# Patient Record
Sex: FEMALE | Race: WHITE | Employment: OTHER | URBAN - NONMETROPOLITAN AREA
[De-identification: names, ages, dates, MRNs, and addresses within clinical notes are randomized per-mention and may not be internally consistent; named-entity substitution may affect disease eponyms.]

---

## 2017-07-25 ENCOUNTER — HOSPITAL ENCOUNTER (OUTPATIENT)
Age: 82
Discharge: HOME | End: 2017-07-25
Payer: MEDICARE

## 2017-07-25 LAB
ALBUMIN SERPL-MCNC: 4.2 G/DL (ref 3.5–5.1)
ALP BLD-CCNC: 66 U/L (ref 38–126)
ALT SERPL-CCNC: 25 U/L (ref 11–66)
ANION GAP SERPL CALCULATED.3IONS-SCNC: 13 MEQ/L (ref 8–16)
AST SERPL-CCNC: 39 U/L (ref 5–40)
BILIRUB SERPL-MCNC: 0.6 MG/DL (ref 0.3–1.2)
BILIRUBIN DIRECT: < 0.2 MG/DL (ref 0–0.3)
BUN BLDV-MCNC: 13 MG/DL (ref 7–22)
CALCIUM SERPL-MCNC: 9.9 MG/DL (ref 8.5–10.5)
CHLORIDE BLD-SCNC: 96 MEQ/L (ref 98–111)
CHOLESTEROL, TOTAL: 143 MG/DL (ref 100–199)
CO2: 33 MEQ/L (ref 23–33)
CREAT SERPL-MCNC: 0.8 MG/DL (ref 0.4–1.2)
GFR SERPL CREATININE-BSD FRML MDRD: 68 ML/MIN/1.73M2
GLUCOSE BLD-MCNC: 116 MG/DL (ref 70–108)
HDLC SERPL-MCNC: 40 MG/DL
LDL CHOLESTEROL CALCULATED: 66 MG/DL
POTASSIUM SERPL-SCNC: 4.4 MEQ/L (ref 3.5–5.2)
SODIUM BLD-SCNC: 142 MEQ/L (ref 135–145)
TOTAL PROTEIN: 7.3 G/DL (ref 6.1–8)
TRIGL SERPL-MCNC: 185 MG/DL (ref 0–199)

## 2017-07-25 PROCEDURE — 80061 LIPID PANEL: CPT

## 2017-07-25 PROCEDURE — 36415 COLL VENOUS BLD VENIPUNCTURE: CPT

## 2017-07-25 PROCEDURE — 80053 COMPREHEN METABOLIC PANEL: CPT

## 2017-07-25 PROCEDURE — 82248 BILIRUBIN DIRECT: CPT

## 2017-07-29 ENCOUNTER — APPOINTMENT (OUTPATIENT)
Dept: GENERAL RADIOLOGY | Age: 82
DRG: 310 | End: 2017-07-29
Payer: MEDICARE

## 2017-07-29 ENCOUNTER — HOSPITAL ENCOUNTER (INPATIENT)
Age: 82
LOS: 2 days | Discharge: HOME | DRG: 310 | End: 2017-07-31
Attending: FAMILY MEDICINE | Admitting: FAMILY MEDICINE
Payer: MEDICARE

## 2017-07-29 ENCOUNTER — APPOINTMENT (OUTPATIENT)
Dept: CT IMAGING | Age: 82
DRG: 310 | End: 2017-07-29
Payer: MEDICARE

## 2017-07-29 DIAGNOSIS — I48.21 PERMANENT ATRIAL FIBRILLATION (HCC): ICD-10-CM

## 2017-07-29 DIAGNOSIS — R23.2 FLUSHING: Primary | ICD-10-CM

## 2017-07-29 DIAGNOSIS — N28.89 RENAL MASS, RIGHT: ICD-10-CM

## 2017-07-29 DIAGNOSIS — N28.89 RENAL MASS: ICD-10-CM

## 2017-07-29 DIAGNOSIS — K76.9 HEPATIC LESION: ICD-10-CM

## 2017-07-29 PROBLEM — I48.91 ATRIAL FIBRILLATION (HCC): Status: ACTIVE | Noted: 2017-07-29

## 2017-07-29 PROBLEM — I10 ESSENTIAL HYPERTENSION: Status: ACTIVE | Noted: 2017-07-29

## 2017-07-29 LAB
ALBUMIN SERPL-MCNC: 4.3 G/DL (ref 3.5–5.1)
ALP BLD-CCNC: 65 U/L (ref 38–126)
ALT SERPL-CCNC: 31 U/L (ref 11–66)
ANION GAP SERPL CALCULATED.3IONS-SCNC: 13 MEQ/L (ref 8–16)
AST SERPL-CCNC: 39 U/L (ref 5–40)
BACTERIA: ABNORMAL /HPF
BASOPHILS # BLD: 0.5 %
BASOPHILS ABSOLUTE: 0 THOU/MM3 (ref 0–0.1)
BILIRUB SERPL-MCNC: 0.5 MG/DL (ref 0.3–1.2)
BILIRUBIN DIRECT: < 0.2 MG/DL (ref 0–0.3)
BILIRUBIN URINE: NEGATIVE
BLOOD, URINE: NEGATIVE
BUN BLDV-MCNC: 19 MG/DL (ref 7–22)
CALCIUM SERPL-MCNC: 10.1 MG/DL (ref 8.5–10.5)
CASTS 2: ABNORMAL /LPF
CASTS UA: ABNORMAL /LPF
CEA: 2 NG/ML (ref 0–5)
CHARACTER, URINE: CLEAR
CHLORIDE BLD-SCNC: 94 MEQ/L (ref 98–111)
CO2: 31 MEQ/L (ref 23–33)
COLOR: YELLOW
CREAT SERPL-MCNC: 0.8 MG/DL (ref 0.4–1.2)
CRYSTALS, UA: ABNORMAL
EOSINOPHIL # BLD: 1 %
EOSINOPHILS ABSOLUTE: 0.1 THOU/MM3 (ref 0–0.4)
EPITHELIAL CELLS, UA: ABNORMAL /HPF
GFR SERPL CREATININE-BSD FRML MDRD: 68 ML/MIN/1.73M2
GLUCOSE BLD-MCNC: 107 MG/DL (ref 70–108)
GLUCOSE URINE: NEGATIVE MG/DL
HCT VFR BLD CALC: 43 % (ref 37–47)
HEMOGLOBIN: 14.5 GM/DL (ref 12–16)
INR BLD: 1.11 (ref 0.85–1.13)
KETONES, URINE: NEGATIVE
LEUKOCYTE ESTERASE, URINE: ABNORMAL
LYMPHOCYTES # BLD: 26.4 %
LYMPHOCYTES ABSOLUTE: 1.6 THOU/MM3 (ref 1–4.8)
MCH RBC QN AUTO: 31.3 PG (ref 27–31)
MCHC RBC AUTO-ENTMCNC: 33.8 GM/DL (ref 33–37)
MCV RBC AUTO: 92.4 FL (ref 81–99)
MISCELLANEOUS 2: ABNORMAL
MONOCYTES # BLD: 6.5 %
MONOCYTES ABSOLUTE: 0.4 THOU/MM3 (ref 0.4–1.3)
NITRITE, URINE: NEGATIVE
NUCLEATED RED BLOOD CELLS: 0 /100 WBC
OSMOLALITY CALCULATION: 278.4 MOSMOL/KG (ref 275–300)
PDW BLD-RTO: 14.5 % (ref 11.5–14.5)
PH UA: 7
PLATELET # BLD: 161 THOU/MM3 (ref 130–400)
PMV BLD AUTO: 8.1 MCM (ref 7.4–10.4)
POTASSIUM SERPL-SCNC: 4.5 MEQ/L (ref 3.5–5.2)
PRO-BNP: 936.8 PG/ML (ref 0–1800)
PROCALCITONIN: 0.04 NG/ML (ref 0.01–0.09)
PROTEIN UA: NEGATIVE
RBC # BLD: 4.65 MILL/MM3 (ref 4.2–5.4)
RBC # BLD: NORMAL 10*6/UL
RBC URINE: ABNORMAL /HPF
RENAL EPITHELIAL, UA: ABNORMAL
SEG NEUTROPHILS: 65.6 %
SEGMENTED NEUTROPHILS ABSOLUTE COUNT: 4 THOU/MM3 (ref 1.8–7.7)
SODIUM BLD-SCNC: 138 MEQ/L (ref 135–145)
SPECIFIC GRAVITY, URINE: 1.01 (ref 1–1.03)
TOTAL PROTEIN: 7.5 G/DL (ref 6.1–8)
TROPONIN T: < 0.01 NG/ML
TSH SERPL DL<=0.05 MIU/L-ACNC: 3.14 UIU/ML (ref 0.4–4.2)
UROBILINOGEN, URINE: 0.2 EU/DL
WBC # BLD: 6.1 THOU/MM3 (ref 4.8–10.8)
WBC UA: ABNORMAL /HPF
YEAST: ABNORMAL

## 2017-07-29 PROCEDURE — 74177 CT ABD & PELVIS W/CONTRAST: CPT

## 2017-07-29 PROCEDURE — 99222 1ST HOSP IP/OBS MODERATE 55: CPT | Performed by: FAMILY MEDICINE

## 2017-07-29 PROCEDURE — 99285 EMERGENCY DEPT VISIT HI MDM: CPT

## 2017-07-29 PROCEDURE — G0378 HOSPITAL OBSERVATION PER HR: HCPCS

## 2017-07-29 PROCEDURE — 36415 COLL VENOUS BLD VENIPUNCTURE: CPT

## 2017-07-29 PROCEDURE — 86301 IMMUNOASSAY TUMOR CA 19-9: CPT

## 2017-07-29 PROCEDURE — 85025 COMPLETE CBC W/AUTO DIFF WBC: CPT

## 2017-07-29 PROCEDURE — 1200000003 HC TELEMETRY R&B

## 2017-07-29 PROCEDURE — 84443 ASSAY THYROID STIM HORMONE: CPT

## 2017-07-29 PROCEDURE — 71260 CT THORAX DX C+: CPT

## 2017-07-29 PROCEDURE — 84145 PROCALCITONIN (PCT): CPT

## 2017-07-29 PROCEDURE — 82378 CARCINOEMBRYONIC ANTIGEN: CPT

## 2017-07-29 PROCEDURE — 93005 ELECTROCARDIOGRAM TRACING: CPT

## 2017-07-29 PROCEDURE — 82248 BILIRUBIN DIRECT: CPT

## 2017-07-29 PROCEDURE — 80053 COMPREHEN METABOLIC PANEL: CPT

## 2017-07-29 PROCEDURE — 6360000004 HC RX CONTRAST MEDICATION: Performed by: FAMILY MEDICINE

## 2017-07-29 PROCEDURE — 83880 ASSAY OF NATRIURETIC PEPTIDE: CPT

## 2017-07-29 PROCEDURE — 85610 PROTHROMBIN TIME: CPT

## 2017-07-29 PROCEDURE — 84484 ASSAY OF TROPONIN QUANT: CPT

## 2017-07-29 PROCEDURE — 87086 URINE CULTURE/COLONY COUNT: CPT

## 2017-07-29 PROCEDURE — 6370000000 HC RX 637 (ALT 250 FOR IP): Performed by: FAMILY MEDICINE

## 2017-07-29 PROCEDURE — 81001 URINALYSIS AUTO W/SCOPE: CPT

## 2017-07-29 PROCEDURE — 71010 XR CHEST PORTABLE: CPT

## 2017-07-29 RX ORDER — AMLODIPINE BESYLATE 5 MG/1
5 TABLET ORAL ONCE
Status: COMPLETED | OUTPATIENT
Start: 2017-07-29 | End: 2017-07-29

## 2017-07-29 RX ADMIN — IOPAMIDOL 80 ML: 755 INJECTION, SOLUTION INTRAVENOUS at 21:46

## 2017-07-29 RX ADMIN — AMLODIPINE BESYLATE 5 MG: 5 TABLET ORAL at 22:54

## 2017-07-29 ASSESSMENT — ENCOUNTER SYMPTOMS
RHINORRHEA: 0
VOMITING: 0
COUGH: 0
SHORTNESS OF BREATH: 0
DIARRHEA: 0
ABDOMINAL PAIN: 0
SORE THROAT: 0
EYE PAIN: 0
EYE DISCHARGE: 0
WHEEZING: 0
BACK PAIN: 0
NAUSEA: 0
COLOR CHANGE: 0

## 2017-07-29 NOTE — ED PROVIDER NOTES
[facility]  eMERGENCY dEPARTMENT eNCOUnter          CHIEF COMPLAINT       Chief Complaint   Patient presents with    Other     feeling flushed       Nurses Notes reviewed and I agree except as noted in the HPI. HISTORY OF PRESENT ILLNESS    Ava Bernstein is a 80 y.o. female who presents to the Emergency Department with her son for the evaluation of feeling flushed. The patient states her first episode was last Monday which lasted for 20 seconds and then resolved on its own. The patient then had a second episode this Thursday which lasted for several hours. Just prior to arrival, the patient was waiting to order food where she started to become flush and then asked her son to take her to the ED. The patient reports these episodes can come on when she is resting, walking or when she is active. She states these episodes make her \"not feel right\" and \"everything stops\". She cannot further elaborate. She has history of similar episodes years ago which she has an extensive cardiac workup. She was found to have a fib and started on Xarelto. At that time, she was told she did not require a pace-maker. She follows up with Dr. Baljinder Bruce annually. She denies all other complains. She also denies any new or changing back pain and leg swelling. The patient also denies nausea, vomiting, urinary problems, numbness and tingling. She has no history or stents, strokes, DVT, and PE. She denies any recent travel. The HPI was provided by the patient. REVIEW OF SYSTEMS     Review of Systems   Constitutional: Negative for appetite change, chills, fatigue and fever. Positive for malaise and feeling flushed. HENT: Negative for congestion, ear pain, rhinorrhea and sore throat. Eyes: Negative for pain, discharge and visual disturbance. Respiratory: Negative for cough, shortness of breath and wheezing. Cardiovascular: Negative for chest pain, palpitations and leg swelling. pulmonary edema. Small pericardial effusion. No displaced fracture is seen. Motion artifact limits exam. Reticular lower to mid lung density suggestive of scarring and/or atelectasis. No pleural effusion or pneumothorax No lymphadenopathy is seen. Heterogeneity at right thyroid lobe suggestive of nodules which appear to be chronic although follow-up ultrasound for further evaluation may be helpful. Moderate atherosclerotic calcifications. Mild to moderate scoliosis and moderate spinal degenerative changes IMPRESSION:  1. No acute intrathoracic abnormality is identified. 2. Further details and incidental findings are described above. Final report electronically signed by Dr. Alex Carrillo on 7/29/2017 10:24 PM    Ct Abdomen Pelvis W Iv Contrast Additional Contrast? None    Result Date: 7/29/2017  PROCEDURE: CT ABDOMEN PELVIS W IV CONTRAST CLINICAL INFORMATION: patient with flusing, history of renal mass . COMPARISON: 2-15, 9-15, 2-16, 5-16 TECHNIQUE: CT of abdomen and pelvis with IV Isovue. All CT scans at this facility use dose modulation, iterative reconstruction, and/or weight-based dosing when appropriate to reduce radiation dose to as low as reasonably achievable. FINDINGS: Posterior right hepatic lobe has indistinct hypodensity approximated at 3.5 x 2.8 cm Hysterectomy. No bowel obstruction. Moderate atherosclerotic disease Moderate spinal degenerative changes. Subcentimeter low-attenuation lesion left hepatic lobe too small to characterize on image 24. Mass posteriorly of the right kidney however has soft tissue around the periphery and central low attenuation, size up to about 2.6 cm Mild pancreatic atrophy. Motion limits the exam     1. No acute abnormality is identified in the abdomen and pelvis.  2.  Mass at the posterior right kidney is about 2.6 cm having peripheral soft tissue attenuation and central cystic, not significantly changed but a renal cell carcinoma is a consideration including prior exams

## 2017-07-29 NOTE — IP AVS SNAPSHOT
8/1/17  AM                            pravastatin 20 MG tablet   Commonly known as:  PRAVACHOL   Take 20 mg by mouth daily. 20 mg on 7/30/2017  9:14 PM     Tonight                          rivaroxaban 20 MG Tabs tablet   Commonly known as:  XARELTO   Take 20 mg by mouth daily                20 mg on 7/30/2017  6:06 PM     Today at 6pm                          therapeutic multivitamin-minerals tablet   Take 1 tablet by mouth daily. 1 tablet on 7/31/2017  9:18 AM     Tomorrow  8/1/17  AM                            OCUVITE PO   Take 1 tablet by mouth daily                1 tablet on 7/31/2017  9:18 AM     Tomorrow  8/1/17  AM                            VITAMIN D-3 PO   Take by mouth daily                  Tomorrow  8/1/17  AM                              These are the medications you have told us you were taking at home, STOP taking them after you leave the hospital     VITAMIN E PO               Allergies as of 7/31/2017        Reactions    Bactrim [Sulfamethoxazole-trimethoprim]     Ciprofloxacin Diarrhea    Sulfa Antibiotics       Immunizations as of 7/31/2017     No immunizations on file. Last Vitals          Most Recent Value    Temp  98.2 °F (36.8 °C)    Pulse  84    Resp  16    BP  (!)  135/90         After Visit Summary    This summary was created for you. Thank you for entrusting your care to us. The following information includes details about your hospital/visit stay along with steps you should take to help with your recovery once you leave the hospital.  In this packet, you will find information about the topics listed below:    · Instructions about your medications including a list of your home medications  · A summary of your hospital visit  · Follow-up appointments once you have left the hospital  · Your care plan at home      You may receive a survey regarding the care you received during your stay. [address] [place] [address]       9/20/2017 1:15 PM     Appointment with Elmer Tran MD at [facility] ([phone])   Please arrive 15 minutes prior to appointment, bring photo ID and insurance card. Please arrive 15 minutes prior to appointment, bring photo ID and insurance card. [address] [address]       10/2/2017     Imaging:  MRI ABDOMEN W WO CONTRAST          Preventive Care        Date Due    Tetanus Combination Vaccine (1 - Tdap) 4/22/1948    Zoster Vaccine 4/22/1989    Pneumococcal Vaccines (two) for all adults aged 72 and over (1 of 2 - PCV13) 4/22/1994    Yearly Flu Vaccine (1) 8/1/2017                 Care Plan Once You Return Home    This section includes instructions you will need to follow once you leave the hospital.  Your care team will discuss these with you, so you and those caring for you know how to best care for your health needs at home. This section may also include educational information about certain health topics that may be of help to you. Diet Instructions     ? Good nutrition is important when healing from an illness, injury, or surgery. Follow any nutrition recommendations given to you during your hospital stay. ? If you were given an oral nutrition supplement while in the hospital, continue to take this supplement at home. You can take it with meals, in-between meals, and/or before bedtime. These supplements can be purchased at most local grocery stores, pharmacies, and chain super-stores.    ? If you have any questions about your diet or nutrition, call the hospital and ask for the dietitian.  general           Activity Instructions     As tolerated           Procedures and Other Instructions     MRI ABDOMEN W WO CONTRAST       Reason for exam:  right renal mass, liver lesion               MyChart Signup     MyChart allows you to send messages to your doctor, view your test

## 2017-07-30 LAB
CA 19-9: < 1 U/ML (ref 0–35)
EKG ATRIAL RATE: 416 BPM
EKG Q-T INTERVAL: 404 MS
EKG QRS DURATION: 140 MS
EKG QTC CALCULATION (BAZETT): 480 MS
EKG R AXIS: -56 DEGREES
EKG T AXIS: -26 DEGREES
EKG VENTRICULAR RATE: 85 BPM
ORGANISM: ABNORMAL
URINE CULTURE REFLEX: ABNORMAL

## 2017-07-30 PROCEDURE — 6370000000 HC RX 637 (ALT 250 FOR IP): Performed by: FAMILY MEDICINE

## 2017-07-30 PROCEDURE — 99221 1ST HOSP IP/OBS SF/LOW 40: CPT | Performed by: NURSE PRACTITIONER

## 2017-07-30 PROCEDURE — 2580000003 HC RX 258: Performed by: FAMILY MEDICINE

## 2017-07-30 PROCEDURE — 1200000000 HC SEMI PRIVATE

## 2017-07-30 PROCEDURE — G0378 HOSPITAL OBSERVATION PER HR: HCPCS

## 2017-07-30 RX ORDER — CALCIUM CARBONATE 200(500)MG
500 TABLET,CHEWABLE ORAL DAILY
Status: DISCONTINUED | OUTPATIENT
Start: 2017-07-30 | End: 2017-07-31 | Stop reason: HOSPADM

## 2017-07-30 RX ORDER — HYDROCHLOROTHIAZIDE 25 MG/1
12.5 TABLET ORAL DAILY
Status: DISCONTINUED | OUTPATIENT
Start: 2017-07-30 | End: 2017-07-30

## 2017-07-30 RX ORDER — LISINOPRIL AND HYDROCHLOROTHIAZIDE 20; 12.5 MG/1; MG/1
1 TABLET ORAL DAILY
Status: DISCONTINUED | OUTPATIENT
Start: 2017-07-30 | End: 2017-07-30 | Stop reason: SDUPTHER

## 2017-07-30 RX ORDER — AMLODIPINE BESYLATE 5 MG/1
5 TABLET ORAL DAILY
Status: DISCONTINUED | OUTPATIENT
Start: 2017-07-30 | End: 2017-07-31 | Stop reason: HOSPADM

## 2017-07-30 RX ORDER — ACETAMINOPHEN 325 MG/1
650 TABLET ORAL EVERY 4 HOURS PRN
Status: DISCONTINUED | OUTPATIENT
Start: 2017-07-30 | End: 2017-07-31 | Stop reason: HOSPADM

## 2017-07-30 RX ORDER — ONDANSETRON 2 MG/ML
4 INJECTION INTRAMUSCULAR; INTRAVENOUS EVERY 6 HOURS PRN
Status: DISCONTINUED | OUTPATIENT
Start: 2017-07-30 | End: 2017-07-31 | Stop reason: HOSPADM

## 2017-07-30 RX ORDER — LISINOPRIL AND HYDROCHLOROTHIAZIDE 20; 12.5 MG/1; MG/1
1 TABLET ORAL DAILY
Status: DISCONTINUED | OUTPATIENT
Start: 2017-07-31 | End: 2017-07-31 | Stop reason: HOSPADM

## 2017-07-30 RX ORDER — POTASSIUM CHLORIDE 750 MG/1
10 TABLET, FILM COATED, EXTENDED RELEASE ORAL DAILY
Status: DISCONTINUED | OUTPATIENT
Start: 2017-07-30 | End: 2017-07-31 | Stop reason: HOSPADM

## 2017-07-30 RX ORDER — PRAVASTATIN SODIUM 20 MG
20 TABLET ORAL NIGHTLY
Status: DISCONTINUED | OUTPATIENT
Start: 2017-07-30 | End: 2017-07-31 | Stop reason: HOSPADM

## 2017-07-30 RX ORDER — ATENOLOL 25 MG/1
25 TABLET ORAL DAILY
Status: DISCONTINUED | OUTPATIENT
Start: 2017-07-30 | End: 2017-07-31 | Stop reason: HOSPADM

## 2017-07-30 RX ORDER — LISINOPRIL 20 MG/1
20 TABLET ORAL DAILY
Status: DISCONTINUED | OUTPATIENT
Start: 2017-07-30 | End: 2017-07-30

## 2017-07-30 RX ORDER — SODIUM CHLORIDE 0.9 % (FLUSH) 0.9 %
10 SYRINGE (ML) INJECTION PRN
Status: DISCONTINUED | OUTPATIENT
Start: 2017-07-30 | End: 2017-07-31 | Stop reason: HOSPADM

## 2017-07-30 RX ORDER — M-VIT,TX,IRON,MINS/CALC/FOLIC 27MG-0.4MG
1 TABLET ORAL DAILY
Status: DISCONTINUED | OUTPATIENT
Start: 2017-07-30 | End: 2017-07-31 | Stop reason: HOSPADM

## 2017-07-30 RX ORDER — PHENOL 1.4 %
1 AEROSOL, SPRAY (ML) MUCOUS MEMBRANE DAILY
Status: DISCONTINUED | OUTPATIENT
Start: 2017-07-30 | End: 2017-07-30 | Stop reason: CLARIF

## 2017-07-30 RX ORDER — SODIUM CHLORIDE 0.9 % (FLUSH) 0.9 %
10 SYRINGE (ML) INJECTION EVERY 12 HOURS SCHEDULED
Status: DISCONTINUED | OUTPATIENT
Start: 2017-07-30 | End: 2017-07-31 | Stop reason: HOSPADM

## 2017-07-30 RX ORDER — FAMOTIDINE 20 MG/1
20 TABLET, FILM COATED ORAL 2 TIMES DAILY
Status: DISCONTINUED | OUTPATIENT
Start: 2017-07-30 | End: 2017-07-31 | Stop reason: HOSPADM

## 2017-07-30 RX ADMIN — Medication 10 ML: at 12:03

## 2017-07-30 RX ADMIN — AMLODIPINE BESYLATE 5 MG: 5 TABLET ORAL at 12:01

## 2017-07-30 RX ADMIN — Medication 20 MG: at 21:14

## 2017-07-30 RX ADMIN — LISINOPRIL 20 MG: 20 TABLET ORAL at 12:02

## 2017-07-30 RX ADMIN — FAMOTIDINE 20 MG: 20 TABLET, FILM COATED ORAL at 21:11

## 2017-07-30 RX ADMIN — HYDROCHLOROTHIAZIDE 12.5 MG: 25 TABLET ORAL at 12:02

## 2017-07-30 RX ADMIN — ATENOLOL 25 MG: 25 TABLET ORAL at 12:02

## 2017-07-30 RX ADMIN — FAMOTIDINE 20 MG: 20 TABLET, FILM COATED ORAL at 12:00

## 2017-07-30 RX ADMIN — Medication 10 ML: at 21:12

## 2017-07-30 ASSESSMENT — PAIN SCALES - GENERAL: PAINLEVEL_OUTOF10: 0

## 2017-07-30 NOTE — ONCOLOGY
[facility] PROFESSIONAL SERVICES  [facility]  [address] [facility] [address] [facility] [address]  [address] [address]  Dept: [phone]  Dept Fax: [phone]: [phone]    Brief note, full consult to follow    Patient:  Shantell Saunders    Chief Complaint: Renal mass, hepatic mass    Patient will require a tissue diagnosis for further oncology recommendations. Urology has been consulted for kidney mass. If tissue diagnosis is not obtained from kidney, then biopsy of liver lesion may be necessary. Vance Figueroa M.D.   [facility]

## 2017-07-30 NOTE — CONSULTS
[facility] Urology Consult Note  [facility]  [facility], [address]    Reason for Consult:  Right renal mass  Requesting Physician:  Dr. Gila Lombardi    History Obtained From:  patient, electronic medical record    HISTORY OF PRESENT ILLNESS:                The patient is a 80 y.o. female with significant past medical history of atrial fibrillation, HTN who presents with feeling of being flushed. She is a patient of Dr. Santos Rodriguez with a known right renal mass. We have been following this as she does not want to undergo surgical procedure to remove it. She denies renal colic, weight loss, night sweats, nausea, vomiting, gross hematuria.     Past Medical History:        Diagnosis Date    Atrial fibrillation ([address]     Gross hematuria 2014    Dr Adi Perrin HTN (hypertension)      Past Surgical History:        Procedure Laterality Date    FOOT SURGERY      x2    HYSTERECTOMY  1982    KNEE SURGERY  2010     Current Medications:   Current Facility-Administered Medications: amLODIPine (NORVASC) tablet 5 mg, 5 mg, Oral, Daily  atenolol (TENORMIN) tablet 25 mg, 25 mg, Oral, Daily  therapeutic multivitamin-minerals 1 tablet, 1 tablet, Oral, Daily  potassium chloride (KLOR-CON) extended release tablet 10 mEq, 10 mEq, Oral, Daily  pravastatin (PRAVACHOL) tablet 20 mg, 20 mg, Oral, Nightly  rivaroxaban (XARELTO) tablet 20 mg, 20 mg, Oral, Daily  sodium chloride flush 0.9 % injection 10 mL, 10 mL, Intravenous, 2 times per day  sodium chloride flush 0.9 % injection 10 mL, 10 mL, Intravenous, PRN  acetaminophen (TYLENOL) tablet 650 mg, 650 mg, Oral, Q4H PRN  magnesium hydroxide (MILK OF MAGNESIA) 400 MG/5ML suspension 30 mL, 30 mL, Oral, Daily PRN  ondansetron (ZOFRAN) injection 4 mg, 4 mg, Intravenous, Q6H PRN  famotidine (PEPCID) tablet 20 mg, 20 mg, Oral, BID  calcium carbonate (TUMS) chewable tablet 500 mg, 500 mg, Oral, Daily  lisinopril (PRINIVIL;ZESTRIL) tablet 20 mg, 20 mg, Oral, Daily **AND** hydrochlorothiazide active. Extremities:    No cyanosis, clubbing, or edema present. No tenderness of lower extremities. Neurological:    Alert and oriented. No cranial nerve deficit. There are no focalizing motor or   Psychiatric:    Normal mood and affect. DATA:  CBC:   Lab Results   Component Value Date    WBC 6.1 07/29/2017    RBC 4.65 07/29/2017    RBC 4.27 07/18/2011    HGB 14.5 07/29/2017    HCT 43.0 07/29/2017    MCV 92.4 07/29/2017    MCH 31.3 07/29/2017    MCHC 33.8 07/29/2017    RDW 14.5 07/29/2017     07/29/2017    MPV 8.1 07/29/2017     BMP:    Lab Results   Component Value Date     07/29/2017    K 4.5 07/29/2017    CL 94 07/29/2017    CO2 31 07/29/2017    BUN 19 07/29/2017    CREATININE 0.8 07/29/2017    CALCIUM 10.1 07/29/2017    LABGLOM 68 07/29/2017    GLUCOSE 107 07/29/2017    GLUCOSE 111 05/15/2012     BUN/Creatinine:    Lab Results   Component Value Date    BUN 19 07/29/2017    CREATININE 0.8 07/29/2017     Magnesium:    Lab Results   Component Value Date    MG 2.0 05/26/2016     Phosphorus:  No results found for: PHOS  PT/INR:    Lab Results   Component Value Date    PROTIME 2.09 12/20/2011    INR 1.11 07/29/2017     U/A:    Lab Results   Component Value Date    NITRITE neg 09/20/2016    COLORU YELLOW 07/29/2017    PHUR 7.0 07/29/2017    WBCUA 2-4 07/29/2017    WBCUA 25-50 07/18/2011    RBCUA 0-2 07/29/2017    MUCUS THREADS 07/18/2011    YEAST NONE SEEN 07/29/2017    BACTERIA NONE 07/29/2017    SPECGRAV 1.015 07/12/2014    LEUKOCYTESUR MODERATE 07/29/2017    UROBILINOGEN 0.2 07/29/2017    BILIRUBINUR NEGATIVE 07/29/2017    BILIRUBINUR NEGATIVE 07/18/2011    BLOODU NEGATIVE 07/29/2017    GLUCOSEU NEGATIVE 07/29/2017     Urine Culture:  No components found for: CURINE  Blood Culture:  No components found for: CBLOOD, CFUNGUSBL    IMAGING: The patient has had a CT With Contrast which I have reviewed along with its accompanying report.   The study demonstrates posterior right renal mass 2.6 cm

## 2017-07-30 NOTE — ED NOTES
Pt resting in bed with side rails up 2x2 and call light in reach.         Eddi Foley RN  07/30/17 2524

## 2017-07-30 NOTE — H&P
noted above are negative. Vitals:   Vitals:    07/29/17 2255   BP: (!) 176/94   Pulse: 77   Resp:    Temp:    SpO2: 96%      BMI: Body mass index is 29.18 kg/(m^2).                 Exam:  Physical Examination: General appearance - alert, well appearing, and in no distress  Mental status - alert, oriented to person, place, and time  Neck - supple, no significant adenopathy, no JVD, or carotid bruits  Chest - clear to auscultation, no wheezes, rales or rhonchi, symmetric air entry  Heart - normal rate, regular rhythm, normal S1, S2, no murmurs, rubs, clicks or gallops  Abdomen - soft, nontender, nondistended, no masses or organomegaly  Neurological - alert, oriented, normal speech, no focal findings or movement disorder noted  Musculoskeletal - no joint tenderness, deformity or swelling  Extremities - peripheral pulses normal, no pedal edema, no clubbing or cyanosis  Skin - normal coloration and turgor, no rashes, no suspicious skin lesions noted      Review of Labs and Diagnostic Testing:    Recent Results (from the past 24 hour(s))   EKG 12 Lead    Collection Time: 07/29/17  7:04 PM   Result Value Ref Range    Ventricular Rate 85 BPM    Atrial Rate 416 BPM    QRS Duration 140 ms    Q-T Interval 404 ms    QTc Calculation (Bazett) 480 ms    R Axis -56 degrees    T Axis -26 degrees   CBC auto differential    Collection Time: 07/29/17  7:55 PM   Result Value Ref Range    WBC 6.1 4.8 - 10.8 thou/mm3    RBC 4.65 4.20 - 5.40 mill/mm3    Hemoglobin 14.5 12.0 - 16.0 gm/dl    Hematocrit 43.0 37.0 - 47.0 %    MCV 92.4 81.0 - 99.0 fL    MCH 31.3 (H) 27.0 - 31.0 pg    MCHC 33.8 33.0 - 37.0 gm/dl    RDW 14.5 11.5 - 14.5 %    Platelets 028 091 - 359 thou/mm3    MPV 8.1 7.4 - 10.4 mcm    RBC Morphology NORMAL     Seg Neutrophils 65.6 %    Lymphocytes 26.4 %    Monocytes 6.5 %    Eosinophils 1.0 %    Basophils 0.5 %    nRBC 0 /100 wbc    Segs Absolute 4.0 1.8 - 7.7 thou/mm3    Lymphocytes # 1.6 1.0 - 4.8 thou/mm3    Monocytes # 0.4 0.4 - 1.3 thou/mm3    Eosinophils # 0.1 0.0 - 0.4 thou/mm3    Basophils # 0.0 0.0 - 0.1 thou/mm3   Basic Metabolic Panel    Collection Time: 07/29/17  7:55 PM   Result Value Ref Range    Sodium 138 135 - 145 meq/L    Potassium 4.5 3.5 - 5.2 meq/L    Chloride 94 (L) 98 - 111 meq/L    CO2 31 23 - 33 meq/L    Glucose 107 70 - 108 mg/dL    BUN 19 7 - 22 mg/dL    CREATININE 0.8 0.4 - 1.2 mg/dL    Calcium 10.1 8.5 - 10.5 mg/dL   Hepatic function panel    Collection Time: 07/29/17  7:55 PM   Result Value Ref Range    Alb 4.3 3.5 - 5.1 g/dL    Total Bilirubin 0.5 0.3 - 1.2 mg/dL    Bilirubin, Direct <0.2 0.0 - 0.3 mg/dL    Alkaline Phosphatase 65 38 - 126 U/L    AST 39 5 - 40 U/L    ALT 31 11 - 66 U/L    Total Protein 7.5 6.1 - 8.0 g/dL   Protime-INR    Collection Time: 07/29/17  7:55 PM   Result Value Ref Range    INR 1.11 0.85 - 1.13   Procalcitonin    Collection Time: 07/29/17  7:55 PM   Result Value Ref Range    Procalcitonin 0.04 0.01 - 0.09 ng/mL   Troponin    Collection Time: 07/29/17  7:55 PM   Result Value Ref Range    Troponin T < 0.010 ng/ml   Brain Natriuretic Peptide    Collection Time: 07/29/17  7:55 PM   Result Value Ref Range    Pro-.8 0.0 - 1800.0 pg/mL   TSH with Reflex    Collection Time: 07/29/17  7:55 PM   Result Value Ref Range    TSH 3.140 0.400 - 4.20 uIU/mL   Anion Gap    Collection Time: 07/29/17  7:55 PM   Result Value Ref Range    Anion Gap 13.0 8.0 - 16.0 meq/L   Glomerular Filtration Rate, Estimated    Collection Time: 07/29/17  7:55 PM   Result Value Ref Range    Est, Glom Filt Rate 68 (A) ml/min/1.73m2   Osmolality    Collection Time: 07/29/17  7:55 PM   Result Value Ref Range    Osmolality Calc 278.4 275.0 - 300 mOsmol/kg   CEA    Collection Time: 07/29/17 10:29 PM   Result Value Ref Range    CEA 2.0 0.0 - 5.0 ng/ml       Radiology:     Ct Chest W Contrast    Result Date: 7/29/2017  PROCEDURE: CT CHEST W CONTRAST CLINICAL INFORMATION: Trauma .  COMPARISON: 7-10 TECHNIQUE: CT chest

## 2017-07-31 VITALS
TEMPERATURE: 98.2 F | BODY MASS INDEX: 29.02 KG/M2 | RESPIRATION RATE: 16 BRPM | SYSTOLIC BLOOD PRESSURE: 135 MMHG | WEIGHT: 170 LBS | HEART RATE: 84 BPM | OXYGEN SATURATION: 94 % | HEIGHT: 64 IN | DIASTOLIC BLOOD PRESSURE: 90 MMHG

## 2017-07-31 LAB
ANION GAP SERPL CALCULATED.3IONS-SCNC: 11 MEQ/L (ref 8–16)
BUN BLDV-MCNC: 19 MG/DL (ref 7–22)
CALCIUM SERPL-MCNC: 9.3 MG/DL (ref 8.5–10.5)
CHLORIDE BLD-SCNC: 97 MEQ/L (ref 98–111)
CO2: 31 MEQ/L (ref 23–33)
CREAT SERPL-MCNC: 0.8 MG/DL (ref 0.4–1.2)
GFR SERPL CREATININE-BSD FRML MDRD: 68 ML/MIN/1.73M2
GLUCOSE BLD-MCNC: 114 MG/DL (ref 70–108)
HCT VFR BLD CALC: 44.2 % (ref 37–47)
HEMOGLOBIN: 14.9 GM/DL (ref 12–16)
MCH RBC QN AUTO: 31.4 PG (ref 27–31)
MCHC RBC AUTO-ENTMCNC: 33.7 GM/DL (ref 33–37)
MCV RBC AUTO: 93.3 FL (ref 81–99)
PDW BLD-RTO: 14 % (ref 11.5–14.5)
PLATELET # BLD: 154 THOU/MM3 (ref 130–400)
PMV BLD AUTO: 8.1 MCM (ref 7.4–10.4)
POTASSIUM SERPL-SCNC: 3.9 MEQ/L (ref 3.5–5.2)
RBC # BLD: 4.74 MILL/MM3 (ref 4.2–5.4)
SODIUM BLD-SCNC: 139 MEQ/L (ref 135–145)
WBC # BLD: 5.4 THOU/MM3 (ref 4.8–10.8)

## 2017-07-31 PROCEDURE — 99232 SBSQ HOSP IP/OBS MODERATE 35: CPT | Performed by: NURSE PRACTITIONER

## 2017-07-31 PROCEDURE — 6370000000 HC RX 637 (ALT 250 FOR IP): Performed by: FAMILY MEDICINE

## 2017-07-31 PROCEDURE — 99238 HOSP IP/OBS DSCHRG MGMT 30/<: CPT | Performed by: INTERNAL MEDICINE

## 2017-07-31 PROCEDURE — 85027 COMPLETE CBC AUTOMATED: CPT

## 2017-07-31 PROCEDURE — 36415 COLL VENOUS BLD VENIPUNCTURE: CPT

## 2017-07-31 PROCEDURE — G0378 HOSPITAL OBSERVATION PER HR: HCPCS

## 2017-07-31 PROCEDURE — 80048 BASIC METABOLIC PNL TOTAL CA: CPT

## 2017-07-31 PROCEDURE — 83835 ASSAY OF METANEPHRINES: CPT

## 2017-07-31 RX ORDER — POTASSIUM CHLORIDE 20 MEQ/1
20 TABLET, EXTENDED RELEASE ORAL DAILY PRN
Status: ON HOLD | COMMUNITY
End: 2018-12-24 | Stop reason: HOSPADM

## 2017-07-31 RX ORDER — FUROSEMIDE 20 MG/1
20 TABLET ORAL DAILY PRN
Status: ON HOLD | COMMUNITY
End: 2018-12-24 | Stop reason: HOSPADM

## 2017-07-31 RX ADMIN — ANTACID TABLETS 500 MG: 500 TABLET, CHEWABLE ORAL at 09:18

## 2017-07-31 RX ADMIN — FAMOTIDINE 20 MG: 20 TABLET, FILM COATED ORAL at 09:18

## 2017-07-31 RX ADMIN — AMLODIPINE BESYLATE 5 MG: 5 TABLET ORAL at 09:20

## 2017-07-31 RX ADMIN — ATENOLOL 25 MG: 25 TABLET ORAL at 09:20

## 2017-07-31 RX ADMIN — MULTIPLE VITAMINS W/ MINERALS TAB 1 TABLET: TAB at 09:18

## 2017-07-31 RX ADMIN — POTASSIUM CHLORIDE 10 MEQ: 750 TABLET, FILM COATED, EXTENDED RELEASE ORAL at 09:18

## 2017-07-31 RX ADMIN — LISINOPRIL AND HYDROCHLOROTHIAZIDE 1 TABLET: 20; 12.5 TABLET ORAL at 09:19

## 2017-07-31 ASSESSMENT — PAIN SCALES - GENERAL: PAINLEVEL_OUTOF10: 0

## 2017-07-31 NOTE — CARE COORDINATION
17, 12:33 PM      Ava Bernstein       Admitted from: ER 2017/ Stiven Jacksonzane [place] 429 day: 2   [facility] Reason for admit: Renal mass [N28.89] Status: Inpt  Admit order signed?: yes  PMH:  has a past medical history of Atrial fibrillation ([address] Gross hematuria; and HTN (hypertension). Procedure: No  Pertinent abnormal Imagin17 CT Abd/Pelvis  1.  No acute abnormality is identified in the abdomen and pelvis. 2.  Mass at the posterior right kidney is about 2.6 cm having peripheral soft tissue attenuation and central cystic, not significantly changed but a renal cell carcinoma is a consideration including prior exams demonstrating enhancement of the peripheral    soft tissue. 3.  Vague low-attenuation area in the posterior right hepatic lobe about 3.5 cm size is unable to be characterized and a neoplasm is a consideration. Medications:  Scheduled Meds:   amLODIPine  5 mg Oral Daily    atenolol  25 mg Oral Daily    therapeutic multivitamin-minerals  1 tablet Oral Daily    potassium chloride  10 mEq Oral Daily    pravastatin  20 mg Oral Nightly    rivaroxaban  20 mg Oral Daily    sodium chloride flush  10 mL Intravenous 2 times per day    famotidine  20 mg Oral BID    calcium carbonate  500 mg Oral Daily    lisinopril-hydrochlorothiazide  1 tablet Oral Daily     Continuous Infusions:    Pertinent Info/Orders/Treatment Plan:  VS. I&O. Telemetry. Urology and Oncology following. Diet: DIET GENERAL;   DVT Prophylaxis: Xarelto  Smoking status:  reports that she has never smoked.  She has never used smokeless tobacco.   Influenza Vaccination Screening Completed: n/a  Pneumonia Vaccination Screening Completed: yes  Core measures: VTE  PCP: Marylee Bernheim, DO  Readmission:   No  Risk Score: 8.25     Discharge Planning  Current Residence:  Private Residence  Living Arrangements:  Alone   Support Systems:  Family Members, Children  Current Services PTA:     Potential Assistance

## 2017-07-31 NOTE — DISCHARGE SUMMARY
low-attenuation area in the posterior right hepatic lobe about 3.5 cm size is unable to be characterized and a neoplasm is a consideration. Final report electronically signed by Dr. David Schmidt on 7/29/2017 10:26 PM      CT Chest W Contrast   Final Result   Mild to moderate scoliosis and moderate spinal degenerative changes IMPRESSION:          1. No acute intrathoracic abnormality is identified. 2. Further details and incidental findings are described above. Final report electronically signed by Dr. David Schmidt on 7/29/2017 10:24 PM      XR Chest Portable   Final Result       No acute cardiopulmonary process is identified by portable evaluation. *This report may contain minor errors which are inherent in voice recognition technology. Final report electronically signed by Dr. David Schmidt on 7/29/2017 8:14 PM      MRI ABDOMEN W WO CONTRAST    (Results Pending)          Consults:     IP CONSULT TO ONCOLOGY  IP CONSULT TO UROLOGY    Disposition:    [] Home       [] TCU       [] Rehab       [] Psych       [] SNF       [] [place]       [] Other-    Condition at Discharge: Stable    Code Status:  Full Code     Patient Instructions:    Discharge lab work: Activity: activity as tolerated  Diet: DIET GENERAL;      Follow-up visits:   Meryl Angelucci, DO  [address] [place] [address]  [phone]               Discharge Medications: Radha Stairs   Home Medication Instructions [id number]    Printed on:07/31/17 7682   Medication Information                      amLODIPine (NORVASC) 5 MG tablet  Take 5 mg by mouth daily. atenolol (TENORMIN) 25 MG tablet  Take 25 mg by mouth daily. calcium carbonate 600 MG TABS tablet  Take 1 tablet by mouth daily.              Cholecalciferol (VITAMIN D-3 PO)  Take by mouth daily              furosemide (LASIX) 20 MG tablet  Take 20 mg by mouth daily as needed (swelling) Glucosamine-Chondroitin (GLUCOSAMINE CHONDR COMPLEX PO)  Take 2 tablets by mouth daily Move Free             lisinopril-hydrochlorothiazide (PRINZIDE;ZESTORETIC) 20-12.5 MG per tablet  Take 1 tablet by mouth daily. Multiple Vitamins-Minerals (OCUVITE PO)  Take 1 tablet by mouth daily              Multiple Vitamins-Minerals (THERAPEUTIC MULTIVITAMIN-MINERALS) tablet  Take 1 tablet by mouth daily. potassium chloride (KLOR-CON M) 20 MEQ extended release tablet  Take 20 mEq by mouth daily as needed (when furosemide is taken)             POTASSIUM GLUCONATE PO  Take 500 mg by mouth daily              pravastatin (PRAVACHOL) 20 MG tablet  Take 20 mg by mouth daily. rivaroxaban (XARELTO) 20 MG TABS tablet  Take 20 mg by mouth daily                  Time Spent on discharge is more than 20 minutes in the examination, evaluation, counseling and review of medications and discharge plan. Signed: Thank you Meryl Angelucci, DO for the opportunity to be involved in this patient's care.     Electronically signed by Edyta Stewart MD on 7/31/2017 at 1:55 PM

## 2017-07-31 NOTE — CARE COORDINATION
7/31/17, 12:46 PM    Discharge plan discussed by  and . Discharge plan reviewed with patient/ family. Patient/ family verbalize understanding of discharge plan and are in agreement with plan. Understanding was demonstrated using the teach back method. IMM Letter  IMM Letter given to Patient/Family/Significant other/Guardian/POA/by[de-identified]   IMM Letter date given[de-identified] 07/31/17  IMM Letter time given[de-identified] 0945       Upon presentation to pt room, it appears pt has already been discharged. She returned to home but per nursing staff states a son lives with her.

## 2017-07-31 NOTE — DISCHARGE INSTR - DIET

## 2017-07-31 NOTE — PROGRESS NOTES
LABURIN  Blood Culture:  No results found for: [facility]    Impression & Plan:   Right renal mass  Liver lesion-new-possible neoplasm? Discussed with Tosha Valles about follow-up MRI in 3 months to monitor right renal mass and liver lesion. Patient is in agreement with this plan. Our office will call to schedule MRI and follow-up appointment.     Biopsy is a consideration if changes are noted on MRI    Sommer Delgado CNP  7/31/2017 9:20 AM

## 2017-08-03 ENCOUNTER — TELEPHONE (OUTPATIENT)
Dept: INTERNAL MEDICINE CLINIC | Age: 82
End: 2017-08-03

## 2017-08-03 LAB — METANEPHRINES PLASMA: NORMAL

## 2017-08-04 ENCOUNTER — HOSPITAL ENCOUNTER (EMERGENCY)
Age: 82
Discharge: HOME | End: 2017-08-05
Attending: EMERGENCY MEDICINE
Payer: MEDICARE

## 2017-08-04 DIAGNOSIS — R16.0 LIVER MASS: ICD-10-CM

## 2017-08-04 DIAGNOSIS — I16.0 HYPERTENSIVE URGENCY: Primary | ICD-10-CM

## 2017-08-04 DIAGNOSIS — R23.2 FLUSHING: ICD-10-CM

## 2017-08-04 DIAGNOSIS — N28.89 RENAL MASS: ICD-10-CM

## 2017-08-04 PROCEDURE — 99283 EMERGENCY DEPT VISIT LOW MDM: CPT

## 2017-08-04 PROCEDURE — 85025 COMPLETE CBC W/AUTO DIFF WBC: CPT

## 2017-08-04 PROCEDURE — 83690 ASSAY OF LIPASE: CPT

## 2017-08-04 PROCEDURE — 84484 ASSAY OF TROPONIN QUANT: CPT

## 2017-08-04 PROCEDURE — 83880 ASSAY OF NATRIURETIC PEPTIDE: CPT

## 2017-08-04 PROCEDURE — 82248 BILIRUBIN DIRECT: CPT

## 2017-08-04 PROCEDURE — 80053 COMPREHEN METABOLIC PANEL: CPT

## 2017-08-04 PROCEDURE — 36415 COLL VENOUS BLD VENIPUNCTURE: CPT

## 2017-08-04 PROCEDURE — 93005 ELECTROCARDIOGRAM TRACING: CPT

## 2017-08-04 ASSESSMENT — PAIN DESCRIPTION - PAIN TYPE: TYPE: ACUTE PAIN

## 2017-08-04 ASSESSMENT — PAIN SCALES - GENERAL: PAINLEVEL_OUTOF10: 1

## 2017-08-04 ASSESSMENT — PAIN DESCRIPTION - DESCRIPTORS: DESCRIPTORS: ACHING

## 2017-08-04 ASSESSMENT — PAIN DESCRIPTION - LOCATION: LOCATION: BACK

## 2017-08-05 VITALS
HEART RATE: 75 BPM | TEMPERATURE: 98 F | OXYGEN SATURATION: 93 % | DIASTOLIC BLOOD PRESSURE: 87 MMHG | SYSTOLIC BLOOD PRESSURE: 133 MMHG | RESPIRATION RATE: 17 BRPM

## 2017-08-05 LAB
ALBUMIN SERPL-MCNC: 4.4 G/DL (ref 3.5–5.1)
ALP BLD-CCNC: 75 U/L (ref 38–126)
ALT SERPL-CCNC: 43 U/L (ref 11–66)
ANION GAP SERPL CALCULATED.3IONS-SCNC: 10 MEQ/L (ref 8–16)
AST SERPL-CCNC: 39 U/L (ref 5–40)
BACTERIA: ABNORMAL /HPF
BASOPHILS # BLD: 0.8 %
BASOPHILS ABSOLUTE: 0.1 THOU/MM3 (ref 0–0.1)
BILIRUB SERPL-MCNC: 0.4 MG/DL (ref 0.3–1.2)
BILIRUBIN DIRECT: < 0.2 MG/DL (ref 0–0.3)
BILIRUBIN URINE: NEGATIVE
BLOOD, URINE: NEGATIVE
BUN BLDV-MCNC: 23 MG/DL (ref 7–22)
CALCIUM SERPL-MCNC: 10 MG/DL (ref 8.5–10.5)
CASTS 2: ABNORMAL /LPF
CASTS UA: ABNORMAL /LPF
CHARACTER, URINE: CLEAR
CHLORIDE BLD-SCNC: 96 MEQ/L (ref 98–111)
CO2: 31 MEQ/L (ref 23–33)
COLOR: YELLOW
CREAT SERPL-MCNC: 0.8 MG/DL (ref 0.4–1.2)
CRYSTALS, UA: ABNORMAL
EOSINOPHIL # BLD: 1.4 %
EOSINOPHILS ABSOLUTE: 0.1 THOU/MM3 (ref 0–0.4)
EPITHELIAL CELLS, UA: ABNORMAL /HPF
GFR SERPL CREATININE-BSD FRML MDRD: 68 ML/MIN/1.73M2
GLUCOSE BLD-MCNC: 122 MG/DL (ref 70–108)
GLUCOSE URINE: NEGATIVE MG/DL
HCT VFR BLD CALC: 45 % (ref 37–47)
HEMOGLOBIN: 15.3 GM/DL (ref 12–16)
KETONES, URINE: NEGATIVE
LEUKOCYTE ESTERASE, URINE: ABNORMAL
LIPASE: 34.3 U/L (ref 5.6–51.3)
LYMPHOCYTES # BLD: 33.6 %
LYMPHOCYTES ABSOLUTE: 2.5 THOU/MM3 (ref 1–4.8)
MCH RBC QN AUTO: 31.6 PG (ref 27–31)
MCHC RBC AUTO-ENTMCNC: 34 GM/DL (ref 33–37)
MCV RBC AUTO: 92.9 FL (ref 81–99)
MISCELLANEOUS 2: ABNORMAL
MONOCYTES # BLD: 8.5 %
MONOCYTES ABSOLUTE: 0.6 THOU/MM3 (ref 0.4–1.3)
NITRITE, URINE: NEGATIVE
NUCLEATED RED BLOOD CELLS: 0 /100 WBC
OSMOLALITY CALCULATION: 278.8 MOSMOL/KG (ref 275–300)
PDW BLD-RTO: 14 % (ref 11.5–14.5)
PH UA: 7
PLATELET # BLD: 163 THOU/MM3 (ref 130–400)
PMV BLD AUTO: 8.3 MCM (ref 7.4–10.4)
POTASSIUM SERPL-SCNC: 4.3 MEQ/L (ref 3.5–5.2)
PRO-BNP: 855.7 PG/ML (ref 0–1800)
PROTEIN UA: NEGATIVE
RBC # BLD: 4.84 MILL/MM3 (ref 4.2–5.4)
RBC # BLD: NORMAL 10*6/UL
RBC URINE: ABNORMAL /HPF
RENAL EPITHELIAL, UA: ABNORMAL
SEG NEUTROPHILS: 55.7 %
SEGMENTED NEUTROPHILS ABSOLUTE COUNT: 4.1 THOU/MM3 (ref 1.8–7.7)
SODIUM BLD-SCNC: 137 MEQ/L (ref 135–145)
SPECIFIC GRAVITY, URINE: 1.01 (ref 1–1.03)
TOTAL PROTEIN: 7.5 G/DL (ref 6.1–8)
TROPONIN T: < 0.01 NG/ML
UROBILINOGEN, URINE: 0.2 EU/DL
WBC # BLD: 7.3 THOU/MM3 (ref 4.8–10.8)
WBC UA: ABNORMAL /HPF
YEAST: ABNORMAL

## 2017-08-05 PROCEDURE — 2500000003 HC RX 250 WO HCPCS: Performed by: EMERGENCY MEDICINE

## 2017-08-05 PROCEDURE — 96374 THER/PROPH/DIAG INJ IV PUSH: CPT

## 2017-08-05 PROCEDURE — 96375 TX/PRO/DX INJ NEW DRUG ADDON: CPT

## 2017-08-05 PROCEDURE — 6360000002 HC RX W HCPCS: Performed by: EMERGENCY MEDICINE

## 2017-08-05 PROCEDURE — 81001 URINALYSIS AUTO W/SCOPE: CPT

## 2017-08-05 PROCEDURE — 2580000003 HC RX 258: Performed by: EMERGENCY MEDICINE

## 2017-08-05 PROCEDURE — 96361 HYDRATE IV INFUSION ADD-ON: CPT

## 2017-08-05 PROCEDURE — 87086 URINE CULTURE/COLONY COUNT: CPT

## 2017-08-05 RX ORDER — SODIUM CHLORIDE 9 MG/ML
INJECTION, SOLUTION INTRAVENOUS CONTINUOUS
Status: DISCONTINUED | OUTPATIENT
Start: 2017-08-05 | End: 2017-08-05 | Stop reason: HOSPADM

## 2017-08-05 RX ORDER — LABETALOL HYDROCHLORIDE 5 MG/ML
20 INJECTION, SOLUTION INTRAVENOUS ONCE
Status: COMPLETED | OUTPATIENT
Start: 2017-08-05 | End: 2017-08-05

## 2017-08-05 RX ORDER — LORAZEPAM 2 MG/ML
0.5 INJECTION INTRAMUSCULAR ONCE
Status: COMPLETED | OUTPATIENT
Start: 2017-08-05 | End: 2017-08-05

## 2017-08-05 RX ADMIN — LORAZEPAM 0.5 MG: 2 INJECTION INTRAMUSCULAR; INTRAVENOUS at 00:21

## 2017-08-05 RX ADMIN — SODIUM CHLORIDE: 9 INJECTION, SOLUTION INTRAVENOUS at 00:20

## 2017-08-05 RX ADMIN — LABETALOL HYDROCHLORIDE 20 MG: 5 INJECTION, SOLUTION INTRAVENOUS at 00:20

## 2017-08-05 ASSESSMENT — ENCOUNTER SYMPTOMS
DIARRHEA: 0
BACK PAIN: 1
FACIAL SWELLING: 1
BLOOD IN STOOL: 0
SORE THROAT: 0
COUGH: 0
RHINORRHEA: 0
NAUSEA: 0
VOMITING: 0
ABDOMINAL PAIN: 0
COLOR CHANGE: 1
WHEEZING: 0
SHORTNESS OF BREATH: 0

## 2017-08-06 LAB
EKG ATRIAL RATE: 100 BPM
EKG Q-T INTERVAL: 384 MS
EKG QRS DURATION: 136 MS
EKG QTC CALCULATION (BAZETT): 482 MS
EKG R AXIS: -60 DEGREES
EKG T AXIS: -32 DEGREES
EKG VENTRICULAR RATE: 95 BPM
ORGANISM: ABNORMAL
URINE CULTURE REFLEX: ABNORMAL

## 2017-08-07 ENCOUNTER — TELEPHONE (OUTPATIENT)
Dept: UROLOGY | Age: 82
End: 2017-08-07

## 2017-08-08 ENCOUNTER — TELEPHONE (OUTPATIENT)
Dept: UROLOGY | Age: 82
End: 2017-08-08

## 2017-08-08 DIAGNOSIS — K76.9 HEPATIC LESION: ICD-10-CM

## 2017-08-08 DIAGNOSIS — N28.89 RENAL MASS, RIGHT: Primary | ICD-10-CM

## 2017-08-10 ENCOUNTER — HOSPITAL ENCOUNTER (OUTPATIENT)
Dept: INFUSION THERAPY | Age: 82
Discharge: HOME | End: 2017-08-10
Payer: MEDICARE

## 2017-08-10 ENCOUNTER — OFFICE VISIT (OUTPATIENT)
Dept: ONCOLOGY | Age: 82
End: 2017-08-10
Payer: MEDICARE

## 2017-08-10 VITALS
DIASTOLIC BLOOD PRESSURE: 86 MMHG | RESPIRATION RATE: 18 BRPM | HEART RATE: 90 BPM | BODY MASS INDEX: 29.26 KG/M2 | WEIGHT: 171.4 LBS | TEMPERATURE: 97.5 F | OXYGEN SATURATION: 97 % | SYSTOLIC BLOOD PRESSURE: 167 MMHG | HEIGHT: 64 IN

## 2017-08-10 DIAGNOSIS — N28.89 RENAL MASS, RIGHT: Primary | ICD-10-CM

## 2017-08-10 PROCEDURE — 99205 OFFICE O/P NEW HI 60 MIN: CPT | Performed by: INTERNAL MEDICINE

## 2017-08-10 PROCEDURE — 99211 OFF/OP EST MAY X REQ PHY/QHP: CPT

## 2017-08-13 ENCOUNTER — HOSPITAL ENCOUNTER (OUTPATIENT)
Age: 82
Setting detail: SPECIMEN
Discharge: HOME | End: 2017-08-13
Attending: OBSTETRICS & GYNECOLOGY | Admitting: OBSTETRICS & GYNECOLOGY
Payer: MEDICARE

## 2017-08-13 PROCEDURE — 83497 ASSAY OF 5-HIAA: CPT

## 2017-08-14 ENCOUNTER — TELEPHONE (OUTPATIENT)
Dept: INTERVENTIONAL RADIOLOGY/VASCULAR | Age: 82
End: 2017-08-14

## 2017-08-14 PROCEDURE — 81050 URINALYSIS VOLUME MEASURE: CPT

## 2017-08-16 ENCOUNTER — HOSPITAL ENCOUNTER (OUTPATIENT)
Dept: INTERVENTIONAL RADIOLOGY/VASCULAR | Age: 82
Discharge: HOME | End: 2017-08-16
Payer: MEDICARE

## 2017-08-16 ENCOUNTER — HOSPITAL ENCOUNTER (OUTPATIENT)
Dept: NON INVASIVE DIAGNOSTICS | Age: 82
Discharge: HOME | End: 2017-08-16
Payer: MEDICARE

## 2017-08-16 DIAGNOSIS — R55 NEAR SYNCOPE: ICD-10-CM

## 2017-08-16 PROCEDURE — 93880 EXTRACRANIAL BILAT STUDY: CPT

## 2017-08-16 PROCEDURE — 93225 XTRNL ECG REC<48 HRS REC: CPT

## 2017-08-16 PROCEDURE — 93226 XTRNL ECG REC<48 HR SCAN A/R: CPT

## 2017-08-23 ENCOUNTER — HOSPITAL ENCOUNTER (OUTPATIENT)
Dept: CT IMAGING | Age: 82
Discharge: HOME | End: 2017-08-23
Payer: MEDICARE

## 2017-08-23 VITALS
DIASTOLIC BLOOD PRESSURE: 71 MMHG | HEART RATE: 70 BPM | SYSTOLIC BLOOD PRESSURE: 131 MMHG | TEMPERATURE: 97.1 F | WEIGHT: 174 LBS | OXYGEN SATURATION: 99 % | RESPIRATION RATE: 16 BRPM | BODY MASS INDEX: 29.71 KG/M2

## 2017-08-23 DIAGNOSIS — K76.9 LIVER LESION: ICD-10-CM

## 2017-08-23 PROCEDURE — 88333 PATH CONSLTJ SURG CYTO XM 1: CPT

## 2017-08-23 PROCEDURE — 77012 CT SCAN FOR NEEDLE BIOPSY: CPT

## 2017-08-23 PROCEDURE — 88307 TISSUE EXAM BY PATHOLOGIST: CPT

## 2017-08-23 PROCEDURE — 2580000003 HC RX 258

## 2017-08-23 PROCEDURE — 47000 NEEDLE BIOPSY OF LIVER PERQ: CPT

## 2017-08-23 PROCEDURE — 6370000000 HC RX 637 (ALT 250 FOR IP)

## 2017-08-23 PROCEDURE — 6360000002 HC RX W HCPCS

## 2017-08-23 PROCEDURE — 6360000004 HC RX CONTRAST MEDICATION

## 2017-08-23 PROCEDURE — 88334 PATH CONSLTJ SURG CYTO XM EA: CPT

## 2017-08-23 RX ORDER — FENTANYL CITRATE 50 UG/ML
50 INJECTION, SOLUTION INTRAMUSCULAR; INTRAVENOUS ONCE
Status: COMPLETED | OUTPATIENT
Start: 2017-08-23 | End: 2017-08-23

## 2017-08-23 RX ORDER — SODIUM CHLORIDE 450 MG/100ML
INJECTION, SOLUTION INTRAVENOUS CONTINUOUS
Status: DISCONTINUED | OUTPATIENT
Start: 2017-08-23 | End: 2017-08-24 | Stop reason: HOSPADM

## 2017-08-23 RX ORDER — DIAPER,BRIEF,INFANT-TODD,DISP
EACH MISCELLANEOUS ONCE
Status: COMPLETED | OUTPATIENT
Start: 2017-08-23 | End: 2017-08-23

## 2017-08-23 RX ORDER — MIDAZOLAM HYDROCHLORIDE 1 MG/ML
1 INJECTION INTRAMUSCULAR; INTRAVENOUS ONCE
Status: COMPLETED | OUTPATIENT
Start: 2017-08-23 | End: 2017-08-23

## 2017-08-23 RX ADMIN — IOPAMIDOL 90 ML: 755 INJECTION, SOLUTION INTRAVENOUS at 12:45

## 2017-08-23 RX ADMIN — FENTANYL CITRATE 50 MCG: 50 INJECTION, SOLUTION INTRAMUSCULAR; INTRAVENOUS at 12:36

## 2017-08-23 RX ADMIN — MIDAZOLAM HYDROCHLORIDE 1 MG: 1 INJECTION INTRAMUSCULAR; INTRAVENOUS at 12:35

## 2017-08-23 RX ADMIN — SODIUM CHLORIDE: 4.5 INJECTION, SOLUTION INTRAVENOUS at 09:56

## 2017-08-23 RX ADMIN — Medication 0.9 G: at 13:15

## 2017-08-23 ASSESSMENT — PAIN SCALES - GENERAL
PAINLEVEL_OUTOF10: 1
PAINLEVEL_OUTOF10: 2
PAINLEVEL_OUTOF10: 2
PAINLEVEL_OUTOF10: 0
PAINLEVEL_OUTOF10: 0
PAINLEVEL_OUTOF10: 5
PAINLEVEL_OUTOF10: 2
PAINLEVEL_OUTOF10: 5

## 2017-08-23 ASSESSMENT — PAIN - FUNCTIONAL ASSESSMENT: PAIN_FUNCTIONAL_ASSESSMENT: 0-10

## 2017-08-28 ENCOUNTER — APPOINTMENT (OUTPATIENT)
Dept: CT IMAGING | Age: 82
End: 2017-08-28
Payer: MEDICARE

## 2017-08-28 ENCOUNTER — HOSPITAL ENCOUNTER (EMERGENCY)
Age: 82
Discharge: HOME | End: 2017-08-28
Attending: FAMILY MEDICINE
Payer: MEDICARE

## 2017-08-28 ENCOUNTER — NURSE TRIAGE (OUTPATIENT)
Dept: ADMINISTRATIVE | Age: 82
End: 2017-08-28

## 2017-08-28 VITALS
BODY MASS INDEX: 29.02 KG/M2 | RESPIRATION RATE: 18 BRPM | HEART RATE: 88 BPM | OXYGEN SATURATION: 94 % | TEMPERATURE: 99.7 F | WEIGHT: 170 LBS | SYSTOLIC BLOOD PRESSURE: 139 MMHG | DIASTOLIC BLOOD PRESSURE: 74 MMHG

## 2017-08-28 DIAGNOSIS — R10.11 ABDOMINAL PAIN, RIGHT UPPER QUADRANT: Primary | ICD-10-CM

## 2017-08-28 LAB
ALBUMIN SERPL-MCNC: 4.4 G/DL (ref 3.5–5.1)
ALP BLD-CCNC: 86 U/L (ref 38–126)
ALT SERPL-CCNC: 42 U/L (ref 11–66)
ANION GAP SERPL CALCULATED.3IONS-SCNC: 12 MEQ/L (ref 8–16)
AST SERPL-CCNC: 40 U/L (ref 5–40)
BACTERIA: ABNORMAL /HPF
BASOPHILS # BLD: 0.6 %
BASOPHILS ABSOLUTE: 0.1 THOU/MM3 (ref 0–0.1)
BILIRUB SERPL-MCNC: 0.5 MG/DL (ref 0.3–1.2)
BILIRUBIN DIRECT: < 0.2 MG/DL (ref 0–0.3)
BILIRUBIN URINE: NEGATIVE
BLOOD, URINE: NEGATIVE
BUN BLDV-MCNC: 19 MG/DL (ref 7–22)
CALCIUM SERPL-MCNC: 10.1 MG/DL (ref 8.5–10.5)
CASTS 2: ABNORMAL /LPF
CASTS UA: ABNORMAL /LPF
CHARACTER, URINE: CLEAR
CHLORIDE BLD-SCNC: 94 MEQ/L (ref 98–111)
CO2: 31 MEQ/L (ref 23–33)
COLOR: YELLOW
CREAT SERPL-MCNC: 0.8 MG/DL (ref 0.4–1.2)
CRYSTALS, UA: ABNORMAL
EOSINOPHIL # BLD: 1.2 %
EOSINOPHILS ABSOLUTE: 0.1 THOU/MM3 (ref 0–0.4)
EPITHELIAL CELLS, UA: ABNORMAL /HPF
GFR SERPL CREATININE-BSD FRML MDRD: 68 ML/MIN/1.73M2
GLUCOSE BLD-MCNC: 134 MG/DL (ref 70–108)
GLUCOSE URINE: NEGATIVE MG/DL
HCT VFR BLD CALC: 42.4 % (ref 37–47)
HEMOGLOBIN: 14.5 GM/DL (ref 12–16)
INR BLD: 1.22 (ref 0.85–1.13)
KETONES, URINE: 15
LACTIC ACID: 0.8 MMOL/L (ref 0.5–2.2)
LEUKOCYTE ESTERASE, URINE: ABNORMAL
LIPASE: 15.4 U/L (ref 5.6–51.3)
LYMPHOCYTES # BLD: 6.5 %
LYMPHOCYTES ABSOLUTE: 0.6 THOU/MM3 (ref 1–4.8)
MCH RBC QN AUTO: 31.8 PG (ref 27–31)
MCHC RBC AUTO-ENTMCNC: 34.2 GM/DL (ref 33–37)
MCV RBC AUTO: 93 FL (ref 81–99)
MISCELLANEOUS 2: ABNORMAL
MONOCYTES # BLD: 7.5 %
MONOCYTES ABSOLUTE: 0.7 THOU/MM3 (ref 0.4–1.3)
NITRITE, URINE: NEGATIVE
NUCLEATED RED BLOOD CELLS: 0 /100 WBC
OSMOLALITY CALCULATION: 278.1 MOSMOL/KG (ref 275–300)
PDW BLD-RTO: 14.2 % (ref 11.5–14.5)
PH UA: 7.5
PLATELET # BLD: 150 THOU/MM3 (ref 130–400)
PMV BLD AUTO: 7.9 MCM (ref 7.4–10.4)
POTASSIUM SERPL-SCNC: 3.8 MEQ/L (ref 3.5–5.2)
PRO-BNP: 1134 PG/ML (ref 0–1800)
PROCALCITONIN: 0.07 NG/ML (ref 0.01–0.09)
PROTEIN UA: NEGATIVE
RBC # BLD: 4.55 MILL/MM3 (ref 4.2–5.4)
RBC # BLD: NORMAL 10*6/UL
RBC URINE: ABNORMAL /HPF
RENAL EPITHELIAL, UA: ABNORMAL
SEG NEUTROPHILS: 84.2 %
SEGMENTED NEUTROPHILS ABSOLUTE COUNT: 7.6 THOU/MM3 (ref 1.8–7.7)
SODIUM BLD-SCNC: 137 MEQ/L (ref 135–145)
SPECIFIC GRAVITY, URINE: 1.02 (ref 1–1.03)
TOTAL PROTEIN: 7.2 G/DL (ref 6.1–8)
TROPONIN T: < 0.01 NG/ML
UROBILINOGEN, URINE: 0.2 EU/DL
WBC # BLD: 9 THOU/MM3 (ref 4.8–10.8)
WBC UA: ABNORMAL /HPF
YEAST: ABNORMAL

## 2017-08-28 PROCEDURE — 36415 COLL VENOUS BLD VENIPUNCTURE: CPT

## 2017-08-28 PROCEDURE — 84145 PROCALCITONIN (PCT): CPT

## 2017-08-28 PROCEDURE — 83605 ASSAY OF LACTIC ACID: CPT

## 2017-08-28 PROCEDURE — 83690 ASSAY OF LIPASE: CPT

## 2017-08-28 PROCEDURE — 84484 ASSAY OF TROPONIN QUANT: CPT

## 2017-08-28 PROCEDURE — 82248 BILIRUBIN DIRECT: CPT

## 2017-08-28 PROCEDURE — 71260 CT THORAX DX C+: CPT

## 2017-08-28 PROCEDURE — 87040 BLOOD CULTURE FOR BACTERIA: CPT

## 2017-08-28 PROCEDURE — 81001 URINALYSIS AUTO W/SCOPE: CPT

## 2017-08-28 PROCEDURE — 74177 CT ABD & PELVIS W/CONTRAST: CPT

## 2017-08-28 PROCEDURE — 99285 EMERGENCY DEPT VISIT HI MDM: CPT

## 2017-08-28 PROCEDURE — 2580000003 HC RX 258: Performed by: FAMILY MEDICINE

## 2017-08-28 PROCEDURE — 80053 COMPREHEN METABOLIC PANEL: CPT

## 2017-08-28 PROCEDURE — 85610 PROTHROMBIN TIME: CPT

## 2017-08-28 PROCEDURE — 93005 ELECTROCARDIOGRAM TRACING: CPT

## 2017-08-28 PROCEDURE — 6360000004 HC RX CONTRAST MEDICATION: Performed by: FAMILY MEDICINE

## 2017-08-28 PROCEDURE — 87086 URINE CULTURE/COLONY COUNT: CPT

## 2017-08-28 PROCEDURE — 85025 COMPLETE CBC W/AUTO DIFF WBC: CPT

## 2017-08-28 PROCEDURE — 83880 ASSAY OF NATRIURETIC PEPTIDE: CPT

## 2017-08-28 RX ORDER — SODIUM CHLORIDE 9 MG/ML
INJECTION, SOLUTION INTRAVENOUS CONTINUOUS
Status: DISCONTINUED | OUTPATIENT
Start: 2017-08-28 | End: 2017-08-28 | Stop reason: HOSPADM

## 2017-08-28 RX ADMIN — SODIUM CHLORIDE: 9 INJECTION, SOLUTION INTRAVENOUS at 16:38

## 2017-08-28 RX ADMIN — IOPAMIDOL 80 ML: 755 INJECTION, SOLUTION INTRAVENOUS at 17:28

## 2017-08-28 ASSESSMENT — PAIN SCALES - GENERAL
PAINLEVEL_OUTOF10: 8
PAINLEVEL_OUTOF10: 6

## 2017-08-28 ASSESSMENT — PAIN DESCRIPTION - DESCRIPTORS: DESCRIPTORS: PRESSURE

## 2017-08-28 ASSESSMENT — ENCOUNTER SYMPTOMS
COUGH: 0
ABDOMINAL PAIN: 1
EYE PAIN: 0
SHORTNESS OF BREATH: 0
VOMITING: 0
SORE THROAT: 0
BACK PAIN: 0
NAUSEA: 0
EYE DISCHARGE: 0
DIARRHEA: 0
RHINORRHEA: 0
WHEEZING: 0

## 2017-08-28 ASSESSMENT — PAIN DESCRIPTION - LOCATION: LOCATION: ABDOMEN

## 2017-08-28 ASSESSMENT — PAIN DESCRIPTION - ORIENTATION: ORIENTATION: RIGHT;UPPER

## 2017-08-28 ASSESSMENT — PAIN DESCRIPTION - FREQUENCY: FREQUENCY: CONTINUOUS

## 2017-08-28 ASSESSMENT — PAIN DESCRIPTION - PAIN TYPE: TYPE: ACUTE PAIN

## 2017-08-29 LAB
EKG ATRIAL RATE: 102 BPM
EKG Q-T INTERVAL: 402 MS
EKG QRS DURATION: 144 MS
EKG QTC CALCULATION (BAZETT): 491 MS
EKG R AXIS: -62 DEGREES
EKG T AXIS: -65 DEGREES
EKG VENTRICULAR RATE: 90 BPM

## 2017-08-31 ENCOUNTER — HOSPITAL ENCOUNTER (OUTPATIENT)
Dept: INFUSION THERAPY | Age: 82
Discharge: HOME | End: 2017-08-31
Payer: MEDICARE

## 2017-08-31 ENCOUNTER — OFFICE VISIT (OUTPATIENT)
Dept: ONCOLOGY | Age: 82
End: 2017-08-31
Payer: MEDICARE

## 2017-08-31 VITALS
HEART RATE: 89 BPM | RESPIRATION RATE: 18 BRPM | HEIGHT: 64 IN | SYSTOLIC BLOOD PRESSURE: 173 MMHG | BODY MASS INDEX: 29.09 KG/M2 | OXYGEN SATURATION: 96 % | TEMPERATURE: 97.2 F | WEIGHT: 170.4 LBS | DIASTOLIC BLOOD PRESSURE: 91 MMHG

## 2017-08-31 DIAGNOSIS — N28.89 RENAL MASS, RIGHT: Primary | ICD-10-CM

## 2017-08-31 LAB — URINE CULTURE REFLEX: NORMAL

## 2017-08-31 PROCEDURE — 99213 OFFICE O/P EST LOW 20 MIN: CPT | Performed by: INTERNAL MEDICINE

## 2017-08-31 PROCEDURE — 99211 OFF/OP EST MAY X REQ PHY/QHP: CPT

## 2017-09-03 LAB
BLOOD CULTURE, ROUTINE: NORMAL
BLOOD CULTURE, ROUTINE: NORMAL

## 2017-09-11 ENCOUNTER — HOSPITAL ENCOUNTER (OUTPATIENT)
Dept: ULTRASOUND IMAGING | Age: 82
Discharge: HOME | End: 2017-09-11
Payer: MEDICARE

## 2017-09-11 DIAGNOSIS — N28.89 RENAL MASS: ICD-10-CM

## 2017-09-11 DIAGNOSIS — N28.89 OTHER SPECIFIED DISORDERS OF KIDNEY AND URETER: ICD-10-CM

## 2017-09-11 PROCEDURE — 76775 US EXAM ABDO BACK WALL LIM: CPT

## 2017-09-15 ENCOUNTER — HOSPITAL ENCOUNTER (OUTPATIENT)
Age: 82
Discharge: HOME | End: 2017-09-15
Payer: MEDICARE

## 2017-09-15 LAB
AVERAGE GLUCOSE: 132 MG/DL (ref 70–126)
CREATININE, URINE: 131.6 MG/DL
HBA1C MFR BLD: 6.4 % (ref 4.4–6.4)
MICROALBUMIN UR-MCNC: 3.73 MG/DL
MICROALBUMIN/CREAT UR-RTO: 28 MG/G (ref 0–30)
TSH SERPL DL<=0.05 MIU/L-ACNC: 1.33 UIU/ML (ref 0.4–4.2)

## 2017-09-15 PROCEDURE — 36415 COLL VENOUS BLD VENIPUNCTURE: CPT

## 2017-09-15 PROCEDURE — 84443 ASSAY THYROID STIM HORMONE: CPT

## 2017-09-15 PROCEDURE — 83036 HEMOGLOBIN GLYCOSYLATED A1C: CPT

## 2017-09-15 PROCEDURE — 82043 UR ALBUMIN QUANTITATIVE: CPT

## 2017-10-18 ENCOUNTER — HOSPITAL ENCOUNTER (OUTPATIENT)
Dept: MRI IMAGING | Age: 82
Discharge: HOME | End: 2017-10-18
Payer: MEDICARE

## 2017-10-18 DIAGNOSIS — N28.89 RENAL MASS: ICD-10-CM

## 2017-10-18 DIAGNOSIS — K76.9 HEPATIC LESION: ICD-10-CM

## 2017-10-18 LAB — POC CREATININE WHOLE BLOOD: 0.9 MG/DL (ref 0.5–1.2)

## 2017-10-18 PROCEDURE — A9581 GADOXETATE DISODIUM INJ: HCPCS | Performed by: NURSE PRACTITIONER

## 2017-10-18 PROCEDURE — 6360000004 HC RX CONTRAST MEDICATION: Performed by: NURSE PRACTITIONER

## 2017-10-18 PROCEDURE — 74183 MRI ABD W/O CNTR FLWD CNTR: CPT

## 2017-10-18 PROCEDURE — 82565 ASSAY OF CREATININE: CPT

## 2017-10-18 RX ADMIN — GADOXETATE DISODIUM 10 ML: 181.43 INJECTION, SOLUTION INTRAVENOUS at 16:10

## 2017-10-24 ENCOUNTER — OFFICE VISIT (OUTPATIENT)
Dept: UROLOGY | Age: 82
End: 2017-10-24
Payer: MEDICARE

## 2017-10-24 VITALS
SYSTOLIC BLOOD PRESSURE: 138 MMHG | WEIGHT: 168.7 LBS | BODY MASS INDEX: 31.85 KG/M2 | HEIGHT: 61 IN | DIASTOLIC BLOOD PRESSURE: 78 MMHG

## 2017-10-24 DIAGNOSIS — N28.1 RENAL CYST: Primary | ICD-10-CM

## 2017-10-24 LAB
BILIRUBIN URINE: NEGATIVE
BLOOD URINE, POC: NEGATIVE
CHARACTER, URINE: CLEAR
COLOR, URINE: YELLOW
GLUCOSE URINE: NEGATIVE MG/DL
KETONES, URINE: NEGATIVE
LEUKOCYTE CLUMPS, URINE: NEGATIVE
NITRITE, URINE: NEGATIVE
PH, URINE: 7
PROTEIN, URINE: NEGATIVE MG/DL
SPECIFIC GRAVITY, URINE: 1.02 (ref 1–1.03)
UROBILINOGEN, URINE: 0.2 EU/DL

## 2017-10-24 PROCEDURE — 99213 OFFICE O/P EST LOW 20 MIN: CPT | Performed by: UROLOGY

## 2017-10-24 PROCEDURE — 81003 URINALYSIS AUTO W/O SCOPE: CPT | Performed by: UROLOGY

## 2017-10-24 NOTE — PROGRESS NOTES
new since the previous    MRI of the abdomen dated 9/14/2016. The patient has had a previous CT-guided biopsy of these lesions however surgical pathology reports normal hepatic parenchyma which appears to be a false negative. A malignancy cannot be excluded based on the MRI    appearance. A follow-up MRI of the liver in 3 months is recommended to confirm stability. 2. There is a stable 1.9 x 2.3 x 2.0 cm Bosniak 3 cyst along the upper pole of the right kidney which is complicated with a thickened enhancing wall. This lesion is not significantly changed from a CT examination of the abdomen dating back to at least    9/8/2014 and partially imaged on a CT chest examination dated 7/17/2010. The long-term stability is consistent with a nonaggressive lesion. 3. There are several subcentimeter Bosniak 1 cysts scattered within both kidneys. 4. There are 2 subcentimeter Bosniak 2 cysts within the right kidney, complicated with proteinaceous debris and/or blood degradation products.             Imp:  1. Renal cyst        Plan:   Mri findings discussed with patients, options explained to, she is not keen on active management  Patient is advised follow in one year with renal ulrasound      Return in about 1 year (around 10/24/2018), or if symptoms worsen or fail to improve, for Bilateral renal cysts. Medication Ordered:  No orders of the defined types were placed in this encounter.     Orders Placed:  Orders Placed This Encounter   Procedures    US RENAL COMPLETE     Standing Status:   Future     Standing Expiration Date:   10/24/2018     Order Specific Question:   Reason for exam:     Answer:   Bilateral renal cysts    POCT Urinalysis No Micro (Auto)       Electronically signed by Chayo Person MD on 10/24/2017 at 10:15 PM

## 2018-03-12 ENCOUNTER — HOSPITAL ENCOUNTER (OUTPATIENT)
Age: 83
Discharge: HOME | End: 2018-03-12
Payer: MEDICARE

## 2018-03-12 LAB
ALBUMIN SERPL-MCNC: 4.1 G/DL (ref 3.5–5.1)
ANION GAP SERPL CALCULATED.3IONS-SCNC: 13 MEQ/L (ref 8–16)
AVERAGE GLUCOSE: 129 MG/DL (ref 70–126)
BUN BLDV-MCNC: 21 MG/DL (ref 7–22)
CALCIUM SERPL-MCNC: 10 MG/DL (ref 8.5–10.5)
CHLORIDE BLD-SCNC: 99 MEQ/L (ref 98–111)
CO2: 31 MEQ/L (ref 23–33)
CREAT SERPL-MCNC: 0.9 MG/DL (ref 0.4–1.2)
GFR SERPL CREATININE-BSD FRML MDRD: 59 ML/MIN/1.73M2
GLUCOSE BLD-MCNC: 115 MG/DL (ref 70–108)
HBA1C MFR BLD: 6.3 % (ref 4.4–6.4)
PHOSPHORUS: 3.2 MG/DL (ref 2.4–4.7)
POTASSIUM SERPL-SCNC: 4.4 MEQ/L (ref 3.5–5.2)
SODIUM BLD-SCNC: 143 MEQ/L (ref 135–145)
TSH SERPL DL<=0.05 MIU/L-ACNC: 2.26 UIU/ML (ref 0.4–4.2)

## 2018-03-12 PROCEDURE — 80069 RENAL FUNCTION PANEL: CPT

## 2018-03-12 PROCEDURE — 36415 COLL VENOUS BLD VENIPUNCTURE: CPT

## 2018-03-12 PROCEDURE — 84443 ASSAY THYROID STIM HORMONE: CPT

## 2018-03-12 PROCEDURE — 83036 HEMOGLOBIN GLYCOSYLATED A1C: CPT

## 2018-04-29 ENCOUNTER — HOSPITAL ENCOUNTER (OUTPATIENT)
Age: 83
Setting detail: OBSERVATION
Discharge: HOME | End: 2018-04-30
Attending: FAMILY MEDICINE | Admitting: INTERNAL MEDICINE
Payer: MEDICARE

## 2018-04-29 DIAGNOSIS — R07.89 ATYPICAL CHEST PAIN: ICD-10-CM

## 2018-04-29 DIAGNOSIS — I16.0 HYPERTENSIVE URGENCY: Primary | ICD-10-CM

## 2018-04-29 PROBLEM — I48.20 CHRONIC ATRIAL FIBRILLATION (HCC): Status: ACTIVE | Noted: 2017-07-29

## 2018-04-29 PROBLEM — I10 ACCELERATED HYPERTENSION: Status: RESOLVED | Noted: 2018-04-29 | Resolved: 2018-04-29

## 2018-04-29 PROBLEM — R07.9 CHEST PAIN: Status: ACTIVE | Noted: 2018-04-29

## 2018-04-29 PROBLEM — I10 ACCELERATED HYPERTENSION: Status: ACTIVE | Noted: 2018-04-29

## 2018-04-29 LAB
ALBUMIN SERPL-MCNC: 4.4 G/DL (ref 3.5–5.1)
ALP BLD-CCNC: 73 U/L (ref 38–126)
ALT SERPL-CCNC: 18 U/L (ref 11–66)
ANION GAP SERPL CALCULATED.3IONS-SCNC: 12 MEQ/L (ref 8–16)
ANISOCYTOSIS: ABNORMAL
APTT: 34.7 SECONDS (ref 22–38)
AST SERPL-CCNC: 25 U/L (ref 5–40)
BASOPHILS # BLD: 1.4 %
BASOPHILS ABSOLUTE: 0.1 THOU/MM3 (ref 0–0.1)
BILIRUB SERPL-MCNC: 0.6 MG/DL (ref 0.3–1.2)
BUN BLDV-MCNC: 18 MG/DL (ref 7–22)
CALCIUM SERPL-MCNC: 9.6 MG/DL (ref 8.5–10.5)
CHLORIDE BLD-SCNC: 95 MEQ/L (ref 98–111)
CO2: 29 MEQ/L (ref 23–33)
CREAT SERPL-MCNC: 0.7 MG/DL (ref 0.4–1.2)
EOSINOPHIL # BLD: 1.1 %
EOSINOPHILS ABSOLUTE: 0.1 THOU/MM3 (ref 0–0.4)
GFR SERPL CREATININE-BSD FRML MDRD: 79 ML/MIN/1.73M2
GLUCOSE BLD-MCNC: 137 MG/DL (ref 70–108)
HCT VFR BLD CALC: 42.2 % (ref 37–47)
HEMOGLOBIN: 14.5 GM/DL (ref 12–16)
INR BLD: 1.2 (ref 0.85–1.13)
LACTIC ACID: 0.8 MMOL/L (ref 0.5–2.2)
LIPASE: 24.3 U/L (ref 5.6–51.3)
LYMPHOCYTES # BLD: 20 %
LYMPHOCYTES ABSOLUTE: 1.3 THOU/MM3 (ref 1–4.8)
MCH RBC QN AUTO: 31.6 PG (ref 27–31)
MCHC RBC AUTO-ENTMCNC: 34.4 GM/DL (ref 33–37)
MCV RBC AUTO: 91.8 FL (ref 81–99)
MONOCYTES # BLD: 8 %
MONOCYTES ABSOLUTE: 0.5 THOU/MM3 (ref 0.4–1.3)
NUCLEATED RED BLOOD CELLS: 0 /100 WBC
OSMOLALITY CALCULATION: 276 MOSMOL/KG (ref 275–300)
PDW BLD-RTO: 14.6 % (ref 11.5–14.5)
PLATELET # BLD: 168 THOU/MM3 (ref 130–400)
PMV BLD AUTO: 7.7 FL (ref 7.4–10.4)
POTASSIUM REFLEX MAGNESIUM: 4.1 MEQ/L (ref 3.5–5.2)
PRO-BNP: 815.4 PG/ML (ref 0–1800)
RBC # BLD: 4.6 MILL/MM3 (ref 4.2–5.4)
SEG NEUTROPHILS: 69.5 %
SEGMENTED NEUTROPHILS ABSOLUTE COUNT: 4.4 THOU/MM3 (ref 1.8–7.7)
SODIUM BLD-SCNC: 136 MEQ/L (ref 135–145)
TOTAL PROTEIN: 7.3 G/DL (ref 6.1–8)
TROPONIN T: < 0.01 NG/ML
TSH SERPL DL<=0.05 MIU/L-ACNC: 2.59 UIU/ML (ref 0.4–4.2)
WBC # BLD: 6.4 THOU/MM3 (ref 4.8–10.8)

## 2018-04-29 PROCEDURE — 99219 PR INITIAL OBSERVATION CARE/DAY 50 MINUTES: CPT | Performed by: NURSE PRACTITIONER

## 2018-04-29 PROCEDURE — G0378 HOSPITAL OBSERVATION PER HR: HCPCS

## 2018-04-29 PROCEDURE — 99284 EMERGENCY DEPT VISIT MOD MDM: CPT

## 2018-04-29 PROCEDURE — 93005 ELECTROCARDIOGRAM TRACING: CPT | Performed by: FAMILY MEDICINE

## 2018-04-29 PROCEDURE — 2580000003 HC RX 258: Performed by: FAMILY MEDICINE

## 2018-04-29 PROCEDURE — 83880 ASSAY OF NATRIURETIC PEPTIDE: CPT

## 2018-04-29 PROCEDURE — 36415 COLL VENOUS BLD VENIPUNCTURE: CPT

## 2018-04-29 PROCEDURE — 85730 THROMBOPLASTIN TIME PARTIAL: CPT

## 2018-04-29 PROCEDURE — 80053 COMPREHEN METABOLIC PANEL: CPT

## 2018-04-29 PROCEDURE — 83690 ASSAY OF LIPASE: CPT

## 2018-04-29 PROCEDURE — 83605 ASSAY OF LACTIC ACID: CPT

## 2018-04-29 PROCEDURE — 85025 COMPLETE CBC W/AUTO DIFF WBC: CPT

## 2018-04-29 PROCEDURE — 84484 ASSAY OF TROPONIN QUANT: CPT

## 2018-04-29 PROCEDURE — 84443 ASSAY THYROID STIM HORMONE: CPT

## 2018-04-29 PROCEDURE — 85610 PROTHROMBIN TIME: CPT

## 2018-04-29 PROCEDURE — 6370000000 HC RX 637 (ALT 250 FOR IP): Performed by: FAMILY MEDICINE

## 2018-04-29 RX ORDER — ACETAMINOPHEN 325 MG/1
650 TABLET ORAL EVERY 4 HOURS PRN
Status: DISCONTINUED | OUTPATIENT
Start: 2018-04-29 | End: 2018-04-30 | Stop reason: HOSPADM

## 2018-04-29 RX ORDER — PRAVASTATIN SODIUM 20 MG
20 TABLET ORAL NIGHTLY
Status: DISCONTINUED | OUTPATIENT
Start: 2018-04-30 | End: 2018-04-30 | Stop reason: HOSPADM

## 2018-04-29 RX ORDER — SODIUM CHLORIDE 0.9 % (FLUSH) 0.9 %
10 SYRINGE (ML) INJECTION EVERY 12 HOURS SCHEDULED
Status: DISCONTINUED | OUTPATIENT
Start: 2018-04-30 | End: 2018-04-30 | Stop reason: HOSPADM

## 2018-04-29 RX ORDER — 0.9 % SODIUM CHLORIDE 0.9 %
500 INTRAVENOUS SOLUTION INTRAVENOUS ONCE
Status: COMPLETED | OUTPATIENT
Start: 2018-04-29 | End: 2018-04-29

## 2018-04-29 RX ORDER — ONDANSETRON 2 MG/ML
4 INJECTION INTRAMUSCULAR; INTRAVENOUS EVERY 6 HOURS PRN
Status: DISCONTINUED | OUTPATIENT
Start: 2018-04-29 | End: 2018-04-30 | Stop reason: HOSPADM

## 2018-04-29 RX ORDER — DOCUSATE SODIUM 100 MG/1
100 CAPSULE, LIQUID FILLED ORAL 2 TIMES DAILY PRN
Status: DISCONTINUED | OUTPATIENT
Start: 2018-04-29 | End: 2018-04-30 | Stop reason: HOSPADM

## 2018-04-29 RX ORDER — AMLODIPINE BESYLATE 5 MG/1
5 TABLET ORAL DAILY
Status: DISCONTINUED | OUTPATIENT
Start: 2018-04-30 | End: 2018-04-30 | Stop reason: HOSPADM

## 2018-04-29 RX ORDER — ATENOLOL 25 MG/1
25 TABLET ORAL DAILY
Status: DISCONTINUED | OUTPATIENT
Start: 2018-04-30 | End: 2018-04-30 | Stop reason: HOSPADM

## 2018-04-29 RX ORDER — LABETALOL HYDROCHLORIDE 5 MG/ML
10 INJECTION, SOLUTION INTRAVENOUS
Status: DISCONTINUED | OUTPATIENT
Start: 2018-04-29 | End: 2018-04-29

## 2018-04-29 RX ORDER — ACETAMINOPHEN 325 MG/1
650 TABLET ORAL ONCE
Status: COMPLETED | OUTPATIENT
Start: 2018-04-29 | End: 2018-04-29

## 2018-04-29 RX ORDER — SODIUM CHLORIDE 0.9 % (FLUSH) 0.9 %
10 SYRINGE (ML) INJECTION PRN
Status: DISCONTINUED | OUTPATIENT
Start: 2018-04-29 | End: 2018-04-30 | Stop reason: HOSPADM

## 2018-04-29 RX ORDER — LISINOPRIL AND HYDROCHLOROTHIAZIDE 20; 12.5 MG/1; MG/1
1 TABLET ORAL DAILY
Status: DISCONTINUED | OUTPATIENT
Start: 2018-04-30 | End: 2018-04-30 | Stop reason: CLARIF

## 2018-04-29 RX ADMIN — ACETAMINOPHEN 650 MG: 325 TABLET ORAL at 18:27

## 2018-04-29 RX ADMIN — SODIUM CHLORIDE 500 ML: 9 INJECTION, SOLUTION INTRAVENOUS at 19:22

## 2018-04-29 ASSESSMENT — ENCOUNTER SYMPTOMS
VOICE CHANGE: 0
RHINORRHEA: 0
SORE THROAT: 0
VOMITING: 0
FACIAL SWELLING: 0
SHORTNESS OF BREATH: 0
CHOKING: 0
ABDOMINAL DISTENTION: 0
COUGH: 0
WHEEZING: 0
EYE DISCHARGE: 0
TROUBLE SWALLOWING: 0
NAUSEA: 0
SINUS PRESSURE: 0
ABDOMINAL PAIN: 0
BACK PAIN: 0
EYE REDNESS: 0
CONSTIPATION: 0
PHOTOPHOBIA: 0
EYE PAIN: 0
COLOR CHANGE: 0
BLOOD IN STOOL: 0
DIARRHEA: 0

## 2018-04-29 ASSESSMENT — PAIN SCALES - GENERAL
PAINLEVEL_OUTOF10: 5
PAINLEVEL_OUTOF10: 5
PAINLEVEL_OUTOF10: 0

## 2018-04-29 ASSESSMENT — PAIN DESCRIPTION - FREQUENCY: FREQUENCY: CONTINUOUS

## 2018-04-29 ASSESSMENT — PAIN DESCRIPTION - ONSET: ONSET: GRADUAL

## 2018-04-29 ASSESSMENT — PAIN DESCRIPTION - DESCRIPTORS: DESCRIPTORS: ACHING

## 2018-04-29 ASSESSMENT — PAIN DESCRIPTION - ORIENTATION: ORIENTATION: LEFT

## 2018-04-29 ASSESSMENT — PAIN DESCRIPTION - LOCATION: LOCATION: ARM

## 2018-04-30 ENCOUNTER — APPOINTMENT (OUTPATIENT)
Dept: GENERAL RADIOLOGY | Age: 83
End: 2018-04-30
Payer: MEDICARE

## 2018-04-30 VITALS
DIASTOLIC BLOOD PRESSURE: 65 MMHG | HEART RATE: 76 BPM | BODY MASS INDEX: 28.24 KG/M2 | WEIGHT: 165.4 LBS | OXYGEN SATURATION: 97 % | RESPIRATION RATE: 18 BRPM | SYSTOLIC BLOOD PRESSURE: 136 MMHG | TEMPERATURE: 97.1 F | HEIGHT: 64 IN

## 2018-04-30 LAB
EKG ATRIAL RATE: 97 BPM
EKG Q-T INTERVAL: 406 MS
EKG QRS DURATION: 136 MS
EKG QTC CALCULATION (BAZETT): 485 MS
EKG R AXIS: -54 DEGREES
EKG T AXIS: -37 DEGREES
EKG VENTRICULAR RATE: 86 BPM
TROPONIN T: < 0.01 NG/ML

## 2018-04-30 PROCEDURE — 6370000000 HC RX 637 (ALT 250 FOR IP): Performed by: INTERNAL MEDICINE

## 2018-04-30 PROCEDURE — 71046 X-RAY EXAM CHEST 2 VIEWS: CPT

## 2018-04-30 PROCEDURE — 84484 ASSAY OF TROPONIN QUANT: CPT

## 2018-04-30 PROCEDURE — 36415 COLL VENOUS BLD VENIPUNCTURE: CPT

## 2018-04-30 PROCEDURE — 2580000003 HC RX 258: Performed by: NURSE PRACTITIONER

## 2018-04-30 PROCEDURE — 93005 ELECTROCARDIOGRAM TRACING: CPT | Performed by: NURSE PRACTITIONER

## 2018-04-30 PROCEDURE — 99217 PR OBSERVATION CARE DISCHARGE MANAGEMENT: CPT | Performed by: INTERNAL MEDICINE

## 2018-04-30 PROCEDURE — G0378 HOSPITAL OBSERVATION PER HR: HCPCS

## 2018-04-30 PROCEDURE — 6370000000 HC RX 637 (ALT 250 FOR IP): Performed by: NURSE PRACTITIONER

## 2018-04-30 PROCEDURE — 73030 X-RAY EXAM OF SHOULDER: CPT

## 2018-04-30 RX ORDER — ISOSORBIDE MONONITRATE 30 MG/1
30 TABLET, EXTENDED RELEASE ORAL DAILY
Status: DISCONTINUED | OUTPATIENT
Start: 2018-04-30 | End: 2018-04-30 | Stop reason: HOSPADM

## 2018-04-30 RX ORDER — HYDROCHLOROTHIAZIDE 12.5 MG/1
12.5 CAPSULE, GELATIN COATED ORAL DAILY
Status: DISCONTINUED | OUTPATIENT
Start: 2018-04-30 | End: 2018-04-30 | Stop reason: HOSPADM

## 2018-04-30 RX ORDER — LISINOPRIL 20 MG/1
20 TABLET ORAL DAILY
Status: DISCONTINUED | OUTPATIENT
Start: 2018-04-30 | End: 2018-04-30 | Stop reason: HOSPADM

## 2018-04-30 RX ORDER — ISOSORBIDE MONONITRATE 30 MG/1
30 TABLET, EXTENDED RELEASE ORAL DAILY
Qty: 30 TABLET | Refills: 3 | Status: ON HOLD | OUTPATIENT
Start: 2018-05-01 | End: 2018-12-11 | Stop reason: ALTCHOICE

## 2018-04-30 RX ADMIN — HYDROCHLOROTHIAZIDE 12.5 MG: 12.5 CAPSULE ORAL at 12:45

## 2018-04-30 RX ADMIN — ATENOLOL 25 MG: 25 TABLET ORAL at 12:43

## 2018-04-30 RX ADMIN — SODIUM CHLORIDE, PRESERVATIVE FREE 10 ML: 5 INJECTION INTRAVENOUS at 12:43

## 2018-04-30 RX ADMIN — ACETAMINOPHEN 650 MG: 325 TABLET ORAL at 05:44

## 2018-04-30 RX ADMIN — RIVAROXABAN 20 MG: 20 TABLET, FILM COATED ORAL at 03:50

## 2018-04-30 RX ADMIN — PRAVASTATIN SODIUM 20 MG: 20 TABLET ORAL at 03:50

## 2018-04-30 RX ADMIN — ACETAMINOPHEN 650 MG: 325 TABLET ORAL at 12:47

## 2018-04-30 RX ADMIN — LISINOPRIL 20 MG: 20 TABLET ORAL at 12:45

## 2018-04-30 RX ADMIN — ISOSORBIDE MONONITRATE 30 MG: 30 TABLET ORAL at 11:10

## 2018-04-30 RX ADMIN — AMLODIPINE BESYLATE 5 MG: 5 TABLET ORAL at 12:43

## 2018-04-30 ASSESSMENT — PAIN SCALES - GENERAL
PAINLEVEL_OUTOF10: 6
PAINLEVEL_OUTOF10: 0
PAINLEVEL_OUTOF10: 0
PAINLEVEL_OUTOF10: 6
PAINLEVEL_OUTOF10: 5
PAINLEVEL_OUTOF10: 2

## 2018-05-01 LAB
EKG ATRIAL RATE: 70 BPM
EKG Q-T INTERVAL: 434 MS
EKG QRS DURATION: 136 MS
EKG QTC CALCULATION (BAZETT): 478 MS
EKG R AXIS: -64 DEGREES
EKG T AXIS: -46 DEGREES
EKG VENTRICULAR RATE: 73 BPM

## 2018-05-01 PROCEDURE — 93010 ELECTROCARDIOGRAM REPORT: CPT | Performed by: INTERNAL MEDICINE

## 2018-05-15 ENCOUNTER — HOSPITAL ENCOUNTER (OUTPATIENT)
Dept: WOMENS IMAGING | Age: 83
Discharge: HOME | End: 2018-05-15
Payer: MEDICARE

## 2018-05-15 DIAGNOSIS — Z12.31 VISIT FOR SCREENING MAMMOGRAM: ICD-10-CM

## 2018-05-15 PROCEDURE — 77063 BREAST TOMOSYNTHESIS BI: CPT

## 2018-05-25 ENCOUNTER — HOSPITAL ENCOUNTER (OUTPATIENT)
Dept: PHYSICAL THERAPY | Age: 83
Setting detail: THERAPIES SERIES
Discharge: HOME | End: 2018-05-25
Payer: MEDICARE

## 2018-05-25 PROCEDURE — G8979 MOBILITY GOAL STATUS: HCPCS

## 2018-05-25 PROCEDURE — G8978 MOBILITY CURRENT STATUS: HCPCS

## 2018-05-25 PROCEDURE — 97140 MANUAL THERAPY 1/> REGIONS: CPT

## 2018-05-25 PROCEDURE — 97161 PT EVAL LOW COMPLEX 20 MIN: CPT

## 2018-05-25 ASSESSMENT — PAIN SCALES - GENERAL: PAINLEVEL_OUTOF10: 5

## 2018-05-25 ASSESSMENT — PAIN DESCRIPTION - PAIN TYPE: TYPE: ACUTE PAIN

## 2018-05-25 ASSESSMENT — PAIN DESCRIPTION - ORIENTATION: ORIENTATION: LEFT

## 2018-05-25 ASSESSMENT — PAIN DESCRIPTION - LOCATION: LOCATION: ARM;NECK

## 2018-05-30 ENCOUNTER — HOSPITAL ENCOUNTER (OUTPATIENT)
Dept: PHYSICAL THERAPY | Age: 83
Setting detail: THERAPIES SERIES
Discharge: HOME | End: 2018-05-30
Payer: MEDICARE

## 2018-05-30 PROCEDURE — 97012 MECHANICAL TRACTION THERAPY: CPT

## 2018-05-30 PROCEDURE — 97110 THERAPEUTIC EXERCISES: CPT

## 2018-06-05 ENCOUNTER — HOSPITAL ENCOUNTER (OUTPATIENT)
Dept: PHYSICAL THERAPY | Age: 83
Setting detail: THERAPIES SERIES
Discharge: HOME | End: 2018-06-05
Payer: MEDICARE

## 2018-06-05 PROCEDURE — 97110 THERAPEUTIC EXERCISES: CPT

## 2018-06-05 PROCEDURE — 97012 MECHANICAL TRACTION THERAPY: CPT

## 2018-06-08 ENCOUNTER — HOSPITAL ENCOUNTER (OUTPATIENT)
Dept: PHYSICAL THERAPY | Age: 83
Setting detail: THERAPIES SERIES
Discharge: HOME | End: 2018-06-08
Payer: MEDICARE

## 2018-06-08 PROCEDURE — 97110 THERAPEUTIC EXERCISES: CPT

## 2018-06-08 PROCEDURE — 97012 MECHANICAL TRACTION THERAPY: CPT

## 2018-06-12 ENCOUNTER — HOSPITAL ENCOUNTER (OUTPATIENT)
Dept: PHYSICAL THERAPY | Age: 83
Setting detail: THERAPIES SERIES
Discharge: HOME | End: 2018-06-12
Payer: MEDICARE

## 2018-06-12 PROCEDURE — 97110 THERAPEUTIC EXERCISES: CPT

## 2018-06-12 PROCEDURE — 97012 MECHANICAL TRACTION THERAPY: CPT

## 2018-06-15 ENCOUNTER — HOSPITAL ENCOUNTER (OUTPATIENT)
Dept: PHYSICAL THERAPY | Age: 83
Setting detail: THERAPIES SERIES
Discharge: HOME | End: 2018-06-15
Payer: MEDICARE

## 2018-06-15 PROCEDURE — 97110 THERAPEUTIC EXERCISES: CPT

## 2018-06-15 PROCEDURE — 97012 MECHANICAL TRACTION THERAPY: CPT

## 2018-06-18 ENCOUNTER — HOSPITAL ENCOUNTER (OUTPATIENT)
Dept: PHYSICAL THERAPY | Age: 83
Setting detail: THERAPIES SERIES
Discharge: HOME | End: 2018-06-18
Payer: MEDICARE

## 2018-06-18 PROCEDURE — 97110 THERAPEUTIC EXERCISES: CPT

## 2018-06-18 PROCEDURE — G8980 MOBILITY D/C STATUS: HCPCS

## 2018-06-18 PROCEDURE — G8979 MOBILITY GOAL STATUS: HCPCS

## 2018-06-22 ENCOUNTER — APPOINTMENT (OUTPATIENT)
Dept: PHYSICAL THERAPY | Age: 83
End: 2018-06-22
Payer: MEDICARE

## 2018-08-19 ENCOUNTER — HOSPITAL ENCOUNTER (EMERGENCY)
Age: 83
Discharge: HOME | End: 2018-08-19
Payer: MEDICARE

## 2018-08-19 VITALS
RESPIRATION RATE: 16 BRPM | SYSTOLIC BLOOD PRESSURE: 169 MMHG | OXYGEN SATURATION: 96 % | HEART RATE: 97 BPM | TEMPERATURE: 97.8 F | DIASTOLIC BLOOD PRESSURE: 93 MMHG

## 2018-08-19 DIAGNOSIS — M79.641 PAIN OF RIGHT HAND: Primary | ICD-10-CM

## 2018-08-19 PROCEDURE — 99213 OFFICE O/P EST LOW 20 MIN: CPT | Performed by: NURSE PRACTITIONER

## 2018-08-19 PROCEDURE — 99213 OFFICE O/P EST LOW 20 MIN: CPT

## 2018-08-19 RX ORDER — CAPSAICIN 0.025 %
CREAM (GRAM) TOPICAL
Qty: 1 TUBE | Refills: 1 | Status: SHIPPED | OUTPATIENT
Start: 2018-08-19 | End: 2018-09-18

## 2018-08-19 ASSESSMENT — ENCOUNTER SYMPTOMS
VISUAL CHANGE: 0
COLOR CHANGE: 0
CHEST TIGHTNESS: 0
HEMATOCHEZIA: 0
WHEEZING: 0
NAUSEA: 0
CHOKING: 0
COUGH: 0
STRIDOR: 0
VOMITING: 0
DIARRHEA: 0
APNEA: 0
ABDOMINAL PAIN: 0
SHORTNESS OF BREATH: 0

## 2018-08-19 ASSESSMENT — PAIN DESCRIPTION - DESCRIPTORS: DESCRIPTORS: TENDER;SORE

## 2018-08-19 ASSESSMENT — PAIN SCALES - GENERAL: PAINLEVEL_OUTOF10: 4

## 2018-08-19 ASSESSMENT — PAIN DESCRIPTION - ORIENTATION: ORIENTATION: RIGHT

## 2018-08-19 ASSESSMENT — PAIN DESCRIPTION - LOCATION: LOCATION: WRIST

## 2018-08-19 ASSESSMENT — PAIN DESCRIPTION - FREQUENCY: FREQUENCY: CONTINUOUS

## 2018-08-19 ASSESSMENT — PAIN DESCRIPTION - PAIN TYPE: TYPE: ACUTE PAIN

## 2018-08-19 NOTE — ED PROVIDER NOTES
palpitations, leg swelling, syncope and near-syncope. Gastrointestinal: Negative for abdominal pain, anorexia, diarrhea, dysphagia, hematochezia, melena, nausea and vomiting. Genitourinary: Negative for dysuria, frequency and urgency. Musculoskeletal: Positive for joint swelling and myalgias. Negative for arthralgias and falls. Skin: Negative for color change, pallor, rash and wound. Neurological: Negative for dizziness, seizures, loss of consciousness and headaches. PAST MEDICAL HISTORY         Diagnosis Date    Arthritis     Atrial fibrillation ([facility] [address]     Cancer ([facility] [address]     skin    Gross hematuria 2014    Dr Renee Abraham HTN (hypertension)     Hyperlipidemia        SURGICAL HISTORY     Patient  has a past surgical history that includes Hysterectomy (1982); Foot surgery; knee surgery (2010); liver biopsy (08/23/2017); Colonoscopy; eye surgery; and skin biopsy. CURRENT MEDICATIONS       Discharge Medication List as of 8/19/2018 11:01 AM      CONTINUE these medications which have NOT CHANGED    Details   isosorbide mononitrate (IMDUR) 30 MG extended release tablet Take 1 tablet by mouth daily, Disp-30 tablet, R-3Normal      potassium chloride (KLOR-CON M) 20 MEQ extended release tablet Take 20 mEq by mouth daily as needed (when furosemide is taken)Historical Med      furosemide (LASIX) 20 MG tablet Take 20 mg by mouth daily as needed (swelling)Historical Med      Glucosamine-Chondroitin (GLUCOSAMINE CHONDR COMPLEX PO) Take 2 tablets by mouth daily Move FreeHistorical Med      Cholecalciferol (VITAMIN D-3 PO) Take by mouth daily Historical Med      !! Multiple Vitamins-Minerals (OCUVITE PO) Take 1 tablet by mouth daily Historical Med      lisinopril-hydrochlorothiazide (PRINZIDE;ZESTORETIC) 20-12.5 MG per tablet Take 1 tablet by mouth daily. !! Multiple Vitamins-Minerals (THERAPEUTIC MULTIVITAMIN-MINERALS) tablet Take 1 tablet by mouth daily.       pravastatin (PRAVACHOL) 20 MG tablet Take 20 mg

## 2018-08-19 NOTE — ED NOTES
Patient discharge instructions given to pt and daughter and pt and daughter verbalized understanding, medications discussed, no other needs at this time, and pt left in stable condition.      Danielle Jorge RN  08/19/18 [id number]

## 2018-09-05 ENCOUNTER — HOSPITAL ENCOUNTER (OUTPATIENT)
Age: 83
Discharge: HOME | End: 2018-09-05
Payer: MEDICARE

## 2018-09-05 LAB
ALBUMIN SERPL-MCNC: 4.3 G/DL (ref 3.5–5.1)
ANION GAP SERPL CALCULATED.3IONS-SCNC: 9 MEQ/L (ref 8–16)
AVERAGE GLUCOSE: 129 MG/DL (ref 70–126)
BUN BLDV-MCNC: 16 MG/DL (ref 7–22)
CALCIUM SERPL-MCNC: 10.3 MG/DL (ref 8.5–10.5)
CHLORIDE BLD-SCNC: 99 MEQ/L (ref 98–111)
CO2: 34 MEQ/L (ref 23–33)
CREAT SERPL-MCNC: 0.8 MG/DL (ref 0.4–1.2)
GFR SERPL CREATININE-BSD FRML MDRD: 67 ML/MIN/1.73M2
GLUCOSE BLD-MCNC: 113 MG/DL (ref 70–108)
HBA1C MFR BLD: 6.3 % (ref 4.4–6.4)
PHOSPHORUS: 4.2 MG/DL (ref 2.4–4.7)
POTASSIUM SERPL-SCNC: 4.6 MEQ/L (ref 3.5–5.2)
SODIUM BLD-SCNC: 142 MEQ/L (ref 135–145)
TSH SERPL DL<=0.05 MIU/L-ACNC: 1.92 UIU/ML (ref 0.4–4.2)

## 2018-09-05 PROCEDURE — 84443 ASSAY THYROID STIM HORMONE: CPT

## 2018-09-05 PROCEDURE — 80069 RENAL FUNCTION PANEL: CPT

## 2018-09-05 PROCEDURE — 83036 HEMOGLOBIN GLYCOSYLATED A1C: CPT

## 2018-09-05 PROCEDURE — 36415 COLL VENOUS BLD VENIPUNCTURE: CPT

## 2018-10-04 ENCOUNTER — PROCEDURE VISIT (OUTPATIENT)
Dept: NEUROLOGY | Age: 83
End: 2018-10-04
Payer: MEDICARE

## 2018-10-04 DIAGNOSIS — M25.531 BILATERAL WRIST PAIN: ICD-10-CM

## 2018-10-04 DIAGNOSIS — G56.03 BILATERAL CARPAL TUNNEL SYNDROME: Primary | ICD-10-CM

## 2018-10-04 DIAGNOSIS — M25.532 BILATERAL WRIST PAIN: ICD-10-CM

## 2018-10-04 DIAGNOSIS — R20.0 BILATERAL HAND NUMBNESS: ICD-10-CM

## 2018-10-04 PROCEDURE — 95911 NRV CNDJ TEST 9-10 STUDIES: CPT | Performed by: PSYCHIATRY & NEUROLOGY

## 2018-10-04 PROCEDURE — 95886 MUSC TEST DONE W/N TEST COMP: CPT | Performed by: PSYCHIATRY & NEUROLOGY

## 2018-10-17 ENCOUNTER — HOSPITAL ENCOUNTER (OUTPATIENT)
Dept: ULTRASOUND IMAGING | Age: 83
Discharge: HOME | End: 2018-10-17
Payer: MEDICARE

## 2018-10-17 ENCOUNTER — TELEPHONE (OUTPATIENT)
Dept: UROLOGY | Age: 83
End: 2018-10-17

## 2018-10-17 DIAGNOSIS — N28.1 RENAL CYST: ICD-10-CM

## 2018-10-17 DIAGNOSIS — N28.1 RENAL CYST: Primary | ICD-10-CM

## 2018-10-17 PROCEDURE — 76770 US EXAM ABDO BACK WALL COMP: CPT

## 2018-10-24 ENCOUNTER — OFFICE VISIT (OUTPATIENT)
Dept: UROLOGY | Age: 83
End: 2018-10-24
Payer: MEDICARE

## 2018-10-24 VITALS
DIASTOLIC BLOOD PRESSURE: 80 MMHG | SYSTOLIC BLOOD PRESSURE: 128 MMHG | HEIGHT: 63 IN | BODY MASS INDEX: 29.59 KG/M2 | WEIGHT: 167 LBS

## 2018-10-24 DIAGNOSIS — N28.1 RENAL CYST: Primary | ICD-10-CM

## 2018-10-24 DIAGNOSIS — N28.9 KIDNEY LESION, NATIVE, RIGHT: ICD-10-CM

## 2018-10-24 LAB
BILIRUBIN URINE: NEGATIVE
BLOOD URINE, POC: NORMAL
CHARACTER, URINE: CLEAR
COLOR, URINE: YELLOW
GLUCOSE URINE: NEGATIVE MG/DL
KETONES, URINE: NEGATIVE
LEUKOCYTE CLUMPS, URINE: NEGATIVE
NITRITE, URINE: NEGATIVE
PH, URINE: 7
PROTEIN, URINE: NEGATIVE MG/DL
SPECIFIC GRAVITY, URINE: 1.01 (ref 1–1.03)
UROBILINOGEN, URINE: 0.2 EU/DL

## 2018-10-24 PROCEDURE — 99214 OFFICE O/P EST MOD 30 MIN: CPT | Performed by: NURSE PRACTITIONER

## 2018-10-24 PROCEDURE — 81003 URINALYSIS AUTO W/O SCOPE: CPT | Performed by: NURSE PRACTITIONER

## 2018-10-24 RX ORDER — KETOCONAZOLE 20 MG/G
CREAM TOPICAL DAILY
Status: ON HOLD | COMMUNITY
End: 2018-12-11 | Stop reason: ALTCHOICE

## 2018-10-29 ENCOUNTER — HOSPITAL ENCOUNTER (OUTPATIENT)
Age: 83
Discharge: HOME | End: 2018-10-29
Payer: MEDICARE

## 2018-10-29 LAB
ALBUMIN SERPL-MCNC: 4.2 G/DL (ref 3.5–5.1)
ALP BLD-CCNC: 68 U/L (ref 38–126)
ALT SERPL-CCNC: 16 U/L (ref 11–66)
ANION GAP SERPL CALCULATED.3IONS-SCNC: 12 MEQ/L (ref 8–16)
AST SERPL-CCNC: 22 U/L (ref 5–40)
BASOPHILS # BLD: 0.5 %
BASOPHILS ABSOLUTE: 0 THOU/MM3 (ref 0–0.1)
BILIRUB SERPL-MCNC: 0.6 MG/DL (ref 0.3–1.2)
BILIRUBIN DIRECT: < 0.2 MG/DL (ref 0–0.3)
BUN BLDV-MCNC: 22 MG/DL (ref 7–22)
CALCIUM SERPL-MCNC: 9.9 MG/DL (ref 8.5–10.5)
CHLORIDE BLD-SCNC: 99 MEQ/L (ref 98–111)
CHOLESTEROL, TOTAL: 121 MG/DL (ref 100–199)
CO2: 31 MEQ/L (ref 23–33)
CREAT SERPL-MCNC: 0.8 MG/DL (ref 0.4–1.2)
EOSINOPHIL # BLD: 1.1 %
EOSINOPHILS ABSOLUTE: 0.1 THOU/MM3 (ref 0–0.4)
ERYTHROCYTE [DISTWIDTH] IN BLOOD BY AUTOMATED COUNT: 13.8 % (ref 11.5–14.5)
ERYTHROCYTE [DISTWIDTH] IN BLOOD BY AUTOMATED COUNT: 47.8 FL (ref 35–45)
GFR SERPL CREATININE-BSD FRML MDRD: 67 ML/MIN/1.73M2
GLUCOSE BLD-MCNC: 109 MG/DL (ref 70–108)
HCT VFR BLD CALC: 43.8 % (ref 37–47)
HDLC SERPL-MCNC: 45 MG/DL
HEMOGLOBIN: 14.5 GM/DL (ref 12–16)
IMMATURE GRANS (ABS): 0.02 THOU/MM3 (ref 0–0.07)
IMMATURE GRANULOCYTES: 0.3 %
LDL CHOLESTEROL CALCULATED: 54 MG/DL
LYMPHOCYTES # BLD: 24.1 %
LYMPHOCYTES ABSOLUTE: 1.5 THOU/MM3 (ref 1–4.8)
MCH RBC QN AUTO: 31.6 PG (ref 26–33)
MCHC RBC AUTO-ENTMCNC: 33.1 GM/DL (ref 32.2–35.5)
MCV RBC AUTO: 95.4 FL (ref 81–99)
MONOCYTES # BLD: 8.6 %
MONOCYTES ABSOLUTE: 0.5 THOU/MM3 (ref 0.4–1.3)
NUCLEATED RED BLOOD CELLS: 0 /100 WBC
PLATELET # BLD: 202 THOU/MM3 (ref 130–400)
PMV BLD AUTO: 10.1 FL (ref 9.4–12.4)
POTASSIUM SERPL-SCNC: 4.3 MEQ/L (ref 3.5–5.2)
RBC # BLD: 4.59 MILL/MM3 (ref 4.2–5.4)
SEG NEUTROPHILS: 65.4 %
SEGMENTED NEUTROPHILS ABSOLUTE COUNT: 4.1 THOU/MM3 (ref 1.8–7.7)
SODIUM BLD-SCNC: 142 MEQ/L (ref 135–145)
TOTAL PROTEIN: 7.1 G/DL (ref 6.1–8)
TRIGL SERPL-MCNC: 110 MG/DL (ref 0–199)
WBC # BLD: 6.3 THOU/MM3 (ref 4.8–10.8)

## 2018-10-29 PROCEDURE — 80053 COMPREHEN METABOLIC PANEL: CPT

## 2018-10-29 PROCEDURE — 85025 COMPLETE CBC W/AUTO DIFF WBC: CPT

## 2018-10-29 PROCEDURE — 36415 COLL VENOUS BLD VENIPUNCTURE: CPT

## 2018-10-29 PROCEDURE — 80061 LIPID PANEL: CPT

## 2018-10-29 PROCEDURE — 82248 BILIRUBIN DIRECT: CPT

## 2018-11-09 ENCOUNTER — HOSPITAL ENCOUNTER (OUTPATIENT)
Dept: MRI IMAGING | Age: 83
Discharge: HOME | End: 2018-11-09
Payer: MEDICARE

## 2018-11-09 DIAGNOSIS — N28.1 RENAL CYST: ICD-10-CM

## 2018-11-09 DIAGNOSIS — N28.9 KIDNEY LESION, NATIVE, RIGHT: ICD-10-CM

## 2018-11-09 PROCEDURE — A9579 GAD-BASE MR CONTRAST NOS,1ML: HCPCS | Performed by: NURSE PRACTITIONER

## 2018-11-09 PROCEDURE — 6360000004 HC RX CONTRAST MEDICATION: Performed by: NURSE PRACTITIONER

## 2018-11-09 PROCEDURE — 74183 MRI ABD W/O CNTR FLWD CNTR: CPT

## 2018-11-09 RX ADMIN — GADOTERIDOL 15 ML: 279.3 INJECTION, SOLUTION INTRAVENOUS at 18:55

## 2018-11-11 ENCOUNTER — TELEPHONE (OUTPATIENT)
Dept: UROLOGY | Age: 83
End: 2018-11-11

## 2018-12-02 ENCOUNTER — APPOINTMENT (OUTPATIENT)
Dept: GENERAL RADIOLOGY | Age: 83
DRG: 871 | End: 2018-12-02
Payer: MEDICARE

## 2018-12-02 ENCOUNTER — APPOINTMENT (OUTPATIENT)
Dept: CT IMAGING | Age: 83
DRG: 871 | End: 2018-12-02
Payer: MEDICARE

## 2018-12-02 ENCOUNTER — HOSPITAL ENCOUNTER (INPATIENT)
Age: 83
LOS: 10 days | Discharge: NURSING, REHAB OR LONG-TERM CARE | DRG: 871 | End: 2018-12-12
Attending: INTERNAL MEDICINE | Admitting: INTERNAL MEDICINE
Payer: MEDICARE

## 2018-12-02 DIAGNOSIS — M79.602 LEFT ARM PAIN: ICD-10-CM

## 2018-12-02 DIAGNOSIS — R10.13 EPIGASTRIC PAIN: ICD-10-CM

## 2018-12-02 DIAGNOSIS — K52.9 GASTROENTERITIS: ICD-10-CM

## 2018-12-02 DIAGNOSIS — R65.10 SIRS (SYSTEMIC INFLAMMATORY RESPONSE SYNDROME) (HCC): Primary | ICD-10-CM

## 2018-12-02 PROBLEM — J18.9 PNEUMONIA: Status: ACTIVE | Noted: 2018-12-02

## 2018-12-02 PROBLEM — M25.512 ACUTE PAIN OF LEFT SHOULDER: Status: ACTIVE | Noted: 2018-12-02

## 2018-12-02 LAB
ALBUMIN SERPL-MCNC: 4.1 G/DL (ref 3.5–5.1)
ALP BLD-CCNC: 76 U/L (ref 38–126)
ALT SERPL-CCNC: 27 U/L (ref 11–66)
ANION GAP SERPL CALCULATED.3IONS-SCNC: 15 MEQ/L (ref 8–16)
APTT: 30.4 SECONDS (ref 22–38)
AST SERPL-CCNC: 28 U/L (ref 5–40)
BASOPHILS # BLD: 0.2 %
BASOPHILS ABSOLUTE: 0 THOU/MM3 (ref 0–0.1)
BILIRUB SERPL-MCNC: 0.8 MG/DL (ref 0.3–1.2)
BILIRUBIN URINE: NEGATIVE
BLOOD, URINE: NEGATIVE
BUN BLDV-MCNC: 19 MG/DL (ref 7–22)
CALCIUM SERPL-MCNC: 9.8 MG/DL (ref 8.5–10.5)
CHARACTER, URINE: CLEAR
CHLORIDE BLD-SCNC: 94 MEQ/L (ref 98–111)
CO2: 27 MEQ/L (ref 23–33)
COLOR: YELLOW
CREAT SERPL-MCNC: 0.8 MG/DL (ref 0.4–1.2)
EKG ATRIAL RATE: 78 BPM
EKG Q-T INTERVAL: 356 MS
EKG QRS DURATION: 138 MS
EKG QTC CALCULATION (BAZETT): 479 MS
EKG R AXIS: -60 DEGREES
EKG T AXIS: 61 DEGREES
EKG VENTRICULAR RATE: 109 BPM
EOSINOPHIL # BLD: 0 %
EOSINOPHILS ABSOLUTE: 0 THOU/MM3 (ref 0–0.4)
ERYTHROCYTE [DISTWIDTH] IN BLOOD BY AUTOMATED COUNT: 13.8 % (ref 11.5–14.5)
ERYTHROCYTE [DISTWIDTH] IN BLOOD BY AUTOMATED COUNT: 47.5 FL (ref 35–45)
FLU A ANTIGEN: NEGATIVE
FLU B ANTIGEN: NEGATIVE
GFR SERPL CREATININE-BSD FRML MDRD: 67 ML/MIN/1.73M2
GLUCOSE BLD-MCNC: 169 MG/DL (ref 70–108)
GLUCOSE URINE: NEGATIVE MG/DL
HCT VFR BLD CALC: 41.6 % (ref 37–47)
HEMOGLOBIN: 13.9 GM/DL (ref 12–16)
IMMATURE GRANS (ABS): 0.03 THOU/MM3 (ref 0–0.07)
IMMATURE GRANULOCYTES: 0.2 %
INR BLD: 1.45 (ref 0.85–1.13)
KETONES, URINE: ABNORMAL
LACTIC ACID: 1.8 MMOL/L (ref 0.5–2.2)
LEUKOCYTE ESTERASE, URINE: NEGATIVE
LIPASE: 24.3 U/L (ref 5.6–51.3)
LYMPHOCYTES # BLD: 4.7 %
LYMPHOCYTES ABSOLUTE: 0.6 THOU/MM3 (ref 1–4.8)
MCH RBC QN AUTO: 31.4 PG (ref 26–33)
MCHC RBC AUTO-ENTMCNC: 33.4 GM/DL (ref 32.2–35.5)
MCV RBC AUTO: 94.1 FL (ref 81–99)
MONOCYTES # BLD: 4.4 %
MONOCYTES ABSOLUTE: 0.6 THOU/MM3 (ref 0.4–1.3)
NITRITE, URINE: NEGATIVE
NUCLEATED RED BLOOD CELLS: 0 /100 WBC
OSMOLALITY CALCULATION: 278.1 MOSMOL/KG (ref 275–300)
PH UA: 7
PLATELET # BLD: 158 THOU/MM3 (ref 130–400)
PMV BLD AUTO: 9.7 FL (ref 9.4–12.4)
POTASSIUM SERPL-SCNC: 3.7 MEQ/L (ref 3.5–5.2)
PROCALCITONIN: 0.19 NG/ML (ref 0.01–0.09)
PROTEIN UA: NEGATIVE
RBC # BLD: 4.42 MILL/MM3 (ref 4.2–5.4)
SEG NEUTROPHILS: 90.5 %
SEGMENTED NEUTROPHILS ABSOLUTE COUNT: 11.4 THOU/MM3 (ref 1.8–7.7)
SODIUM BLD-SCNC: 136 MEQ/L (ref 135–145)
SPECIFIC GRAVITY, URINE: 1.01 (ref 1–1.03)
TOTAL PROTEIN: 7.2 G/DL (ref 6.1–8)
TROPONIN T: < 0.01 NG/ML
UROBILINOGEN, URINE: 0.2 EU/DL
WBC # BLD: 12.6 THOU/MM3 (ref 4.8–10.8)

## 2018-12-02 PROCEDURE — 87186 SC STD MICRODIL/AGAR DIL: CPT

## 2018-12-02 PROCEDURE — 71046 X-RAY EXAM CHEST 2 VIEWS: CPT

## 2018-12-02 PROCEDURE — 84145 PROCALCITONIN (PCT): CPT

## 2018-12-02 PROCEDURE — 99223 1ST HOSP IP/OBS HIGH 75: CPT | Performed by: INTERNAL MEDICINE

## 2018-12-02 PROCEDURE — 94760 N-INVAS EAR/PLS OXIMETRY 1: CPT

## 2018-12-02 PROCEDURE — 73010 X-RAY EXAM OF SHOULDER BLADE: CPT

## 2018-12-02 PROCEDURE — 93005 ELECTROCARDIOGRAM TRACING: CPT | Performed by: PHYSICIAN ASSISTANT

## 2018-12-02 PROCEDURE — 87804 INFLUENZA ASSAY W/OPTIC: CPT

## 2018-12-02 PROCEDURE — 87040 BLOOD CULTURE FOR BACTERIA: CPT

## 2018-12-02 PROCEDURE — 87077 CULTURE AEROBIC IDENTIFY: CPT

## 2018-12-02 PROCEDURE — 2580000003 HC RX 258: Performed by: PHYSICIAN ASSISTANT

## 2018-12-02 PROCEDURE — 81003 URINALYSIS AUTO W/O SCOPE: CPT

## 2018-12-02 PROCEDURE — 87801 DETECT AGNT MULT DNA AMPLI: CPT

## 2018-12-02 PROCEDURE — 6360000004 HC RX CONTRAST MEDICATION: Performed by: PHYSICIAN ASSISTANT

## 2018-12-02 PROCEDURE — 1200000003 HC TELEMETRY R&B

## 2018-12-02 PROCEDURE — 2700000000 HC OXYGEN THERAPY PER DAY

## 2018-12-02 PROCEDURE — 36415 COLL VENOUS BLD VENIPUNCTURE: CPT

## 2018-12-02 PROCEDURE — 84484 ASSAY OF TROPONIN QUANT: CPT

## 2018-12-02 PROCEDURE — 6370000000 HC RX 637 (ALT 250 FOR IP): Performed by: INTERNAL MEDICINE

## 2018-12-02 PROCEDURE — 85025 COMPLETE CBC W/AUTO DIFF WBC: CPT

## 2018-12-02 PROCEDURE — 6370000000 HC RX 637 (ALT 250 FOR IP): Performed by: PHYSICIAN ASSISTANT

## 2018-12-02 PROCEDURE — 85730 THROMBOPLASTIN TIME PARTIAL: CPT

## 2018-12-02 PROCEDURE — 83690 ASSAY OF LIPASE: CPT

## 2018-12-02 PROCEDURE — 99285 EMERGENCY DEPT VISIT HI MDM: CPT

## 2018-12-02 PROCEDURE — 2580000003 HC RX 258: Performed by: INTERNAL MEDICINE

## 2018-12-02 PROCEDURE — 85610 PROTHROMBIN TIME: CPT

## 2018-12-02 PROCEDURE — 83605 ASSAY OF LACTIC ACID: CPT

## 2018-12-02 PROCEDURE — 72040 X-RAY EXAM NECK SPINE 2-3 VW: CPT

## 2018-12-02 PROCEDURE — 74177 CT ABD & PELVIS W/CONTRAST: CPT

## 2018-12-02 PROCEDURE — 6360000002 HC RX W HCPCS: Performed by: INTERNAL MEDICINE

## 2018-12-02 PROCEDURE — 80053 COMPREHEN METABOLIC PANEL: CPT

## 2018-12-02 RX ORDER — LISINOPRIL 20 MG/1
20 TABLET ORAL DAILY
Status: DISCONTINUED | OUTPATIENT
Start: 2018-12-02 | End: 2018-12-06

## 2018-12-02 RX ORDER — POTASSIUM CHLORIDE 20 MEQ/1
20 TABLET, EXTENDED RELEASE ORAL DAILY PRN
Status: DISCONTINUED | OUTPATIENT
Start: 2018-12-02 | End: 2018-12-12 | Stop reason: HOSPADM

## 2018-12-02 RX ORDER — CALCIUM CARBONATE 500(1250)
500 TABLET ORAL DAILY
Status: DISCONTINUED | OUTPATIENT
Start: 2018-12-02 | End: 2018-12-12 | Stop reason: HOSPADM

## 2018-12-02 RX ORDER — KETOROLAC TROMETHAMINE 30 MG/ML
15 INJECTION, SOLUTION INTRAMUSCULAR; INTRAVENOUS ONCE
Status: DISCONTINUED | OUTPATIENT
Start: 2018-12-02 | End: 2018-12-02

## 2018-12-02 RX ORDER — SODIUM CHLORIDE 0.9 % (FLUSH) 0.9 %
10 SYRINGE (ML) INJECTION EVERY 12 HOURS SCHEDULED
Status: DISCONTINUED | OUTPATIENT
Start: 2018-12-02 | End: 2018-12-12 | Stop reason: HOSPADM

## 2018-12-02 RX ORDER — TRAMADOL HYDROCHLORIDE 50 MG/1
50 TABLET ORAL ONCE
Status: COMPLETED | OUTPATIENT
Start: 2018-12-02 | End: 2018-12-02

## 2018-12-02 RX ORDER — PRAVASTATIN SODIUM 20 MG
20 TABLET ORAL DAILY
Status: DISCONTINUED | OUTPATIENT
Start: 2018-12-02 | End: 2018-12-12 | Stop reason: HOSPADM

## 2018-12-02 RX ORDER — DOCUSATE SODIUM 100 MG/1
100 CAPSULE, LIQUID FILLED ORAL 2 TIMES DAILY
Status: DISCONTINUED | OUTPATIENT
Start: 2018-12-02 | End: 2018-12-12 | Stop reason: HOSPADM

## 2018-12-02 RX ORDER — TRAMADOL HYDROCHLORIDE 50 MG/1
25 TABLET ORAL EVERY 6 HOURS PRN
Status: DISCONTINUED | OUTPATIENT
Start: 2018-12-02 | End: 2018-12-08

## 2018-12-02 RX ORDER — AMLODIPINE BESYLATE 5 MG/1
5 TABLET ORAL DAILY
Status: DISCONTINUED | OUTPATIENT
Start: 2018-12-02 | End: 2018-12-06

## 2018-12-02 RX ORDER — ACETAMINOPHEN 500 MG
1000 TABLET ORAL ONCE
Status: COMPLETED | OUTPATIENT
Start: 2018-12-02 | End: 2018-12-02

## 2018-12-02 RX ORDER — SODIUM CHLORIDE 9 MG/ML
INJECTION, SOLUTION INTRAVENOUS CONTINUOUS
Status: DISCONTINUED | OUTPATIENT
Start: 2018-12-02 | End: 2018-12-02

## 2018-12-02 RX ORDER — ONDANSETRON 2 MG/ML
4 INJECTION INTRAMUSCULAR; INTRAVENOUS EVERY 6 HOURS PRN
Status: DISCONTINUED | OUTPATIENT
Start: 2018-12-02 | End: 2018-12-12 | Stop reason: HOSPADM

## 2018-12-02 RX ORDER — ATENOLOL 25 MG/1
25 TABLET ORAL DAILY
Status: DISCONTINUED | OUTPATIENT
Start: 2018-12-02 | End: 2018-12-12 | Stop reason: HOSPADM

## 2018-12-02 RX ORDER — HYDROCHLOROTHIAZIDE 25 MG/1
12.5 TABLET ORAL DAILY
Status: DISCONTINUED | OUTPATIENT
Start: 2018-12-02 | End: 2018-12-05

## 2018-12-02 RX ORDER — 0.9 % SODIUM CHLORIDE 0.9 %
30 INTRAVENOUS SOLUTION INTRAVENOUS ONCE
Status: COMPLETED | OUTPATIENT
Start: 2018-12-02 | End: 2018-12-02

## 2018-12-02 RX ORDER — SODIUM CHLORIDE 0.9 % (FLUSH) 0.9 %
10 SYRINGE (ML) INJECTION PRN
Status: DISCONTINUED | OUTPATIENT
Start: 2018-12-02 | End: 2018-12-12 | Stop reason: HOSPADM

## 2018-12-02 RX ORDER — LISINOPRIL AND HYDROCHLOROTHIAZIDE 20; 12.5 MG/1; MG/1
1 TABLET ORAL DAILY
Status: DISCONTINUED | OUTPATIENT
Start: 2018-12-02 | End: 2018-12-02 | Stop reason: CLARIF

## 2018-12-02 RX ORDER — FUROSEMIDE 40 MG/1
20 TABLET ORAL DAILY PRN
Status: DISCONTINUED | OUTPATIENT
Start: 2018-12-02 | End: 2018-12-05

## 2018-12-02 RX ORDER — ISOSORBIDE MONONITRATE 30 MG/1
30 TABLET, EXTENDED RELEASE ORAL DAILY
Status: DISCONTINUED | OUTPATIENT
Start: 2018-12-02 | End: 2018-12-07

## 2018-12-02 RX ADMIN — SODIUM CHLORIDE: 9 INJECTION, SOLUTION INTRAVENOUS at 19:03

## 2018-12-02 RX ADMIN — ACETAMINOPHEN 1000 MG: 500 TABLET, FILM COATED ORAL at 11:43

## 2018-12-02 RX ADMIN — FLUOCINONIDE: 0.5 CREAM TOPICAL at 21:06

## 2018-12-02 RX ADMIN — CALCIUM 500 MG: 500 TABLET ORAL at 21:06

## 2018-12-02 RX ADMIN — ISOSORBIDE MONONITRATE 30 MG: 30 TABLET ORAL at 21:06

## 2018-12-02 RX ADMIN — ATENOLOL 25 MG: 25 TABLET ORAL at 21:06

## 2018-12-02 RX ADMIN — SODIUM CHLORIDE 2178 ML: 9 INJECTION, SOLUTION INTRAVENOUS at 10:45

## 2018-12-02 RX ADMIN — TRAMADOL HYDROCHLORIDE 50 MG: 50 TABLET, FILM COATED ORAL at 14:26

## 2018-12-02 RX ADMIN — AMLODIPINE BESYLATE 5 MG: 5 TABLET ORAL at 21:06

## 2018-12-02 RX ADMIN — DOCUSATE SODIUM 100 MG: 100 CAPSULE, LIQUID FILLED ORAL at 21:06

## 2018-12-02 RX ADMIN — LISINOPRIL 20 MG: 20 TABLET ORAL at 21:06

## 2018-12-02 RX ADMIN — AZITHROMYCIN MONOHYDRATE 500 MG: 500 INJECTION, POWDER, LYOPHILIZED, FOR SOLUTION INTRAVENOUS at 22:37

## 2018-12-02 RX ADMIN — HYDROCHLOROTHIAZIDE 12.5 MG: 25 TABLET ORAL at 21:05

## 2018-12-02 RX ADMIN — RIVAROXABAN 20 MG: 20 TABLET, FILM COATED ORAL at 21:06

## 2018-12-02 RX ADMIN — PRAVASTATIN SODIUM 20 MG: 20 TABLET ORAL at 21:06

## 2018-12-02 RX ADMIN — IOPAMIDOL 80 ML: 755 INJECTION, SOLUTION INTRAVENOUS at 12:13

## 2018-12-02 RX ADMIN — TRAMADOL HYDROCHLORIDE 25 MG: 50 TABLET, FILM COATED ORAL at 22:28

## 2018-12-02 RX ADMIN — CEFTRIAXONE SODIUM 1 G: 1 INJECTION, POWDER, FOR SOLUTION INTRAMUSCULAR; INTRAVENOUS at 22:34

## 2018-12-02 ASSESSMENT — PAIN DESCRIPTION - LOCATION
LOCATION: SHOULDER
LOCATION: SHOULDER
LOCATION: BACK;SHOULDER
LOCATION: SHOULDER
LOCATION: SHOULDER

## 2018-12-02 ASSESSMENT — PAIN DESCRIPTION - ORIENTATION
ORIENTATION: LEFT
ORIENTATION: LEFT
ORIENTATION: LEFT;RIGHT;POSTERIOR
ORIENTATION: LEFT

## 2018-12-02 ASSESSMENT — ENCOUNTER SYMPTOMS
COUGH: 0
EYE DISCHARGE: 0
DIARRHEA: 0
WHEEZING: 0
EYE PAIN: 0
VOMITING: 1
SHORTNESS OF BREATH: 0
RHINORRHEA: 0
SORE THROAT: 0
ABDOMINAL PAIN: 1
BACK PAIN: 0
NAUSEA: 1

## 2018-12-02 ASSESSMENT — PAIN DESCRIPTION - PAIN TYPE
TYPE: ACUTE PAIN

## 2018-12-02 ASSESSMENT — PAIN DESCRIPTION - PROGRESSION: CLINICAL_PROGRESSION: NOT CHANGED

## 2018-12-02 ASSESSMENT — PAIN SCALES - GENERAL
PAINLEVEL_OUTOF10: 10
PAINLEVEL_OUTOF10: 6
PAINLEVEL_OUTOF10: 10
PAINLEVEL_OUTOF10: 5
PAINLEVEL_OUTOF10: 8
PAINLEVEL_OUTOF10: 8
PAINLEVEL_OUTOF10: 10

## 2018-12-02 ASSESSMENT — PAIN DESCRIPTION - DESCRIPTORS: DESCRIPTORS: SHARP

## 2018-12-02 ASSESSMENT — PAIN DESCRIPTION - FREQUENCY: FREQUENCY: CONTINUOUS

## 2018-12-02 ASSESSMENT — PAIN DESCRIPTION - ONSET: ONSET: ON-GOING

## 2018-12-02 NOTE — ED PROVIDER NOTES
Neck: No JVD present. No neck rigidity. No tracheal deviation present. Cardiovascular: Regular rhythm and normal heart sounds. Tachycardia present. Pulses:       Radial pulses are 2+ on the right side, and 2+ on the left side. Pulmonary/Chest: Effort normal and breath sounds normal. No stridor. No tachypnea. No respiratory distress. She has no decreased breath sounds. She has no wheezes. Abdominal: Soft. Normal appearance and bowel sounds are normal. She exhibits no distension and no pulsatile midline mass. There is no tenderness. There is no rigidity, no rebound, no guarding, no CVA tenderness, no tenderness at McBurney's point and negative Hanson's sign. No hernia. Musculoskeletal: Normal range of motion. Left shoulder: She exhibits tenderness (mild to palpation). She exhibits normal range of motion, no bony tenderness, no swelling, no effusion, no deformity and no pain. Left elbow: She exhibits normal range of motion and no swelling. No tenderness found. Lymphadenopathy:     She has no cervical adenopathy. Neurological: She is alert and oriented to person, place, and time. She has normal strength. No sensory deficit. Gait normal. GCS eye subscore is 4. GCS verbal subscore is 5. GCS motor subscore is 6. Skin: Skin is warm, dry and intact. No abrasion, no ecchymosis and no rash noted. She is not diaphoretic. No pallor. Psychiatric: She has a normal mood and affect. Her speech is normal and behavior is normal. Thought content normal. Cognition and memory are normal.   Nursing note and vitals reviewed. DIFFERENTIAL DIAGNOSIS:   Including but not limited to: gastroenteritis, musculoskeletal pain, Cervical radiculopathy, ACS, dehydration, and influenza. DIAGNOSTIC RESULTS     EKG: All [facility] who either signs or Co-signs this chart in the absence of a cardiologist.  EKG interpreted by Dr. Christina Tyler.  Rate: 109 bpm  CO Lymphocytes # 0.8 (*)     Immature Grans (Abs) 0.25 (*)     All other components within normal limits   GLOMERULAR FILTRATION RATE, ESTIMATED - Abnormal; Notable for the following:     Est, Glom Filt Rate 59 (*)     All other components within normal limits   SEDIMENTATION RATE - Abnormal; Notable for the following:     Sed Rate 80 (*)     All other components within normal limits   C-REACTIVE PROTEIN - Abnormal; Notable for the following:     CRP 30.14 (*)     All other components within normal limits   BASIC METABOLIC PANEL - Abnormal; Notable for the following:     Sodium 128 (*)     Chloride 90 (*)     Glucose 117 (*)     All other components within normal limits   CBC - Abnormal; Notable for the following:     WBC 12.7 (*)     RBC 3.84 (*)     Hematocrit 33.7 (*)     MCHC 35.9 (*)     Platelets 137 (*)     All other components within normal limits   CK - Abnormal; Notable for the following:      Total CK 28 (*)     All other components within normal limits   CBC - Abnormal; Notable for the following:     WBC 12.0 (*)     RBC 3.84 (*)     Hematocrit 35.8 (*)     RDW-SD 46.8 (*)     All other components within normal limits   BASIC METABOLIC PANEL - Abnormal; Notable for the following:     Sodium 131 (*)     Chloride 89 (*)     Glucose 172 (*)     All other components within normal limits   SEDIMENTATION RATE - Abnormal; Notable for the following:     Sed Rate 87 (*)     All other components within normal limits   C-REACTIVE PROTEIN - Abnormal; Notable for the following:     CRP 17.40 (*)     All other components within normal limits   GLOMERULAR FILTRATION RATE, ESTIMATED - Abnormal; Notable for the following:     Est, Glom Filt Rate 79 (*)     All other components within normal limits   CBC - Abnormal; Notable for the following:     WBC 12.1 (*)     RBC 3.81 (*)     Hemoglobin 11.9 (*)     Hematocrit 35.3 (*)     RDW-SD 46.5 (*)     MPV 9.1 (*)     All other components within normal limits   BASIC METABOLIC PANEL - Abnormal; Notable for the following:     Sodium 126 (*)     Chloride 86 (*)     Glucose 130 (*)     All other components within normal limits   SEDIMENTATION RATE - Abnormal; Notable for the following:     Sed Rate 93 (*)     All other components within normal limits   C-REACTIVE PROTEIN - Abnormal; Notable for the following:     CRP 20.67 (*)     All other components within normal limits   GLOMERULAR FILTRATION RATE, ESTIMATED - Abnormal; Notable for the following:     Est, Glom Filt Rate 79 (*)     All other components within normal limits   RAPID INFLUENZA A/B ANTIGENS   CULTURE BLOOD #1    Narrative:     Source: blood-Adult-suboptimal <5.5oz./set volume       Site: Peripheral Vein            Current Antibiotics: not stated   CULTURE BLOOD #2    Narrative:     Source: blood-Adult-suboptimal <5.5oz./set volume       Site: Peripheral Vein(single bottle)            Current Antibiotics: not stated   MRSA SCREENING CULTURE ONLY    Narrative:     Source: nares/nasal       Site: swab          Current Antibiotics: Ceftriaxone, Ampicillin,   Clindamycin   CULTURE BLOOD #1   CULTURE BLOOD #2   LACTIC ACID, PLASMA   LIPASE   APTT   ANION GAP   OSMOLALITY   TROPONIN   BLOOD ID FILM ARRAY   ANION GAP   MAGNESIUM   ANION GAP   ANION GAP   GLOMERULAR FILTRATION RATE, ESTIMATED   ANION GAP   ANION GAP       EMERGENCY DEPARTMENT COURSE:   Vitals:    Vitals:    12/06/18 1900 12/06/18 2200 12/07/18 0300 12/07/18 0858   BP: 132/60 (!) 117/56 (!) 126/58 (!) 111/51   Pulse: 73 97 68 71   Resp: 18 16 16 18   Temp: 101.1 °F (38.4 °C) 98 °F (36.7 °C) 98.1 °F (36.7 °C) 98.4 °F (36.9 °C)   TempSrc: Temporal Temporal Temporal Oral   SpO2: 97% 99% 98% 93%   Weight:       Height:         Patient was seen and evaluated by me at bedside for left shoulder pain, abdominal pain, and a fever (103). These symptoms have been ongoing for the last three days.  History and physical exam were obtained which the patient did have some left shoulder

## 2018-12-02 NOTE — H&P
History & Physical        Patient:  Alisson Thibodeaux  YOB: 1929    MRN: [id number]     Acct: [de-identified]    PCP: Herlinda Sandoval    Date of Admission: 12/2/2018    Date of Service: Pt seen/examined on 12/02/18  and Admitted to Inpatient with expected LOS greater than two midnights due to medical therapy. Chief Complaint:  Left shoulder pain      History Of Present Illness:   80 y.o. female who presented to [address] [place] [address] with left shoulder pain. Pt notes that symptoms began 3 days ago. Has gradually gotten worse. Rated as 10/10, radiating to the shoulder blade and elbow. Worse with neck extension. Pt developed fever this morning and was brought to ED. Pt denies any cough, no n/v and no change in bowel. CT abd shows Pneumonia. procal mildly elevated    Past Medical History:          Diagnosis Date    Arthritis     Atrial fibrillation ([address]     Cancer ([address]     skin    Gross hematuria 2014    Dr Pasty Lesch    HTN (hypertension)     Hyperlipidemia     Pneumonia 12/2/2018       Past Surgical History:          Procedure Laterality Date    CARPAL TUNNEL RELEASE  10/23/2018    right hand    COLONOSCOPY      EYE SURGERY      FOOT SURGERY      x2    HYSTERECTOMY  1982    KNEE SURGERY  2010    LIVER BIOPSY  08/23/2017    SKIN BIOPSY         Medications Prior to Admission:      Prior to Admission medications    Medication Sig Start Date End Date Taking? Authorizing Provider   ketoconazole (NIZORAL) 2 % cream Apply topically daily Apply topically daily. Historical Provider, MD   hydrocortisone (WESTCORT) 0.2 % cream Apply topically 2 times daily.  8/19/18   SHARON Samuels - TRISHA   isosorbide mononitrate (IMDUR) 30 MG extended release tablet Take 1 tablet by mouth daily 5/1/18   Sujey Zhang MD   potassium chloride (KLOR-CON M) 20 MEQ extended release tablet Take 20 mEq by mouth daily as needed (when furosemide is taken)    Historical Provider, MD   furosemide (LASIX) 20 MG tablet Take 20 mg by mouth daily as needed (swelling)    Historical Provider, MD   Glucosamine-Chondroitin (GLUCOSAMINE CHONDR COMPLEX PO) Take 2 tablets by mouth daily Move Free    Historical Provider, MD   Cholecalciferol (VITAMIN D-3 PO) Take by mouth daily     Historical Provider, MD   Multiple Vitamins-Minerals (OCUVITE PO) Take 1 tablet by mouth daily     Historical Provider, MD   lisinopril-hydrochlorothiazide (PRINZIDE;ZESTORETIC) 20-12.5 MG per tablet Take 1 tablet by mouth daily. Historical Provider, MD   Multiple Vitamins-Minerals (THERAPEUTIC MULTIVITAMIN-MINERALS) tablet Take 1 tablet by mouth daily. Historical Provider, MD   pravastatin (PRAVACHOL) 20 MG tablet Take 20 mg by mouth daily. Historical Provider, MD   atenolol (TENORMIN) 25 MG tablet Take 25 mg by mouth daily. Historical Provider, MD   amLODIPine (NORVASC) 5 MG tablet Take 5 mg by mouth daily. Historical Provider, MD   calcium carbonate 600 MG TABS tablet Take 1 tablet by mouth daily. Historical Provider, MD   rivaroxaban (XARELTO) 20 MG TABS tablet Take 20 mg by mouth daily     Historical Provider, MD       Allergies:  Bactrim [sulfamethoxazole-trimethoprim]; Ciprofloxacin; and Sulfa antibiotics    Social History:      The patient currently lives at home    TOBACCO:   reports that she has never smoked. She has never used smokeless tobacco.  ETOH:   reports that she does not drink alcohol. Family History:          Family History   Problem Relation Age of Onset    Cancer Mother     High Blood Pressure Father     Arthritis Father     Heart Attack Father     Heart Disease Father     Cancer Sister     Diabetes Sister     Cancer Brother     Early Death Brother     Cancer Sister     Diabetes Sister        Diet:  DIET GENERAL; Low Sodium (2 GM)    REVIEW OF SYSTEMS:   Pertinent positives as noted in the HPI. All other systems reviewed and negative.     PHYSICAL EXAM:    BP (!) 101/54   Pulse 87   Temp 98.7 **This report has been created using voice recognition software. It may contain minor errors which are inherent in voice recognition technology. **      Final report electronically signed by Dr. Peter Roth on 12/2/2018 11:01 AM      XR SCAPULA LEFT (COMPLETE)    (Results Pending)   XR CERVICAL SPINE (2-3 VIEWS)    (Results Pending)            DVT prophylaxis: [] Lovenox                                 [] SCDs                                 [] SQ Heparin                                 [] Encourage ambulation           [x] Already on Anticoagulation    Code Status: Full Code      PT/OT Eval Status: y  Disposition:    [] Home       [] TCU       [] Rehab       [] Psych       [] SNF       [] [place]       [] Other-    ASSESSMENT:    C/Nelda Harris 1106 Problems    Diagnosis Date Noted    Pneumonia [J18.9] 12/02/2018    Acute pain of left shoulder [M25.512] 12/02/2018    Chronic atrial fibrillation ([facility] [address] [I48.2] 07/29/2017       PLAN:    1. Admit to acute medical floor  2. Antibiotics for CAP  3. Pain control  4. X-ray: shoulder and neck  5. Ortho consult  6. Resume home meds  7. Monitor        Thank you Krunal Coleman for the opportunity to be involved in this patient's care.     Electronically signed by Kadeem Jimenez MD on 12/2/2018 at 5:27 PM

## 2018-12-03 ENCOUNTER — APPOINTMENT (OUTPATIENT)
Dept: INTERVENTIONAL RADIOLOGY/VASCULAR | Age: 83
DRG: 871 | End: 2018-12-03
Payer: MEDICARE

## 2018-12-03 LAB
ACINETOBACTER BAUMANNII FILM ARRAY: NOT DETECTED
ANION GAP SERPL CALCULATED.3IONS-SCNC: 12 MEQ/L (ref 8–16)
BASOPHILS # BLD: 0.1 %
BASOPHILS ABSOLUTE: 0 THOU/MM3 (ref 0–0.1)
BOTTLE TYPE: NORMAL
BUN BLDV-MCNC: 17 MG/DL (ref 7–22)
CALCIUM SERPL-MCNC: 9 MG/DL (ref 8.5–10.5)
CANDIDA ALBICANS FILM ARRAY: NOT DETECTED
CANDIDA GLABRATA FILM ARRAY: NOT DETECTED
CANDIDA KRUSEI FILM ARRAY: NOT DETECTED
CANDIDA PARAPSILOSIS FILM ARRAY: NOT DETECTED
CANDIDA TROPICALIS FILM ARRAY: NOT DETECTED
CARBAPENEM RESITANT FILM ARRAY: NORMAL
CHLORIDE BLD-SCNC: 92 MEQ/L (ref 98–111)
CO2: 28 MEQ/L (ref 23–33)
CREAT SERPL-MCNC: 1 MG/DL (ref 0.4–1.2)
ENTERBACTER CLOACAE FILM ARRAY: NOT DETECTED
ENTERBACTERIACEAE FILM ARRAY: NOT DETECTED
ENTEROCOCCUS FILM ARRAY: NOT DETECTED
EOSINOPHIL # BLD: 0 %
EOSINOPHILS ABSOLUTE: 0 THOU/MM3 (ref 0–0.4)
ERYTHROCYTE [DISTWIDTH] IN BLOOD BY AUTOMATED COUNT: 13.9 % (ref 11.5–14.5)
ERYTHROCYTE [DISTWIDTH] IN BLOOD BY AUTOMATED COUNT: 47.7 FL (ref 35–45)
ESCHERICHIA COLI FILM ARRAY: NOT DETECTED
GFR SERPL CREATININE-BSD FRML MDRD: 52 ML/MIN/1.73M2
GLUCOSE BLD-MCNC: 210 MG/DL (ref 70–108)
HAEMOPHILUS INFLUENZA FILM ARRAY: NOT DETECTED
HCT VFR BLD CALC: 37.1 % (ref 37–47)
HEMOGLOBIN: 12.4 GM/DL (ref 12–16)
IMMATURE GRANS (ABS): 0.08 THOU/MM3 (ref 0–0.07)
IMMATURE GRANULOCYTES: 0.6 %
KLEBSIELLA OXYTOCA FILM ARRAY: NOT DETECTED
KLEBSIELLA PNEUMONIAE FILM ARRAY: NOT DETECTED
LISTERIA MONOCYTOGENES FILM ARRAY: NOT DETECTED
LYMPHOCYTES # BLD: 3.7 %
LYMPHOCYTES ABSOLUTE: 0.5 THOU/MM3 (ref 1–4.8)
MCH RBC QN AUTO: 31.6 PG (ref 26–33)
MCHC RBC AUTO-ENTMCNC: 33.4 GM/DL (ref 32.2–35.5)
MCV RBC AUTO: 94.6 FL (ref 81–99)
METHICILLIN RESISTANT FILM ARRAY: NORMAL
MONOCYTES # BLD: 6.5 %
MONOCYTES ABSOLUTE: 0.9 THOU/MM3 (ref 0.4–1.3)
NEISSERIA MENIGITIDIS FILM ARRAY: NOT DETECTED
NUCLEATED RED BLOOD CELLS: 0 /100 WBC
OSMOLALITY CALCULATION: 272.3 MOSMOL/KG (ref 275–300)
PLATELET # BLD: 132 THOU/MM3 (ref 130–400)
PMV BLD AUTO: 9.7 FL (ref 9.4–12.4)
POTASSIUM REFLEX MAGNESIUM: 3.9 MEQ/L (ref 3.5–5.2)
PRO-BNP: 6790 PG/ML (ref 0–1800)
PROTEUS FILM ARRAY: NOT DETECTED
PSEUDOMONAS AERUGINOSA FILM ARRAY: NOT DETECTED
RBC # BLD: 3.92 MILL/MM3 (ref 4.2–5.4)
SEG NEUTROPHILS: 89.1 %
SEGMENTED NEUTROPHILS ABSOLUTE COUNT: 12.8 THOU/MM3 (ref 1.8–7.7)
SERRATIA MARCESCENS FILM ARRAY: NOT DETECTED
SODIUM BLD-SCNC: 132 MEQ/L (ref 135–145)
SOURCE OF BLOOD CULTURE: NORMAL
STAPH AUREUS FILM ARRAY: NOT DETECTED
STAPHYLOCOCCUS FILM ARRAY: NOT DETECTED
STREP AGALACTIAE FILM ARRAY: NOT DETECTED
STREP PNEUMONIAE FILM ARRAY: NOT DETECTED
STREP PYOCGENES FILM ARRAY: NOT DETECTED
STREPTOCOCCUS FILM ARRAY: NOT DETECTED
VANCOMYCIN RESISTANT FILM ARRAY: NORMAL
WBC # BLD: 14.4 THOU/MM3 (ref 4.8–10.8)

## 2018-12-03 PROCEDURE — G8988 SELF CARE GOAL STATUS: HCPCS

## 2018-12-03 PROCEDURE — G8987 SELF CARE CURRENT STATUS: HCPCS

## 2018-12-03 PROCEDURE — 93971 EXTREMITY STUDY: CPT

## 2018-12-03 PROCEDURE — 2700000000 HC OXYGEN THERAPY PER DAY

## 2018-12-03 PROCEDURE — 1200000003 HC TELEMETRY R&B

## 2018-12-03 PROCEDURE — 2580000003 HC RX 258: Performed by: FAMILY MEDICINE

## 2018-12-03 PROCEDURE — 97166 OT EVAL MOD COMPLEX 45 MIN: CPT

## 2018-12-03 PROCEDURE — 83880 ASSAY OF NATRIURETIC PEPTIDE: CPT

## 2018-12-03 PROCEDURE — 6370000000 HC RX 637 (ALT 250 FOR IP): Performed by: INTERNAL MEDICINE

## 2018-12-03 PROCEDURE — 6360000002 HC RX W HCPCS: Performed by: INTERNAL MEDICINE

## 2018-12-03 PROCEDURE — 85025 COMPLETE CBC W/AUTO DIFF WBC: CPT

## 2018-12-03 PROCEDURE — 99233 SBSQ HOSP IP/OBS HIGH 50: CPT | Performed by: INTERNAL MEDICINE

## 2018-12-03 PROCEDURE — 97535 SELF CARE MNGMENT TRAINING: CPT

## 2018-12-03 PROCEDURE — 6360000002 HC RX W HCPCS: Performed by: FAMILY MEDICINE

## 2018-12-03 PROCEDURE — 36415 COLL VENOUS BLD VENIPUNCTURE: CPT

## 2018-12-03 PROCEDURE — 80048 BASIC METABOLIC PNL TOTAL CA: CPT

## 2018-12-03 PROCEDURE — 2580000003 HC RX 258: Performed by: INTERNAL MEDICINE

## 2018-12-03 PROCEDURE — 93010 ELECTROCARDIOGRAM REPORT: CPT | Performed by: INTERNAL MEDICINE

## 2018-12-03 RX ADMIN — FLUOCINONIDE: 0.5 CREAM TOPICAL at 08:10

## 2018-12-03 RX ADMIN — ATENOLOL 25 MG: 25 TABLET ORAL at 08:17

## 2018-12-03 RX ADMIN — ISOSORBIDE MONONITRATE 30 MG: 30 TABLET ORAL at 08:16

## 2018-12-03 RX ADMIN — CALCIUM 500 MG: 500 TABLET ORAL at 08:16

## 2018-12-03 RX ADMIN — TRAMADOL HYDROCHLORIDE 25 MG: 50 TABLET, FILM COATED ORAL at 20:05

## 2018-12-03 RX ADMIN — CEFTRIAXONE SODIUM 2 G: 2 INJECTION, POWDER, FOR SOLUTION INTRAMUSCULAR; INTRAVENOUS at 13:30

## 2018-12-03 RX ADMIN — Medication 10 ML: at 20:06

## 2018-12-03 RX ADMIN — Medication 10 ML: at 08:10

## 2018-12-03 RX ADMIN — AMLODIPINE BESYLATE 5 MG: 5 TABLET ORAL at 08:16

## 2018-12-03 RX ADMIN — HYDROCHLOROTHIAZIDE 12.5 MG: 25 TABLET ORAL at 08:11

## 2018-12-03 RX ADMIN — LISINOPRIL 20 MG: 20 TABLET ORAL at 08:16

## 2018-12-03 RX ADMIN — AZITHROMYCIN MONOHYDRATE 500 MG: 500 INJECTION, POWDER, LYOPHILIZED, FOR SOLUTION INTRAVENOUS at 18:37

## 2018-12-03 RX ADMIN — RIVAROXABAN 20 MG: 20 TABLET, FILM COATED ORAL at 08:10

## 2018-12-03 RX ADMIN — DOCUSATE SODIUM 100 MG: 100 CAPSULE, LIQUID FILLED ORAL at 20:05

## 2018-12-03 RX ADMIN — DOCUSATE SODIUM 100 MG: 100 CAPSULE, LIQUID FILLED ORAL at 08:10

## 2018-12-03 RX ADMIN — PRAVASTATIN SODIUM 20 MG: 20 TABLET ORAL at 08:10

## 2018-12-03 ASSESSMENT — PAIN DESCRIPTION - PAIN TYPE
TYPE: ACUTE PAIN

## 2018-12-03 ASSESSMENT — PAIN DESCRIPTION - LOCATION
LOCATION: LEG;SHOULDER
LOCATION: LEG
LOCATION: SHOULDER

## 2018-12-03 ASSESSMENT — PAIN DESCRIPTION - PROGRESSION
CLINICAL_PROGRESSION: GRADUALLY IMPROVING
CLINICAL_PROGRESSION: GRADUALLY IMPROVING
CLINICAL_PROGRESSION: NOT CHANGED

## 2018-12-03 ASSESSMENT — PAIN SCALES - GENERAL
PAINLEVEL_OUTOF10: 4
PAINLEVEL_OUTOF10: 4
PAINLEVEL_OUTOF10: 5
PAINLEVEL_OUTOF10: 0

## 2018-12-03 ASSESSMENT — PAIN DESCRIPTION - FREQUENCY
FREQUENCY: CONTINUOUS
FREQUENCY: CONTINUOUS

## 2018-12-03 ASSESSMENT — PAIN DESCRIPTION - ONSET
ONSET: ON-GOING
ONSET: ON-GOING

## 2018-12-03 ASSESSMENT — PAIN DESCRIPTION - DESCRIPTORS
DESCRIPTORS: SHARP

## 2018-12-03 ASSESSMENT — PAIN DESCRIPTION - ORIENTATION
ORIENTATION: LEFT
ORIENTATION: RIGHT;LEFT
ORIENTATION: RIGHT

## 2018-12-03 NOTE — CONSULTS
Orthopedic Consult    Requesting Physician: Benji Amaya MD    CHIEF COMPLAINT:  Left shoulder pain/Left scapula pain    HISTORY OF PRESENT ILLNESS:      The patient is a 80 y.o. female  who presents with left shoulder and scapula pain ongoing for the last several days. She does report some cervical spine issues from the past or from raking leaves about 5 days ago. Since about 5 days ago she reports moderate to severe in the superolateral, anterior and posterior aspect of her left shoulder into her scapular area. She also reports some locking catching popping and grinding sensations in her left shoulder. In addition she reports decreased motion and strength in her left shoulder as compared to her right. Her left shoulder/scapular pain and mechanical symptoms are debilitating in nature and altering her ADLs. She responding to pain medications and conservative methods of treatment at this time.     Past Medical History:    Past Medical History:   Diagnosis Date    Arthritis     [facility] fibrillation ([facility] [address]     Cancer ([facility] [address]     skin    Gross hematuria 2014    Dr Stephen Shoemaker HTN (hypertension)     Hyperlipidemia     Pneumonia 12/2/2018       Past Surgical History:    Past Surgical History:   Procedure Laterality Date    CARPAL TUNNEL RELEASE  10/23/2018    right hand    COLONOSCOPY      EYE SURGERY      FOOT SURGERY      x2    HYSTERECTOMY  1982    KNEE SURGERY  2010    LIVER BIOPSY  08/23/2017    SKIN BIOPSY         Medications Prior to Admission:   Current Facility-Administered Medications   Medication Dose Route Frequency Provider Last Rate Last Dose    cefTRIAXone (ROCEPHIN) 2 g IVPB in D5W 50ml minibag  2 g Intravenous Q24H Mary Jane Troy,         sodium chloride flush 0.9 % injection 10 mL  10 mL Intravenous 2 times per day Arslan Olsen MD   10 mL at 12/03/18 0810    sodium chloride flush 0.9 % injection 10 mL  10 mL Intravenous PRN Arslan Olsen MD        docusate sodium Value Date    PROTIME 2.09 12/20/2011    INR 1.45 12/02/2018         Radiology: See electronic record to view reports. Reports reviewed. Xray 2 views left scapula  No acute fracture or dislocation is identified. Degenerative changes are present of the acromioclavicular joint and bony spurring is present at the greater tuberosity of the humerus which may correspond to underlying rotator cuff arthropathy. As per read by: Dr. Pisano Ear cervical spine 3 views: Worsened degenerative changes are present within the cervical spine and which are most notable at C6-C7. As per read by: Dr. Nicolás Lee    ASSESSMENT:Principal Problem:    Pneumonia  Active Problems:    Chronic atrial fibrillation (HCC)    Acute pain of left shoulder  Resolved Problems:    * No resolved hospital problems. *     Left Shoulder sprain  Left Scapula pain  Neck DJD  Left RC Impingement   Left AC joint OA  Possible Left RCT      PLAN:  1)  Sling to the left upper extremity for comfort. No heavy activities with left upper extremity  2)  Continue current hospital course and pain control per admitting  3)  Ice the left shoulder and scapula QID x30 min  4)  Follow with Dr. Jama Schulz office 12/17/18. Please call for appt. Anticipate xrays at that time. 5)  Ok to d/c from ortho standpoint.       Electronically signed by Brent Harding PA-C on 12/3/2018 at 11:27 AM

## 2018-12-03 NOTE — PROGRESS NOTES
for PLB)  LE Bathing: Moderate assistance, Verbal cueing, Increased time to complete (Assist distal to bilat knees)  UE Dressing: Minimal assistance, Increased time to complete (changing gowns)  LE Dressing: Maximum assistance (with donning and doffing slippers)     Bed mobility  Supine to Sit: Minimal assistance (inc time, cues for technique)  Sit to Supine: Unable to assess  Scooting: Contact guard assistance (to EOB)    Transfers  Sit to stand: Contact guard assistance (cues for hand placement)  Stand to sit: Contact guard assistance (cues for safety)    Balance  Sitting Balance: Stand by assistance  Standing Balance: Contact guard assistance     Time: 30 seconds, 30 seconds, 3 mins  Activity: prep for mobility, prep for mobility, ADLs sinkside     Functional Mobility  Functional - Mobility Device: Rolling Walker  Activity: Other (around room from EOB to sink in room to chair)  Assist Level: Contact guard assistance  Functional Mobility Comments: pt initially stood at EOB without device, unable to maintain d/t pain in RLE, unsteady however no LOB with use of RW                                                                    Activity Tolerance:  Activity Tolerance: Patient Tolerated treatment well, Patient limited by pain  Activity Tolerance: Treatmnet initiated: OT eval completed, see above. Pt c/o R LE pain with WB, RN notified and aware.         Assessment:  Assessment: Pt would continue to benefit from skilled OT services for maximizing pt safety and independnece with self care tasks and functional mobility to ensure safe return to PLOF and home  Performance deficits / Impairments: Decreased functional mobility , Decreased ADL status, Decreased endurance, Decreased balance, Decreased safe awareness  Prognosis: Good    Clinical Decision Making: Clinical Decision making was of Moderate Complexity as the result of analysis of data from a detailed assessment, a consideration of several treatment options, the

## 2018-12-03 NOTE — PROGRESS NOTES
scarring along the posterior margin of the lower pole the left kidney. There are stable 0.2 cm hypodense lesion along the anterior margin of the upper pole of the left kidney, 0.6 cm along the medial aspect of the midpole    of the left kidney and 0.4 cm along the anterior aspect of lower pole the left kidney. 5. There is a stable 2.3 cm mass projecting off the posterolateral margin of the midpole the right kidney which is high attenuation peripherally and low attenuation centrally. There is also a stable 1.4 cm cyst projecting off the lateral margin of the    mid to lower pole of the right kidney. 6. There are stable hypodense lesions within the lateral segment of the left lobe of the liver measuring 0.4 cm and 0.3 cm. There is a stable 3.6 cm irregular hypoechoic lesion within the lateral aspect of the right lobe of the liver. 7. The gallbladder is unremarkable. 8. There is stable mild prominence of the common duct measuring 1.0 cm.   9. There are numerous small mesenteric and retroperitoneal lymph nodes which appear slightly larger and more numerous compared to the previous examination. These lymph nodes are nonspecific with the differential including reactive lymphadenopathy and a    neoplastic process. 10. Additional findings as described in the body the report. **This report has been created using voice recognition software. It may contain minor errors which are inherent in voice recognition technology. **      Final report electronically signed by Dr. Soila Parish on 12/2/2018 12:36 PM      XR CHEST STANDARD (2 VW)   Final Result   1. There is no acute cardiopulmonary process. **This report has been created using voice recognition software. It may contain minor errors which are inherent in voice recognition technology. **      Final report electronically signed by Dr. Soila Parish on 12/2/2018 11:01 AM          Diet: DIET GENERAL; Low Sodium (2 GM)    DVT prophylaxis: [] Lovenox                                 [] SCDs                                 [] SQ Heparin                                 [] Encourage ambulation           [x] Already on Anticoagulation     Disposition:    [x] Home       [] TCU       [] Rehab       [] Psych       [x] SNF       [] [place]       [] Other-    Code Status: Limited    PT/OT Eval Status: pending     Assessment/Plan:    Anticipated Discharge in : 1-2 days     Active Hospital Problems    Diagnosis Date Noted    Pneumonia [J18.9] 12/02/2018    Acute pain of left shoulder [M25.512] 12/02/2018    Chronic atrial fibrillation ([facility] [address] [I48.2] 07/29/2017       Assessment:  1. Sepsis   2. CAP   3. Left shoulder pain   4. Chronic A. Fib   5. Liver and Renal Mass       Plan:  1. Presented with 3/4 SIRS (tachycardia, fevers and elevated WBC count). Source likely lungs (CAP) given her cough. ProCal elevated. CT A/P showing bilateral effusions. Will start treatment with IV Rocephin and Azithromycin. Cont IV fluids. F/U Bx. If continues to decompensate will need to broaden Abx. 2. Plan per #1  3. Left shoulder pain likely 2/2 sprain with possible left RCT. Orthopedic surgery recommending LUE sling and ice with no heavy activities and outpatient follow-up with Dr. Teodoro Caal. No acute intervention at this time. 4. Known Atrial Fibrillation on Xarelto and Atenolol. Currently rate controlled. Monitor on telemetry. 5. Known liver and renal mass. Follows up with Dr. Benjamen Phalen (oncology) and urology. Liver biopsy showing benign. Pt does not want anymore invasive biopsies. Will just monitor. Dispo: will need treatment for CAP. Consider transitioning to oral ABx therapy. PT/OT.          Electronically signed by Brant Kaur MD on 12/3/2018 at 12:47 PM

## 2018-12-03 NOTE — CONSULTS
Patient was seen and examined by Alvaro Menendez PA-C. I agree with the assessment and plan as outlined by Alvaro Menendez PA-C; as described in her note.     Electronically signed by Zena Mcgill MD on 12/3/2018 at 11:54 AM

## 2018-12-04 LAB
ANION GAP SERPL CALCULATED.3IONS-SCNC: 12 MEQ/L (ref 8–16)
BASOPHILS # BLD: 0.2 %
BASOPHILS ABSOLUTE: 0 THOU/MM3 (ref 0–0.1)
BLOOD CULTURE, ROUTINE: ABNORMAL
BUN BLDV-MCNC: 20 MG/DL (ref 7–22)
C-REACTIVE PROTEIN: 30.14 MG/DL (ref 0–1)
CALCIUM SERPL-MCNC: 9.3 MG/DL (ref 8.5–10.5)
CHLORIDE BLD-SCNC: 89 MEQ/L (ref 98–111)
CO2: 28 MEQ/L (ref 23–33)
CREAT SERPL-MCNC: 0.9 MG/DL (ref 0.4–1.2)
EOSINOPHIL # BLD: 0 %
EOSINOPHILS ABSOLUTE: 0 THOU/MM3 (ref 0–0.4)
ERYTHROCYTE [DISTWIDTH] IN BLOOD BY AUTOMATED COUNT: 13.9 % (ref 11.5–14.5)
ERYTHROCYTE [DISTWIDTH] IN BLOOD BY AUTOMATED COUNT: 47.7 FL (ref 35–45)
GFR SERPL CREATININE-BSD FRML MDRD: 59 ML/MIN/1.73M2
GLUCOSE BLD-MCNC: 122 MG/DL (ref 70–108)
HCT VFR BLD CALC: 35.2 % (ref 37–47)
HEMOGLOBIN: 11.7 GM/DL (ref 12–16)
IMMATURE GRANS (ABS): 0.25 THOU/MM3 (ref 0–0.07)
IMMATURE GRANULOCYTES: 1.7 %
LV EF: 55 %
LVEF MODALITY: NORMAL
LYMPHOCYTES # BLD: 5.6 %
LYMPHOCYTES ABSOLUTE: 0.8 THOU/MM3 (ref 1–4.8)
MAGNESIUM: 1.9 MG/DL (ref 1.6–2.4)
MCH RBC QN AUTO: 31.1 PG (ref 26–33)
MCHC RBC AUTO-ENTMCNC: 33.2 GM/DL (ref 32.2–35.5)
MCV RBC AUTO: 93.6 FL (ref 81–99)
MONOCYTES # BLD: 5.6 %
MONOCYTES ABSOLUTE: 0.8 THOU/MM3 (ref 0.4–1.3)
NUCLEATED RED BLOOD CELLS: 0 /100 WBC
ORGANISM: ABNORMAL
ORGANISM: ABNORMAL
PLATELET # BLD: 135 THOU/MM3 (ref 130–400)
PMV BLD AUTO: 9.8 FL (ref 9.4–12.4)
POTASSIUM SERPL-SCNC: 3.8 MEQ/L (ref 3.5–5.2)
RBC # BLD: 3.76 MILL/MM3 (ref 4.2–5.4)
SEDIMENTATION RATE, ERYTHROCYTE: 80 MM/HR (ref 0–20)
SEG NEUTROPHILS: 86.9 %
SEGMENTED NEUTROPHILS ABSOLUTE COUNT: 12.5 THOU/MM3 (ref 1.8–7.7)
SODIUM BLD-SCNC: 129 MEQ/L (ref 135–145)
TOTAL CK: 28 U/L (ref 30–135)
WBC # BLD: 14.4 THOU/MM3 (ref 4.8–10.8)

## 2018-12-04 PROCEDURE — 6360000002 HC RX W HCPCS: Performed by: INTERNAL MEDICINE

## 2018-12-04 PROCEDURE — 2580000003 HC RX 258: Performed by: FAMILY MEDICINE

## 2018-12-04 PROCEDURE — 93306 TTE W/DOPPLER COMPLETE: CPT

## 2018-12-04 PROCEDURE — 80048 BASIC METABOLIC PNL TOTAL CA: CPT

## 2018-12-04 PROCEDURE — 85651 RBC SED RATE NONAUTOMATED: CPT

## 2018-12-04 PROCEDURE — 85025 COMPLETE CBC W/AUTO DIFF WBC: CPT

## 2018-12-04 PROCEDURE — 86140 C-REACTIVE PROTEIN: CPT

## 2018-12-04 PROCEDURE — 87040 BLOOD CULTURE FOR BACTERIA: CPT

## 2018-12-04 PROCEDURE — 6370000000 HC RX 637 (ALT 250 FOR IP): Performed by: INTERNAL MEDICINE

## 2018-12-04 PROCEDURE — 2580000003 HC RX 258: Performed by: INTERNAL MEDICINE

## 2018-12-04 PROCEDURE — 83735 ASSAY OF MAGNESIUM: CPT

## 2018-12-04 PROCEDURE — 36415 COLL VENOUS BLD VENIPUNCTURE: CPT

## 2018-12-04 PROCEDURE — 97161 PT EVAL LOW COMPLEX 20 MIN: CPT

## 2018-12-04 PROCEDURE — 6360000002 HC RX W HCPCS: Performed by: FAMILY MEDICINE

## 2018-12-04 PROCEDURE — 82550 ASSAY OF CK (CPK): CPT

## 2018-12-04 PROCEDURE — 97530 THERAPEUTIC ACTIVITIES: CPT

## 2018-12-04 PROCEDURE — G8979 MOBILITY GOAL STATUS: HCPCS

## 2018-12-04 PROCEDURE — G8978 MOBILITY CURRENT STATUS: HCPCS

## 2018-12-04 PROCEDURE — 97535 SELF CARE MNGMENT TRAINING: CPT

## 2018-12-04 PROCEDURE — 1200000003 HC TELEMETRY R&B

## 2018-12-04 PROCEDURE — 97110 THERAPEUTIC EXERCISES: CPT

## 2018-12-04 PROCEDURE — 99233 SBSQ HOSP IP/OBS HIGH 50: CPT | Performed by: INTERNAL MEDICINE

## 2018-12-04 RX ADMIN — ISOSORBIDE MONONITRATE 30 MG: 30 TABLET ORAL at 08:49

## 2018-12-04 RX ADMIN — AMPICILLIN SODIUM 1 G: 1 INJECTION, POWDER, FOR SOLUTION INTRAMUSCULAR; INTRAVENOUS at 20:13

## 2018-12-04 RX ADMIN — TRAMADOL HYDROCHLORIDE 25 MG: 50 TABLET, FILM COATED ORAL at 05:01

## 2018-12-04 RX ADMIN — TRAMADOL HYDROCHLORIDE 25 MG: 50 TABLET, FILM COATED ORAL at 20:13

## 2018-12-04 RX ADMIN — Medication 10 ML: at 20:13

## 2018-12-04 RX ADMIN — AZITHROMYCIN MONOHYDRATE 500 MG: 500 INJECTION, POWDER, LYOPHILIZED, FOR SOLUTION INTRAVENOUS at 17:14

## 2018-12-04 RX ADMIN — CEFTRIAXONE SODIUM 2 G: 2 INJECTION, POWDER, FOR SOLUTION INTRAMUSCULAR; INTRAVENOUS at 13:01

## 2018-12-04 RX ADMIN — FLUOCINONIDE: 0.5 CREAM TOPICAL at 08:49

## 2018-12-04 RX ADMIN — DOCUSATE SODIUM 100 MG: 100 CAPSULE, LIQUID FILLED ORAL at 08:49

## 2018-12-04 RX ADMIN — CALCIUM 500 MG: 500 TABLET ORAL at 08:49

## 2018-12-04 RX ADMIN — RIVAROXABAN 15 MG: 15 TABLET, FILM COATED ORAL at 08:49

## 2018-12-04 RX ADMIN — Medication 10 ML: at 08:47

## 2018-12-04 RX ADMIN — DOCUSATE SODIUM 100 MG: 100 CAPSULE, LIQUID FILLED ORAL at 20:13

## 2018-12-04 RX ADMIN — PRAVASTATIN SODIUM 20 MG: 20 TABLET ORAL at 08:49

## 2018-12-04 ASSESSMENT — PAIN SCALES - GENERAL
PAINLEVEL_OUTOF10: 0
PAINLEVEL_OUTOF10: 0
PAINLEVEL_OUTOF10: 5
PAINLEVEL_OUTOF10: 6
PAINLEVEL_OUTOF10: 6

## 2018-12-04 ASSESSMENT — PAIN DESCRIPTION - PAIN TYPE
TYPE: ACUTE PAIN
TYPE: ACUTE PAIN

## 2018-12-04 ASSESSMENT — PAIN DESCRIPTION - FREQUENCY: FREQUENCY: CONTINUOUS

## 2018-12-04 ASSESSMENT — PAIN DESCRIPTION - ONSET: ONSET: ON-GOING

## 2018-12-04 ASSESSMENT — PAIN DESCRIPTION - ORIENTATION
ORIENTATION: RIGHT
ORIENTATION: RIGHT

## 2018-12-04 ASSESSMENT — PAIN DESCRIPTION - LOCATION
LOCATION: LEG
LOCATION: LEG

## 2018-12-04 ASSESSMENT — PAIN DESCRIPTION - DESCRIPTORS: DESCRIPTORS: ACHING

## 2018-12-04 NOTE — PLAN OF CARE
Problem: DISCHARGE BARRIERS  Goal: Patient's continuum of care needs are met  Outcome: Ongoing  SW assessment completed, see note for details.   Home alone with [facility]

## 2018-12-04 NOTE — CONSULTS
distress. HEENT: pink conjunctiva, unicteric sclera, moist oral mucosa. Chest:  clear  Cardiovascular:  RRR ,S1S2, no murmur or gallop. Abdomen:  Soft, non tender to palpation. Extremities: the right lower leg is swollen red and warm to touch. It is very tender on the calf, no crepitus. dilated veins on the lower extremites. There is no swelling or effusion on the left shoulder  Skin:  Warm and dry. CNS: oriented        LABS:     CBC:   Recent Labs      12/02/18   1024  12/03/18   0547  12/04/18   0633   WBC  12.6*  14.4*  14.4*   HGB  13.9  12.4  11.7*   PLT  158  132  135     BMP:    Recent Labs      12/02/18   1024  12/03/18   0547  12/04/18   0633   NA  136  132*  129*   K  3.7  3.9  3.8   CL  94*  92*  89*   CO2  27  28  28   BUN  19  17  20   CREATININE  0.8  1.0  0.9   GLUCOSE  169*  210*  122*     Calcium:  Recent Labs      12/04/18   0633   CALCIUM  9.3     Ionized Calcium:No results for input(s): IONCA in the last 72 hours. Magnesium:  Recent Labs      12/04/18   0633   MG  1.9     Phosphorus:No results for input(s): PHOS in the last 72 hours. BNP:No results for input(s): BNP in the last 72 hours. Glucose:No results for input(s): POCGLU in the last 72 hours. HgbA1C: No results for input(s): LABA1C in the last 72 hours.   INR:   Recent Labs      12/02/18   1024   INR  1.45*     Hepatic:   Recent Labs      12/02/18   1024   ALKPHOS  76   ALT  27   AST  28   PROT  7.2   BILITOT  0.8   LABALBU  4.1     Amylase and Lipase:  Recent Labs      12/02/18   1024   LACTA  1.8     Lactic Acid:   Recent Labs      12/02/18   1024   LACTA  1.8      UA:   Recent Labs      12/02/18   1024   PHUR  7.0   COLORU  YELLOW   PROTEINU  NEGATIVE   BLOODU  NEGATIVE   NITRU  NEGATIVE   GLUCOSEU  NEGATIVE   BILIRUBINUR  NEGATIVE   UROBILINOGEN  0.2   KETUA  TRACE*         IMAGING:    Micro:   Lab Results   Component Value Date    [facility]  12/02/2018     Isolates of beta hemolytic Streptococcus are sensitive

## 2018-12-04 NOTE — PROGRESS NOTES
[address]ie [address] 60  INPATIENT OCCUPATIONAL THERAPY  [facility] RENAL TELEMETRY 6K  DAILY NOTE    Time:  Time In: 9064  Time Out: 1520  Timed Code Treatment Minutes: 52 Minutes  Minutes: 47          Date: 2018  Patient Name: Igor Guillory,   Gender: female      Room: [facility]27/027-A  MRN: [id number]  : 1929  (80 y.o.)  Referring Practitioner: Timur Gill MD  Diagnosis: Pneumonia  Additional Pertinent Hx: 80 y.o. female who presented to [address] [place] [address] with left shoulder pain. Pt notes that symptoms began 3 days ago. Has gradually gotten worse. Rated as 10/10, radiating to the shoulder blade and elbow. Worse with neck extension. Pt developed fever this morning and was brought to ED. Pt denies any cough, no n/v and no change in bowel. CT abd shows Pneumonia. procal mildly elevated    Past Medical History:   Diagnosis Date    Arthritis     Atrial fibrillation ([address]     Cancer ([address]     skin    Gross hematuria     Dr Archie Muniz HTN (hypertension)     Hyperlipidemia     Pneumonia 2018     Past Surgical History:   Procedure Laterality Date    CARPAL TUNNEL RELEASE  10/23/2018    right hand    COLONOSCOPY      EYE SURGERY      FOOT SURGERY      x2    HYSTERECTOMY  1982    KNEE SURGERY  2010    LIVER BIOPSY  2017    SKIN BIOPSY         Restrictions/Precautions:  General Precautions, Fall Risk                    Other position/activity restrictions: No heavy lifting L UE  Left Upper Extremity Brace/Splint: Sling  Left Upper Extremity Brace/Splint: for comfort    Prior Level of Function:  ADL Assistance: Independent  Homemaking Assistance: Independent  Ambulation Assistance: Independent  Transfer Assistance: Independent  Additional Comments: Pt very indp at OF, ambulates without device and manages all ADLs and IADLs independently.   pt uses walk in shower in basement, laundry is in basement along with treadmill and pulleys for home therex    Subjective  Patient assessed of devices: All fall risk precautions in place, Left in chair, Call light within reach, Chair alarm in place, Patient at risk for falls, Nurse notified, Gait belt    Plan:  Times per week: 5x  Current Treatment Recommendations: Strengthening, Balance Training, Endurance Training, Functional Mobility Training, Self-Care / ADL, Home Management Training, Safety Education & Training, Equipment Evaluation, Education, & procurement, Patient/Caregiver Education & Training    Goals:  Patient goals : To return to independent lifestyle    Short term goals  Time Frame for Short term goals:  Two weeks  Short term goal 1: Pt to complete functional mobility with no greater than SUP for inc indep with accessing enviornment  Short term goal 2: Pt to tolerate standing greater than 6 mins with 1-2 UE release at SUP in prep for simple IADL tasks  Short term goal 3: Pt to complete BADL routine with no greater than SBA using AE prn for increased indep with self cares  Long term goals  Time Frame for Long term goals : NA d/t [place]

## 2018-12-04 NOTE — PROGRESS NOTES
132*  129*   K  3.7  3.9  3.8   CL  94*  92*  89*   CO2  27  28  28   BUN  19  17  20   CREATININE  0.8  1.0  0.9   CALCIUM  9.8  9.0  9.3     Recent Labs      12/02/18   1024   AST  28   ALT  27   BILITOT  0.8   ALKPHOS  76     Recent Labs      12/02/18   1024   INR  1.45*     No results for input(s): Nevarchana Hosteller in the last 72 hours. Urinalysis:      Lab Results   Component Value Date    NITRU NEGATIVE 12/02/2018    WBCUA 0-2 08/28/2017    WBCUA 25-50 07/18/2011    BACTERIA NONE 08/28/2017    RBCUA 0-2 08/28/2017    BLOODU NEGATIVE 12/02/2018    SPECGRAV 1.015 07/12/2014    GLUCOSEU NEGATIVE 12/02/2018       Radiology:  VL DUP LOWER EXTREMITY VENOUS RIGHT   Final Result      No evidence of deep venous thrombosis in the right lower extremity. Final report electronically signed by Dr. Mallory Urrutia on 12/3/2018 5:20 PM      XR SCAPULA LEFT (COMPLETE)   Final Result      No acute fracture or dislocation is identified. Degenerative changes are present of the acromioclavicular joint and bony spurring is present at the greater tuberosity of the humerus which may correspond to underlying rotator cuff arthropathy. **This report has been created using voice recognition software. It may contain minor errors which are inherent in voice recognition technology. **      Final report electronically signed by Dr. Kennyth Severance on 12/2/2018 5:59 PM      XR CERVICAL SPINE (2-3 VIEWS)   Final Result       Worsened degenerative changes are present within the cervical spine and which are most notable at C6-C7. **This report has been created using voice recognition software. It may contain minor errors which are inherent in voice recognition technology. **      Final report electronically signed by Dr. Kennyth Severance on 12/2/2018 5:55 PM      CT ABDOMEN PELVIS W IV CONTRAST Additional Contrast? None   Final Result   1.  There are small bilateral pleural effusions, right greater than

## 2018-12-05 LAB
ANION GAP SERPL CALCULATED.3IONS-SCNC: 11 MEQ/L (ref 8–16)
BUN BLDV-MCNC: 16 MG/DL (ref 7–22)
CALCIUM SERPL-MCNC: 8.9 MG/DL (ref 8.5–10.5)
CHLORIDE BLD-SCNC: 90 MEQ/L (ref 98–111)
CO2: 27 MEQ/L (ref 23–33)
CREAT SERPL-MCNC: 0.6 MG/DL (ref 0.4–1.2)
ERYTHROCYTE [DISTWIDTH] IN BLOOD BY AUTOMATED COUNT: 13.8 % (ref 11.5–14.5)
ERYTHROCYTE [DISTWIDTH] IN BLOOD BY AUTOMATED COUNT: 44 FL (ref 35–45)
GFR SERPL CREATININE-BSD FRML MDRD: > 90 ML/MIN/1.73M2
GLUCOSE BLD-MCNC: 117 MG/DL (ref 70–108)
HCT VFR BLD CALC: 33.7 % (ref 37–47)
HEMOGLOBIN: 12.1 GM/DL (ref 12–16)
MCH RBC QN AUTO: 31.5 PG (ref 26–33)
MCHC RBC AUTO-ENTMCNC: 35.9 GM/DL (ref 32.2–35.5)
MCV RBC AUTO: 87.8 FL (ref 81–99)
PLATELET # BLD: 126 THOU/MM3 (ref 130–400)
PMV BLD AUTO: 10.4 FL (ref 9.4–12.4)
POTASSIUM SERPL-SCNC: 4.1 MEQ/L (ref 3.5–5.2)
RBC # BLD: 3.84 MILL/MM3 (ref 4.2–5.4)
SODIUM BLD-SCNC: 128 MEQ/L (ref 135–145)
WBC # BLD: 12.7 THOU/MM3 (ref 4.8–10.8)

## 2018-12-05 PROCEDURE — 97535 SELF CARE MNGMENT TRAINING: CPT

## 2018-12-05 PROCEDURE — 97530 THERAPEUTIC ACTIVITIES: CPT

## 2018-12-05 PROCEDURE — 2580000003 HC RX 258: Performed by: INTERNAL MEDICINE

## 2018-12-05 PROCEDURE — 6370000000 HC RX 637 (ALT 250 FOR IP): Performed by: INTERNAL MEDICINE

## 2018-12-05 PROCEDURE — 80048 BASIC METABOLIC PNL TOTAL CA: CPT

## 2018-12-05 PROCEDURE — 6360000002 HC RX W HCPCS: Performed by: INTERNAL MEDICINE

## 2018-12-05 PROCEDURE — 99232 SBSQ HOSP IP/OBS MODERATE 35: CPT | Performed by: INTERNAL MEDICINE

## 2018-12-05 PROCEDURE — 97116 GAIT TRAINING THERAPY: CPT

## 2018-12-05 PROCEDURE — 97110 THERAPEUTIC EXERCISES: CPT

## 2018-12-05 PROCEDURE — 1200000003 HC TELEMETRY R&B

## 2018-12-05 PROCEDURE — 36415 COLL VENOUS BLD VENIPUNCTURE: CPT

## 2018-12-05 PROCEDURE — 94760 N-INVAS EAR/PLS OXIMETRY 1: CPT

## 2018-12-05 PROCEDURE — 85027 COMPLETE CBC AUTOMATED: CPT

## 2018-12-05 RX ORDER — FUROSEMIDE 20 MG/1
20 TABLET ORAL DAILY
Status: DISCONTINUED | OUTPATIENT
Start: 2018-12-05 | End: 2018-12-07

## 2018-12-05 RX ADMIN — ISOSORBIDE MONONITRATE 30 MG: 30 TABLET ORAL at 09:15

## 2018-12-05 RX ADMIN — DOCUSATE SODIUM 100 MG: 100 CAPSULE, LIQUID FILLED ORAL at 21:33

## 2018-12-05 RX ADMIN — DOCUSATE SODIUM 100 MG: 100 CAPSULE, LIQUID FILLED ORAL at 09:15

## 2018-12-05 RX ADMIN — PRAVASTATIN SODIUM 20 MG: 20 TABLET ORAL at 09:15

## 2018-12-05 RX ADMIN — Medication 10 ML: at 09:15

## 2018-12-05 RX ADMIN — Medication 10 ML: at 21:46

## 2018-12-05 RX ADMIN — FUROSEMIDE 20 MG: 20 TABLET ORAL at 13:32

## 2018-12-05 RX ADMIN — AMPICILLIN SODIUM 1 G: 1 INJECTION, POWDER, FOR SOLUTION INTRAMUSCULAR; INTRAVENOUS at 03:07

## 2018-12-05 RX ADMIN — AMPICILLIN SODIUM 1 G: 1 INJECTION, POWDER, FOR SOLUTION INTRAMUSCULAR; INTRAVENOUS at 13:32

## 2018-12-05 RX ADMIN — CALCIUM 500 MG: 500 TABLET ORAL at 09:15

## 2018-12-05 RX ADMIN — AMPICILLIN SODIUM 2 G: 2 INJECTION, POWDER, FOR SOLUTION INTRAMUSCULAR; INTRAVENOUS at 21:34

## 2018-12-05 RX ADMIN — FLUOCINONIDE: 0.5 CREAM TOPICAL at 09:14

## 2018-12-05 RX ADMIN — Medication 10 ML: at 21:37

## 2018-12-05 RX ADMIN — TRAMADOL HYDROCHLORIDE 25 MG: 50 TABLET, FILM COATED ORAL at 03:17

## 2018-12-05 RX ADMIN — RIVAROXABAN 15 MG: 15 TABLET, FILM COATED ORAL at 09:15

## 2018-12-05 RX ADMIN — AMPICILLIN SODIUM 1 G: 1 INJECTION, POWDER, FOR SOLUTION INTRAMUSCULAR; INTRAVENOUS at 09:15

## 2018-12-05 ASSESSMENT — PAIN DESCRIPTION - ORIENTATION
ORIENTATION: RIGHT

## 2018-12-05 ASSESSMENT — PAIN DESCRIPTION - ONSET: ONSET: ON-GOING

## 2018-12-05 ASSESSMENT — PAIN SCALES - GENERAL
PAINLEVEL_OUTOF10: 2
PAINLEVEL_OUTOF10: 2
PAINLEVEL_OUTOF10: 5

## 2018-12-05 ASSESSMENT — PAIN DESCRIPTION - LOCATION
LOCATION: LEG

## 2018-12-05 ASSESSMENT — PAIN DESCRIPTION - PAIN TYPE
TYPE: ACUTE PAIN
TYPE: ACUTE PAIN

## 2018-12-05 ASSESSMENT — PAIN DESCRIPTION - FREQUENCY: FREQUENCY: CONTINUOUS

## 2018-12-05 ASSESSMENT — PAIN DESCRIPTION - PROGRESSION: CLINICAL_PROGRESSION: NOT CHANGED

## 2018-12-05 ASSESSMENT — PAIN SCALES - WONG BAKER: WONGBAKER_NUMERICALRESPONSE: 2

## 2018-12-05 ASSESSMENT — PAIN DESCRIPTION - DESCRIPTORS: DESCRIPTORS: ACHING

## 2018-12-05 NOTE — CONSULTS
Patient was seen and examined by Elpidio Sumner PA-C. I agree with the assessment plan as outlined by Elpidio uSmner PA-C; as described in her note.     Electronically signed by Les Bello MD on 12/5/2018 at 1:07 PM

## 2018-12-05 NOTE — CONSULTS
Patient was seen and examined by Joan Vital PA-C. I agree with the assessment plan as outlined by Joan Vital PA-C; as described in her note.     Electronically signed by Sophia Callahan MD on 12/4/2018 at 7:34 PM

## 2018-12-05 NOTE — CONSULTS
Orthopedic Consult    Requesting Physician: Boy Crowley MD    CHIEF COMPLAINT:  Left shoulder and scapula pain    HISTORY OF PRESENT ILLNESS:      Omari Loja is a 80 y.o. female  who presented with left shoulder and scapula pain ongoing for the several days at home before presenting to the ER. Mayra Aviles does report some cervical spine issues in the past and raking leaves about several days ago. After raking leaves she reports moderate pain in the superolateral, anterior and posterior aspect of her left shoulder into her scapular area.  She also reports some locking, catching, popping and grinding sensations in her left shoulder.  In addition she reports decreased motion and strength in her left shoulder as compared to her right.  Her left shoulder/scapular pain and mechanical symptoms are debilitating in nature and altering her ADLs.  She responding to pain medications and conservative methods of treatment at this time. She does report improvement in her pain and mechanical symptoms since she has been admitted.     Past Medical History:    Past Medical History:   Diagnosis Date    Arthritis     Atrial fibrillation ([address]     Cancer ([facility] [address]     skin    Gross hematuria 2014    Dr Cha Patiño HTN (hypertension)     Hyperlipidemia     Pneumonia 12/2/2018       Past Surgical History:    Past Surgical History:   Procedure Laterality Date    CARPAL TUNNEL RELEASE  10/23/2018    right hand    COLONOSCOPY      EYE SURGERY      FOOT SURGERY      x2    HYSTERECTOMY  1982    KNEE SURGERY  2010    LIVER BIOPSY  08/23/2017    SKIN BIOPSY         Medications Prior to Admission:   Current Facility-Administered Medications   Medication Dose Route Frequency Provider Last Rate Last Dose    ampicillin 1 g ivpb mini bag  1 g Intravenous Q6H Boy Crowley MD   Stopped at 12/05/18 0631    rivaroxaban (XARELTO) tablet 15 mg  15 mg Oral Daily with breakfast Edgard Adan MD   15 mg at 12/04/18 0849    sodium chloride flush

## 2018-12-05 NOTE — PLAN OF CARE
Problem: Pain:  Goal: Pain level will decrease  Pain level will decrease   Outcome: Ongoing  Patient free from pain this shift. Pain rated on 0-10 pain rating scale. Will continue to reassess. Problem: Falls - Risk of:  Goal: Will remain free from falls  Will remain free from falls   Outcome: Ongoing  Call light within reach. Side rails up x2. Bed alarm on. Non skid slippers available. Patient is a 1 assist with walker/gait belt      Problem: Discharge Planning:  Goal: Participates in care planning  Participates in care planning   Outcome: Ongoing  Patient and family have been kept up to date on POC and discharge planning. Will continue to update     Problem: Airway Clearance - Ineffective:  Goal: Ability to maintain a clear airway will improve  Ability to maintain a clear airway will improve   Outcome: Ongoing  Lung sounds are clear    Problem: Bowel Function - Altered:  Goal: Bowel elimination is within specified parameters  Bowel elimination is within specified parameters   Outcome: Ongoing  Patient has had a bowel movement this shift. Colace taken daily    Problem: Cardiac Output - Decreased:  Goal: Hemodynamic stability will improve  Hemodynamic stability will improve   Outcome: Ongoing  I/O last 3 completed shifts: In: 7010 [P.O.:1010; I.V.:95]  Out: 450 [Urine:450]  No intake/output data recorded. Problem: Fluid Volume - Imbalance:  Goal: Absence of imbalanced fluid volume signs and symptoms  Absence of imbalanced fluid volume signs and symptoms   Outcome: Ongoing  Lung sounds are clear    Problem: Gas Exchange - Impaired:  Goal: Levels of oxygenation will improve  Levels of oxygenation will improve   Outcome: Ongoing  Patient oxygen level greater than 90 on room air. Will continue to reassess respiratory status.       Problem: Skin Integrity - Impaired:  Goal: Will show no infection signs and symptoms  Will show no infection signs and symptoms   Outcome: Ongoing  Patient free from skin

## 2018-12-05 NOTE — PROGRESS NOTES
Progress note: Infectious diseases    Patient - Jessica Gaines,  Age - 80 y.o.    - 1929      Room Number - 9K-15/776-L   MRN -  [id number]   Acct # - [de-identified]  Date of Admission -  2018  9:59 AM    SUBJECTIVE:   She feels better  Repeat blood culture  OBJECTIVE   VITALS    height is 5' 4\" (1.626 m) and weight is 170 lb 6.4 oz (77.3 kg). Her oral temperature is 98.1 °F (36.7 °C). Her blood pressure is 124/65 and her pulse is 95. Her respiration is 18 and oxygen saturation is 96%.        Wt Readings from Last 3 Encounters:   18 170 lb 6.4 oz (77.3 kg)   10/24/18 167 lb (75.8 kg)   18 165 lb 6.4 oz (75 kg)       I/O (24 Hours)    Intake/Output Summary (Last 24 hours) at 18 1453  Last data filed at 18 0310   Gross per 24 hour   Intake              745 ml   Output              450 ml   Net              295 ml       General Appearance  Awake, alert, oriented,  not  In acute distress  HEENT - normocephalic, atraumatic, pink conjunctiva,  anicteric sclera  Neck - Supple, no mass  Lungs -  Bilateral good air entry, no rhonchi, no wheeze  Cardiovascular - Heart sounds are normal.    Abdomen - soft, not distended, nontender,   Neurologic -oriented  Skin - No bruising or bleeding  Extremities - the right lower leg is swollen and red, less tender than yesterday    MEDICATIONS:      furosemide  20 mg Oral Daily    ampicillin IV  1 g Intravenous Q6H    rivaroxaban  15 mg Oral Daily with breakfast    sodium chloride flush  10 mL Intravenous 2 times per day    docusate sodium  100 mg Oral BID    amLODIPine  5 mg Oral Daily    atenolol  25 mg Oral Daily    calcium elemental  500 mg Oral Daily    isosorbide mononitrate  30 mg Oral Daily    pravastatin  20 mg Oral Daily    lisinopril  20 mg Oral Daily    fluocinonide   Topical Daily       sodium chloride flush, ondansetron, potassium chloride,

## 2018-12-06 ENCOUNTER — APPOINTMENT (OUTPATIENT)
Dept: MRI IMAGING | Age: 83
DRG: 871 | End: 2018-12-06
Payer: MEDICARE

## 2018-12-06 LAB
ANION GAP SERPL CALCULATED.3IONS-SCNC: 12 MEQ/L (ref 8–16)
BUN BLDV-MCNC: 11 MG/DL (ref 7–22)
C-REACTIVE PROTEIN: 17.4 MG/DL (ref 0–1)
CALCIUM SERPL-MCNC: 8.7 MG/DL (ref 8.5–10.5)
CHLORIDE BLD-SCNC: 89 MEQ/L (ref 98–111)
CO2: 30 MEQ/L (ref 23–33)
CREAT SERPL-MCNC: 0.7 MG/DL (ref 0.4–1.2)
ERYTHROCYTE [DISTWIDTH] IN BLOOD BY AUTOMATED COUNT: 13.8 % (ref 11.5–14.5)
ERYTHROCYTE [DISTWIDTH] IN BLOOD BY AUTOMATED COUNT: 46.8 FL (ref 35–45)
GFR SERPL CREATININE-BSD FRML MDRD: 79 ML/MIN/1.73M2
GLUCOSE BLD-MCNC: 172 MG/DL (ref 70–108)
HCT VFR BLD CALC: 35.8 % (ref 37–47)
HEMOGLOBIN: 12.1 GM/DL (ref 12–16)
MCH RBC QN AUTO: 31.5 PG (ref 26–33)
MCHC RBC AUTO-ENTMCNC: 33.8 GM/DL (ref 32.2–35.5)
MCV RBC AUTO: 93.2 FL (ref 81–99)
PLATELET # BLD: 169 THOU/MM3 (ref 130–400)
PMV BLD AUTO: 9.6 FL (ref 9.4–12.4)
POTASSIUM SERPL-SCNC: 4.1 MEQ/L (ref 3.5–5.2)
RBC # BLD: 3.84 MILL/MM3 (ref 4.2–5.4)
SEDIMENTATION RATE, ERYTHROCYTE: 87 MM/HR (ref 0–20)
SODIUM BLD-SCNC: 131 MEQ/L (ref 135–145)
WBC # BLD: 12 THOU/MM3 (ref 4.8–10.8)

## 2018-12-06 PROCEDURE — 2580000003 HC RX 258: Performed by: INTERNAL MEDICINE

## 2018-12-06 PROCEDURE — 73720 MRI LWR EXTREMITY W/O&W/DYE: CPT

## 2018-12-06 PROCEDURE — 1200000003 HC TELEMETRY R&B

## 2018-12-06 PROCEDURE — 6370000000 HC RX 637 (ALT 250 FOR IP): Performed by: INTERNAL MEDICINE

## 2018-12-06 PROCEDURE — 6360000002 HC RX W HCPCS: Performed by: INTERNAL MEDICINE

## 2018-12-06 PROCEDURE — 80048 BASIC METABOLIC PNL TOTAL CA: CPT

## 2018-12-06 PROCEDURE — 2500000003 HC RX 250 WO HCPCS: Performed by: INTERNAL MEDICINE

## 2018-12-06 PROCEDURE — 85027 COMPLETE CBC AUTOMATED: CPT

## 2018-12-06 PROCEDURE — 36415 COLL VENOUS BLD VENIPUNCTURE: CPT

## 2018-12-06 PROCEDURE — 97116 GAIT TRAINING THERAPY: CPT

## 2018-12-06 PROCEDURE — 99233 SBSQ HOSP IP/OBS HIGH 50: CPT | Performed by: INTERNAL MEDICINE

## 2018-12-06 PROCEDURE — 97110 THERAPEUTIC EXERCISES: CPT

## 2018-12-06 PROCEDURE — 87081 CULTURE SCREEN ONLY: CPT

## 2018-12-06 PROCEDURE — A9579 GAD-BASE MR CONTRAST NOS,1ML: HCPCS | Performed by: ORTHOPAEDIC SURGERY

## 2018-12-06 PROCEDURE — 6360000004 HC RX CONTRAST MEDICATION: Performed by: ORTHOPAEDIC SURGERY

## 2018-12-06 PROCEDURE — 86140 C-REACTIVE PROTEIN: CPT

## 2018-12-06 PROCEDURE — 85651 RBC SED RATE NONAUTOMATED: CPT

## 2018-12-06 RX ORDER — OXYCODONE HYDROCHLORIDE 5 MG/1
2.5 TABLET ORAL ONCE
Status: COMPLETED | OUTPATIENT
Start: 2018-12-06 | End: 2018-12-06

## 2018-12-06 RX ORDER — ACETAMINOPHEN 325 MG/1
650 TABLET ORAL EVERY 4 HOURS PRN
Status: DISCONTINUED | OUTPATIENT
Start: 2018-12-06 | End: 2018-12-12 | Stop reason: HOSPADM

## 2018-12-06 RX ORDER — LISINOPRIL 5 MG/1
5 TABLET ORAL DAILY
Status: DISCONTINUED | OUTPATIENT
Start: 2018-12-07 | End: 2018-12-07

## 2018-12-06 RX ORDER — LACTOBACILLUS RHAMNOSUS GG 10B CELL
1 CAPSULE ORAL
Status: DISCONTINUED | OUTPATIENT
Start: 2018-12-07 | End: 2018-12-12 | Stop reason: HOSPADM

## 2018-12-06 RX ORDER — CLINDAMYCIN PHOSPHATE 600 MG/50ML
600 INJECTION INTRAVENOUS EVERY 8 HOURS
Status: DISCONTINUED | OUTPATIENT
Start: 2018-12-06 | End: 2018-12-08

## 2018-12-06 RX ADMIN — TRAMADOL HYDROCHLORIDE 25 MG: 50 TABLET, FILM COATED ORAL at 20:01

## 2018-12-06 RX ADMIN — PRAVASTATIN SODIUM 20 MG: 20 TABLET ORAL at 08:41

## 2018-12-06 RX ADMIN — CLINDAMYCIN IN 5 PERCENT DEXTROSE 600 MG: 12 INJECTION, SOLUTION INTRAVENOUS at 23:00

## 2018-12-06 RX ADMIN — FUROSEMIDE 20 MG: 20 TABLET ORAL at 08:41

## 2018-12-06 RX ADMIN — ATENOLOL 25 MG: 25 TABLET ORAL at 08:41

## 2018-12-06 RX ADMIN — GADOTERIDOL 15 ML: 279.3 INJECTION, SOLUTION INTRAVENOUS at 19:04

## 2018-12-06 RX ADMIN — RIVAROXABAN 15 MG: 15 TABLET, FILM COATED ORAL at 08:41

## 2018-12-06 RX ADMIN — AMPICILLIN SODIUM 2 G: 2 INJECTION, POWDER, FOR SOLUTION INTRAMUSCULAR; INTRAVENOUS at 02:28

## 2018-12-06 RX ADMIN — CALCIUM 500 MG: 500 TABLET ORAL at 08:41

## 2018-12-06 RX ADMIN — AMPICILLIN SODIUM 2 G: 2 INJECTION, POWDER, FOR SOLUTION INTRAMUSCULAR; INTRAVENOUS at 10:07

## 2018-12-06 RX ADMIN — Medication 10 ML: at 20:04

## 2018-12-06 RX ADMIN — CEFTRIAXONE SODIUM 2 G: 2 INJECTION, POWDER, FOR SOLUTION INTRAMUSCULAR; INTRAVENOUS at 16:16

## 2018-12-06 RX ADMIN — FLUOCINONIDE: 0.5 CREAM TOPICAL at 08:45

## 2018-12-06 RX ADMIN — AMLODIPINE BESYLATE 5 MG: 5 TABLET ORAL at 08:42

## 2018-12-06 RX ADMIN — LISINOPRIL 20 MG: 20 TABLET ORAL at 08:41

## 2018-12-06 RX ADMIN — Medication 10 ML: at 08:45

## 2018-12-06 RX ADMIN — ACETAMINOPHEN 650 MG: 325 TABLET ORAL at 20:55

## 2018-12-06 RX ADMIN — TRAMADOL HYDROCHLORIDE 25 MG: 50 TABLET, FILM COATED ORAL at 02:14

## 2018-12-06 RX ADMIN — TRAMADOL HYDROCHLORIDE 25 MG: 50 TABLET, FILM COATED ORAL at 08:49

## 2018-12-06 RX ADMIN — ISOSORBIDE MONONITRATE 30 MG: 30 TABLET ORAL at 08:41

## 2018-12-06 RX ADMIN — OXYCODONE HYDROCHLORIDE 2.5 MG: 5 TABLET ORAL at 13:28

## 2018-12-06 RX ADMIN — CLINDAMYCIN IN 5 PERCENT DEXTROSE 600 MG: 12 INJECTION, SOLUTION INTRAVENOUS at 16:06

## 2018-12-06 ASSESSMENT — PAIN SCALES - GENERAL
PAINLEVEL_OUTOF10: 2
PAINLEVEL_OUTOF10: 10
PAINLEVEL_OUTOF10: 10
PAINLEVEL_OUTOF10: 8
PAINLEVEL_OUTOF10: 10
PAINLEVEL_OUTOF10: 7
PAINLEVEL_OUTOF10: 5
PAINLEVEL_OUTOF10: 5
PAINLEVEL_OUTOF10: 6
PAINLEVEL_OUTOF10: 10
PAINLEVEL_OUTOF10: 5
PAINLEVEL_OUTOF10: 6
PAINLEVEL_OUTOF10: 0
PAINLEVEL_OUTOF10: 10
PAINLEVEL_OUTOF10: 10

## 2018-12-06 ASSESSMENT — PAIN DESCRIPTION - ORIENTATION
ORIENTATION: RIGHT;LOWER
ORIENTATION: RIGHT

## 2018-12-06 ASSESSMENT — PAIN DESCRIPTION - PAIN TYPE
TYPE: ACUTE PAIN

## 2018-12-06 ASSESSMENT — PAIN DESCRIPTION - LOCATION
LOCATION: LEG

## 2018-12-06 NOTE — FLOWSHEET NOTE
Pt conplained of leg problem. She talked about some deaths in her family. She said that she was ready to go if it pleases the [address] Prayer was appreciated. 12/06/18 1405   Encounter Summary   Services provided to: Patient   Referral/Consult From: [address] [place] [address] [place] [address] Visiting Yes  (12/6 )   Complexity of Encounter Low   Length of Encounter 15 minutes   Spiritual/Cheondoism   Type Spiritual support   Assessment Approachable;Calm   Intervention Prayer;Nurtured hope; Active listening   Outcome Connection/belonging;Expressed gratitude; Hopeful;Encouraged   Advance Directives (For Healthcare)   Healthcare Directive Yes, patient has an advance directive for healthcare treatment

## 2018-12-06 NOTE — PROGRESS NOTES
Pineda [address] 60  OCCUPATIONAL THERAPY MISSED TREATMENT NOTE  [facility] RENAL TELEMETRY 6K  6K-027-A      Date: 2018  Patient Name: Karla Avalos        CSN: [id number]   : 1929  (80 y.o.)  Gender: female   Referring Practitioner: Anat Lake MD  Diagnosis: Pneumonia         REASON FOR MISSED TREATMENT:  Missed Treat. Attempted to see pt however pt declined due to fatigue and was in too much pain, as well as getting an MRI soon of RLE.   Will attempt to see again tomorrow as time allows

## 2018-12-06 NOTE — PLAN OF CARE
Problem: Pain:  Goal: Pain level will decrease  Pain level will decrease   Outcome: Ongoing  Pain is decreasing, in pain only when touching the affected extremity. Problem: Falls - Risk of:  Goal: Will remain free from falls  Will remain free from falls   Outcome: Ongoing  No falls noted this shift. Continue falling star program. Bed alarm on, bed in low position. Call light and personal belongings in reach. Patient uses call light appropriately. Problem: Bowel Function - Altered:  Goal: Bowel elimination is within specified parameters  Bowel elimination is within specified parameters   Outcome: Ongoing  Patient is passing gas.      Problem: Cardiac Output - Decreased:  Goal: Hemodynamic stability will improve  Hemodynamic stability will improve   Outcome: Ongoing  VSS

## 2018-12-07 LAB
ANION GAP SERPL CALCULATED.3IONS-SCNC: 9 MEQ/L (ref 8–16)
BUN BLDV-MCNC: 11 MG/DL (ref 7–22)
C-REACTIVE PROTEIN: 20.67 MG/DL (ref 0–1)
CALCIUM SERPL-MCNC: 8.8 MG/DL (ref 8.5–10.5)
CHLORIDE BLD-SCNC: 86 MEQ/L (ref 98–111)
CO2: 31 MEQ/L (ref 23–33)
CREAT SERPL-MCNC: 0.7 MG/DL (ref 0.4–1.2)
CREATININE URINE: 33.2 MG/DL
ERYTHROCYTE [DISTWIDTH] IN BLOOD BY AUTOMATED COUNT: 13.7 % (ref 11.5–14.5)
ERYTHROCYTE [DISTWIDTH] IN BLOOD BY AUTOMATED COUNT: 46.5 FL (ref 35–45)
GFR SERPL CREATININE-BSD FRML MDRD: 79 ML/MIN/1.73M2
GLUCOSE BLD-MCNC: 130 MG/DL (ref 70–108)
HCT VFR BLD CALC: 35.3 % (ref 37–47)
HEMOGLOBIN: 11.9 GM/DL (ref 12–16)
MCH RBC QN AUTO: 31.2 PG (ref 26–33)
MCHC RBC AUTO-ENTMCNC: 33.7 GM/DL (ref 32.2–35.5)
MCV RBC AUTO: 92.7 FL (ref 81–99)
OSMOLALITY URINE: 142 MOSMOL/KG (ref 250–750)
OSMOLALITY: 268 MOSMOL/KG (ref 275–295)
PLATELET # BLD: 189 THOU/MM3 (ref 130–400)
PMV BLD AUTO: 9.1 FL (ref 9.4–12.4)
POTASSIUM SERPL-SCNC: 3.8 MEQ/L (ref 3.5–5.2)
PROTEIN, URINE: 16.5 MG/DL
RBC # BLD: 3.81 MILL/MM3 (ref 4.2–5.4)
SEDIMENTATION RATE, ERYTHROCYTE: 93 MM/HR (ref 0–20)
SODIUM BLD-SCNC: 126 MEQ/L (ref 135–145)
SODIUM URINE: < 20 MEQ/L
TOTAL CK: 36 U/L (ref 30–135)
WBC # BLD: 12.1 THOU/MM3 (ref 4.8–10.8)

## 2018-12-07 PROCEDURE — 0SJC3ZZ INSPECTION OF RIGHT KNEE JOINT, PERCUTANEOUS APPROACH: ICD-10-PCS | Performed by: ORTHOPAEDIC SURGERY

## 2018-12-07 PROCEDURE — 86140 C-REACTIVE PROTEIN: CPT

## 2018-12-07 PROCEDURE — 6370000000 HC RX 637 (ALT 250 FOR IP): Performed by: INTERNAL MEDICINE

## 2018-12-07 PROCEDURE — 80048 BASIC METABOLIC PNL TOTAL CA: CPT

## 2018-12-07 PROCEDURE — 1200000003 HC TELEMETRY R&B

## 2018-12-07 PROCEDURE — 84156 ASSAY OF PROTEIN URINE: CPT

## 2018-12-07 PROCEDURE — 99233 SBSQ HOSP IP/OBS HIGH 50: CPT | Performed by: INTERNAL MEDICINE

## 2018-12-07 PROCEDURE — 97535 SELF CARE MNGMENT TRAINING: CPT

## 2018-12-07 PROCEDURE — 2580000003 HC RX 258: Performed by: INTERNAL MEDICINE

## 2018-12-07 PROCEDURE — 82550 ASSAY OF CK (CPK): CPT

## 2018-12-07 PROCEDURE — 83935 ASSAY OF URINE OSMOLALITY: CPT

## 2018-12-07 PROCEDURE — 85027 COMPLETE CBC AUTOMATED: CPT

## 2018-12-07 PROCEDURE — 6360000002 HC RX W HCPCS: Performed by: INTERNAL MEDICINE

## 2018-12-07 PROCEDURE — 97530 THERAPEUTIC ACTIVITIES: CPT

## 2018-12-07 PROCEDURE — 83930 ASSAY OF BLOOD OSMOLALITY: CPT

## 2018-12-07 PROCEDURE — 84300 ASSAY OF URINE SODIUM: CPT

## 2018-12-07 PROCEDURE — 36415 COLL VENOUS BLD VENIPUNCTURE: CPT

## 2018-12-07 PROCEDURE — 2500000003 HC RX 250 WO HCPCS: Performed by: INTERNAL MEDICINE

## 2018-12-07 PROCEDURE — 87040 BLOOD CULTURE FOR BACTERIA: CPT

## 2018-12-07 PROCEDURE — 82570 ASSAY OF URINE CREATININE: CPT

## 2018-12-07 PROCEDURE — 85651 RBC SED RATE NONAUTOMATED: CPT

## 2018-12-07 RX ORDER — SODIUM CHLORIDE 9 MG/ML
INJECTION, SOLUTION INTRAVENOUS CONTINUOUS
Status: DISCONTINUED | OUTPATIENT
Start: 2018-12-07 | End: 2018-12-08

## 2018-12-07 RX ADMIN — TRAMADOL HYDROCHLORIDE 25 MG: 50 TABLET, FILM COATED ORAL at 20:39

## 2018-12-07 RX ADMIN — TRAMADOL HYDROCHLORIDE 25 MG: 50 TABLET, FILM COATED ORAL at 02:56

## 2018-12-07 RX ADMIN — Medication 10 ML: at 03:41

## 2018-12-07 RX ADMIN — SODIUM CHLORIDE: 9 INJECTION, SOLUTION INTRAVENOUS at 16:36

## 2018-12-07 RX ADMIN — ATENOLOL 25 MG: 25 TABLET ORAL at 10:30

## 2018-12-07 RX ADMIN — RIVAROXABAN 15 MG: 15 TABLET, FILM COATED ORAL at 09:01

## 2018-12-07 RX ADMIN — CLINDAMYCIN IN 5 PERCENT DEXTROSE 600 MG: 12 INJECTION, SOLUTION INTRAVENOUS at 18:23

## 2018-12-07 RX ADMIN — Medication 1 CAPSULE: at 09:02

## 2018-12-07 RX ADMIN — TRAMADOL HYDROCHLORIDE 25 MG: 50 TABLET, FILM COATED ORAL at 09:02

## 2018-12-07 RX ADMIN — CEFTRIAXONE SODIUM 2 G: 2 INJECTION, POWDER, FOR SOLUTION INTRAMUSCULAR; INTRAVENOUS at 03:32

## 2018-12-07 RX ADMIN — PRAVASTATIN SODIUM 20 MG: 20 TABLET ORAL at 09:02

## 2018-12-07 RX ADMIN — CEFTRIAXONE SODIUM 2 G: 2 INJECTION, POWDER, FOR SOLUTION INTRAMUSCULAR; INTRAVENOUS at 16:36

## 2018-12-07 RX ADMIN — CLINDAMYCIN IN 5 PERCENT DEXTROSE 600 MG: 12 INJECTION, SOLUTION INTRAVENOUS at 06:19

## 2018-12-07 RX ADMIN — FLUOCINONIDE: 0.5 CREAM TOPICAL at 09:02

## 2018-12-07 RX ADMIN — CALCIUM 500 MG: 500 TABLET ORAL at 09:02

## 2018-12-07 RX ADMIN — Medication 10 ML: at 09:02

## 2018-12-07 ASSESSMENT — PAIN SCALES - GENERAL
PAINLEVEL_OUTOF10: 7
PAINLEVEL_OUTOF10: 10
PAINLEVEL_OUTOF10: 2
PAINLEVEL_OUTOF10: 7
PAINLEVEL_OUTOF10: 0

## 2018-12-07 NOTE — PROGRESS NOTES
0541  12/06/18   0550  12/07/18   0540   WBC  12.7*  12.0*  12.1*   HGB  12.1  12.1  11.9*   HCT  33.7*  35.8*  35.3*   PLT  126*  169  189     Recent Labs      12/05/18   0541  12/06/18   1215  12/07/18   0540   NA  128*  131*  126*   K  4.1  4.1  3.8   CL  90*  89*  86*   CO2  27  30  31   BUN  16  11  11   CREATININE  0.6  0.7  0.7   CALCIUM  8.9  8.7  8.8     No results for input(s): AST, ALT, BILIDIR, BILITOT, ALKPHOS in the last 72 hours. No results for input(s): INR in the last 72 hours. Recent Labs      12/04/18   1842   CKTOTAL  28*       Urinalysis:      Lab Results   Component Value Date    NITRU NEGATIVE 12/02/2018    WBCUA 0-2 08/28/2017    WBCUA 25-50 07/18/2011    BACTERIA NONE 08/28/2017    RBCUA 0-2 08/28/2017    BLOODU NEGATIVE 12/02/2018    SPECGRAV 1.015 07/12/2014    GLUCOSEU NEGATIVE 12/02/2018       Radiology:  MRI TIBULA FIBULA RIGHT W WO CONTRAST   Final Result   1. Findings consistent with a severe cellulitis of the right lower leg as described in the body the report. There is no associated abscess. 2. There is no evidence of osteomyelitis. 3. There is vague edema throughout the musculature of the anterior, lateral and posterior compartments of the right lower leg which can be associated with a myositis. There is no intramuscular fluid or subcapsular fluid. There is no MRI evidence of    compartment syndrome however correlation should be made with clinical findings. 4. Clinical management is recommended. Follow-up MR imaging is recommended if clinical symptoms worsen or if there is continued clinical concern. **This report has been created using voice recognition software. It may contain minor errors which are inherent in voice recognition technology. **      Final report electronically signed by Dr. Ashleigh Sanches on 12/7/2018 6:41 AM      VL DUP LOWER EXTREMITY VENOUS RIGHT   Final Result      No evidence of deep venous thrombosis in the right lower extremity.       Final the lateral margin of the    mid to lower pole of the right kidney. 6. There are stable hypodense lesions within the lateral segment of the left lobe of the liver measuring 0.4 cm and 0.3 cm. There is a stable 3.6 cm irregular hypoechoic lesion within the lateral aspect of the right lobe of the liver. 7. The gallbladder is unremarkable. 8. There is stable mild prominence of the common duct measuring 1.0 cm.   9. There are numerous small mesenteric and retroperitoneal lymph nodes which appear slightly larger and more numerous compared to the previous examination. These lymph nodes are nonspecific with the differential including reactive lymphadenopathy and a    neoplastic process. 10. Additional findings as described in the body the report. **This report has been created using voice recognition software. It may contain minor errors which are inherent in voice recognition technology. **      Final report electronically signed by Dr. Su Saint on 12/2/2018 12:36 PM      XR CHEST STANDARD (2 VW)   Final Result   1. There is no acute cardiopulmonary process. **This report has been created using voice recognition software. It may contain minor errors which are inherent in voice recognition technology. **      Final report electronically signed by Dr. Su Saint on 12/2/2018 11:01 AM          Diet: DIET GENERAL; Low Sodium (2 GM)    DVT prophylaxis: [] Lovenox                                 [] SCDs                                 [] SQ Heparin                                 [] Encourage ambulation           [x] Already on Anticoagulation     Disposition:    [x] Home       [] TCU       [] Rehab       [] Psych       [x] SNF       [] [facility]       [] Other-    Code Status: Limited    PT/OT Eval Status: likely home with home PT/OT    Assessment/Plan:    Anticipated Discharge in : 2-3 days     Active Hospital Problems    Diagnosis Date Noted    Sepsis ([facility] [address] [A41.9]    

## 2018-12-07 NOTE — PLAN OF CARE
Problem: Pain:  Goal: Pain level will decrease  Pain level will decrease   Outcome: Ongoing  Pain is decreasing with medication intervention & elevation. Will continue to monitor       Problem: Falls - Risk of:  Goal: Will remain free from falls  Will remain free from falls   Outcome: Ongoing  No falls noted this shift. Continue falling star program. Bed alarm on, bed in low position. Call light and personal belongings in reach. Patient uses call light appropriately. Problem: Discharge Planning:  Goal: Participates in care planning  Participates in care planning   Outcome: Ongoing  Care plan reviewed with patient. Patient verbalizes understanding of the plan of care and contribute to goal setting.         Problem: Airway Clearance - Ineffective:  Goal: Ability to maintain a clear airway will improve  Ability to maintain a clear airway will improve   Outcome: Ongoing

## 2018-12-07 NOTE — PROGRESS NOTES
in  bed upon arrival, agreeale to OT treatment  Comments: RN ok'd OT, Per PCT the patient leg has been very painful    Pain:    Patient reports 6/10 pain in bed and increases with movement    Objective        ADL  Grooming: Stand by assistance (Seated on BSC with washing face.)  Toileting: Maximum assistance (with hygiene and clothing management. Patient required B UE on walker for standing balance support)   LB dressing: Max Assistance with LLE sock and R LE slipper. Bed mobility  Supine to Sit: Minimal assistance (with R LE due to pain )  Scooting: Stand by assistance  Comment: extended time  required    Transfers  Sit to stand: Contact guard assistance  Stand to sit: Minimal assistance (with verbal cues for safety with hand placement)  From bed, to recliner    Toilet Transfers  Toilet - Technique: Stand pivot  Equipment Used: Standard bedside commode  Toilet Transfer: Minimal assistance with verbal cues for safety with hand placement  Toilet Transfers Comments: additional time required to stand pivot to [facility]. Patient requires additional time to get R LE into position. Assistance to stand from [address] [place] [address]  Sitting Balance: Stand by assistance (seated edge of bed in preparation for transfer)    Standing Balance: Contact guard assistance   In preparation for ambulation        Functional Mobility  Functional - Mobility Device: Rolling Walker  Assist Level: Contact guard assistance  Functional Mobility Comments: Patient ambulated from bed to BSC, 3 steps, Slow pace, NO LOB, verbal cues for walker placement. Patient completed ambulation of 3 steps from [facility] to Chair. Room chair moved to right side of room so patient could get out of bed and sit in chair. Chair brought to patient due to pain in RLE and limited ambulation at this time. Education on music channel and set up of music channel for patient at end of treatment. Patient reports she cannot see well.        Activity Tolerance:  Activity Tolerance:

## 2018-12-07 NOTE — PROGRESS NOTES
Topical Daily       acetaminophen, sodium chloride flush, ondansetron, potassium chloride, traMADol      LABS:     CBC:   Recent Labs      12/05/18   0541  12/06/18   0550  12/07/18   0540   WBC  12.7*  12.0*  12.1*   HGB  12.1  12.1  11.9*   PLT  126*  169  189     BMP:    Recent Labs      12/05/18   0541  12/06/18   1215  12/07/18   0540   NA  128*  131*  126*   K  4.1  4.1  3.8   CL  90*  89*  86*   CO2  27  30  31   BUN  16  11  11   CREATININE  0.6  0.7  0.7   GLUCOSE  117*  172*  130*     Calcium:  Recent Labs      12/07/18   0540   CALCIUM  8.8           Problem list of patient:     Patient Active Problem List   Diagnosis Code    Renal mass, right N28.89    GISELL (stress urinary incontinence, female) N39.3    Flushing R23.2    Uncontrolled hypertension I10    Chronic atrial fibrillation (HCC) I48.2    Hepatic lesion K76.9    Chest pain R07.9    Hypertensive urgency I16.0    Atypical chest pain R07.89    Pneumonia J18.9    Acute pain of left shoulder M25.512    Sepsis (HCC) A41.9         ASSESSMENT/PLAN   Streptococcus bacteremia  Right lower leg cellulites: MRI shows soft tissue swelling and mysoites  Continue iv antibiotics  Orthopedic consult : she may need exploration of the leg due to worsening symptoms and involvement of the muscle.   Cate Ghosh MD, [address] 12/7/2018 10:03 AM

## 2018-12-07 NOTE — CONSULTS
12/07/2018     BMP:  Lab Results   Component Value Date     12/07/2018    K 3.8 12/07/2018    K 3.9 12/03/2018    CL 86 12/07/2018    CO2 31 12/07/2018    BUN 11 12/07/2018    CREATININE 0.7 12/07/2018    CALCIUM 8.8 12/07/2018    GLUCOSE 130 12/07/2018    GLUCOSE 111 05/15/2012     PT/INR:    Lab Results   Component Value Date    PROTIME 2.09 12/20/2011    INR 1.45 12/02/2018     Troponin:  No results found for: TROPONINI  No results for input(s): LIPASE, AMYLASE in the last 72 hours. No results for input(s): AST, ALT, BILIDIR, BILITOT, ALKPHOS in the last 72 hours. Radiology:   Xr Chest Standard (2 Vw)    Result Date: 12/2/2018  PROCEDURE: XR CHEST (2 VW) CLINICAL INFORMATION: Shoulder pain; back pain; low oxygen saturation. COMPARISON: No prior study. TECHNIQUE: AP upright and lateral views of the chest performed. FINDINGS: POSTSURGICAL CHANGES: None. LINES/TUBES/MECHANICAL DEVICES: None. TRACHEA/HEART/MEDIASTINUM/HILUM: 1. There is mild to moderate enlargement of the cardiac silhouette. 2. There is mild atheromatous calcification at the aortic arch. LUNG BRITO: Normal. OTHER: None. PNEUMOTHORAX: None. OSSEOUS STRUCTURES: 1. No acute osseous abnormality. 2. There is generalized osteopenia. 1. There is no acute cardiopulmonary process. **This report has been created using voice recognition software. It may contain minor errors which are inherent in voice recognition technology. ** Final report electronically signed by Dr. Marimar Whatley on 12/2/2018 11:01 AM    Xr Cervical Spine (2-3 Views)    Result Date: 12/2/2018  PROCEDURE: XR CERVICAL SPINE (2-3 VIEWS) CLINICAL INFORMATION: pain. Additional history obtained from electronic medical record indicates the patient has pain along the left side of the neck and down the left arm. COMPARISON: Radiographs of the cervical spine dated February 3, 2016. TECHNIQUE: 3 views of the cervical spine including AP view, a lateral view and an odontoid view.  FINDINGS: There is stable mild cortical scarring along the posterior margin of the lower pole the left kidney. There are stable 0.2 cm hypodense lesion along the anterior margin of the upper pole of the left kidney, 0.6 cm along the medial aspect of the midpole  of the left kidney and 0.4 cm along the anterior aspect of lower pole the left kidney. 3. There is a stable 2.3 cm mass projecting off the posterolateral margin of the midpole the right kidney which is high attenuation peripherally and low attenuation centrally. There is also a stable 1.4 cm cyst projecting off the lateral margin of the mid to lower pole of the right kidney. BOWEL: 1. The bowel gas pattern is nonobstructive. There is a small amount of stool within the colon. 2. The appendix is not identified. 3. The distal esophagus, stomach and small bowel are unremarkable. FREE AIR/FREE FLUID/INFLAMMATION: None. LYMPHADENOPATHY: 1. There are numerous small mesenteric and retroperitoneal lymph nodes which appear slightly larger and more numerous compared to the previous examination. These lymph nodes are nonspecific with the differential including reactive lymphadenopathy and a neoplastic process. ABDOMINAL AORTA: 1. Atheromatous calcification abdominal aorta and iliac arteries. 2. Atheromatous calcification splenic artery. PELVIS: 1. The unopacified urinary bladder is unremarkable. 2. Hysterectomy. ABDOMINAL WALL: Unremarkable. MUSCULOSKELETAL: 1. There is generalized osteopenia. 2. Scoliosis. 3. Degenerative changes at numerous levels along the spine. OTHER: None. 1. There are small bilateral pleural effusions, right greater than left. 2. There is atelectasis and/or infiltrate at the right lung base. There are atelectatic densities at the left lung base. 3. There is stable mild hypertrophy of the bilateral adrenal glands. 4. There is stable mild cortical scarring along the posterior margin of the lower pole the left kidney.  There are stable 0.2 cm hypodense would be helpful to further evaluate this. The bony alignment is anatomic. There is fatty marrow signal throughout the imaged osseous structures. There is a 1.6 cm focus of increased T2 weighted subchondral signal at the junction of the lateral tibial plateau and the tibial eminence which is either degenerative or related to an old osteochondral injury. There is otherwise no marrow edema. There is no fracture. There is no osseous destruction or periostitis. There is no evidence of osteomyelitis. There is no tendinous derangement. There is vague edema throughout the musculature of the anterior, lateral and posterior compartments of the right lower leg which can be associated with a myositis. There is no intramuscular fluid or subcapsular fluid. There is no MRI evidence of compartment syndrome however correlation should be made with clinical findings. The neurovascular bundles are unremarkable. There is extensive skin thickening with edema and enhancement and extensive subcutaneous edema and enhancement throughout the right lower leg consistent with the clinical history of a cellulitis. There is no loculated or drainable fluid collection. There  is no abscess. 1. Findings consistent with a severe cellulitis of the right lower leg as described in the body the report. There is no associated abscess. 2. There is no evidence of osteomyelitis. 3. There is vague edema throughout the musculature of the anterior, lateral and posterior compartments of the right lower leg which can be associated with a myositis. There is no intramuscular fluid or subcapsular fluid. There is no MRI evidence of compartment syndrome however correlation should be made with clinical findings. 4. Clinical management is recommended. Follow-up MR imaging is recommended if clinical symptoms worsen or if there is continued clinical concern. **This report has been created using voice recognition software.  It may contain minor errors which are inherent in voice recognition technology. ** Final report electronically signed by Dr. Amandeep Scruggs on 12/7/2018 6:41 AM     Dup Lower Extremity Venous Right    Result Date: 12/3/2018  PROCEDURE: VL DUP LOWER EXTREMITY VENOUS RIGHT CLINICAL INFORMATION: Right leg pain and redness TECHNIQUE: Ultrasound of the right lower extremity was performed. The common femoral, superficial femoral, popliteal and calf vein segments were studied to the ankle. COMPARISON: None FINDINGS: There is normal compressibility with no evidence of thrombus. Flow study shows normal color flow, augmentation and phasic response. No evidence of deep venous thrombosis in the right lower extremity. Final report electronically signed by Dr. Bennett Crocker on 12/3/2018 5:20 PM      ASSESSMENT:Principal Problem:    Pneumonia  Active Problems:    Chronic atrial fibrillation ([address]    Acute pain of left shoulder    Sepsis ([address]  Resolved Problems:    * No resolved hospital problems. *     RLE cellulitis    Procedure: Right knee prepped with betadine. Right knee aspiration attempted, no fluid obtained. PLAN as discussed with Dr. Drea Alvarez:  No plan for I and D today. Will continue to Monitor  Continue medical management and pain control  abx per Dr. Carlos Park pending  Dr. Drea Alvarez to follow.          Electronically signed by SHARON Hodges CNP on 12/7/2018 at 12:52 PM

## 2018-12-07 NOTE — PLAN OF CARE
Problem: Airway Clearance - Ineffective:  Goal: Ability to maintain a clear airway will improve  Ability to maintain a clear airway will improve   Outcome: Ongoing  Patient educated on cough and deep breathing. Problem: Bowel Function - Altered:  Goal: Bowel elimination is within specified parameters  Bowel elimination is within specified parameters   Outcome: Ongoing  Patient bowel sounds active, BM x3 this shift, will continue to monitor. Problem: Cardiac Output - Decreased:  Goal: Hemodynamic stability will improve  Hemodynamic stability will improve   Outcome: Ongoing  VSS    Problem: Fluid Volume - Imbalance:  Goal: Absence of imbalanced fluid volume signs and symptoms  Absence of imbalanced fluid volume signs and symptoms   Outcome: Ongoing  Patient I/O monitored, lung sounds clear. Problem: Gas Exchange - Impaired:  Goal: Levels of oxygenation will improve  Levels of oxygenation will improve   Outcome: Ongoing  O2 Sat level >92. Will continue to monitor    Problem: Skin Integrity - Impaired:  Goal: Will show no infection signs and symptoms  Will show no infection signs and symptoms   Outcome: Ongoing  No new signs or symptoms of skin breakdown noted this shift, encouraging patient to turn and reposition self in bed q2h      Problem: DISCHARGE BARRIERS  Goal: Patient's continuum of care needs are met  Outcome: Ongoing  Care plan reviewed with patient. Patient verbalize understanding of the plan of care and contribute to goal setting.

## 2018-12-08 LAB
ANION GAP SERPL CALCULATED.3IONS-SCNC: 9 MEQ/L (ref 8–16)
APTT: 33.6 SECONDS (ref 22–38)
APTT: 38.5 SECONDS (ref 22–38)
BUN BLDV-MCNC: 10 MG/DL (ref 7–22)
C-REACTIVE PROTEIN: 16.64 MG/DL (ref 0–1)
CALCIUM SERPL-MCNC: 8.7 MG/DL (ref 8.5–10.5)
CHLORIDE BLD-SCNC: 93 MEQ/L (ref 98–111)
CO2: 30 MEQ/L (ref 23–33)
CREAT SERPL-MCNC: 0.6 MG/DL (ref 0.4–1.2)
ERYTHROCYTE [DISTWIDTH] IN BLOOD BY AUTOMATED COUNT: 13.6 % (ref 11.5–14.5)
ERYTHROCYTE [DISTWIDTH] IN BLOOD BY AUTOMATED COUNT: 13.8 % (ref 11.5–14.5)
ERYTHROCYTE [DISTWIDTH] IN BLOOD BY AUTOMATED COUNT: 46.7 FL (ref 35–45)
ERYTHROCYTE [DISTWIDTH] IN BLOOD BY AUTOMATED COUNT: 47.2 FL (ref 35–45)
GFR SERPL CREATININE-BSD FRML MDRD: > 90 ML/MIN/1.73M2
GLUCOSE BLD-MCNC: 133 MG/DL (ref 70–108)
HCT VFR BLD CALC: 34.9 % (ref 37–47)
HCT VFR BLD CALC: 37.9 % (ref 37–47)
HEMOGLOBIN: 11.6 GM/DL (ref 12–16)
HEMOGLOBIN: 12.7 GM/DL (ref 12–16)
MCH RBC QN AUTO: 30.9 PG (ref 26–33)
MCH RBC QN AUTO: 31.4 PG (ref 26–33)
MCHC RBC AUTO-ENTMCNC: 33.2 GM/DL (ref 32.2–35.5)
MCHC RBC AUTO-ENTMCNC: 33.5 GM/DL (ref 32.2–35.5)
MCV RBC AUTO: 93.1 FL (ref 81–99)
MCV RBC AUTO: 93.8 FL (ref 81–99)
MRSA SCREEN: NORMAL
PLATELET # BLD: 212 THOU/MM3 (ref 130–400)
PLATELET # BLD: 262 THOU/MM3 (ref 130–400)
PMV BLD AUTO: 9.1 FL (ref 9.4–12.4)
PMV BLD AUTO: 9.4 FL (ref 9.4–12.4)
POTASSIUM SERPL-SCNC: 4 MEQ/L (ref 3.5–5.2)
RBC # BLD: 3.75 MILL/MM3 (ref 4.2–5.4)
RBC # BLD: 4.04 MILL/MM3 (ref 4.2–5.4)
SEDIMENTATION RATE, ERYTHROCYTE: 103 MM/HR (ref 0–20)
SODIUM BLD-SCNC: 132 MEQ/L (ref 135–145)
WBC # BLD: 9.1 THOU/MM3 (ref 4.8–10.8)
WBC # BLD: 9.9 THOU/MM3 (ref 4.8–10.8)

## 2018-12-08 PROCEDURE — 2580000003 HC RX 258: Performed by: INTERNAL MEDICINE

## 2018-12-08 PROCEDURE — 85027 COMPLETE CBC AUTOMATED: CPT

## 2018-12-08 PROCEDURE — 87040 BLOOD CULTURE FOR BACTERIA: CPT

## 2018-12-08 PROCEDURE — 86140 C-REACTIVE PROTEIN: CPT

## 2018-12-08 PROCEDURE — 1200000003 HC TELEMETRY R&B

## 2018-12-08 PROCEDURE — 80048 BASIC METABOLIC PNL TOTAL CA: CPT

## 2018-12-08 PROCEDURE — 6360000002 HC RX W HCPCS: Performed by: INTERNAL MEDICINE

## 2018-12-08 PROCEDURE — 85730 THROMBOPLASTIN TIME PARTIAL: CPT

## 2018-12-08 PROCEDURE — 6370000000 HC RX 637 (ALT 250 FOR IP): Performed by: INTERNAL MEDICINE

## 2018-12-08 PROCEDURE — 2500000003 HC RX 250 WO HCPCS: Performed by: INTERNAL MEDICINE

## 2018-12-08 PROCEDURE — 99232 SBSQ HOSP IP/OBS MODERATE 35: CPT | Performed by: INTERNAL MEDICINE

## 2018-12-08 PROCEDURE — 36415 COLL VENOUS BLD VENIPUNCTURE: CPT

## 2018-12-08 PROCEDURE — 85651 RBC SED RATE NONAUTOMATED: CPT

## 2018-12-08 RX ORDER — HEPARIN SODIUM 1000 [USP'U]/ML
80 INJECTION, SOLUTION INTRAVENOUS; SUBCUTANEOUS PRN
Status: DISCONTINUED | OUTPATIENT
Start: 2018-12-08 | End: 2018-12-11 | Stop reason: ALTCHOICE

## 2018-12-08 RX ORDER — HEPARIN SODIUM 1000 [USP'U]/ML
40 INJECTION, SOLUTION INTRAVENOUS; SUBCUTANEOUS PRN
Status: DISCONTINUED | OUTPATIENT
Start: 2018-12-08 | End: 2018-12-11 | Stop reason: ALTCHOICE

## 2018-12-08 RX ORDER — HEPARIN SODIUM 10000 [USP'U]/100ML
18 INJECTION, SOLUTION INTRAVENOUS CONTINUOUS
Status: DISCONTINUED | OUTPATIENT
Start: 2018-12-08 | End: 2018-12-10

## 2018-12-08 RX ORDER — CLINDAMYCIN PHOSPHATE 900 MG/50ML
900 INJECTION INTRAVENOUS EVERY 8 HOURS
Status: DISCONTINUED | OUTPATIENT
Start: 2018-12-09 | End: 2018-12-12

## 2018-12-08 RX ORDER — OXYCODONE HYDROCHLORIDE 5 MG/1
5 TABLET ORAL EVERY 6 HOURS PRN
Status: DISCONTINUED | OUTPATIENT
Start: 2018-12-08 | End: 2018-12-12 | Stop reason: HOSPADM

## 2018-12-08 RX ORDER — HEPARIN SODIUM 1000 [USP'U]/ML
80 INJECTION, SOLUTION INTRAVENOUS; SUBCUTANEOUS ONCE
Status: COMPLETED | OUTPATIENT
Start: 2018-12-08 | End: 2018-12-08

## 2018-12-08 RX ORDER — TRAMADOL HYDROCHLORIDE 50 MG/1
25 TABLET ORAL EVERY 6 HOURS PRN
Status: DISCONTINUED | OUTPATIENT
Start: 2018-12-08 | End: 2018-12-12 | Stop reason: HOSPADM

## 2018-12-08 RX ADMIN — ATENOLOL 25 MG: 25 TABLET ORAL at 08:52

## 2018-12-08 RX ADMIN — CLINDAMYCIN IN 5 PERCENT DEXTROSE 600 MG: 12 INJECTION, SOLUTION INTRAVENOUS at 03:44

## 2018-12-08 RX ADMIN — CEFTRIAXONE SODIUM 2 G: 2 INJECTION, POWDER, FOR SOLUTION INTRAMUSCULAR; INTRAVENOUS at 04:50

## 2018-12-08 RX ADMIN — HEPARIN SODIUM 6180 UNITS: 1000 INJECTION INTRAVENOUS; SUBCUTANEOUS at 14:00

## 2018-12-08 RX ADMIN — OXYCODONE HYDROCHLORIDE 5 MG: 5 TABLET ORAL at 08:52

## 2018-12-08 RX ADMIN — OXYCODONE HYDROCHLORIDE 5 MG: 5 TABLET ORAL at 17:28

## 2018-12-08 RX ADMIN — CLINDAMYCIN IN 5 PERCENT DEXTROSE 600 MG: 12 INJECTION, SOLUTION INTRAVENOUS at 12:04

## 2018-12-08 RX ADMIN — HEPARIN SODIUM 18 UNITS/KG/HR: 10000 INJECTION, SOLUTION INTRAVENOUS at 14:00

## 2018-12-08 RX ADMIN — ACETAMINOPHEN 650 MG: 325 TABLET ORAL at 17:28

## 2018-12-08 RX ADMIN — CEFTRIAXONE SODIUM 2 G: 2 INJECTION, POWDER, FOR SOLUTION INTRAMUSCULAR; INTRAVENOUS at 17:28

## 2018-12-08 RX ADMIN — HEPARIN SODIUM 3090 UNITS: 1000 INJECTION INTRAVENOUS; SUBCUTANEOUS at 21:41

## 2018-12-08 RX ADMIN — DOCUSATE SODIUM 100 MG: 100 CAPSULE, LIQUID FILLED ORAL at 21:50

## 2018-12-08 ASSESSMENT — PAIN SCALES - GENERAL
PAINLEVEL_OUTOF10: 10
PAINLEVEL_OUTOF10: 10
PAINLEVEL_OUTOF10: 4
PAINLEVEL_OUTOF10: 10
PAINLEVEL_OUTOF10: 10

## 2018-12-08 NOTE — PROGRESS NOTES
Hospitalist Progress Note    Patient:  Jason Kahn      Unit/Bed:6K-27/027-A    YOB: 1929    MRN: [id number]       Acct: [de-identified]     PCP: Hao Bashir    Date of Admission: 12/2/2018    Chief Complaint: left shoulder pain, fevers, fatigue, malaise, cough, nausea, diarrhea       Hospital Course: 80 y.o. female who presents to the emergency department for evaluation of left shoulder pain and abdominal pain that has been ongoing for the last three days. 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 She also states that she rakes leaves five days ago and is unsure if she did anything to her shoulder then. Patient states that her pain worsens with lying down but is better when she moves the left arm around. Patient locates her abdominal pain to the mid-abdomen. Patient rates her pain as a 9/10 in severity. She has also developed a fever of 103, chills, nausea, vomiting, and a few loose stools and cough. She denies any urinary symptoms, hematemesis, cough, congestion, headache, chest pain, or shortness of breath. She lives at home alone and has a history of A. Fib., hypertension, and arthritis. In the ED, pt found to have 3/4 SIRS (tachycardic, elevated WBC count, and fever 103). CT showing bilateral effusions. Shoulder XRAY showing no fracture or dislocation but is showing rotator cuff arthropathy. Cervical XRAY showing DJD. Orthopedic surgery consulted. Blood cultures 2/2 Group C strep    RLE cellulitis appears to be worsening. Switched to ceftriaxone and clinda (added lactobacillus). MRI without abscess. Fevered to 101 on night of 12/6. Blood cultures redrawn. Subjective: Feels as if some parts of leg are improving. Other parts appear more red. Otherwise she doesn't have other complaints.        Medications:  Reviewed    Infusion Medications    heparin (porcine) 18 Units/kg/hr

## 2018-12-09 ENCOUNTER — APPOINTMENT (OUTPATIENT)
Dept: GENERAL RADIOLOGY | Age: 83
DRG: 871 | End: 2018-12-09
Payer: MEDICARE

## 2018-12-09 LAB
ANION GAP SERPL CALCULATED.3IONS-SCNC: 13 MEQ/L (ref 8–16)
APTT: 62.5 SECONDS (ref 22–38)
APTT: 63.8 SECONDS (ref 22–38)
APTT: 69.4 SECONDS (ref 22–38)
BUN BLDV-MCNC: 9 MG/DL (ref 7–22)
C-REACTIVE PROTEIN: 16.16 MG/DL (ref 0–1)
CALCIUM SERPL-MCNC: 8.9 MG/DL (ref 8.5–10.5)
CHLORIDE BLD-SCNC: 91 MEQ/L (ref 98–111)
CO2: 30 MEQ/L (ref 23–33)
CREAT SERPL-MCNC: 0.7 MG/DL (ref 0.4–1.2)
ERYTHROCYTE [DISTWIDTH] IN BLOOD BY AUTOMATED COUNT: 13.8 % (ref 11.5–14.5)
ERYTHROCYTE [DISTWIDTH] IN BLOOD BY AUTOMATED COUNT: 46.9 FL (ref 35–45)
GFR SERPL CREATININE-BSD FRML MDRD: 79 ML/MIN/1.73M2
GLUCOSE BLD-MCNC: 141 MG/DL (ref 70–108)
HCT VFR BLD CALC: 33.8 % (ref 37–47)
HEMOGLOBIN: 11.6 GM/DL (ref 12–16)
MCH RBC QN AUTO: 31.4 PG (ref 26–33)
MCHC RBC AUTO-ENTMCNC: 34.3 GM/DL (ref 32.2–35.5)
MCV RBC AUTO: 91.6 FL (ref 81–99)
PLATELET # BLD: 269 THOU/MM3 (ref 130–400)
PMV BLD AUTO: 9.3 FL (ref 9.4–12.4)
POTASSIUM SERPL-SCNC: 4 MEQ/L (ref 3.5–5.2)
PROCALCITONIN: 0.69 NG/ML (ref 0.01–0.09)
RBC # BLD: 3.69 MILL/MM3 (ref 4.2–5.4)
SEDIMENTATION RATE, ERYTHROCYTE: 120 MM/HR (ref 0–20)
SODIUM BLD-SCNC: 134 MEQ/L (ref 135–145)
WBC # BLD: 8.9 THOU/MM3 (ref 4.8–10.8)

## 2018-12-09 PROCEDURE — 80048 BASIC METABOLIC PNL TOTAL CA: CPT

## 2018-12-09 PROCEDURE — 2580000003 HC RX 258: Performed by: INTERNAL MEDICINE

## 2018-12-09 PROCEDURE — 2709999900 HC NON-CHARGEABLE SUPPLY

## 2018-12-09 PROCEDURE — 99232 SBSQ HOSP IP/OBS MODERATE 35: CPT | Performed by: INTERNAL MEDICINE

## 2018-12-09 PROCEDURE — 86140 C-REACTIVE PROTEIN: CPT

## 2018-12-09 PROCEDURE — 85730 THROMBOPLASTIN TIME PARTIAL: CPT

## 2018-12-09 PROCEDURE — 71045 X-RAY EXAM CHEST 1 VIEW: CPT

## 2018-12-09 PROCEDURE — 6370000000 HC RX 637 (ALT 250 FOR IP): Performed by: INTERNAL MEDICINE

## 2018-12-09 PROCEDURE — 85027 COMPLETE CBC AUTOMATED: CPT

## 2018-12-09 PROCEDURE — 1200000003 HC TELEMETRY R&B

## 2018-12-09 PROCEDURE — 36415 COLL VENOUS BLD VENIPUNCTURE: CPT

## 2018-12-09 PROCEDURE — 84145 PROCALCITONIN (PCT): CPT

## 2018-12-09 PROCEDURE — 6360000002 HC RX W HCPCS: Performed by: INTERNAL MEDICINE

## 2018-12-09 PROCEDURE — 2500000003 HC RX 250 WO HCPCS: Performed by: INTERNAL MEDICINE

## 2018-12-09 PROCEDURE — 85651 RBC SED RATE NONAUTOMATED: CPT

## 2018-12-09 RX ADMIN — CLINDAMYCIN IN 5 PERCENT DEXTROSE 900 MG: 18 INJECTION, SOLUTION INTRAVENOUS at 18:34

## 2018-12-09 RX ADMIN — CALCIUM 500 MG: 500 TABLET ORAL at 09:59

## 2018-12-09 RX ADMIN — CEFTRIAXONE SODIUM 2 G: 2 INJECTION, POWDER, FOR SOLUTION INTRAMUSCULAR; INTRAVENOUS at 17:49

## 2018-12-09 RX ADMIN — CLINDAMYCIN IN 5 PERCENT DEXTROSE 900 MG: 18 INJECTION, SOLUTION INTRAVENOUS at 11:52

## 2018-12-09 RX ADMIN — ATENOLOL 25 MG: 25 TABLET ORAL at 09:59

## 2018-12-09 RX ADMIN — PRAVASTATIN SODIUM 20 MG: 20 TABLET ORAL at 09:59

## 2018-12-09 RX ADMIN — HEPARIN SODIUM 22 UNITS/KG/HR: 10000 INJECTION, SOLUTION INTRAVENOUS at 06:00

## 2018-12-09 RX ADMIN — OXYCODONE HYDROCHLORIDE 5 MG: 5 TABLET ORAL at 09:59

## 2018-12-09 RX ADMIN — FLUOCINONIDE: 0.5 CREAM TOPICAL at 14:58

## 2018-12-09 RX ADMIN — CEFTRIAXONE SODIUM 2 G: 2 INJECTION, POWDER, FOR SOLUTION INTRAMUSCULAR; INTRAVENOUS at 05:00

## 2018-12-09 RX ADMIN — CLINDAMYCIN IN 5 PERCENT DEXTROSE 900 MG: 18 INJECTION, SOLUTION INTRAVENOUS at 02:00

## 2018-12-09 RX ADMIN — Medication 1 CAPSULE: at 09:59

## 2018-12-09 RX ADMIN — OXYCODONE HYDROCHLORIDE 5 MG: 5 TABLET ORAL at 18:40

## 2018-12-09 RX ADMIN — HEPARIN SODIUM 22 UNITS/KG/HR: 10000 INJECTION, SOLUTION INTRAVENOUS at 22:09

## 2018-12-09 ASSESSMENT — PAIN SCALES - GENERAL
PAINLEVEL_OUTOF10: 0
PAINLEVEL_OUTOF10: 7
PAINLEVEL_OUTOF10: 10

## 2018-12-09 NOTE — PLAN OF CARE
Problem: Pain:  Goal: Pain level will decrease  Pain level will decrease   Outcome: Ongoing  No concerns or pain voiced at this time. Pt appears to be resting in bed at this time.      Problem: Falls - Risk of:  Goal: Will remain free from falls  Will remain free from falls   Outcome: Ongoing  No falls noted at his time. Bed in low position. Call light within reach.      Problem: Discharge Planning:  Goal: Participates in care planning  Participates in care planning   Outcome: Ongoing  Pt plans to return home at time of discharge.      Problem: Airway Clearance - Ineffective:  Goal: Ability to maintain a clear airway will improve  Ability to maintain a clear airway will improve   Outcome: Ongoing  No resp distress noted. resp easy unlabored.      Problem: Bowel Function - Altered:  Goal: Bowel elimination is within specified parameters  Bowel elimination is within specified parameters   Outcome: Ongoing  No bowel issues noted at this time.      Problem: Cardiac Output - Decreased:  Goal: Hemodynamic stability will improve  Hemodynamic stability will improve   Outcome: Ongoing  V.S. Stable. Vitals:    12/08/18 2146   BP: 127/60   Pulse: 71   Resp: 18   Temp: 98.6 °F (37 °C)   SpO2: 92%       Problem: Fluid Volume - Imbalance:  Goal: Absence of imbalanced fluid volume signs and symptoms  Absence of imbalanced fluid volume signs and symptoms   Outcome: Ongoing  See I\"s and O's on flow sheet.      Problem: Gas Exchange - Impaired:  Goal: Levels of oxygenation will improve  Levels of oxygenation will improve   Outcome: Ongoing        Problem: Skin Integrity - Impaired:  Goal: Will show no infection signs and symptoms  Will show no infection signs and symptoms   Outcome: Ongoing  Pt currently on ATB for cellulitis in right leg.      Problem: DISCHARGE BARRIERS  Goal: Patient's continuum of care needs are met  Outcome: Ongoing  Care needs met.  Pt able to make needs known and use call system.      Comments: Care plan reviewed with patient. Patient verbalize understanding of the plan of care and contribute to goal setting.

## 2018-12-09 NOTE — PROGRESS NOTES
bilaterally           Labs:   Recent Labs      12/08/18   0441  12/08/18   1232  12/09/18   0701   WBC  9.9  9.1  8.9   HGB  11.6*  12.7  11.6*   HCT  34.9*  37.9  33.8*   PLT  212  262  269     Recent Labs      12/07/18   0540  12/08/18   0441  12/09/18   0701   NA  126*  132*  134*   K  3.8  4.0  4.0   CL  86*  93*  91*   CO2  31  30  30   BUN  11  10  9   CREATININE  0.7  0.6  0.7   CALCIUM  8.8  8.7  8.9     No results for input(s): AST, ALT, BILIDIR, BILITOT, ALKPHOS in the last 72 hours. No results for input(s): INR in the last 72 hours. Recent Labs      12/07/18   0540   CKTOTAL  36       Urinalysis:      Lab Results   Component Value Date    NITRU NEGATIVE 12/02/2018    WBCUA 0-2 08/28/2017    WBCUA 25-50 07/18/2011    BACTERIA NONE 08/28/2017    RBCUA 0-2 08/28/2017    BLOODU NEGATIVE 12/02/2018    SPECGRAV 1.015 07/12/2014    GLUCOSEU NEGATIVE 12/02/2018       Radiology:  MRI TIBULA FIBULA RIGHT W WO CONTRAST   Final Result   1. Findings consistent with a severe cellulitis of the right lower leg as described in the body the report. There is no associated abscess. 2. There is no evidence of osteomyelitis. 3. There is vague edema throughout the musculature of the anterior, lateral and posterior compartments of the right lower leg which can be associated with a myositis. There is no intramuscular fluid or subcapsular fluid. There is no MRI evidence of    compartment syndrome however correlation should be made with clinical findings. 4. Clinical management is recommended. Follow-up MR imaging is recommended if clinical symptoms worsen or if there is continued clinical concern. **This report has been created using voice recognition software. It may contain minor errors which are inherent in voice recognition technology. **      Final report electronically signed by Dr. Puja Downing on 12/7/2018 6:41 AM      VL DUP LOWER EXTREMITY VENOUS RIGHT   Final Result      No evidence of deep venous

## 2018-12-10 LAB
ANION GAP SERPL CALCULATED.3IONS-SCNC: 9 MEQ/L (ref 8–16)
APTT: 66.6 SECONDS (ref 22–38)
APTT: 70.8 SECONDS (ref 22–38)
BLOOD CULTURE, ROUTINE: NORMAL
BLOOD CULTURE, ROUTINE: NORMAL
BUN BLDV-MCNC: 9 MG/DL (ref 7–22)
C-REACTIVE PROTEIN: 15.36 MG/DL (ref 0–1)
CALCIUM SERPL-MCNC: 8.9 MG/DL (ref 8.5–10.5)
CHLORIDE BLD-SCNC: 89 MEQ/L (ref 98–111)
CO2: 32 MEQ/L (ref 23–33)
CREAT SERPL-MCNC: 0.8 MG/DL (ref 0.4–1.2)
ERYTHROCYTE [DISTWIDTH] IN BLOOD BY AUTOMATED COUNT: 13.8 % (ref 11.5–14.5)
ERYTHROCYTE [DISTWIDTH] IN BLOOD BY AUTOMATED COUNT: 47.8 FL (ref 35–45)
GFR SERPL CREATININE-BSD FRML MDRD: 67 ML/MIN/1.73M2
GLUCOSE BLD-MCNC: 143 MG/DL (ref 70–108)
HCT VFR BLD CALC: 34.9 % (ref 37–47)
HEMOGLOBIN: 11.4 GM/DL (ref 12–16)
MCH RBC QN AUTO: 30.7 PG (ref 26–33)
MCHC RBC AUTO-ENTMCNC: 32.7 GM/DL (ref 32.2–35.5)
MCV RBC AUTO: 94.1 FL (ref 81–99)
PLATELET # BLD: 295 THOU/MM3 (ref 130–400)
PMV BLD AUTO: 8.6 FL (ref 9.4–12.4)
POTASSIUM SERPL-SCNC: 4.3 MEQ/L (ref 3.5–5.2)
RBC # BLD: 3.71 MILL/MM3 (ref 4.2–5.4)
SEDIMENTATION RATE, ERYTHROCYTE: 97 MM/HR (ref 0–20)
SODIUM BLD-SCNC: 130 MEQ/L (ref 135–145)
WBC # BLD: 11 THOU/MM3 (ref 4.8–10.8)

## 2018-12-10 PROCEDURE — 6370000000 HC RX 637 (ALT 250 FOR IP): Performed by: INTERNAL MEDICINE

## 2018-12-10 PROCEDURE — 97535 SELF CARE MNGMENT TRAINING: CPT

## 2018-12-10 PROCEDURE — 80048 BASIC METABOLIC PNL TOTAL CA: CPT

## 2018-12-10 PROCEDURE — 97530 THERAPEUTIC ACTIVITIES: CPT

## 2018-12-10 PROCEDURE — 36415 COLL VENOUS BLD VENIPUNCTURE: CPT

## 2018-12-10 PROCEDURE — 97110 THERAPEUTIC EXERCISES: CPT

## 2018-12-10 PROCEDURE — 2580000003 HC RX 258: Performed by: INTERNAL MEDICINE

## 2018-12-10 PROCEDURE — 6360000002 HC RX W HCPCS: Performed by: INTERNAL MEDICINE

## 2018-12-10 PROCEDURE — 85730 THROMBOPLASTIN TIME PARTIAL: CPT

## 2018-12-10 PROCEDURE — 1200000003 HC TELEMETRY R&B

## 2018-12-10 PROCEDURE — 99232 SBSQ HOSP IP/OBS MODERATE 35: CPT | Performed by: INTERNAL MEDICINE

## 2018-12-10 PROCEDURE — 85651 RBC SED RATE NONAUTOMATED: CPT

## 2018-12-10 PROCEDURE — 86140 C-REACTIVE PROTEIN: CPT

## 2018-12-10 PROCEDURE — 2500000003 HC RX 250 WO HCPCS: Performed by: INTERNAL MEDICINE

## 2018-12-10 PROCEDURE — 85027 COMPLETE CBC AUTOMATED: CPT

## 2018-12-10 RX ADMIN — OXYCODONE HYDROCHLORIDE 5 MG: 5 TABLET ORAL at 05:45

## 2018-12-10 RX ADMIN — CEFTRIAXONE SODIUM 2 G: 2 INJECTION, POWDER, FOR SOLUTION INTRAMUSCULAR; INTRAVENOUS at 18:27

## 2018-12-10 RX ADMIN — FLUOCINONIDE: 0.5 CREAM TOPICAL at 10:01

## 2018-12-10 RX ADMIN — ATENOLOL 25 MG: 25 TABLET ORAL at 10:01

## 2018-12-10 RX ADMIN — CEFTRIAXONE SODIUM 2 G: 2 INJECTION, POWDER, FOR SOLUTION INTRAMUSCULAR; INTRAVENOUS at 05:13

## 2018-12-10 RX ADMIN — CALCIUM 500 MG: 500 TABLET ORAL at 10:01

## 2018-12-10 RX ADMIN — HEPARIN SODIUM 22 UNITS/KG/HR: 10000 INJECTION, SOLUTION INTRAVENOUS at 14:44

## 2018-12-10 RX ADMIN — PRAVASTATIN SODIUM 20 MG: 20 TABLET ORAL at 10:01

## 2018-12-10 RX ADMIN — RIVAROXABAN 20 MG: 20 TABLET, FILM COATED ORAL at 23:04

## 2018-12-10 RX ADMIN — OXYCODONE HYDROCHLORIDE 5 MG: 5 TABLET ORAL at 12:49

## 2018-12-10 RX ADMIN — CLINDAMYCIN IN 5 PERCENT DEXTROSE 900 MG: 18 INJECTION, SOLUTION INTRAVENOUS at 20:38

## 2018-12-10 RX ADMIN — OXYCODONE HYDROCHLORIDE 5 MG: 5 TABLET ORAL at 20:50

## 2018-12-10 RX ADMIN — CLINDAMYCIN IN 5 PERCENT DEXTROSE 900 MG: 18 INJECTION, SOLUTION INTRAVENOUS at 02:47

## 2018-12-10 RX ADMIN — CLINDAMYCIN IN 5 PERCENT DEXTROSE 900 MG: 18 INJECTION, SOLUTION INTRAVENOUS at 12:42

## 2018-12-10 RX ADMIN — Medication 1 CAPSULE: at 10:01

## 2018-12-10 ASSESSMENT — PAIN SCALES - GENERAL
PAINLEVEL_OUTOF10: 7
PAINLEVEL_OUTOF10: 0
PAINLEVEL_OUTOF10: 6
PAINLEVEL_OUTOF10: 0
PAINLEVEL_OUTOF10: 7
PAINLEVEL_OUTOF10: 0
PAINLEVEL_OUTOF10: 7
PAINLEVEL_OUTOF10: 7

## 2018-12-10 ASSESSMENT — PAIN DESCRIPTION - ORIENTATION
ORIENTATION: RIGHT

## 2018-12-10 ASSESSMENT — PAIN DESCRIPTION - PAIN TYPE
TYPE: ACUTE PAIN

## 2018-12-10 ASSESSMENT — PAIN DESCRIPTION - FREQUENCY: FREQUENCY: CONTINUOUS

## 2018-12-10 ASSESSMENT — PAIN DESCRIPTION - PROGRESSION: CLINICAL_PROGRESSION: GRADUALLY IMPROVING

## 2018-12-10 ASSESSMENT — PAIN DESCRIPTION - DESCRIPTORS
DESCRIPTORS: SHARP;BURNING
DESCRIPTORS: ACHING;DISCOMFORT

## 2018-12-10 ASSESSMENT — PAIN SCALES - WONG BAKER: WONGBAKER_NUMERICALRESPONSE: 2

## 2018-12-10 ASSESSMENT — PAIN DESCRIPTION - LOCATION
LOCATION: LEG

## 2018-12-10 NOTE — PROGRESS NOTES
ondansetron, potassium chloride      LABS:     CBC:   Recent Labs      12/08/18   1232  12/09/18   0701  12/10/18   0719   WBC  9.1  8.9  11.0*   HGB  12.7  11.6*  11.4*   PLT  262  269  295     BMP:    Recent Labs      12/08/18   0441  12/09/18   0701  12/10/18   0719   NA  132*  134*  130*   K  4.0  4.0  4.3   CL  93*  91*  89*   CO2  30  30  32   BUN  10  9  9   CREATININE  0.6  0.7  0.8   GLUCOSE  133*  141*  143*     Calcium:  Recent Labs      12/10/18   0719   CALCIUM  8.9           Problem list of patient:     Patient Active Problem List   Diagnosis Code    Renal mass, right N28.89    GISELL (stress urinary incontinence, female) N39.3    Flushing R23.2    Uncontrolled hypertension I10    Chronic atrial fibrillation (HCC) I48.2    Hepatic lesion K76.9    Chest pain R07.9    Hypertensive urgency I16.0    Atypical chest pain R07.89    Pneumonia J18.9    Acute pain of left shoulder M25.512    Sepsis (HCC) A41.9         ASSESSMENT/PLAN   Streptococcus bacteremia  Right lower leg cellulites:slowly improving  Continue iv antibiotics  Liver mass? Concern for metastatic disease, will need work up  Once she is stable.   Discussed with nursing staff    Greer Rivers MD, Syed Lam 12/10/2018 8:51 AM

## 2018-12-10 NOTE — PROGRESS NOTES
basement along with treadmill and pulleys for home therex    Subjective:  Chart Reviewed: Yes  Family / Caregiver Present: Yes  Subjective: RN approved session. Daughter present. Pt up on bedside commode getting cleaned up with help from tech. Pt pleasant and agreeable to therapy. Pain:  Yes. Pain Assessment  Pain Level: 7  Pain Type: Acute pain  Pain Location: Leg  Pain Orientation: Right       Social/Functional:  Lives With: Alone (children close by)  Type of Home: House  Home Layout: Two level, Able to Live on Main level with bedroom/bathroom, Laundry in basement (shower in basement)  Home Access: Stairs to enter with rails  Entrance Stairs - Number of Steps: 2  Entrance Stairs - Rails: Right  Home Equipment: Cane, Rolling walker     Objective:       Transfers  Sit to Stand: Moderate Assistance (x1 from bedside commode. Required several trials. )  Stand to sit: Minimal Assistance (Cuing to reach back for chair before sitting for decreased fall risk. )       Ambulation 1  Surface: level tile  Device: Rolling Walker  Assistance: Contact guard assistance  Quality of Gait: Small shuffled steps with minimal weight bearing through R LE. Forward flexed posture. Unsteady. Distance: 3 feet x1          Balance  Comments: Static stand at EOB for tech to complete clean up at Contact Guard to Min A x1 to maintain balance. Pt required cuing to not lean down onto elbows    Exercises:  Exercises  Comments: Seated in bedside chair pt completed BLE ankle pumps, LAQ, marches, reclined glut set, quad set, heel slides all x10 reps to increase strength for improved functional mobility. HEP written up on communication board       Activity Tolerance:  Activity Tolerance: Patient limited by pain    Assessment: Body structures, Functions, Activity limitations: Decreased functional mobility , Decreased endurance, Decreased balance, Decreased strength  Assessment: Pt tolerated session fair.  Limited by pain, decreased strength,

## 2018-12-10 NOTE — PROGRESS NOTES
[address] [facility]  [address] [address]  Dept: [phone]  Loc: [phone]        Adult Orthopaedic Service      Patient Name:  Terrence Mireles  YOB: 1929   MRN:  [id number]   Date: 12/10/18          Assessment:    s/p  left LE cellulitis, significant improvement since 12/9/18    Plan:   -No surgery today. OK for diet today  -Continue ABX per Dr. Red Jarrett  -Continue medical management and pain control  -Dr Edmondson Dad to follow      Subjective:   Pain controlled, tolerating diet, resting in bed in no acute distress. Medications:      clindamycin (CLEOCIN) IV  900 mg Intravenous Q8H    cefTRIAXone (ROCEPHIN) IV  2 g Intravenous Q12H    lactobacillus  1 capsule Oral Daily with breakfast    sodium chloride flush  10 mL Intravenous 2 times per day    docusate sodium  100 mg Oral BID    atenolol  25 mg Oral Daily    calcium elemental  500 mg Oral Daily    pravastatin  20 mg Oral Daily    fluocinonide   Topical Daily       Physical Exam:     Vitals:    12/10/18 0245   BP: (!) 147/67   Pulse: 88   Resp: 20   Temp: 101.4 °F (38.6 °C)   SpO2: 92%       Intake and Output Summary (Last 24 hours) at Date Time    Intake/Output Summary (Last 24 hours) at 12/10/18 8202  Last data filed at 12/10/18 0246   Gross per 24 hour   Intake          2271.69 ml   Output                0 ml   Net          2271.69 ml       General appearance - no acute distress  Musculoskeletal -  Diffuse erythema throughout right LE that is improving an decreasing.   Tenderness to palpation throughout right LE with it being worse on medial aspect of tibia area  Fires TA/EHL/GSC  SILT SP/DP/TN  WWP distally  Calves soft, nontender bilateral  2+ DP, PT pulses  Able to ambulate from bed to chair today    Labs:     CBC:   Lab Results   Component Value Date    WBC 11.0 12/10/2018    RBC 3.71 12/10/2018    RBC 4.27 07/18/2011    HGB 11.4 12/10/2018    HCT 34.9 12/10/2018

## 2018-12-10 NOTE — PROGRESS NOTES
Nutrition Assessment    Type and Reason for Visit: Initial (LOS)    Nutrition Recommendations:   Continue Cardiac Diet  Will initiate yogurt at breakfast for probiotic benefits (pt is on IV antibiotics)  Will initiate Ensure High Protein, Low Calorie Supplement at supper to help provide extra nutrition if po intake has not been adequate that day  Monitor pt's weight/edema as well as skin integrity/wound healing  Continue pt's Oscal and Probiotic. Recommend a daily MVI to help pt's overall nutritional and medical status    Nutrition Assessment: Pt admitted with pneumonia and RLE cellulitis. Pt has hx of Hyperlipidemia, HTN, Skin Ca, Afib. Pt seen. Appears tired and weak. Stated that she has been eating good - at least half of her meals. No diarrhea noted despite being on IV antibiotics per pt. Encouraged pt to continue eating at best effort, gustavo protein. Pt feels that since she is eating so good that ONS is not needed. Did however talk pt into an Ensure daily with supper and a yogurt with breakfast (probiotic benefits). Chart documentation states possible TCU transfer d/t IV antibiotics and pt being deconditioned. Pt's fasting blood sugars 141-143 the past 2 mornings; low sodium at 130 this am; WBC still elevated with low H/H. Medications include Oscal, IV heparin, IV antibiotics, Probiotic, Colace. Pt adequately nourished d/t stable weight and po intake. However, feel that pt is at risk for nutritional compromise d/t pt's poor skin integrity/cellulitis; edema/fluid retention; later age of 80. Will initiate yogurt and Ensure. Will continue to monitor pt's overall nutritional status/needs - gustavo if pt transfers to TCU    Malnutrition Assessment:  · Malnutrition Status:  At risk for malnutrition  · Context: Acute illness or injury  · Findings of the 6 clinical characteristics of malnutrition (Minimum of 2 out of 6 clinical characteristics is required to make the diagnosis of moderate or severe Protein Calorie

## 2018-12-11 LAB
ANION GAP SERPL CALCULATED.3IONS-SCNC: 9 MEQ/L (ref 8–16)
BUN BLDV-MCNC: 9 MG/DL (ref 7–22)
C-REACTIVE PROTEIN: 14.34 MG/DL (ref 0–1)
CALCIUM SERPL-MCNC: 8.8 MG/DL (ref 8.5–10.5)
CHLORIDE BLD-SCNC: 83 MEQ/L (ref 98–111)
CO2: 30 MEQ/L (ref 23–33)
CREAT SERPL-MCNC: 0.6 MG/DL (ref 0.4–1.2)
ERYTHROCYTE [DISTWIDTH] IN BLOOD BY AUTOMATED COUNT: 13.7 % (ref 11.5–14.5)
ERYTHROCYTE [DISTWIDTH] IN BLOOD BY AUTOMATED COUNT: 46.5 FL (ref 35–45)
GFR SERPL CREATININE-BSD FRML MDRD: > 90 ML/MIN/1.73M2
GLUCOSE BLD-MCNC: 137 MG/DL (ref 70–108)
HCT VFR BLD CALC: 32.7 % (ref 37–47)
HEMOGLOBIN: 11.1 GM/DL (ref 12–16)
MCH RBC QN AUTO: 31.2 PG (ref 26–33)
MCHC RBC AUTO-ENTMCNC: 33.9 GM/DL (ref 32.2–35.5)
MCV RBC AUTO: 91.9 FL (ref 81–99)
PLATELET # BLD: 296 THOU/MM3 (ref 130–400)
PMV BLD AUTO: 8.9 FL (ref 9.4–12.4)
POTASSIUM SERPL-SCNC: 4.3 MEQ/L (ref 3.5–5.2)
RBC # BLD: 3.56 MILL/MM3 (ref 4.2–5.4)
SEDIMENTATION RATE, ERYTHROCYTE: 108 MM/HR (ref 0–20)
SODIUM BLD-SCNC: 122 MEQ/L (ref 135–145)
WBC # BLD: 11.3 THOU/MM3 (ref 4.8–10.8)

## 2018-12-11 PROCEDURE — 80048 BASIC METABOLIC PNL TOTAL CA: CPT

## 2018-12-11 PROCEDURE — 6370000000 HC RX 637 (ALT 250 FOR IP): Performed by: INTERNAL MEDICINE

## 2018-12-11 PROCEDURE — 97530 THERAPEUTIC ACTIVITIES: CPT

## 2018-12-11 PROCEDURE — 85027 COMPLETE CBC AUTOMATED: CPT

## 2018-12-11 PROCEDURE — 97110 THERAPEUTIC EXERCISES: CPT

## 2018-12-11 PROCEDURE — 1200000003 HC TELEMETRY R&B

## 2018-12-11 PROCEDURE — 99232 SBSQ HOSP IP/OBS MODERATE 35: CPT | Performed by: FAMILY MEDICINE

## 2018-12-11 PROCEDURE — 2580000003 HC RX 258: Performed by: INTERNAL MEDICINE

## 2018-12-11 PROCEDURE — 85651 RBC SED RATE NONAUTOMATED: CPT

## 2018-12-11 PROCEDURE — 6360000002 HC RX W HCPCS: Performed by: INTERNAL MEDICINE

## 2018-12-11 PROCEDURE — 36415 COLL VENOUS BLD VENIPUNCTURE: CPT

## 2018-12-11 PROCEDURE — 86140 C-REACTIVE PROTEIN: CPT

## 2018-12-11 PROCEDURE — 2500000003 HC RX 250 WO HCPCS: Performed by: INTERNAL MEDICINE

## 2018-12-11 RX ADMIN — CLINDAMYCIN IN 5 PERCENT DEXTROSE 900 MG: 18 INJECTION, SOLUTION INTRAVENOUS at 18:17

## 2018-12-11 RX ADMIN — ATENOLOL 25 MG: 25 TABLET ORAL at 08:57

## 2018-12-11 RX ADMIN — CLINDAMYCIN IN 5 PERCENT DEXTROSE 900 MG: 18 INJECTION, SOLUTION INTRAVENOUS at 01:59

## 2018-12-11 RX ADMIN — CEFTRIAXONE SODIUM 2 G: 2 INJECTION, POWDER, FOR SOLUTION INTRAMUSCULAR; INTRAVENOUS at 17:41

## 2018-12-11 RX ADMIN — CLINDAMYCIN IN 5 PERCENT DEXTROSE 900 MG: 18 INJECTION, SOLUTION INTRAVENOUS at 10:42

## 2018-12-11 RX ADMIN — PRAVASTATIN SODIUM 20 MG: 20 TABLET ORAL at 08:57

## 2018-12-11 RX ADMIN — Medication 10 ML: at 08:57

## 2018-12-11 RX ADMIN — OXYCODONE HYDROCHLORIDE 5 MG: 5 TABLET ORAL at 03:24

## 2018-12-11 RX ADMIN — OXYCODONE HYDROCHLORIDE 5 MG: 5 TABLET ORAL at 17:41

## 2018-12-11 RX ADMIN — Medication 1 CAPSULE: at 08:57

## 2018-12-11 RX ADMIN — RIVAROXABAN 20 MG: 20 TABLET, FILM COATED ORAL at 17:41

## 2018-12-11 RX ADMIN — CALCIUM 500 MG: 500 TABLET ORAL at 08:56

## 2018-12-11 RX ADMIN — CEFTRIAXONE SODIUM 2 G: 2 INJECTION, POWDER, FOR SOLUTION INTRAMUSCULAR; INTRAVENOUS at 04:57

## 2018-12-11 RX ADMIN — FLUOCINONIDE: 0.5 CREAM TOPICAL at 08:58

## 2018-12-11 ASSESSMENT — PAIN DESCRIPTION - PAIN TYPE
TYPE: ACUTE PAIN
TYPE: ACUTE PAIN

## 2018-12-11 ASSESSMENT — PAIN SCALES - WONG BAKER: WONGBAKER_NUMERICALRESPONSE: 8

## 2018-12-11 ASSESSMENT — PAIN SCALES - GENERAL
PAINLEVEL_OUTOF10: 8
PAINLEVEL_OUTOF10: 6
PAINLEVEL_OUTOF10: 8
PAINLEVEL_OUTOF10: 0
PAINLEVEL_OUTOF10: 5

## 2018-12-11 ASSESSMENT — PAIN DESCRIPTION - LOCATION
LOCATION: LEG
LOCATION: LEG

## 2018-12-11 ASSESSMENT — PAIN DESCRIPTION - ORIENTATION
ORIENTATION: RIGHT
ORIENTATION: RIGHT

## 2018-12-11 ASSESSMENT — PAIN DESCRIPTION - DESCRIPTORS: DESCRIPTORS: ACHING;DISCOMFORT

## 2018-12-11 NOTE — PLAN OF CARE
Problem: Pain:  Goal: Pain level will decrease  Pain level will decrease   Outcome: Ongoing  Pt has pain 7/10 RLE PRN medication has been given    Problem: Falls - Risk of:  Goal: Will remain free from falls  Will remain free from falls   Outcome: Ongoing  No falls noted this shift. Continue falling star program. Bed alarm on, bed in low position. Call light and personal belongings in reach. Patient uses call light appropriately. Problem: Discharge Planning:  Goal: Participates in care planning  Participates in care planning   Outcome: Ongoing  Pt is planing on going to  for Rehab at d/c    Comments: Care plan reviewed with patient and family. Patient and family verbalize understanding of the plan of care and contribute to goal setting.

## 2018-12-11 NOTE — PROGRESS NOTES
[facility] 60  INPATIENT OCCUPATIONAL THERAPY  [facility]  DAILY NOTE    Time:  Time In: 1325  Time Out: 1340  Timed Code Treatment Minutes: 15 Minutes  Minutes: 15        Date: 2018  Patient Name: Kenzie Hernandez,   Gender: female      Room: [facility]27/027-A  MRN: [id number]  : 1929  (80 y.o.)  Referring Practitioner: Jitendra Gardner MD  Diagnosis: Pneumonia  Additional Pertinent Hx: 80 y.o. female who presented to [facility] with left shoulder pain. Pt notes that symptoms began 3 days ago. Has gradually gotten worse. Rated as 10/10, radiating to the shoulder blade and elbow. Worse with neck extension. Pt developed fever this morning and was brought to ED. Pt denies any cough, no n/v and no change in bowel. CT abd shows Pneumonia. procal mildly elevated    Past Medical History:   Diagnosis Date    Arthritis     Atrial fibrillation ([address]     Cancer ([address]     skin    Gross hematuria     Dr Miguel Dobbins HTN (hypertension)     Hyperlipidemia     Pneumonia 2018     Past Surgical History:   Procedure Laterality Date    CARPAL TUNNEL RELEASE  10/23/2018    right hand    COLONOSCOPY      EYE SURGERY      FOOT SURGERY      x2    HYSTERECTOMY  1982    KNEE SURGERY  2010    LIVER BIOPSY  2017    SKIN BIOPSY         Restrictions/Precautions:  General Precautions, Fall Risk                    Other position/activity restrictions: No heavy lifting L UE  Left Upper Extremity Brace/Splint: Sling  Left Upper Extremity Brace/Splint: for comfort    Prior Level of Function:  ADL Assistance: Independent  Homemaking Assistance: Independent  Ambulation Assistance: Independent  Transfer Assistance: Independent  Additional Comments: Pt very indp at PLOF, ambulates without device and manages all ADLs and IADLs independently.   pt uses walk in shower in basement, laundry is in basement along with treadmill and pulleys for home therex    Subjective   Subjective: Pt

## 2018-12-11 NOTE — PLAN OF CARE
Problem: Pain:  Goal: Pain level will decrease  Pain level will decrease   Outcome: Ongoing  Patient complains of right leg pain. PRN pain medication given. Patient satisfied. Problem: Falls - Risk of:  Goal: Will remain free from falls  Will remain free from falls   Outcome: Ongoing  Patient is free from falls. Patient is using call light appropriately at this time. Problem: Discharge Planning:  Goal: Participates in care planning  Participates in care planning   Outcome: Ongoing      Problem: Airway Clearance - Ineffective:  Goal: Ability to maintain a clear airway will improve  Ability to maintain a clear airway will improve   Outcome: Ongoing      Problem: Bowel Function - Altered:  Goal: Bowel elimination is within specified parameters  Bowel elimination is within specified parameters   Outcome: Ongoing      Problem: Cardiac Output - Decreased:  Goal: Hemodynamic stability will improve  Hemodynamic stability will improve   Outcome: Ongoing      Problem: Fluid Volume - Imbalance:  Goal: Absence of imbalanced fluid volume signs and symptoms  Absence of imbalanced fluid volume signs and symptoms   Outcome: Ongoing      Problem: Gas Exchange - Impaired:  Goal: Levels of oxygenation will improve  Levels of oxygenation will improve   Outcome: Ongoing  RA    Problem: Skin Integrity - Impaired:  Goal: Will show no infection signs and symptoms  Will show no infection signs and symptoms   Outcome: Ongoing  Patient is on IV ATB at this time. Problem: DISCHARGE BARRIERS  Goal: Patient's continuum of care needs are met  Outcome: Ongoing  Care needs are met at this time. Problem: Nutrition  Goal: Optimal nutrition therapy  Outcome: Ongoing  Patient is tolerating a cardiac diet at this time. Comments: Care plan reviewed with patient. Patient verbalize understanding of the plan of care and contribute to goal setting.

## 2018-12-11 NOTE — PROGRESS NOTES
Supplement (see comment)    DVT prophylaxis: [] Lovenox                                 [] SCDs                                 [] SQ Heparin                                 [] Encourage ambulation           [x] Already on Anticoagulation  Disposition:    [] Home       [x] TCU       [] Rehab       [] Psych       [x] SNF       [] [place]       [] Other-    Code Status: Limited    PT/OT Eval Status: Likely TCU with IV abx and rehab    Assessment/Plan:    Anticipated Discharge in : 2 days     Active Hospital Problems    Diagnosis Date Noted    Sepsis ([facility] [A41.9]     Pneumonia [J18.9] 12/02/2018    Acute pain of left shoulder [M25.512] 12/02/2018    Chronic atrial fibrillation ([facility] [I48.2] 07/29/2017       Assessment:  1. Sepsis   2. Group C Strep bacteremia/ RLE cellulitis. 3. Left shoulder pain   4. Chronic A. Fib   5. Liver and Renal Mass       Plan:  1. RLE Cellulitis and Group C Strep Bacteremia, Sepsis. Improving. Presented with 3/4 SIRS (tachycardia, fevers and elevated WBC count). Due to RLE cellulitis, likely strep as it was found in the blood. Cellulitis is persisting Consider joint seeding given pain, however, shoulder pain is much improved and has a story to support etiology (raking at home). TTE without vegetation. Repeat blood cultures NGTD. Inflammatory markers elevated. RLE venous US negative. Switched to Ceftriaxone with clinda for antitoxin. ID consulted. MRI without abscess but shows possible myositis. Ortho consulted, no surgery at this time. Cellulitis appeared worse thus clinda was increased. Continue current regimen. Continuing to improve. 2. Left shoulder pain likely 2/2 sprain with possible left RCT. Orthopedic surgery recommending LUE sling and ice with no heavy activities and outpatient follow-up with Dr. Clyde Kim. No acute intervention at this time. 3. Known Atrial Fibrillation on Xarelto and Atenolol. Currently rate controlled. Monitor on telemetry.  Resume

## 2018-12-12 ENCOUNTER — HOSPITAL ENCOUNTER (INPATIENT)
Age: 83
LOS: 12 days | Discharge: HOME HEALTH | DRG: 603 | End: 2018-12-24
Attending: FAMILY MEDICINE | Admitting: FAMILY MEDICINE
Payer: MEDICARE

## 2018-12-12 VITALS
HEIGHT: 64 IN | HEART RATE: 59 BPM | RESPIRATION RATE: 20 BRPM | WEIGHT: 182.4 LBS | TEMPERATURE: 97.8 F | DIASTOLIC BLOOD PRESSURE: 57 MMHG | OXYGEN SATURATION: 95 % | SYSTOLIC BLOOD PRESSURE: 113 MMHG | BODY MASS INDEX: 31.14 KG/M2

## 2018-12-12 DIAGNOSIS — L03.115 CELLULITIS OF RIGHT LOWER EXTREMITY: ICD-10-CM

## 2018-12-12 DIAGNOSIS — M47.812 SPONDYLOSIS OF CERVICAL REGION WITHOUT MYELOPATHY OR RADICULOPATHY: Primary | ICD-10-CM

## 2018-12-12 PROBLEM — J90 BILATERAL PLEURAL EFFUSION: Status: ACTIVE | Noted: 2018-12-12

## 2018-12-12 PROBLEM — E66.09 CLASS 1 OBESITY DUE TO EXCESS CALORIES WITH SERIOUS COMORBIDITY AND BODY MASS INDEX (BMI) OF 31.0 TO 31.9 IN ADULT: Status: ACTIVE | Noted: 2018-12-12

## 2018-12-12 PROBLEM — M19.012 PRIMARY OSTEOARTHRITIS OF LEFT SHOULDER: Status: ACTIVE | Noted: 2018-12-12

## 2018-12-12 LAB
ANION GAP SERPL CALCULATED.3IONS-SCNC: 8 MEQ/L (ref 8–16)
BLOOD CULTURE, ROUTINE: NORMAL
BLOOD CULTURE, ROUTINE: NORMAL
BUN BLDV-MCNC: 8 MG/DL (ref 7–22)
C-REACTIVE PROTEIN: 9.43 MG/DL (ref 0–1)
CALCIUM SERPL-MCNC: 9.1 MG/DL (ref 8.5–10.5)
CHLORIDE BLD-SCNC: 88 MEQ/L (ref 98–111)
CO2: 31 MEQ/L (ref 23–33)
CREAT SERPL-MCNC: 0.5 MG/DL (ref 0.4–1.2)
ERYTHROCYTE [DISTWIDTH] IN BLOOD BY AUTOMATED COUNT: 13.5 % (ref 11.5–14.5)
ERYTHROCYTE [DISTWIDTH] IN BLOOD BY AUTOMATED COUNT: 44.5 FL (ref 35–45)
GFR SERPL CREATININE-BSD FRML MDRD: > 90 ML/MIN/1.73M2
GLUCOSE BLD-MCNC: 123 MG/DL (ref 70–108)
HCT VFR BLD CALC: 34.5 % (ref 37–47)
HEMOGLOBIN: 11.7 GM/DL (ref 12–16)
MCH RBC QN AUTO: 30.7 PG (ref 26–33)
MCHC RBC AUTO-ENTMCNC: 33.9 GM/DL (ref 32.2–35.5)
MCV RBC AUTO: 90.6 FL (ref 81–99)
PLATELET # BLD: 307 THOU/MM3 (ref 130–400)
PMV BLD AUTO: 8.8 FL (ref 9.4–12.4)
POTASSIUM SERPL-SCNC: 4.8 MEQ/L (ref 3.5–5.2)
RBC # BLD: 3.81 MILL/MM3 (ref 4.2–5.4)
SEDIMENTATION RATE, ERYTHROCYTE: 106 MM/HR (ref 0–20)
SODIUM BLD-SCNC: 127 MEQ/L (ref 135–145)
WBC # BLD: 10.5 THOU/MM3 (ref 4.8–10.8)

## 2018-12-12 PROCEDURE — 1290000000 HC SEMI PRIVATE OTHER R&B

## 2018-12-12 PROCEDURE — 85651 RBC SED RATE NONAUTOMATED: CPT

## 2018-12-12 PROCEDURE — 2500000003 HC RX 250 WO HCPCS: Performed by: INTERNAL MEDICINE

## 2018-12-12 PROCEDURE — 97116 GAIT TRAINING THERAPY: CPT

## 2018-12-12 PROCEDURE — 6370000000 HC RX 637 (ALT 250 FOR IP): Performed by: INTERNAL MEDICINE

## 2018-12-12 PROCEDURE — 97530 THERAPEUTIC ACTIVITIES: CPT

## 2018-12-12 PROCEDURE — 2580000003 HC RX 258: Performed by: INTERNAL MEDICINE

## 2018-12-12 PROCEDURE — 6360000002 HC RX W HCPCS: Performed by: FAMILY MEDICINE

## 2018-12-12 PROCEDURE — 6370000000 HC RX 637 (ALT 250 FOR IP): Performed by: FAMILY MEDICINE

## 2018-12-12 PROCEDURE — 85027 COMPLETE CBC AUTOMATED: CPT

## 2018-12-12 PROCEDURE — 2580000003 HC RX 258: Performed by: FAMILY MEDICINE

## 2018-12-12 PROCEDURE — 97535 SELF CARE MNGMENT TRAINING: CPT

## 2018-12-12 PROCEDURE — 0220000000 HC SKILLED NURSING FACILITY

## 2018-12-12 PROCEDURE — 86140 C-REACTIVE PROTEIN: CPT

## 2018-12-12 PROCEDURE — 99239 HOSP IP/OBS DSCHRG MGMT >30: CPT | Performed by: FAMILY MEDICINE

## 2018-12-12 PROCEDURE — 80048 BASIC METABOLIC PNL TOTAL CA: CPT

## 2018-12-12 PROCEDURE — 6360000002 HC RX W HCPCS: Performed by: INTERNAL MEDICINE

## 2018-12-12 PROCEDURE — 97110 THERAPEUTIC EXERCISES: CPT

## 2018-12-12 PROCEDURE — 36415 COLL VENOUS BLD VENIPUNCTURE: CPT

## 2018-12-12 RX ORDER — CALCIUM CARBONATE 500(1250)
500 TABLET ORAL DAILY
Status: CANCELLED | OUTPATIENT
Start: 2018-12-13

## 2018-12-12 RX ORDER — PRAVASTATIN SODIUM 20 MG
20 TABLET ORAL DAILY
Status: DISCONTINUED | OUTPATIENT
Start: 2018-12-13 | End: 2018-12-24 | Stop reason: HOSPADM

## 2018-12-12 RX ORDER — LISINOPRIL 20 MG/1
20 TABLET ORAL DAILY
Status: DISCONTINUED | OUTPATIENT
Start: 2018-12-12 | End: 2018-12-12 | Stop reason: HOSPADM

## 2018-12-12 RX ORDER — AMLODIPINE BESYLATE 5 MG/1
5 TABLET ORAL DAILY
Status: DISCONTINUED | OUTPATIENT
Start: 2018-12-12 | End: 2018-12-12 | Stop reason: HOSPADM

## 2018-12-12 RX ORDER — SODIUM CHLORIDE 0.9 % (FLUSH) 0.9 %
10 SYRINGE (ML) INJECTION PRN
Status: CANCELLED | OUTPATIENT
Start: 2018-12-12

## 2018-12-12 RX ORDER — OXYCODONE HYDROCHLORIDE 5 MG/1
5 TABLET ORAL EVERY 6 HOURS PRN
Status: DISCONTINUED | OUTPATIENT
Start: 2018-12-12 | End: 2018-12-23

## 2018-12-12 RX ORDER — LISINOPRIL 20 MG/1
20 TABLET ORAL DAILY
Status: CANCELLED | OUTPATIENT
Start: 2018-12-13

## 2018-12-12 RX ORDER — OXYCODONE HYDROCHLORIDE 5 MG/1
5 TABLET ORAL EVERY 6 HOURS PRN
Status: CANCELLED | OUTPATIENT
Start: 2018-12-12

## 2018-12-12 RX ORDER — AMLODIPINE BESYLATE 5 MG/1
5 TABLET ORAL DAILY
Status: DISCONTINUED | OUTPATIENT
Start: 2018-12-13 | End: 2018-12-24 | Stop reason: HOSPADM

## 2018-12-12 RX ORDER — SODIUM CHLORIDE 0.9 % (FLUSH) 0.9 %
10 SYRINGE (ML) INJECTION PRN
Status: DISCONTINUED | OUTPATIENT
Start: 2018-12-12 | End: 2018-12-24 | Stop reason: HOSPADM

## 2018-12-12 RX ORDER — TRAMADOL HYDROCHLORIDE 50 MG/1
25 TABLET ORAL EVERY 6 HOURS PRN
Status: CANCELLED | OUTPATIENT
Start: 2018-12-12

## 2018-12-12 RX ORDER — SENNA PLUS 8.6 MG/1
1 TABLET ORAL NIGHTLY PRN
Status: DISCONTINUED | OUTPATIENT
Start: 2018-12-12 | End: 2018-12-24 | Stop reason: HOSPADM

## 2018-12-12 RX ORDER — POTASSIUM CHLORIDE 20 MEQ/1
20 TABLET, EXTENDED RELEASE ORAL DAILY PRN
Status: CANCELLED | OUTPATIENT
Start: 2018-12-12

## 2018-12-12 RX ORDER — TRAMADOL HYDROCHLORIDE 50 MG/1
25 TABLET ORAL EVERY 6 HOURS PRN
Status: DISCONTINUED | OUTPATIENT
Start: 2018-12-12 | End: 2018-12-24 | Stop reason: HOSPADM

## 2018-12-12 RX ORDER — AMLODIPINE BESYLATE 5 MG/1
5 TABLET ORAL DAILY
Status: CANCELLED | OUTPATIENT
Start: 2018-12-13

## 2018-12-12 RX ORDER — ONDANSETRON 2 MG/ML
4 INJECTION INTRAMUSCULAR; INTRAVENOUS EVERY 6 HOURS PRN
Status: DISCONTINUED | OUTPATIENT
Start: 2018-12-12 | End: 2018-12-12

## 2018-12-12 RX ORDER — ONDANSETRON 2 MG/ML
4 INJECTION INTRAMUSCULAR; INTRAVENOUS EVERY 6 HOURS PRN
Status: CANCELLED | OUTPATIENT
Start: 2018-12-12

## 2018-12-12 RX ORDER — ONDANSETRON 4 MG/1
4 TABLET, FILM COATED ORAL EVERY 8 HOURS PRN
Status: DISCONTINUED | OUTPATIENT
Start: 2018-12-12 | End: 2018-12-24 | Stop reason: HOSPADM

## 2018-12-12 RX ORDER — POTASSIUM CHLORIDE 20 MEQ/1
20 TABLET, EXTENDED RELEASE ORAL DAILY PRN
Status: DISCONTINUED | OUTPATIENT
Start: 2018-12-12 | End: 2018-12-24 | Stop reason: HOSPADM

## 2018-12-12 RX ORDER — ACETAMINOPHEN 325 MG/1
650 TABLET ORAL EVERY 4 HOURS PRN
Status: DISCONTINUED | OUTPATIENT
Start: 2018-12-12 | End: 2018-12-24 | Stop reason: HOSPADM

## 2018-12-12 RX ORDER — ATENOLOL 25 MG/1
25 TABLET ORAL DAILY
Status: DISCONTINUED | OUTPATIENT
Start: 2018-12-13 | End: 2018-12-24 | Stop reason: HOSPADM

## 2018-12-12 RX ORDER — DOCUSATE SODIUM 100 MG/1
100 CAPSULE, LIQUID FILLED ORAL 2 TIMES DAILY
Status: DISCONTINUED | OUTPATIENT
Start: 2018-12-12 | End: 2018-12-24 | Stop reason: HOSPADM

## 2018-12-12 RX ORDER — PRAVASTATIN SODIUM 20 MG
20 TABLET ORAL DAILY
Status: CANCELLED | OUTPATIENT
Start: 2018-12-13

## 2018-12-12 RX ORDER — DOCUSATE SODIUM 100 MG/1
100 CAPSULE, LIQUID FILLED ORAL 2 TIMES DAILY
Status: CANCELLED | OUTPATIENT
Start: 2018-12-12

## 2018-12-12 RX ORDER — LACTOBACILLUS RHAMNOSUS GG 10B CELL
1 CAPSULE ORAL
Status: CANCELLED | OUTPATIENT
Start: 2018-12-13

## 2018-12-12 RX ORDER — LACTOBACILLUS RHAMNOSUS GG 10B CELL
1 CAPSULE ORAL
Status: DISCONTINUED | OUTPATIENT
Start: 2018-12-13 | End: 2018-12-24 | Stop reason: HOSPADM

## 2018-12-12 RX ORDER — CALCIUM CARBONATE 500(1250)
500 TABLET ORAL DAILY
Status: DISCONTINUED | OUTPATIENT
Start: 2018-12-13 | End: 2018-12-24 | Stop reason: HOSPADM

## 2018-12-12 RX ORDER — ATENOLOL 25 MG/1
25 TABLET ORAL DAILY
Status: CANCELLED | OUTPATIENT
Start: 2018-12-13

## 2018-12-12 RX ORDER — ACETAMINOPHEN 325 MG/1
650 TABLET ORAL EVERY 4 HOURS PRN
Status: CANCELLED | OUTPATIENT
Start: 2018-12-12

## 2018-12-12 RX ORDER — LISINOPRIL 20 MG/1
20 TABLET ORAL DAILY
Status: DISCONTINUED | OUTPATIENT
Start: 2018-12-13 | End: 2018-12-24 | Stop reason: HOSPADM

## 2018-12-12 RX ORDER — POLYETHYLENE GLYCOL 3350 17 G/17G
17 POWDER, FOR SOLUTION ORAL DAILY PRN
Status: DISCONTINUED | OUTPATIENT
Start: 2018-12-12 | End: 2018-12-24 | Stop reason: HOSPADM

## 2018-12-12 RX ORDER — FAMOTIDINE 20 MG/1
20 TABLET, FILM COATED ORAL DAILY
Status: DISCONTINUED | OUTPATIENT
Start: 2018-12-12 | End: 2018-12-24 | Stop reason: HOSPADM

## 2018-12-12 RX ADMIN — Medication 10 ML: at 22:05

## 2018-12-12 RX ADMIN — AMLODIPINE BESYLATE 5 MG: 5 TABLET ORAL at 08:57

## 2018-12-12 RX ADMIN — FLUOCINONIDE: 0.5 CREAM TOPICAL at 09:01

## 2018-12-12 RX ADMIN — CLINDAMYCIN IN 5 PERCENT DEXTROSE 900 MG: 18 INJECTION, SOLUTION INTRAVENOUS at 02:27

## 2018-12-12 RX ADMIN — OXYCODONE HYDROCHLORIDE 5 MG: 5 TABLET ORAL at 10:14

## 2018-12-12 RX ADMIN — OXYCODONE HYDROCHLORIDE 5 MG: 5 TABLET ORAL at 02:27

## 2018-12-12 RX ADMIN — PRAVASTATIN SODIUM 20 MG: 20 TABLET ORAL at 08:57

## 2018-12-12 RX ADMIN — CEFTRIAXONE SODIUM 2 G: 2 INJECTION, POWDER, FOR SOLUTION INTRAMUSCULAR; INTRAVENOUS at 05:32

## 2018-12-12 RX ADMIN — RIVAROXABAN 20 MG: 20 TABLET, FILM COATED ORAL at 22:04

## 2018-12-12 RX ADMIN — LISINOPRIL 20 MG: 20 TABLET ORAL at 08:57

## 2018-12-12 RX ADMIN — OXYCODONE HYDROCHLORIDE 5 MG: 5 TABLET ORAL at 22:06

## 2018-12-12 RX ADMIN — CEFTRIAXONE SODIUM 2 G: 2 INJECTION, POWDER, FOR SOLUTION INTRAMUSCULAR; INTRAVENOUS at 22:03

## 2018-12-12 RX ADMIN — Medication 1 CAPSULE: at 08:57

## 2018-12-12 RX ADMIN — CALCIUM 500 MG: 500 TABLET ORAL at 08:57

## 2018-12-12 RX ADMIN — ACETAMINOPHEN 650 MG: 325 TABLET ORAL at 09:00

## 2018-12-12 RX ADMIN — FAMOTIDINE 20 MG: 20 TABLET ORAL at 22:14

## 2018-12-12 RX ADMIN — DOCUSATE SODIUM 100 MG: 100 CAPSULE, LIQUID FILLED ORAL at 22:06

## 2018-12-12 RX ADMIN — ATENOLOL 25 MG: 25 TABLET ORAL at 08:57

## 2018-12-12 RX ADMIN — CLINDAMYCIN IN 5 PERCENT DEXTROSE 900 MG: 18 INJECTION, SOLUTION INTRAVENOUS at 10:23

## 2018-12-12 RX ADMIN — Medication 10 ML: at 02:29

## 2018-12-12 RX ADMIN — Medication 10 ML: at 09:00

## 2018-12-12 ASSESSMENT — PAIN SCALES - WONG BAKER: WONGBAKER_NUMERICALRESPONSE: 6

## 2018-12-12 ASSESSMENT — PAIN DESCRIPTION - PAIN TYPE
TYPE: ACUTE PAIN
TYPE: ACUTE PAIN

## 2018-12-12 ASSESSMENT — PAIN DESCRIPTION - FREQUENCY: FREQUENCY: CONTINUOUS

## 2018-12-12 ASSESSMENT — PAIN DESCRIPTION - ONSET: ONSET: ON-GOING

## 2018-12-12 ASSESSMENT — PAIN DESCRIPTION - PROGRESSION: CLINICAL_PROGRESSION: GRADUALLY IMPROVING

## 2018-12-12 ASSESSMENT — PAIN DESCRIPTION - DESCRIPTORS: DESCRIPTORS: ACHING;DISCOMFORT

## 2018-12-12 ASSESSMENT — PAIN SCALES - GENERAL
PAINLEVEL_OUTOF10: 6
PAINLEVEL_OUTOF10: 7
PAINLEVEL_OUTOF10: 7
PAINLEVEL_OUTOF10: 8
PAINLEVEL_OUTOF10: 10
PAINLEVEL_OUTOF10: 6
PAINLEVEL_OUTOF10: 5

## 2018-12-12 ASSESSMENT — PAIN DESCRIPTION - ORIENTATION
ORIENTATION: RIGHT
ORIENTATION: RIGHT

## 2018-12-12 ASSESSMENT — PAIN DESCRIPTION - LOCATION: LOCATION: LEG

## 2018-12-12 NOTE — PROGRESS NOTES
Hospitalist Progress Note    Patient:  Bill Cevallos      Unit/Bed:6K-27/027-A    YOB: 1929    MRN: [id number]       Acct: [de-identified]     PCP: Alexandra Rothman    Date of Admission: 12/2/2018    Chief Complaint:   Fatigue, L shoulder pain    Hospital Course:   80 y.o. female who presents to the emergency department for evaluation of left shoulder pain and abdominal pain that has been ongoing for the last three days. 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 She also states that she rakes leaves five days ago and is unsure if she did anything to her shoulder then. Patient states that her pain worsens with lying down but is better when she moves the left arm around. Patient locates her abdominal pain to the mid-abdomen. Patient rates her pain as a 9/10 in severity. She has also developed a fever of 103, chills, nausea, vomiting, and a few loose stools and cough. She denies any urinary symptoms, hematemesis, cough, congestion, headache, chest pain, or shortness of breath. She lives at home alone and has a history of A. Fib., hypertension, and arthritis.      In the ED, pt found to have 3/4 SIRS (tachycardic, elevated WBC count, and fever 103). CT showing bilateral effusions. Shoulder XRAY showing no fracture or dislocation but is showing rotator cuff arthropathy. Cervical XRAY showing DJD. Orthopedic surgery consulted.      Blood cultures 2/2 Group C strep     RLE cellulitis appears to be worsening. Switched to ceftriaxone and clinda (added lactobacillus). MRI without abscess. Fevered to 101 on night of 12/6. Blood cultures redrawn.      12/9 Leg starting to show significant improvement. 12/12 wbc normal. Patient accepted to TCU.     Subjective:   Patient continues to improve, although still complains of R leg pain, aggravated by movement. No fever overnight.  Blood pressure better. Review of Systems    All other systems reviewed and are negative; except for the pertinent findings previously mentioned in the history of present illness. Medications:  Reviewed    Infusion Medications   Scheduled Medications    amLODIPine  5 mg Oral Daily    lisinopril  20 mg Oral Daily    rivaroxaban  20 mg Oral Daily    cefTRIAXone (ROCEPHIN) IV  2 g Intravenous Q12H    lactobacillus  1 capsule Oral Daily with breakfast    sodium chloride flush  10 mL Intravenous 2 times per day    docusate sodium  100 mg Oral BID    atenolol  25 mg Oral Daily    calcium elemental  500 mg Oral Daily    pravastatin  20 mg Oral Daily    fluocinonide   Topical Daily     PRN Meds: oxyCODONE, traMADol, acetaminophen, sodium chloride flush, ondansetron, potassium chloride      Intake/Output Summary (Last 24 hours) at 12/12/18 1603  Last data filed at 12/12/18 1125   Gross per 24 hour   Intake          1132.41 ml   Output                0 ml   Net          1132.41 ml       Diet:  DIET CARDIAC; Dietary Nutrition Supplements: Low Calorie High Protein Supplement  Dietary Nutrition Supplements: Other Oral Supplement (see comment)    Exam:  BP (!) 113/57   Pulse 59   Temp 97.8 °F (36.6 °C) (Oral)   Resp 20   Ht 5' 4\" (1.626 m)   Wt 182 lb 6.4 oz (82.7 kg)   SpO2 95%   BMI 31.31 kg/m²     General appearance: patient appears comfortable in bed, not in acute distress  HEENT: Pupils equal, round, and reactive to light. Conjunctivae/corneas clear. Neck: Supple, with full range of motion. No jugular venous distention. Trachea midline. Respiratory:  Normal respiratory effort. Clear breath sounds  Cardiovascular: Irregularly irregular rhythm with normal S1/S2 without murmurs, rubs or gallops. Abdomen: Soft, non-tender, non-distended with normal bowel sounds. Musculoskeletal: passive and active ROM x 4 extremities. 2+ LE edema bilaterally. Skin: RLE with erythema, warmth, and edema 2+, Erythema improving. Neurologic:  Neurovascularly intact without any focal sensory/motor deficits. Cranial nerves: II-XII intact, grossly non-focal.  Psychiatric: Alert and oriented, thought content appropriate, normal insight  Capillary Refill: Brisk,< 3 seconds   Peripheral Pulses: +2 palpable, equal bilaterally     Labs:   Recent Labs      12/10/18   0719  12/11/18   0453  12/12/18   0540   WBC  11.0*  11.3*  10.5   HGB  11.4*  11.1*  11.7*   HCT  34.9*  32.7*  34.5*   PLT  295  296  307     Recent Labs      12/10/18   0719  12/11/18   0453  12/12/18   0540   NA  130*  122*  127*   K  4.3  4.3  4.8   CL  89*  83*  88*   CO2  32  30  31   BUN  9  9  8   CREATININE  0.8  0.6  0.5   CALCIUM  8.9  8.8  9.1     No results for input(s): AST, ALT, BILIDIR, BILITOT, ALKPHOS in the last 72 hours. No results for input(s): INR in the last 72 hours. No results for input(s): [place] in the last 72 hours. Urinalysis:      Lab Results   Component Value Date    NITRU NEGATIVE 12/02/2018    WBCUA 0-2 08/28/2017    WBCUA 25-50 07/18/2011    BACTERIA NONE 08/28/2017    RBCUA 0-2 08/28/2017    BLOODU NEGATIVE 12/02/2018    SPECGRAV 1.015 07/12/2014    GLUCOSEU NEGATIVE 12/02/2018       Radiology:  XR CHEST PORTABLE   Final Result   1. No acute intrathoracic process. 2. Moderate stable cardiomegaly. **This report has been created using voice recognition software. It may contain minor errors which are inherent in voice recognition technology. **      Final report electronically signed by Dr. Yaneth Ornelas on 12/9/2018 10:00 AM      MRI TIBULA FIBULA RIGHT W WO CONTRAST   Final Result   1. Findings consistent with a severe cellulitis of the right lower leg as described in the body the report. There is no associated abscess. 2. There is no evidence of osteomyelitis.    3. There is vague edema throughout the musculature of the anterior, lateral and posterior compartments of the right lower leg which can be associated with a myositis. There is no intramuscular fluid or subcapsular fluid. There is no MRI evidence of    compartment syndrome however correlation should be made with clinical findings. 4. Clinical management is recommended. Follow-up MR imaging is recommended if clinical symptoms worsen or if there is continued clinical concern. **This report has been created using voice recognition software. It may contain minor errors which are inherent in voice recognition technology. **      Final report electronically signed by Dr. Bo Ch on 12/7/2018 6:41 AM      VL DUP LOWER EXTREMITY VENOUS RIGHT   Final Result      No evidence of deep venous thrombosis in the right lower extremity. Final report electronically signed by Dr. Adrian Harley on 12/3/2018 5:20 PM      XR SCAPULA LEFT (COMPLETE)   Final Result      No acute fracture or dislocation is identified. Degenerative changes are present of the acromioclavicular joint and bony spurring is present at the greater tuberosity of the humerus which may correspond to underlying rotator cuff arthropathy. **This report has been created using voice recognition software. It may contain minor errors which are inherent in voice recognition technology. **      Final report electronically signed by Dr. Timur Etienne on 12/2/2018 5:59 PM      XR CERVICAL SPINE (2-3 VIEWS)   Final Result       Worsened degenerative changes are present within the cervical spine and which are most notable at C6-C7. **This report has been created using voice recognition software. It may contain minor errors which are inherent in voice recognition technology. **      Final report electronically signed by Dr. Timur Etienne on 12/2/2018 5:55 PM      CT ABDOMEN PELVIS W IV CONTRAST Additional Contrast? None   Final Result   1. There are small bilateral pleural effusions, right greater than left.    2. There is atelectasis and/or infiltrate at the right lung base. There are atelectatic densities at the left lung base. 3. There is stable mild hypertrophy of the bilateral adrenal glands. 4. There is stable mild cortical scarring along the posterior margin of the lower pole the left kidney. There are stable 0.2 cm hypodense lesion along the anterior margin of the upper pole of the left kidney, 0.6 cm along the medial aspect of the midpole    of the left kidney and 0.4 cm along the anterior aspect of lower pole the left kidney. 5. There is a stable 2.3 cm mass projecting off the posterolateral margin of the midpole the right kidney which is high attenuation peripherally and low attenuation centrally. There is also a stable 1.4 cm cyst projecting off the lateral margin of the    mid to lower pole of the right kidney. 6. There are stable hypodense lesions within the lateral segment of the left lobe of the liver measuring 0.4 cm and 0.3 cm. There is a stable 3.6 cm irregular hypoechoic lesion within the lateral aspect of the right lobe of the liver. 7. The gallbladder is unremarkable. 8. There is stable mild prominence of the common duct measuring 1.0 cm.   9. There are numerous small mesenteric and retroperitoneal lymph nodes which appear slightly larger and more numerous compared to the previous examination. These lymph nodes are nonspecific with the differential including reactive lymphadenopathy and a    neoplastic process. 10. Additional findings as described in the body the report. **This report has been created using voice recognition software. It may contain minor errors which are inherent in voice recognition technology. **      Final report electronically signed by Dr. Pily Hassan on 12/2/2018 12:36 PM      XR CHEST STANDARD (2 VW)   Final Result   1. There is no acute cardiopulmonary process. **This report has been created using voice recognition software.   It may contain minor errors which are inherent in voice recognition

## 2018-12-12 NOTE — PROGRESS NOTES
along with treadmill and pulleys for home therex    Subjective:     Subjective: Pt very pleasant and cooeprative. Eager to reposition. Motivated towards therapy. Pain:   .      not rated; right leg positioned for max comfort end of session     Social/Functional:  Lives With: Alone (children close by)  Type of Home: House  Home Layout: Two level, Able to Live on Main level with bedroom/bathroom, Laundry in basement (shower in basement)  Home Access: Stairs to enter with rails  Entrance Stairs - Number of Steps: 2  Entrance Stairs - Rails: Right  Home Equipment: Cane, Rolling walker     Objective:  Supine to Sit: Moderate assistance (at trunk and right leg )  Scooting: Moderate assistance (at chux )    Transfers  Sit to Stand: Minimal Assistance (EOB, unsteady gustavo with t-karin hand up to walker )  Stand to sit: Minimal Assistance (posterior LOB once at chair, aMrgarita to steady ; lacks control with up and downs )       Ambulation 1  Surface: level tile  Device: Rolling Walker  Assistance: Minimal assistance  Quality of Gait: generally unsteady, needs assist with walker, heavilly guards right leg, uses a TTWBing gait   Distance: approx 3 feet x 1          Balance  Comments: sat EOB approx 5 minutes before t-ferring to bs chair ; close SBA x 1 with use of one BR                                         Exercises:  Exercises  Comments: seated/reclined ml LE therex; AP x 20;  glut sets, IR/ER  and quad sets both x 10 reps each for improved bloodflow to ml LE s                                 Activity Tolerance:  Activity Tolerance: Patient Tolerated treatment well    Assessment:   Body structures, Functions, Activity limitations: Decreased functional mobility , Decreased endurance, Decreased balance, Decreased strength  Assessment: rec cont skilled therapy forimproved functional endurance and mobiltiy   Prognosis: Good     REQUIRES PT FOLLOW UP: Yes    Discharge Recommendations:  Discharge Recommendations: Continue to assess pending progress, Patient would benefit from continued therapy after discharge    Patient Education:  Patient Education: bed mobs     Equipment Recommendations:  Equipment Needed: No    Safety:  Type of devices: All fall risk precautions in place, Left in chair, Call light within reach, Chair alarm in place  Restraints  Initially in place: No    Plan:  Times per week: 5 X GM  Times per day: Daily  Current Treatment Recommendations: Strengthening, Home Exercise Program, Safety Education & Training, Balance Training, Functional Mobility Training, Transfer Training, Gait Training, Stair training, Equipment Evaluation, Education, & procurement, Patient/Caregiver Education & Training    Goals:  Patient goals : to get better    Short term goals  Time Frame for Short term goals: 2 weeks  Short term goal 1: Pt to transfer supine <--> sit SBA to enable pt to get in/out of bed. Short term goal 2: Pt to transfer sit <--> stand SBA for increased functional mobility. Short term goal 3: Pt to ambulate >100 feet with LRAD SBA for household ambulation. Short term goal 4: Pt to ascend/descend 2 steps with R HR SBA for home entry. Long term goals  Time Frame for Long term goals : NA due to short length of stay.

## 2018-12-12 NOTE — PROGRESS NOTES
Orthopaedic Progress Note      SUBJECTIVE:    Chief Complaint   Patient presents with    Shoulder Pain    Back Pain    Fever    Nausea   No surgery plan for today  RLE cellulitis slowly improving  Inflammatory markers trending down  Up in chair.   kerlix and ace wrap per Dr. Faith Miranda  Patient notes pain somewhat improved      Physical    Vitals:    12/12/18 0851   BP: (!) 118/57   Pulse: 78   Resp: 18   Temp: 98.6 °F (37 °C)   SpO2: 95%         OBJECTIVE  RLE: improved erythema and edema, diffusely TTP. Calf soft non-tender bilaterally. SILT, DP/PT 2/4. Intact ROM.        Data  CBC:   Lab Results   Component Value Date    WBC 10.5 12/12/2018    RBC 3.81 12/12/2018    RBC 4.27 07/18/2011    HGB 11.7 12/12/2018    HCT 34.5 12/12/2018    MCV 90.6 12/12/2018    MCH 30.7 12/12/2018    MCHC 33.9 12/12/2018    RDW 14.6 04/29/2018     12/12/2018    MPV 8.8 12/12/2018     BMP:    Lab Results   Component Value Date     12/12/2018    K 4.8 12/12/2018    K 3.9 12/03/2018    CL 88 12/12/2018    CO2 31 12/12/2018    BUN 8 12/12/2018    LABALBU 4.1 12/02/2018    LABALBU 4.3 05/15/2012    CREATININE 0.5 12/12/2018    CALCIUM 9.1 12/12/2018    LABGLOM >90 12/12/2018    GLUCOSE 123 12/12/2018    GLUCOSE 111 05/15/2012     Uric Acid:  No components found for: URIC  PT/INR:    Lab Results   Component Value Date    PROTIME 2.09 12/20/2011    INR 1.45 12/02/2018     PTT:    Lab Results   Component Value Date    APTT 66.6 12/10/2018   [APTT  Troponin:  No results found for: TROPONINI  Urine Culture:  No components found for: PIERREINE    Current Inpatient Medications    Current Facility-Administered Medications: amLODIPine (NORVASC) tablet 5 mg, 5 mg, Oral, Daily  lisinopril (PRINIVIL;ZESTRIL) tablet 20 mg, 20 mg, Oral, Daily  rivaroxaban (XARELTO) tablet 20 mg, 20 mg, Oral, Daily  oxyCODONE (ROXICODONE) immediate release tablet 5 mg, 5 mg, Oral, Q6H PRN  traMADol (ULTRAM) tablet 25 mg, 25 mg, Oral, Q6H PRN  clindamycin

## 2018-12-12 NOTE — PROGRESS NOTES
at risk for falls, Nurse notified, Gait belt, All fall risk precautions in place, Left in chair, Chair alarm in place    Plan:  Times per week: 5x  Current Treatment Recommendations: Strengthening, Balance Training, Endurance Training, Functional Mobility Training, Self-Care / ADL, Home Management Training, Safety Education & Training, Equipment Evaluation, Education, & procurement, Patient/Caregiver Education & Training  Plan Comment: Pt would benefit from stay on TCU with continued OT recommended when medically stable. Specific instructions for Next Treatment: Functional mobility; ADLs and use of adaptations; endurance training activities    Goals:  Patient goals : To return to independent lifestyle    Short term goals  Time Frame for Short term goals: Two weeks  Short term goal 1: Pt to complete functional mobility with no greater than CGA for increased independence with accessing enviornment  Short term goal 2: Pt to tolerate standing greater than 4 minutes with 1-2 UE release and CGA in prep for simple IADL tasks  Short term goal 3: Pt to complete lower body dressing or bathing while using with no greater than CGA using AE prn for increased indep with self care  Short term goal 4: Pt will complete various transfers including to/from the commode or chair with SBA and min cues needed for hand placement to increase her independence with toileting routine.    Long term goals  Time Frame for Long term goals : NA d/t [place]

## 2018-12-13 LAB
ANION GAP SERPL CALCULATED.3IONS-SCNC: 8 MEQ/L (ref 8–16)
BUN BLDV-MCNC: 10 MG/DL (ref 7–22)
CALCIUM SERPL-MCNC: 9.2 MG/DL (ref 8.5–10.5)
CHLORIDE BLD-SCNC: 90 MEQ/L (ref 98–111)
CO2: 35 MEQ/L (ref 23–33)
CREAT SERPL-MCNC: 0.6 MG/DL (ref 0.4–1.2)
ERYTHROCYTE [DISTWIDTH] IN BLOOD BY AUTOMATED COUNT: 13.8 % (ref 11.5–14.5)
ERYTHROCYTE [DISTWIDTH] IN BLOOD BY AUTOMATED COUNT: 47 FL (ref 35–45)
GFR SERPL CREATININE-BSD FRML MDRD: > 90 ML/MIN/1.73M2
GLUCOSE BLD-MCNC: 125 MG/DL (ref 70–108)
HCT VFR BLD CALC: 34.2 % (ref 37–47)
HEMOGLOBIN: 11.4 GM/DL (ref 12–16)
MCH RBC QN AUTO: 31 PG (ref 26–33)
MCHC RBC AUTO-ENTMCNC: 33.3 GM/DL (ref 32.2–35.5)
MCV RBC AUTO: 92.9 FL (ref 81–99)
PLATELET # BLD: 339 THOU/MM3 (ref 130–400)
PMV BLD AUTO: 8.8 FL (ref 9.4–12.4)
POTASSIUM SERPL-SCNC: 5.2 MEQ/L (ref 3.5–5.2)
RBC # BLD: 3.68 MILL/MM3 (ref 4.2–5.4)
SODIUM BLD-SCNC: 133 MEQ/L (ref 135–145)
WBC # BLD: 9.2 THOU/MM3 (ref 4.8–10.8)

## 2018-12-13 PROCEDURE — 2580000003 HC RX 258: Performed by: INTERNAL MEDICINE

## 2018-12-13 PROCEDURE — 97530 THERAPEUTIC ACTIVITIES: CPT

## 2018-12-13 PROCEDURE — 97110 THERAPEUTIC EXERCISES: CPT

## 2018-12-13 PROCEDURE — 6370000000 HC RX 637 (ALT 250 FOR IP): Performed by: FAMILY MEDICINE

## 2018-12-13 PROCEDURE — 2709999900 HC NON-CHARGEABLE SUPPLY

## 2018-12-13 PROCEDURE — 97535 SELF CARE MNGMENT TRAINING: CPT

## 2018-12-13 PROCEDURE — 97116 GAIT TRAINING THERAPY: CPT

## 2018-12-13 PROCEDURE — 36415 COLL VENOUS BLD VENIPUNCTURE: CPT

## 2018-12-13 PROCEDURE — 1290000000 HC SEMI PRIVATE OTHER R&B

## 2018-12-13 PROCEDURE — 97166 OT EVAL MOD COMPLEX 45 MIN: CPT

## 2018-12-13 PROCEDURE — 85027 COMPLETE CBC AUTOMATED: CPT

## 2018-12-13 PROCEDURE — 2500000003 HC RX 250 WO HCPCS: Performed by: FAMILY MEDICINE

## 2018-12-13 PROCEDURE — 80048 BASIC METABOLIC PNL TOTAL CA: CPT

## 2018-12-13 PROCEDURE — 6360000002 HC RX W HCPCS: Performed by: FAMILY MEDICINE

## 2018-12-13 PROCEDURE — 2580000003 HC RX 258: Performed by: FAMILY MEDICINE

## 2018-12-13 PROCEDURE — 97163 PT EVAL HIGH COMPLEX 45 MIN: CPT

## 2018-12-13 PROCEDURE — 6360000002 HC RX W HCPCS: Performed by: INTERNAL MEDICINE

## 2018-12-13 RX ADMIN — CALCIUM 500 MG: 500 TABLET ORAL at 08:23

## 2018-12-13 RX ADMIN — ATENOLOL 25 MG: 25 TABLET ORAL at 08:23

## 2018-12-13 RX ADMIN — OXYCODONE HYDROCHLORIDE 5 MG: 5 TABLET ORAL at 19:51

## 2018-12-13 RX ADMIN — Medication 10 ML: at 19:52

## 2018-12-13 RX ADMIN — DOCUSATE SODIUM 100 MG: 100 CAPSULE, LIQUID FILLED ORAL at 08:23

## 2018-12-13 RX ADMIN — AMLODIPINE BESYLATE 5 MG: 5 TABLET ORAL at 08:23

## 2018-12-13 RX ADMIN — Medication 5 UNITS: at 09:53

## 2018-12-13 RX ADMIN — CEFTRIAXONE SODIUM 2 G: 2 INJECTION, POWDER, FOR SOLUTION INTRAMUSCULAR; INTRAVENOUS at 08:29

## 2018-12-13 RX ADMIN — FLUOCINONIDE: 0.5 CREAM TOPICAL at 09:00

## 2018-12-13 RX ADMIN — CEFTRIAXONE SODIUM 2 G: 2 INJECTION, POWDER, FOR SOLUTION INTRAMUSCULAR; INTRAVENOUS at 19:51

## 2018-12-13 RX ADMIN — DOCUSATE SODIUM 100 MG: 100 CAPSULE, LIQUID FILLED ORAL at 19:51

## 2018-12-13 RX ADMIN — Medication 1 CAPSULE: at 08:23

## 2018-12-13 RX ADMIN — FAMOTIDINE 20 MG: 20 TABLET ORAL at 08:23

## 2018-12-13 RX ADMIN — OXYCODONE HYDROCHLORIDE 5 MG: 5 TABLET ORAL at 06:08

## 2018-12-13 RX ADMIN — LISINOPRIL 20 MG: 20 TABLET ORAL at 08:23

## 2018-12-13 RX ADMIN — RIVAROXABAN 20 MG: 20 TABLET, FILM COATED ORAL at 17:59

## 2018-12-13 RX ADMIN — Medication 10 ML: at 08:29

## 2018-12-13 RX ADMIN — PRAVASTATIN SODIUM 20 MG: 20 TABLET ORAL at 08:23

## 2018-12-13 ASSESSMENT — PAIN DESCRIPTION - ORIENTATION: ORIENTATION: RIGHT

## 2018-12-13 ASSESSMENT — PAIN DESCRIPTION - DESCRIPTORS: DESCRIPTORS: ACHING;SORE

## 2018-12-13 ASSESSMENT — PAIN SCALES - GENERAL
PAINLEVEL_OUTOF10: 7
PAINLEVEL_OUTOF10: 8

## 2018-12-13 ASSESSMENT — PAIN DESCRIPTION - LOCATION: LOCATION: LEG

## 2018-12-13 ASSESSMENT — PAIN DESCRIPTION - PAIN TYPE: TYPE: ACUTE PAIN

## 2018-12-13 NOTE — H&P
Cancer Sister     Diabetes Sister        Review of Systems:  CONSTITUTIONAL:  positive for  fatigue  EYES:  positive for  glasses  HEENT:  negative for  nasal congestion  RESPIRATORY:  positive for  dyspnea  CARDIOVASCULAR:  positive for  chest pain  GASTROINTESTINAL:  positive for change in bowel habits  GENITOURINARY:  negative for dysuria  SKIN:  rash  HEMATOLOGIC/LYMPHATIC:  positive for easy bruising  MUSCULOSKELETAL:  positive for  myalgias, arthralgias, decreased range of motion and muscle weakness  NEUROLOGICAL:  negative for gait problems  BEHAVIOR/PSYCH:  negative for increased agitation  10 point system review otherwise negative    Physical Exam:  BP (!) 141/71   Pulse 80   Temp 97.7 °F (36.5 °C) (Oral)   Resp 20   Ht 5' 4\" (1.626 m)   SpO2 94%   BMI 31.31 kg/m²   Well developed well nourished white female who is awake alert and cooperative laying in bed  Skin with ACE wrap on the right leg  Head normocephalic  Membranes moist  Neck with out mass  Chest symmetrical   Lungs CTA  Heart S1S2 without murmur  Abd soft non tender, normoactive BS and no mass  Ext trace edema  Neuro weak  Psy cooperative     Diagnostics:  Recent Results (from the past 24 hour(s))   Basic Metabolic Panel    Collection Time: 12/12/18  5:40 AM   Result Value Ref Range    Sodium 127 (L) 135 - 145 meq/L    Potassium 4.8 3.5 - 5.2 meq/L    Chloride 88 (L) 98 - 111 meq/L    CO2 31 23 - 33 meq/L    Glucose 123 (H) 70 - 108 mg/dL    BUN 8 7 - 22 mg/dL    CREATININE 0.5 0.4 - 1.2 mg/dL    Calcium 9.1 8.5 - 10.5 mg/dL   Sedimentation Rate    Collection Time: 12/12/18  5:40 AM   Result Value Ref Range    Sed Rate 106 (H) 0 - 20 mm/hr   C-reactive protein    Collection Time: 12/12/18  5:40 AM   Result Value Ref Range    CRP 9.43 (H) 0.00 - 1.00 mg/dl   CBC    Collection Time: 12/12/18  5:40 AM   Result Value Ref Range    Platelets 911 171 - 903 thou/mm3    WBC 10.5 4.8 - 10.8 thou/mm3    RBC 3.81 (L) 4.20 - 5.40 mill/mm3    Hemoglobin 11.7 (L) 12.0 - 16.0 gm/dl    Hematocrit 34.5 (L) 37.0 - 47.0 %    MCV 90.6 81.0 - 99.0 fL    MCH 30.7 26.0 - 33.0 pg    MCHC 33.9 32.2 - 35.5 gm/dl    RDW-CV 13.5 11.5 - 14.5 %    RDW-SD 44.5 35.0 - 45.0 fL    MPV 8.8 (L) 9.4 - 12.4 fL   Anion Gap    Collection Time: 12/12/18  5:40 AM   Result Value Ref Range    Anion Gap 8.0 8.0 - 16.0 meq/L   Glomerular Filtration Rate, Estimated    Collection Time: 12/12/18  5:40 AM   Result Value Ref Range    Est, Glom Filt Rate >90 ml/min/1.73m2       Impression:  Active Hospital Problems    Diagnosis Date Noted    Cellulitis of right lower extremity [L89.948] 12/12/2018    Primary osteoarthritis of left shoulder [M19.012] 12/12/2018    Spondylosis of cervical region without myelopathy or radiculopathy [M47.812] 12/12/2018    Bilateral pleural effusion [J90] 12/12/2018    Class 1 obesity due to excess calories with serious comorbidity and body mass index (BMI) of 31.0 to 31.9 in adult [E66.09, Z68.31] 12/12/2018    Hepatic lesion [K76.9]     Chronic atrial fibrillation ([facility] [address] [I48.2] 07/29/2017   ·      Plan:   · The patient demonstrates good potential to participate in a skilled rehabilitation program on the transitional care unit. Rehabilitation services will include PT and OT/RT in order to improve functional status prior to discharge. Family education and training will be completed. Equipment evaluations and recommendations will be completed as appropriate. · Nursing will be involved for bowel, bladder, skin, and pain management. Nursing will also provide education and training to patient and family. · Prophylaxis:  DVT: xarelto. GI: pepcid. · Pain: tramadol, tyelenol, roxicodone  · Nutrition:  Consultation to dietician for nutritional counseling and recommendations.    · Bladder: continent  · Bowel: colace, mom, senokot, glycolax  ·  and case management consultations for coordination of care and discharge [facility] [place] [facility] [place] [address],

## 2018-12-13 NOTE — PROGRESS NOTES
Alone  Type of Home: House  Home Layout: Two level, Laundry in basement, Able to Live on Main level with bedroom/bathroom (goes to basement, 12 steps with B rails)  Home Access: Stairs to enter with rails  Entrance Stairs - Number of Steps: 2  Entrance Stairs - Rails: Right  Home Equipment: Cane, Rolling walker     Bathroom Shower/Tub: Tub/Shower unit, Walk-in shower (walk in shower is in basement )  Bathroom Equipment: Grab bars in shower, Shower chair, Built-in shower seat       ADL Assistance: [address] [place] [address]: Independent  Homemaking Responsibilities: Yes    Ambulation Assistance: Independent  Transfer Assistance: Independent    Active : No     Additional Comments: Pt very indp at [facility], ambulates without device and manages all ADLs and IADLs independently. pt uses walk in shower in basement, laundry is in basement along with treadmill and pulleys for home therex    Objective        Overall Cognitive Status: WFL         Sensation  Overall Sensation Status: WNL                           LUE AROM (degrees)  LUE AROM : WNL          RUE AROM (degrees)  RUE AROM : WNL       LUE Strength  Gross LUE Strength: WFL  LUE Strength Comment: BUE general deconditioning noted                 RUE Strength  Gross RUE Strength: WFL  RUE Strength Comment: BUE general deconditioning noted                      RUE Tone: Normotonic  LUE Tone: Normotonic    Movements Are Fluid And Coordinated: Yes               ADL  Grooming: Stand by assistance, Setup (hair cair and standing to complete oral care )  UE Bathing: Supervision, Setup (seated in recliner)  LE Bathing: Moderate assistance (bottom, and below knees)  UE Dressing: Minimal assistance (to don shirt )  LE Dressing: Moderate assistance     Bed mobility  Sit to Supine:  Moderate assistance    Transfers  Sit to stand: Stand by assistance  Stand to sit: Stand by assistance    Balance  Sitting Balance: Stand by assistance  Standing Balance: Contact guard

## 2018-12-13 NOTE — PROGRESS NOTES
Certification for TCU Admission    Name:  Camilo Stewart    MR#:    [id number]  [place]: [de-identified]      :   1929    Long Term Care Facility Type: Transitional Care Unit     Dx:   RLE cellulitis      Patient Active Problem List   Diagnosis    Renal mass, right    GISELL (stress urinary incontinence, female)    Flushing    Uncontrolled hypertension    Chronic atrial fibrillation (HCC)    Hepatic lesion    Chest pain    Hypertensive urgency    Atypical chest pain    Pneumonia    Acute pain of left shoulder    Sepsis ([facility] [address]    Cellulitis of right lower extremity    Primary osteoarthritis of left shoulder    Spondylosis of cervical region without myelopathy or radiculopathy    Bilateral pleural effusion    Class 1 obesity due to excess calories with serious comorbidity and body mass index (BMI) of 31.0 to 31.9 in adult       Code Status: Limited      Rehabilitation Potential: Good     Prognosis: Good     Stability: Stable     History & Physical current: Yes   If No, note changes in H&P update     Level of Care Recommendation/Request: Skilled     Physician Certification: I certify that I have reviewed the information contained in the discharge summary and that the information is a true and accurate reflection of the individual's condition. I certify this resident is appropriate for skilled services provided in the TCU/ECF for a condition which the individual received inpatient care. Certification: I certify the orders, information, and transfer of said patient is necessary for the continuing treatment of the diagnosis listed in a skilled care setting providing skilled nursing and/or skilled rehabilitative services. The patient will require on a daily basis SNF covered care.   Electronically signed by Finesse Lui MD on 2018 at 2:15 PM

## 2018-12-13 NOTE — PROGRESS NOTES
Nutrition Assessment    Type and Reason for Visit: Initial, Consult (per TCU protocol)    Nutrition Recommendations:   Continue glucose & weight checks. Recommend MVI. Continue Oscal & probiotic. Continue to monitor labs. Note K+ 5.2, Na+ 133, CRP 9.43. Nutrition Assessment:   Pt. Nutritionally compromised AEB cellulitis & recent  pneumonia . At risk for further nutrition compromise r/t per pt decreased po intake d/t pain. Will continue 1 Ensure Muscle health daily & 1 yogurt daily. Encouraged healthy intake & pt agrees. Malnutrition Assessment:  · Malnutrition Status: At risk for malnutrition    Nutrition Risk Level: Moderate    Nutrient Needs:  · Estimated Daily Total Kcal: ~1692 kcals ( 20 kcals/kg on admit weight of 84.6 kg)  · Estimated Daily Protein (g): ~82 grams protein (1.5 grams protein/kg on IBW of 54.5 kg)  Nutrition Diagnosis:   · Problem: Inadequate oral intake  · Etiology: related to  (cellulitis)     Signs and symptoms:  as evidenced by Patient report of (pain)    Objective Information:  · Nutrition-Focused Physical Findings: Pt reports eating a little better.    · Wound Type:  (RLE cellulitis)  · Current Nutrition Therapies:  · Oral Diet Orders: Cardiac   · Oral Diet intake: 51-75%, % (on acute)  · Oral Nutrition Supplement (ONS) Orders: Low Calorie, High Protein (1 Ensure Muscle heallth daily, 1 yougrt daily)  · ONS intake:  (per pt taking)  · Anthropometric Measures:  · Ht: 5' 4\" (162.6 cm)   · Current Body Wt: 186 lb 8.2 oz (84.6 kg) (12/13 +1 + 2 edema)  · Admission Body Wt: 186 lb 8.2 oz (84.6 kg) (12/13 +1 + 2 edema )  · Usual Body Wt: 165 lb 5.5 oz (75 kg) (4/29/18 per EMR)  · Ideal Body Wt: 120 lb (54.4 kg),   · BMI Classification: BMI 30.0 - 34.9 Obese Class I (32.1)    Nutrition Interventions:   Continue current ONS  Continued Inpatient Monitoring, Education Initiated, Coordination of Care    Nutrition Evaluation:   · Evaluation: Goals set   · Goals: 75% or more of meal

## 2018-12-13 NOTE — PROGRESS NOTES
Clinical Pharmacy Note  Pharmacy Antimicrobial Monitoring    Current antibiotic(s): ceftriaxone 2 g IV q12h    Total days of antibiotics: 8 consecutive days of ceftriaxone    Indication: Streptococcus bacteremia, right lower leg cellulitis    Patient chart reviewed for signs/symptoms of infection: Yes    /61   Pulse 82   Temp 98.1 °F (36.7 °C) (Oral)   Resp 15   Ht 5' 4\" (1.626 m)   Wt 186 lb 8 oz (84.6 kg)   SpO2 95%   BMI 32.01 kg/m²   Lab Results   Component Value Date    WBC 9.2 12/13/2018       Cultures: 12/2/18 Blood x 2 - Streptococcus equisimilis (Group C )     Recommended stop date: 12/27/18 (he stated 2 more weeks in his note today)    Prescriber contacted: Dr Yamilka Corona    Recommendations accepted: yes. He stated 2 more weeks from today.     Tim Humphries, PharmD, BCPS 12/13/2018 3:34 PM

## 2018-12-14 LAB
ANION GAP SERPL CALCULATED.3IONS-SCNC: 9 MEQ/L (ref 8–16)
BLOOD CULTURE, ROUTINE: NORMAL
BLOOD CULTURE, ROUTINE: NORMAL
BUN BLDV-MCNC: 9 MG/DL (ref 7–22)
CALCIUM SERPL-MCNC: 9.5 MG/DL (ref 8.5–10.5)
CHLORIDE BLD-SCNC: 93 MEQ/L (ref 98–111)
CO2: 33 MEQ/L (ref 23–33)
CREAT SERPL-MCNC: 0.7 MG/DL (ref 0.4–1.2)
ERYTHROCYTE [DISTWIDTH] IN BLOOD BY AUTOMATED COUNT: 13.9 % (ref 11.5–14.5)
ERYTHROCYTE [DISTWIDTH] IN BLOOD BY AUTOMATED COUNT: 47 FL (ref 35–45)
GFR SERPL CREATININE-BSD FRML MDRD: 79 ML/MIN/1.73M2
GLUCOSE BLD-MCNC: 117 MG/DL (ref 70–108)
HCT VFR BLD CALC: 34.7 % (ref 37–47)
HEMOGLOBIN: 11.6 GM/DL (ref 12–16)
MCH RBC QN AUTO: 31.4 PG (ref 26–33)
MCHC RBC AUTO-ENTMCNC: 33.4 GM/DL (ref 32.2–35.5)
MCV RBC AUTO: 94 FL (ref 81–99)
PLATELET # BLD: 352 THOU/MM3 (ref 130–400)
PMV BLD AUTO: 8.5 FL (ref 9.4–12.4)
POTASSIUM SERPL-SCNC: 4.9 MEQ/L (ref 3.5–5.2)
RBC # BLD: 3.69 MILL/MM3 (ref 4.2–5.4)
SODIUM BLD-SCNC: 135 MEQ/L (ref 135–145)
WBC # BLD: 8.8 THOU/MM3 (ref 4.8–10.8)

## 2018-12-14 PROCEDURE — 2580000003 HC RX 258: Performed by: INTERNAL MEDICINE

## 2018-12-14 PROCEDURE — 80048 BASIC METABOLIC PNL TOTAL CA: CPT

## 2018-12-14 PROCEDURE — 97116 GAIT TRAINING THERAPY: CPT

## 2018-12-14 PROCEDURE — 97110 THERAPEUTIC EXERCISES: CPT

## 2018-12-14 PROCEDURE — 6360000002 HC RX W HCPCS: Performed by: INTERNAL MEDICINE

## 2018-12-14 PROCEDURE — 97530 THERAPEUTIC ACTIVITIES: CPT

## 2018-12-14 PROCEDURE — 1290000000 HC SEMI PRIVATE OTHER R&B

## 2018-12-14 PROCEDURE — 6370000000 HC RX 637 (ALT 250 FOR IP): Performed by: FAMILY MEDICINE

## 2018-12-14 PROCEDURE — 85027 COMPLETE CBC AUTOMATED: CPT

## 2018-12-14 PROCEDURE — 2709999900 HC NON-CHARGEABLE SUPPLY

## 2018-12-14 PROCEDURE — 36415 COLL VENOUS BLD VENIPUNCTURE: CPT

## 2018-12-14 RX ADMIN — RIVAROXABAN 20 MG: 20 TABLET, FILM COATED ORAL at 17:51

## 2018-12-14 RX ADMIN — CEFTRIAXONE SODIUM 2 G: 2 INJECTION, POWDER, FOR SOLUTION INTRAMUSCULAR; INTRAVENOUS at 09:45

## 2018-12-14 RX ADMIN — TRAMADOL HYDROCHLORIDE 25 MG: 50 TABLET, FILM COATED ORAL at 14:41

## 2018-12-14 RX ADMIN — FAMOTIDINE 20 MG: 20 TABLET ORAL at 10:30

## 2018-12-14 RX ADMIN — PRAVASTATIN SODIUM 20 MG: 20 TABLET ORAL at 09:45

## 2018-12-14 RX ADMIN — OXYCODONE HYDROCHLORIDE 5 MG: 5 TABLET ORAL at 19:34

## 2018-12-14 RX ADMIN — DOCUSATE SODIUM 100 MG: 100 CAPSULE, LIQUID FILLED ORAL at 10:30

## 2018-12-14 RX ADMIN — CALCIUM 500 MG: 500 TABLET ORAL at 09:45

## 2018-12-14 RX ADMIN — AMLODIPINE BESYLATE 5 MG: 5 TABLET ORAL at 09:45

## 2018-12-14 RX ADMIN — OXYCODONE HYDROCHLORIDE 5 MG: 5 TABLET ORAL at 06:00

## 2018-12-14 RX ADMIN — ATENOLOL 25 MG: 25 TABLET ORAL at 09:45

## 2018-12-14 RX ADMIN — Medication 1 CAPSULE: at 09:45

## 2018-12-14 RX ADMIN — FLUOCINONIDE: 0.5 CREAM TOPICAL at 09:45

## 2018-12-14 RX ADMIN — TRAMADOL HYDROCHLORIDE 25 MG: 50 TABLET, FILM COATED ORAL at 23:41

## 2018-12-14 RX ADMIN — CEFTRIAXONE SODIUM 2 G: 2 INJECTION, POWDER, FOR SOLUTION INTRAMUSCULAR; INTRAVENOUS at 20:27

## 2018-12-14 RX ADMIN — LISINOPRIL 20 MG: 20 TABLET ORAL at 09:45

## 2018-12-14 ASSESSMENT — PAIN DESCRIPTION - ONSET: ONSET: ON-GOING

## 2018-12-14 ASSESSMENT — PAIN DESCRIPTION - PROGRESSION: CLINICAL_PROGRESSION: GRADUALLY IMPROVING

## 2018-12-14 ASSESSMENT — PAIN SCALES - GENERAL
PAINLEVEL_OUTOF10: 8
PAINLEVEL_OUTOF10: 6
PAINLEVEL_OUTOF10: 10
PAINLEVEL_OUTOF10: 4
PAINLEVEL_OUTOF10: 10
PAINLEVEL_OUTOF10: 0

## 2018-12-14 ASSESSMENT — PAIN DESCRIPTION - FREQUENCY: FREQUENCY: CONTINUOUS

## 2018-12-14 ASSESSMENT — PAIN DESCRIPTION - ORIENTATION: ORIENTATION: RIGHT

## 2018-12-14 ASSESSMENT — PAIN DESCRIPTION - LOCATION: LOCATION: LEG

## 2018-12-14 ASSESSMENT — PAIN DESCRIPTION - PAIN TYPE: TYPE: ACUTE PAIN

## 2018-12-14 NOTE — PROGRESS NOTES
[facility]  INPATIENT PHYSICAL THERAPY  DAILY NOTE  [facility]    Time In: 0800  Time Out: 0900  Timed Code Treatment Minutes: 60 Minutes  Minutes: 60          Date: 2018  Patient Name: Mei Gallardo,  Gender:  female        MRN: [id number]  : 1929  (80 y.o.)     Referring Practitioner: Ordering: Jones Land MD. Attending: Charles Mckeon MD  Diagnosis: RLE cellulitis  Additional Pertinent Hx: 80 y.o. female who presented to [facility] with left shoulder pain. Pt notes that symptoms began 3 days ago. Has gradually gotten worse. Rated as 10/10, radiating to the shoulder and elbow. Worse with neck extension. Pt developed fever this morning and was brought to ED. Pt denies any cough, no n/v and no change in bowel. CT abd shows Pneumonia. procal mildly elevated. Doppler R LE negative for DVT. Pt with neck DJD, L shoulder sprain, L RC impingement. To TCU on      Past Medical History:   Diagnosis Date    Arthritis     Atrial fibrillation ([facility] [address]     Cancer ([facility] [address]     skin    Gross hematuria     Dr Bethel Scheuermann HTN (hypertension)     Hyperlipidemia     Pneumonia 2018     Past Surgical History:   Procedure Laterality Date    CARPAL TUNNEL RELEASE  10/23/2018    right hand    COLONOSCOPY      EYE SURGERY      FOOT SURGERY      x2    HYSTERECTOMY  1982    KNEE SURGERY  2010    LIVER BIOPSY  2017    SKIN BIOPSY         Restrictions/Precautions:  General Precautions, Fall Risk                    Other position/activity restrictions: No heavy lifting L UE  Left Upper Extremity Brace/Splint: Sling  Left Upper Extremity Brace/Splint: for comfort    Prior Level of Function:  ADL Assistance: Independent  Homemaking Assistance: Independent  Ambulation Assistance: Independent  Transfer Assistance: Independent  Additional Comments: Pt very indp at PLOF, ambulates without device and manages all ADLs and IADLs independently.   pt uses walk in shower in basement, laundry is in basement along with treadmill and pulleys for home therex    Subjective:  Response To Previous Treatment: Patient with no complaints from previous session. Family / Caregiver Present: No  Subjective: pt in bedside chair on arrival, agrees to therapy session. motivated    Pain:   .  Pain Assessment  Pain Level:  (no # given)  Pain Type: Acute pain  Pain Location: Leg  Pain Orientation: Right  Pain Descriptors: Aching;Sore;Constant;Tightness  Pain Frequency: Continuous  Pain Onset: On-going  Clinical Progression: Gradually improving       Social/Functional:  Lives With: Alone  Type of Home: House  Home Layout: Two level, Laundry in basement, Able to Live on Main level with bedroom/bathroom (goes to basement, 12 steps with B rails)  Home Access: Stairs to enter with rails  Entrance Stairs - Number of Steps: 2  Entrance Stairs - Rails: Right  Home Equipment: Cane, Rolling walker     Objective:  Supine to Sit: Moderate assistance (with raising of trunk on mat)  Sit to Supine: Moderate assistance (mod a with raising of b le's and body positioning on mat)  Scooting: Stand by assistance (scoot to edge of sitting surfaces)    Transfers  Sit to Stand: Contact guard assistance;Minimal Assistance ( cga and occasional lt. min a.  Pt.repeatedly needed vc's for hand placement this am)  Stand to sit: Contact guard assistance  Stand Pivot Transfers: Contact guard assistance (from w/c-> mat with walker)       Ambulation 1  Surface: level tile  Device: Rolling Walker  Other Apparatus: Wheelchair follow  Assistance: Contact guard assistance  Quality of Gait: decreased velocity and alie, antalgia noted with minimal WB and stance time on R, step to pattern , forward flexed trunk, slightly unsteady at times  Distance: 80' x 2  Comments: cues throughout for sequencing and upright trunk with minimal change in gait noted.  cues also to increase step length on L with no change in gait

## 2018-12-14 NOTE — PROGRESS NOTES
therex    Subjective       Subjective: Patient seated in chair upon arrival.        Pain:    No Complaints  Patient reported sitting in the wheel chair was uncomfortable    Objective     ADL  Grooming: Setup (seated in chair with hair care)  LE Dressing: Maximum assistance (adjustment of sock)        Transfers  Sit to stand: Stand by assistance  Stand to sit: Stand by assistance  Transfer Comments: Patient requires additional time for transfer due to pain     Verbal cues for safety with hand placement   Sitting balance: SBA seated in recliner and wheel chair    Standing balance: CGA in preparation for ambulation  Patient completed dynamic reaching standing activity with 1 -2  Hand release off walker needed for improved standing balance    Functional Mobility  Functional - Mobility Device: Rolling Walker  Assist Level: Contact guard assistance  Functional Mobility Comments: Patient completed ambulation from recliner to room door then transported to gym by wheel chair. Patient ambulation  from wheel chair to recliner and  mat, slow pace due to pain short giat, NO lOB        Additional Activities: Patient seated in recliner with feet elevated completed folding laundry task. Type of ROM/Therapeutic Exercise: AROM  Comment: Pt. completed BUE AROM exercises x10 all joints all planes completed to increase strength and endurance for ADLs/IADLS. Education on form and technique and gentle pain free range. Activity Tolerance:   Patient limited by pain     Assessment:     Performance deficits / Impairments: Decreased functional mobility , Decreased ADL status, Decreased endurance, Decreased balance, Decreased safe awareness  Prognosis: Good    Discharge Recommendations:  Discharge Recommendations: Continue to assess pending progress    Patient Education:  Patient Education: elevation of feet, transfer safety    Equipment Recommendations:   Other: may need LHAE,     Safety:  Safety Devices in place: Yes  Type of

## 2018-12-15 LAB
ANION GAP SERPL CALCULATED.3IONS-SCNC: 9 MEQ/L (ref 8–16)
BUN BLDV-MCNC: 11 MG/DL (ref 7–22)
CALCIUM SERPL-MCNC: 9.5 MG/DL (ref 8.5–10.5)
CHLORIDE BLD-SCNC: 91 MEQ/L (ref 98–111)
CO2: 32 MEQ/L (ref 23–33)
CREAT SERPL-MCNC: 0.6 MG/DL (ref 0.4–1.2)
ERYTHROCYTE [DISTWIDTH] IN BLOOD BY AUTOMATED COUNT: 14 % (ref 11.5–14.5)
ERYTHROCYTE [DISTWIDTH] IN BLOOD BY AUTOMATED COUNT: 48.7 FL (ref 35–45)
GFR SERPL CREATININE-BSD FRML MDRD: > 90 ML/MIN/1.73M2
GLUCOSE BLD-MCNC: 111 MG/DL (ref 70–108)
HCT VFR BLD CALC: 36 % (ref 37–47)
HEMOGLOBIN: 11.6 GM/DL (ref 12–16)
MCH RBC QN AUTO: 30.7 PG (ref 26–33)
MCHC RBC AUTO-ENTMCNC: 32.2 GM/DL (ref 32.2–35.5)
MCV RBC AUTO: 95.2 FL (ref 81–99)
PLATELET # BLD: 386 THOU/MM3 (ref 130–400)
PMV BLD AUTO: 8.8 FL (ref 9.4–12.4)
POTASSIUM SERPL-SCNC: 5.2 MEQ/L (ref 3.5–5.2)
RBC # BLD: 3.78 MILL/MM3 (ref 4.2–5.4)
SODIUM BLD-SCNC: 132 MEQ/L (ref 135–145)
WBC # BLD: 8.7 THOU/MM3 (ref 4.8–10.8)

## 2018-12-15 PROCEDURE — 1290000000 HC SEMI PRIVATE OTHER R&B

## 2018-12-15 PROCEDURE — 6370000000 HC RX 637 (ALT 250 FOR IP): Performed by: FAMILY MEDICINE

## 2018-12-15 PROCEDURE — 2580000003 HC RX 258: Performed by: INTERNAL MEDICINE

## 2018-12-15 PROCEDURE — 36415 COLL VENOUS BLD VENIPUNCTURE: CPT

## 2018-12-15 PROCEDURE — 6360000002 HC RX W HCPCS: Performed by: INTERNAL MEDICINE

## 2018-12-15 PROCEDURE — 85027 COMPLETE CBC AUTOMATED: CPT

## 2018-12-15 PROCEDURE — 80048 BASIC METABOLIC PNL TOTAL CA: CPT

## 2018-12-15 PROCEDURE — 2709999900 HC NON-CHARGEABLE SUPPLY

## 2018-12-15 RX ADMIN — RIVAROXABAN 20 MG: 20 TABLET, FILM COATED ORAL at 17:50

## 2018-12-15 RX ADMIN — DOCUSATE SODIUM 100 MG: 100 CAPSULE, LIQUID FILLED ORAL at 20:29

## 2018-12-15 RX ADMIN — FAMOTIDINE 20 MG: 20 TABLET ORAL at 09:29

## 2018-12-15 RX ADMIN — CEFTRIAXONE SODIUM 2 G: 2 INJECTION, POWDER, FOR SOLUTION INTRAMUSCULAR; INTRAVENOUS at 11:38

## 2018-12-15 RX ADMIN — PRAVASTATIN SODIUM 20 MG: 20 TABLET ORAL at 09:28

## 2018-12-15 RX ADMIN — ATENOLOL 25 MG: 25 TABLET ORAL at 09:28

## 2018-12-15 RX ADMIN — OXYCODONE HYDROCHLORIDE 5 MG: 5 TABLET ORAL at 02:10

## 2018-12-15 RX ADMIN — Medication 1 CAPSULE: at 09:28

## 2018-12-15 RX ADMIN — TRAMADOL HYDROCHLORIDE 25 MG: 50 TABLET, FILM COATED ORAL at 06:00

## 2018-12-15 RX ADMIN — LISINOPRIL 20 MG: 20 TABLET ORAL at 09:28

## 2018-12-15 RX ADMIN — CALCIUM 500 MG: 500 TABLET ORAL at 09:28

## 2018-12-15 RX ADMIN — CEFTRIAXONE SODIUM 2 G: 2 INJECTION, POWDER, FOR SOLUTION INTRAMUSCULAR; INTRAVENOUS at 20:29

## 2018-12-15 RX ADMIN — DOCUSATE SODIUM 100 MG: 100 CAPSULE, LIQUID FILLED ORAL at 09:29

## 2018-12-15 RX ADMIN — ACETAMINOPHEN 650 MG: 325 TABLET ORAL at 20:29

## 2018-12-15 RX ADMIN — OXYCODONE HYDROCHLORIDE 5 MG: 5 TABLET ORAL at 23:23

## 2018-12-15 RX ADMIN — AMLODIPINE BESYLATE 5 MG: 5 TABLET ORAL at 09:28

## 2018-12-15 ASSESSMENT — PAIN SCALES - GENERAL
PAINLEVEL_OUTOF10: 9
PAINLEVEL_OUTOF10: 9
PAINLEVEL_OUTOF10: 5
PAINLEVEL_OUTOF10: 8
PAINLEVEL_OUTOF10: 8

## 2018-12-16 LAB
ANION GAP SERPL CALCULATED.3IONS-SCNC: 10 MEQ/L (ref 8–16)
BUN BLDV-MCNC: 12 MG/DL (ref 7–22)
CALCIUM SERPL-MCNC: 9.6 MG/DL (ref 8.5–10.5)
CHLORIDE BLD-SCNC: 94 MEQ/L (ref 98–111)
CO2: 31 MEQ/L (ref 23–33)
CREAT SERPL-MCNC: 0.7 MG/DL (ref 0.4–1.2)
ERYTHROCYTE [DISTWIDTH] IN BLOOD BY AUTOMATED COUNT: 14 % (ref 11.5–14.5)
ERYTHROCYTE [DISTWIDTH] IN BLOOD BY AUTOMATED COUNT: 47.9 FL (ref 35–45)
GFR SERPL CREATININE-BSD FRML MDRD: 79 ML/MIN/1.73M2
GLUCOSE BLD-MCNC: 103 MG/DL (ref 70–108)
GLUCOSE BLD-MCNC: 130 MG/DL (ref 70–108)
HCT VFR BLD CALC: 35 % (ref 37–47)
HEMOGLOBIN: 11.5 GM/DL (ref 12–16)
MCH RBC QN AUTO: 31 PG (ref 26–33)
MCHC RBC AUTO-ENTMCNC: 32.9 GM/DL (ref 32.2–35.5)
MCV RBC AUTO: 94.3 FL (ref 81–99)
PLATELET # BLD: 362 THOU/MM3 (ref 130–400)
PMV BLD AUTO: 8.9 FL (ref 9.4–12.4)
POTASSIUM SERPL-SCNC: 4.7 MEQ/L (ref 3.5–5.2)
RBC # BLD: 3.71 MILL/MM3 (ref 4.2–5.4)
SODIUM BLD-SCNC: 135 MEQ/L (ref 135–145)
WBC # BLD: 6.8 THOU/MM3 (ref 4.8–10.8)

## 2018-12-16 PROCEDURE — 6360000002 HC RX W HCPCS: Performed by: INTERNAL MEDICINE

## 2018-12-16 PROCEDURE — 6370000000 HC RX 637 (ALT 250 FOR IP): Performed by: FAMILY MEDICINE

## 2018-12-16 PROCEDURE — 36415 COLL VENOUS BLD VENIPUNCTURE: CPT

## 2018-12-16 PROCEDURE — 97110 THERAPEUTIC EXERCISES: CPT

## 2018-12-16 PROCEDURE — 85027 COMPLETE CBC AUTOMATED: CPT

## 2018-12-16 PROCEDURE — 97535 SELF CARE MNGMENT TRAINING: CPT

## 2018-12-16 PROCEDURE — 82948 REAGENT STRIP/BLOOD GLUCOSE: CPT

## 2018-12-16 PROCEDURE — 80048 BASIC METABOLIC PNL TOTAL CA: CPT

## 2018-12-16 PROCEDURE — 97116 GAIT TRAINING THERAPY: CPT

## 2018-12-16 PROCEDURE — 2580000003 HC RX 258: Performed by: INTERNAL MEDICINE

## 2018-12-16 PROCEDURE — 1290000000 HC SEMI PRIVATE OTHER R&B

## 2018-12-16 PROCEDURE — 97530 THERAPEUTIC ACTIVITIES: CPT

## 2018-12-16 RX ADMIN — ATENOLOL 25 MG: 25 TABLET ORAL at 08:46

## 2018-12-16 RX ADMIN — AMLODIPINE BESYLATE 5 MG: 5 TABLET ORAL at 08:46

## 2018-12-16 RX ADMIN — OXYCODONE HYDROCHLORIDE 5 MG: 5 TABLET ORAL at 21:27

## 2018-12-16 RX ADMIN — CEFTRIAXONE SODIUM 2 G: 2 INJECTION, POWDER, FOR SOLUTION INTRAMUSCULAR; INTRAVENOUS at 10:37

## 2018-12-16 RX ADMIN — ACETAMINOPHEN 650 MG: 325 TABLET ORAL at 13:31

## 2018-12-16 RX ADMIN — ACETAMINOPHEN 650 MG: 325 TABLET ORAL at 06:22

## 2018-12-16 RX ADMIN — FAMOTIDINE 20 MG: 20 TABLET ORAL at 08:45

## 2018-12-16 RX ADMIN — CALCIUM 500 MG: 500 TABLET ORAL at 08:46

## 2018-12-16 RX ADMIN — CEFTRIAXONE SODIUM 2 G: 2 INJECTION, POWDER, FOR SOLUTION INTRAMUSCULAR; INTRAVENOUS at 21:27

## 2018-12-16 RX ADMIN — Medication 1 CAPSULE: at 08:45

## 2018-12-16 RX ADMIN — LISINOPRIL 20 MG: 20 TABLET ORAL at 08:45

## 2018-12-16 RX ADMIN — PRAVASTATIN SODIUM 20 MG: 20 TABLET ORAL at 08:45

## 2018-12-16 RX ADMIN — DOCUSATE SODIUM 100 MG: 100 CAPSULE, LIQUID FILLED ORAL at 08:46

## 2018-12-16 RX ADMIN — RIVAROXABAN 20 MG: 20 TABLET, FILM COATED ORAL at 17:40

## 2018-12-16 RX ADMIN — DOCUSATE SODIUM 100 MG: 100 CAPSULE, LIQUID FILLED ORAL at 21:27

## 2018-12-16 ASSESSMENT — PAIN SCALES - GENERAL
PAINLEVEL_OUTOF10: 2
PAINLEVEL_OUTOF10: 0
PAINLEVEL_OUTOF10: 5
PAINLEVEL_OUTOF10: 7
PAINLEVEL_OUTOF10: 8
PAINLEVEL_OUTOF10: 5
PAINLEVEL_OUTOF10: 10

## 2018-12-16 ASSESSMENT — PAIN DESCRIPTION - PAIN TYPE: TYPE: ACUTE PAIN

## 2018-12-16 ASSESSMENT — PAIN DESCRIPTION - LOCATION: LOCATION: LEG

## 2018-12-16 ASSESSMENT — PAIN DESCRIPTION - ORIENTATION: ORIENTATION: RIGHT

## 2018-12-16 NOTE — PROGRESS NOTES
Patient: Kacie Mayorga  Unit/Bed: 8E-68/068-A  YOB: 1929  MRN: [id number] Acct: [de-identified]   Admitting Diagnosis: RLE Cellulitis  Admit Date:  12/12/2018  Hospital Day: 3    Assessment:     Active Problems:    Chronic atrial fibrillation (HCC)    Hepatic lesion    Cellulitis of right lower extremity    Primary osteoarthritis of left shoulder    Spondylosis of cervical region without myelopathy or radiculopathy    Bilateral pleural effusion    Class 1 obesity due to excess calories with serious comorbidity and body mass index (BMI) of 31.0 to 31.9 in adult  Resolved Problems:    * No resolved hospital problems. *      Plan:     Continue therapies        Subjective:     Patient has no complaint of CP, SOB or GI upset. Medication side effects: none    Scheduled Meds:   ppd   Does not apply Weekly    tuberculin  5 Units Intradermal Weekly    docusate sodium  100 mg Oral BID    amLODIPine  5 mg Oral Daily    atenolol  25 mg Oral Daily    calcium elemental  500 mg Oral Daily    cefTRIAXone (ROCEPHIN) IV  2 g Intravenous Q12H    fluocinonide   Topical Daily    lactobacillus  1 capsule Oral Daily with breakfast    lisinopril  20 mg Oral Daily    pravastatin  20 mg Oral Daily    rivaroxaban  20 mg Oral Daily    famotidine  20 mg Oral Daily     Continuous Infusions:  PRN Meds:sodium chloride flush, acetaminophen, oxyCODONE, potassium chloride, traMADol, senna, polyethylene glycol, magnesium hydroxide, ondansetron    Review of Systems  Pertinent items are noted in HPI. Objective:     Patient Vitals for the past 8 hrs:   BP Temp Temp src Pulse Resp SpO2   12/15/18 1938 131/62 98.4 °F (36.9 °C) Oral 66 26 95 %     I/O last 3 completed shifts: In: 355 [P.O.:355]  Out: -   I/O this shift: In: 765.6 [P.O.:710;  I.V.:55.6]  Out: -     /62   Pulse 66   Temp 98.4 °F (36.9 °C) (Oral)   Resp 26   Ht 5' 4\" (1.626 m)   Wt 182 lb (82.6 kg)   SpO2 95%   BMI 31.24 kg/m²     /62 Pulse 66   Temp 98.4 °F (36.9 °C) (Oral)   Resp 26   Ht 5' 4\" (1.626 m)   Wt 182 lb (82.6 kg)   SpO2 95%   BMI 31.24 kg/m²   General appearance: alert, appears stated age and cooperative  Head: Normocephalic, without obvious abnormality, atraumatic  Lungs: clear to auscultation bilaterally  Heart: regular rate and rhythm, S1, S2 normal, no murmur, click, rub or gallop  Abdomen: soft, non-tender; bowel sounds normal; no masses,  no organomegaly  Extremities: extremities normal, atraumatic, no cyanosis or edema  Skin: Skin color, texture, turgor normal. No rashes or lesions  Neurologic: Grossly normal      Electronically signed by Maye Toussaint MD on 12/15/2018 at 8:18 PM

## 2018-12-17 LAB
ANION GAP SERPL CALCULATED.3IONS-SCNC: 10 MEQ/L (ref 8–16)
BUN BLDV-MCNC: 10 MG/DL (ref 7–22)
CALCIUM SERPL-MCNC: 9.2 MG/DL (ref 8.5–10.5)
CHLORIDE BLD-SCNC: 95 MEQ/L (ref 98–111)
CO2: 31 MEQ/L (ref 23–33)
CREAT SERPL-MCNC: 0.7 MG/DL (ref 0.4–1.2)
ERYTHROCYTE [DISTWIDTH] IN BLOOD BY AUTOMATED COUNT: 14.1 % (ref 11.5–14.5)
ERYTHROCYTE [DISTWIDTH] IN BLOOD BY AUTOMATED COUNT: 47.7 FL (ref 35–45)
GFR SERPL CREATININE-BSD FRML MDRD: 79 ML/MIN/1.73M2
GLUCOSE BLD-MCNC: 112 MG/DL (ref 70–108)
HCT VFR BLD CALC: 33.1 % (ref 37–47)
HEMOGLOBIN: 10.9 GM/DL (ref 12–16)
MCH RBC QN AUTO: 31 PG (ref 26–33)
MCHC RBC AUTO-ENTMCNC: 32.9 GM/DL (ref 32.2–35.5)
MCV RBC AUTO: 94 FL (ref 81–99)
PLATELET # BLD: 336 THOU/MM3 (ref 130–400)
PMV BLD AUTO: 8.6 FL (ref 9.4–12.4)
POTASSIUM SERPL-SCNC: 4.6 MEQ/L (ref 3.5–5.2)
RBC # BLD: 3.52 MILL/MM3 (ref 4.2–5.4)
SODIUM BLD-SCNC: 136 MEQ/L (ref 135–145)
WBC # BLD: 7 THOU/MM3 (ref 4.8–10.8)

## 2018-12-17 PROCEDURE — 1290000000 HC SEMI PRIVATE OTHER R&B

## 2018-12-17 PROCEDURE — 6370000000 HC RX 637 (ALT 250 FOR IP): Performed by: FAMILY MEDICINE

## 2018-12-17 PROCEDURE — 80048 BASIC METABOLIC PNL TOTAL CA: CPT

## 2018-12-17 PROCEDURE — 2709999900 HC NON-CHARGEABLE SUPPLY

## 2018-12-17 PROCEDURE — 97110 THERAPEUTIC EXERCISES: CPT

## 2018-12-17 PROCEDURE — 97530 THERAPEUTIC ACTIVITIES: CPT

## 2018-12-17 PROCEDURE — 36415 COLL VENOUS BLD VENIPUNCTURE: CPT

## 2018-12-17 PROCEDURE — 97116 GAIT TRAINING THERAPY: CPT

## 2018-12-17 PROCEDURE — 85027 COMPLETE CBC AUTOMATED: CPT

## 2018-12-17 PROCEDURE — 97535 SELF CARE MNGMENT TRAINING: CPT

## 2018-12-17 PROCEDURE — 2580000003 HC RX 258: Performed by: INTERNAL MEDICINE

## 2018-12-17 PROCEDURE — 6360000002 HC RX W HCPCS: Performed by: INTERNAL MEDICINE

## 2018-12-17 RX ADMIN — Medication 1 CAPSULE: at 09:54

## 2018-12-17 RX ADMIN — FLUOCINONIDE: 0.5 CREAM TOPICAL at 09:56

## 2018-12-17 RX ADMIN — DOCUSATE SODIUM 100 MG: 100 CAPSULE, LIQUID FILLED ORAL at 20:48

## 2018-12-17 RX ADMIN — CEFTRIAXONE SODIUM 2 G: 2 INJECTION, POWDER, FOR SOLUTION INTRAMUSCULAR; INTRAVENOUS at 11:24

## 2018-12-17 RX ADMIN — OXYCODONE HYDROCHLORIDE 5 MG: 5 TABLET ORAL at 05:06

## 2018-12-17 RX ADMIN — LISINOPRIL 20 MG: 20 TABLET ORAL at 09:54

## 2018-12-17 RX ADMIN — AMLODIPINE BESYLATE 5 MG: 5 TABLET ORAL at 09:53

## 2018-12-17 RX ADMIN — ATENOLOL 25 MG: 25 TABLET ORAL at 09:54

## 2018-12-17 RX ADMIN — CALCIUM 500 MG: 500 TABLET ORAL at 09:54

## 2018-12-17 RX ADMIN — ACETAMINOPHEN 650 MG: 325 TABLET ORAL at 09:54

## 2018-12-17 RX ADMIN — PRAVASTATIN SODIUM 20 MG: 20 TABLET ORAL at 09:50

## 2018-12-17 RX ADMIN — RIVAROXABAN 20 MG: 20 TABLET, FILM COATED ORAL at 17:41

## 2018-12-17 RX ADMIN — CEFTRIAXONE SODIUM 2 G: 2 INJECTION, POWDER, FOR SOLUTION INTRAMUSCULAR; INTRAVENOUS at 20:48

## 2018-12-17 RX ADMIN — DOCUSATE SODIUM 100 MG: 100 CAPSULE, LIQUID FILLED ORAL at 09:53

## 2018-12-17 RX ADMIN — FAMOTIDINE 20 MG: 20 TABLET ORAL at 09:53

## 2018-12-17 ASSESSMENT — PAIN SCALES - GENERAL
PAINLEVEL_OUTOF10: 10
PAINLEVEL_OUTOF10: 4
PAINLEVEL_OUTOF10: 6
PAINLEVEL_OUTOF10: 0
PAINLEVEL_OUTOF10: 6
PAINLEVEL_OUTOF10: 3

## 2018-12-17 ASSESSMENT — PAIN DESCRIPTION - PROGRESSION
CLINICAL_PROGRESSION: NOT CHANGED

## 2018-12-17 ASSESSMENT — PAIN DESCRIPTION - FREQUENCY
FREQUENCY: INTERMITTENT
FREQUENCY: INTERMITTENT

## 2018-12-17 ASSESSMENT — PAIN DESCRIPTION - DESCRIPTORS: DESCRIPTORS: ACHING;SORE

## 2018-12-17 ASSESSMENT — PAIN DESCRIPTION - ORIENTATION
ORIENTATION: RIGHT
ORIENTATION: RIGHT;LEFT
ORIENTATION: RIGHT

## 2018-12-17 ASSESSMENT — PAIN DESCRIPTION - PAIN TYPE
TYPE: ACUTE PAIN;CHRONIC PAIN
TYPE: ACUTE PAIN
TYPE: ACUTE PAIN

## 2018-12-17 ASSESSMENT — PAIN DESCRIPTION - ONSET: ONSET: ON-GOING

## 2018-12-17 ASSESSMENT — PAIN DESCRIPTION - LOCATION
LOCATION: LEG;SHOULDER
LOCATION: LEG
LOCATION: LEG

## 2018-12-17 NOTE — PROGRESS NOTES
Physical Therapy   [address] [place] [address]  INPATIENT PHYSICAL THERAPY  DAILY NOTE  [facility] U 8E - 8E-68/068-A    Time In: 1330  Time Out: 1415  Timed Code Treatment Minutes: 39 Minutes  Minutes: 45          Date: 2018  Patient Name: Sammy Be,  Gender:  female        MRN: [id number]  : 1929  (80 y.o.)     Referring Practitioner: Ordering: Essence Hope MD. Attending: Grady Webber MD  Diagnosis: RLE cellulitis  Additional Pertinent Hx: 80 y.o. female who presented to [address] [place] [address] with left shoulder pain. Pt notes that symptoms began 3 days ago. Has gradually gotten worse. Rated as 10/10, radiating to the shoulder and elbow. Worse with neck extension. Pt developed fever this morning and was brought to ED. Pt denies any cough, no n/v and no change in bowel. CT abd shows Pneumonia. procal mildly elevated. Doppler R LE negative for DVT. Pt with neck DJD, L shoulder sprain, L RC impingement. To TCU on      Past Medical History:   Diagnosis Date    Arthritis     Atrial [facility] ([facility]     Cancer ([facility] [address] [facility]     skin    Gross hematuria     Dr Jaclyn Coffman HTN (hypertension)     Hyperlipidemia     Pneumonia 2018     Past Surgical History:   Procedure Laterality Date    CARPAL TUNNEL RELEASE  10/23/2018    right hand    COLONOSCOPY      EYE SURGERY      FOOT SURGERY      x2    HYSTERECTOMY  1982    KNEE SURGERY  2010    LIVER BIOPSY  2017    SKIN BIOPSY         Restrictions/Precautions:  General Precautions, Fall Risk     Other position/activity restrictions: No heavy lifting L UE  Left Upper Extremity Brace/Splint: Sling  Left Upper Extremity Brace/Splint: for comfort    Prior Level of Function:  ADL Assistance: Independent  Homemaking Assistance: Independent  Ambulation Assistance: Independent  Transfer Assistance: Independent  Additional Comments: Pt very indp at [facility], ambulates without device and manages all ADLs and IADLs independently.   pt uses walk in shower in basement, laundry is in basement along with treadmill and pulleys for home therex    Subjective:  Chart Reviewed: Yes  Family / Caregiver Present: No  Subjective: Pt resting in recliner upon arrival, pleasant and agreeable to therapy. Reports having to use the bathroom before activities. Pain:  Yes. Pain Assessment  Pain Assessment: 0-10  Pain Level: 4       Social/Functional:  Lives With: Alone  Type of Home: House  Home Layout: Two level, Laundry in basement, Able to Live on Main level with bedroom/bathroom  Home Access: Stairs to enter with rails  Entrance Stairs - Number of Steps: 2 steps to enter home from the garage  Entrance Stairs - Rails: Right  Home Equipment: Cane, Rolling walker     Objective:       Transfers  Sit to Stand: Stand by assistance (Good hand placement, steady upon rising)  Stand to sit: Stand by assistance       Ambulation 1  Surface: level tile  Device: Rolling Walker  Other Apparatus: Wheelchair follow  Assistance: Contact guard assistance;Stand by assistance  Quality of Gait: decreased alie, antalgic gait, step to pattern to step through pattern, forward flexed posture, no LOB, steady, downward gaze, good foot clearance  Distance: 200ft x1, 10ft x2  Comments: VCs for posture and fwd gaze, cueing for step through gait pattern. Able to self correct and cont with correction for short periods of time         Balance  Comments: Pt completed dynamic sitting and standing during bathroom activities, close SBA, steady, good tolerance with multidirectional reaching and bending noted, using one UE to no UE for support. , tolerated for approx 15 min. and min to no fatigue noted      Exercises:  Exercises  Comments: Performed seated BLE exercises: heel/toe raises, marches, long arc quads with 3 sec holds in extension, x15 reps to increase strength for improved functional mobility     Activity Tolerance:  Activity Tolerance: Patient Tolerated treatment well    Assessment:   Body

## 2018-12-17 NOTE — PROGRESS NOTES
Progress note: Infectious diseases    Patient - Tiffanie Merritt,  Age - 80 y.o.    - 1929      Room Number - 8E-68/068-A   MRN -  [id number]   Acct # - [de-identified]  Date of Admission -  2018  5:45 PM    SUBJECTIVE:   She is feeling much better, no fever or chills. the leg pain is much improved. OBJECTIVE   VITALS    height is 5' 4\" (1.626 m) and weight is 182 lb 4.8 oz (82.7 kg). Her temperature is 98.1 °F (36.7 °C). Her blood pressure is 136/62 and her pulse is 80. Her respiration is 16 and oxygen saturation is 96%. Wt Readings from Last 3 Encounters:   18 182 lb 4.8 oz (82.7 kg)   18 182 lb 6.4 oz (82.7 kg)   10/24/18 167 lb (75.8 kg)       I/O (24 Hours)    Intake/Output Summary (Last 24 hours) at 18 1433  Last data filed at 18 0900   Gross per 24 hour   Intake              300 ml   Output                0 ml   Net              300 ml       General Appearance  Awake, alert, oriented,  not  In acute distress  HEENT - normocephalic, atraumatic, pink conjunctiva,  anicteric sclera  Neck - Supple, no mass  Lungs -  Bilateral   air entry  Cardiovascular - Heart sounds are normal.    Abdomen - soft, not distended, non tender. Neurologic -oriented. Skin - No bruising or bleeding. Extremities: the redness and swelling on the right lower leg is much improved.     MEDICATIONS:      ppd   Does not apply Weekly    tuberculin  5 Units Intradermal Weekly    docusate sodium  100 mg Oral BID    amLODIPine  5 mg Oral Daily    atenolol  25 mg Oral Daily    calcium elemental  500 mg Oral Daily    cefTRIAXone (ROCEPHIN) IV  2 g Intravenous Q12H    fluocinonide   Topical Daily    lactobacillus  1 capsule Oral Daily with breakfast    lisinopril  20 mg Oral Daily    pravastatin  20 mg Oral Daily    rivaroxaban  20 mg Oral Daily    famotidine  20 mg Oral Daily       sodium chloride flush,

## 2018-12-18 LAB
ANION GAP SERPL CALCULATED.3IONS-SCNC: 9 MEQ/L (ref 8–16)
BUN BLDV-MCNC: 11 MG/DL (ref 7–22)
CALCIUM SERPL-MCNC: 9.1 MG/DL (ref 8.5–10.5)
CHLORIDE BLD-SCNC: 93 MEQ/L (ref 98–111)
CO2: 31 MEQ/L (ref 23–33)
CREAT SERPL-MCNC: 0.6 MG/DL (ref 0.4–1.2)
ERYTHROCYTE [DISTWIDTH] IN BLOOD BY AUTOMATED COUNT: 14.1 % (ref 11.5–14.5)
ERYTHROCYTE [DISTWIDTH] IN BLOOD BY AUTOMATED COUNT: 48.4 FL (ref 35–45)
GFR SERPL CREATININE-BSD FRML MDRD: > 90 ML/MIN/1.73M2
GLUCOSE BLD-MCNC: 122 MG/DL (ref 70–108)
HCT VFR BLD CALC: 33.3 % (ref 37–47)
HEMOGLOBIN: 11 GM/DL (ref 12–16)
MCH RBC QN AUTO: 31.1 PG (ref 26–33)
MCHC RBC AUTO-ENTMCNC: 33 GM/DL (ref 32.2–35.5)
MCV RBC AUTO: 94.1 FL (ref 81–99)
PLATELET # BLD: 350 THOU/MM3 (ref 130–400)
PMV BLD AUTO: 8.3 FL (ref 9.4–12.4)
POTASSIUM SERPL-SCNC: 4.9 MEQ/L (ref 3.5–5.2)
RBC # BLD: 3.54 MILL/MM3 (ref 4.2–5.4)
SODIUM BLD-SCNC: 133 MEQ/L (ref 135–145)
WBC # BLD: 6.7 THOU/MM3 (ref 4.8–10.8)

## 2018-12-18 PROCEDURE — 6370000000 HC RX 637 (ALT 250 FOR IP): Performed by: FAMILY MEDICINE

## 2018-12-18 PROCEDURE — 36415 COLL VENOUS BLD VENIPUNCTURE: CPT

## 2018-12-18 PROCEDURE — 80048 BASIC METABOLIC PNL TOTAL CA: CPT

## 2018-12-18 PROCEDURE — 2580000003 HC RX 258: Performed by: INTERNAL MEDICINE

## 2018-12-18 PROCEDURE — 97116 GAIT TRAINING THERAPY: CPT

## 2018-12-18 PROCEDURE — 97535 SELF CARE MNGMENT TRAINING: CPT

## 2018-12-18 PROCEDURE — 2580000003 HC RX 258: Performed by: FAMILY MEDICINE

## 2018-12-18 PROCEDURE — 97530 THERAPEUTIC ACTIVITIES: CPT

## 2018-12-18 PROCEDURE — 1290000000 HC SEMI PRIVATE OTHER R&B

## 2018-12-18 PROCEDURE — 2709999900 HC NON-CHARGEABLE SUPPLY

## 2018-12-18 PROCEDURE — 97110 THERAPEUTIC EXERCISES: CPT

## 2018-12-18 PROCEDURE — 94760 N-INVAS EAR/PLS OXIMETRY 1: CPT

## 2018-12-18 PROCEDURE — 6360000002 HC RX W HCPCS: Performed by: INTERNAL MEDICINE

## 2018-12-18 PROCEDURE — 85027 COMPLETE CBC AUTOMATED: CPT

## 2018-12-18 RX ADMIN — FLUOCINONIDE: 0.5 CREAM TOPICAL at 08:44

## 2018-12-18 RX ADMIN — CEFTRIAXONE SODIUM 2 G: 2 INJECTION, POWDER, FOR SOLUTION INTRAMUSCULAR; INTRAVENOUS at 21:47

## 2018-12-18 RX ADMIN — Medication 10 ML: at 21:47

## 2018-12-18 RX ADMIN — AMLODIPINE BESYLATE 5 MG: 5 TABLET ORAL at 08:38

## 2018-12-18 RX ADMIN — FAMOTIDINE 20 MG: 20 TABLET ORAL at 08:38

## 2018-12-18 RX ADMIN — CEFTRIAXONE SODIUM 2 G: 2 INJECTION, POWDER, FOR SOLUTION INTRAMUSCULAR; INTRAVENOUS at 08:35

## 2018-12-18 RX ADMIN — OXYCODONE HYDROCHLORIDE 5 MG: 5 TABLET ORAL at 21:47

## 2018-12-18 RX ADMIN — CALCIUM 500 MG: 500 TABLET ORAL at 08:38

## 2018-12-18 RX ADMIN — ATENOLOL 25 MG: 25 TABLET ORAL at 08:38

## 2018-12-18 RX ADMIN — OXYCODONE HYDROCHLORIDE 5 MG: 5 TABLET ORAL at 05:24

## 2018-12-18 RX ADMIN — LISINOPRIL 20 MG: 20 TABLET ORAL at 08:38

## 2018-12-18 RX ADMIN — RIVAROXABAN 20 MG: 20 TABLET, FILM COATED ORAL at 16:55

## 2018-12-18 RX ADMIN — Medication 1 CAPSULE: at 08:39

## 2018-12-18 RX ADMIN — SENNOSIDES 8.6 MG: 8.6 TABLET, FILM COATED ORAL at 21:47

## 2018-12-18 RX ADMIN — PRAVASTATIN SODIUM 20 MG: 20 TABLET ORAL at 08:38

## 2018-12-18 RX ADMIN — DOCUSATE SODIUM 100 MG: 100 CAPSULE, LIQUID FILLED ORAL at 21:47

## 2018-12-18 ASSESSMENT — PAIN DESCRIPTION - PROGRESSION
CLINICAL_PROGRESSION: NOT CHANGED

## 2018-12-18 ASSESSMENT — PAIN SCALES - GENERAL
PAINLEVEL_OUTOF10: 7
PAINLEVEL_OUTOF10: 5
PAINLEVEL_OUTOF10: 4
PAINLEVEL_OUTOF10: 4
PAINLEVEL_OUTOF10: 7

## 2018-12-18 ASSESSMENT — PAIN DESCRIPTION - LOCATION: LOCATION: LEG

## 2018-12-18 ASSESSMENT — PAIN DESCRIPTION - ORIENTATION: ORIENTATION: RIGHT

## 2018-12-18 NOTE — PROGRESS NOTES
basement, laundry is in basement along with treadmill and pulleys for home therex    Subjective:     Subjective: Patient resting in bed upon arrival. Patient agreeable to therapy. Patient requested to use restroom at beginning of session. Pain:  Yes. Pain Assessment  Pain Assessment: 0-10  Pain Level: 5 (R LE)       Social/Functional:  Lives With: Alone  Type of Home: House  Home Layout: Two level, Laundry in basement, Able to Live on Main level with bedroom/bathroom  Home Access: Stairs to enter with rails  Entrance Stairs - Number of Steps: 2 steps to enter home from the garage  Entrance Stairs - Rails: Right  Home Equipment: Cane, Rolling walker     Objective:  Supine to Sit: Stand by assistance (head of bed elevated, extra time to complete)  Scooting: Stand by assistance (to edge of seated surfaces)    Transfers  Sit to Stand: Stand by assistance  Stand to sit: Stand by assistance       Ambulation 1  Surface: level tile  Device: Rolling Walker  Assistance: Stand by assistance;Contact guard assistance (light CGA to close SBA)  Quality of Gait: decreased alie, antalgic gait, step to pattern, forward flexed posture, dowanward gaze, no LOB, steady, good foot clearance, decreased knee flex on R  Distance: 10ft x1, 60ft x2         Balance  Comments: patient performed bathroom tasks with SBA, no LOB, ~8min            Exercises:  Exercises  Comments: Performed seated BLE exercises: heel/toe raises, marches, long arc quads, hip add ball squeezes, green tband hamstring curls, reclined heel slides (R only) x15 reps to increase strength for improved functional mobility. Manual L heel cord stretch 3x20 sec to increase flexibility and dcereased tightness. Activity Tolerance:  Activity Tolerance: Patient Tolerated treatment well    Assessment:   Body structures, Functions, Activity limitations: Decreased functional mobility , Decreased endurance, Decreased balance, Decreased strength  Assessment: Patient

## 2018-12-18 NOTE — PROGRESS NOTES
Patient Name: Kentrell Solis        MRN: [id number]    : 1929  (80 y.o.)  Gender: female   Principal Problem: <principal problem not specified>    Section F - Preferences for Customary Routine Activities   . Should Interview for Daily and Activity Preferences be Conducted? - Attempt to interview all residents able to communicate. If resident is unable to complete, attempt to complete interview with family member or significant other. Enter Code    1 0. No (resident is rarely/never understood and family/significant other not available) à Skip to and complete , Staff Assessment of Daily and Activity Preferences  1. Yes à Continue to [place] for Daily Preferences   . Interview for Daily Preferences  Show resident the response options and say: While you are in this facility           [place] - Very Important  2 - Somewhat Important  3 - Not very important  4 - Not important at all  5 - Important, but cant do or no choice  6 - No response or non-responsive Enter Codes in Boxes    1 A. How important is it to you to choose what clothes to wear?    1 B. How important is it to you to take care of your personal belongings or things?    4 C. How important is it to you to choose between a tub bath, shower, bed bath or sponge bath?    2 D. How important is it to you to have snacks available between meals?    1 E. How important is it to you to choose your own bedtime?    1 F. How important is it to you to have your family or a close friend involved in discussions about your care?    1 G. How important is it to you to be able to use the phone in private?    4 H. How important is it to you to have a place to lock your things to keep them safe? .  Interview for Activity Preferences   Show resident the response options and say: While you are in this facility           [place] - Very Important  2 - Somewhat Important  3 - Not very important  4 - Not important at all  5 -

## 2018-12-18 NOTE — PLAN OF CARE
Problem: Falls - Risk of:  Goal: Will remain free from falls  Will remain free from falls   Outcome: Ongoing  Bed and chair alarm on   Goal: Absence of physical injury  Absence of physical injury   Outcome: Ongoing  Call light within reach     Problem: Risk for Impaired Skin Integrity  Goal: Tissue integrity - skin and mucous membranes  Structural intactness and normal physiological function of skin and  mucous membranes. Outcome: Ongoing  Skin checks every shift     Problem: Skin Integrity:  Goal: Will show no infection signs and symptoms  Will show no infection signs and symptoms   Outcome: Ongoing  Monitor labs and vitals every shift     Problem: Pain:  Goal: Pain level will decrease  Pain level will decrease   Outcome: Ongoing  Give pain medications as ordered     Problem:  Bowel/Gastric:  Goal: Control of bowel function will improve  Control of bowel function will improve   Outcome: Ongoing  Offer restroom every 2 hours     Problem: Mobility - Impaired:  Goal: Mobility will improve to maximum level  Mobility will improve to maximum level   Outcome: Ongoing  Continue with pt and ot therapy
Problem: Falls - Risk of:  Goal: Will remain free from falls  Will remain free from falls   Outcome: Ongoing  Fall precautions. Problem: Risk for Impaired Skin Integrity  Goal: Tissue integrity - skin and mucous membranes  Structural intactness and normal physiological function of skin and  mucous membranes. Outcome: Ongoing  Keep skin clean and dry. Problem: Bleeding:  Goal: Will show no signs and symptoms of excessive bleeding  Will show no signs and symptoms of excessive bleeding   Outcome: Ongoing  Bleeding precautions. Problem: Pain:  Goal: Pain level will decrease  Pain level will decrease   Outcome: Ongoing  Assess barriers to pain control. Problem: Bowel/Gastric:  Goal: Control of bowel function will improve  Control of bowel function will improve   Outcome: Ongoing  Assess for impaction. Problem: Mobility - Impaired:  Goal: Mobility will improve to maximum level  Mobility will improve to maximum level   Outcome: Ongoing  Gait assessment. Problem: Nutrition  Goal: Optimal nutrition therapy  Outcome: Ongoing  Encourage patient to consume % of all meals.
Problem: Nutrition  Goal: Optimal nutrition therapy  Outcome: Ongoing  Nutrition Problem: Inadequate oral intake  Intervention: Food and/or Nutrient Delivery: Continue current ONS  Nutritional Goals: 75% or more of meal intake during LOS
apply Weekly Hilda Mccabe MD        tuberculin injection 5 Units  5 Units Intradermal Weekly Hilda Mccabe MD   5 Units at 12/13/18 0641    docusate sodium (COLACE) capsule 100 mg  100 mg Oral BID Hilda Mccabe MD   100 mg at 12/17/18 2048    sodium chloride flush 0.9 % injection 10 mL  10 mL Intravenous PRN Hilda Mccabe MD   10 mL at 12/13/18 1952    acetaminophen (TYLENOL) tablet 650 mg  650 mg Oral Q4H PRN Hilda Mccabe MD   650 mg at 12/17/18 0954    amLODIPine (NORVASC) tablet 5 mg  5 mg Oral Daily Hilda Mccabe MD   5 mg at 12/18/18 2620    atenolol (TENORMIN) tablet 25 mg  25 mg Oral Daily Hilda Mccabe MD   25 mg at 12/18/18 2526    calcium elemental (OSCAL) tablet 500 mg  500 mg Oral Daily Hilda Mccabe MD   500 mg at 12/18/18 5727    cefTRIAXone (ROCEPHIN) 2 g IVPB in D5W 50ml minibag  2 g Intravenous Q12H Isela Laird  mL/hr at 12/18/18 0835 2 g at 12/18/18 0835    fluocinonide (LIDEX) 0.05 % cream   Topical Daily Hilda Mccabe MD        lactobacillus (CULTURELLE) capsule 1 capsule  1 capsule Oral Daily with breakfast Hilda Mccabe MD   1 capsule at 12/18/18 0738    lisinopril (PRINIVIL;ZESTRIL) tablet 20 mg  20 mg Oral Daily Hilda Mccabe MD   20 mg at 12/18/18 6835    oxyCODONE (ROXICODONE) immediate release tablet 5 mg  5 mg Oral Q6H PRN Hilda Mccabe MD   5 mg at 12/18/18 4154    potassium chloride (KLOR-CON M) extended release tablet 20 mEq  20 mEq Oral Daily PRN Hilda Mccabe MD        pravastatin (PRAVACHOL) tablet 20 mg  20 mg Oral Daily Hilda Mccabe MD   20 mg at 12/18/18 4364    rivaroxaban (XARELTO) tablet 20 mg  20 mg Oral Daily Hilda Mccabe MD   20 mg at 12/17/18 1741    traMADol (ULTRAM) tablet 25 mg  25 mg Oral Q6H PRN Hilda Mccabe MD   25 mg at 12/15/18 0600    senna (SENOKOT) tablet 8.6 mg  1 tablet Oral Nightly PRN Rosemarie Wilburn MD        polyethylene glycol [facility]) packet 17 g

## 2018-12-19 LAB
ANION GAP SERPL CALCULATED.3IONS-SCNC: 11 MEQ/L (ref 8–16)
BUN BLDV-MCNC: 10 MG/DL (ref 7–22)
CALCIUM SERPL-MCNC: 9.2 MG/DL (ref 8.5–10.5)
CHLORIDE BLD-SCNC: 94 MEQ/L (ref 98–111)
CO2: 29 MEQ/L (ref 23–33)
CREAT SERPL-MCNC: 0.7 MG/DL (ref 0.4–1.2)
ERYTHROCYTE [DISTWIDTH] IN BLOOD BY AUTOMATED COUNT: 14.1 % (ref 11.5–14.5)
ERYTHROCYTE [DISTWIDTH] IN BLOOD BY AUTOMATED COUNT: 49.8 FL (ref 35–45)
GFR SERPL CREATININE-BSD FRML MDRD: 79 ML/MIN/1.73M2
GLUCOSE BLD-MCNC: 109 MG/DL (ref 70–108)
HCT VFR BLD CALC: 33.7 % (ref 37–47)
HEMOGLOBIN: 10.7 GM/DL (ref 12–16)
MCH RBC QN AUTO: 30.6 PG (ref 26–33)
MCHC RBC AUTO-ENTMCNC: 31.8 GM/DL (ref 32.2–35.5)
MCV RBC AUTO: 96.3 FL (ref 81–99)
PLATELET # BLD: 311 THOU/MM3 (ref 130–400)
PMV BLD AUTO: 8.4 FL (ref 9.4–12.4)
POTASSIUM SERPL-SCNC: 4.8 MEQ/L (ref 3.5–5.2)
RBC # BLD: 3.5 MILL/MM3 (ref 4.2–5.4)
SODIUM BLD-SCNC: 134 MEQ/L (ref 135–145)
WBC # BLD: 6.2 THOU/MM3 (ref 4.8–10.8)

## 2018-12-19 PROCEDURE — 6370000000 HC RX 637 (ALT 250 FOR IP): Performed by: FAMILY MEDICINE

## 2018-12-19 PROCEDURE — 80048 BASIC METABOLIC PNL TOTAL CA: CPT

## 2018-12-19 PROCEDURE — 2709999900 HC NON-CHARGEABLE SUPPLY

## 2018-12-19 PROCEDURE — 97110 THERAPEUTIC EXERCISES: CPT

## 2018-12-19 PROCEDURE — 2580000003 HC RX 258: Performed by: INTERNAL MEDICINE

## 2018-12-19 PROCEDURE — 97535 SELF CARE MNGMENT TRAINING: CPT

## 2018-12-19 PROCEDURE — 1290000000 HC SEMI PRIVATE OTHER R&B

## 2018-12-19 PROCEDURE — 0220000000 HC SKILLED NURSING FACILITY

## 2018-12-19 PROCEDURE — 36415 COLL VENOUS BLD VENIPUNCTURE: CPT

## 2018-12-19 PROCEDURE — 2580000003 HC RX 258: Performed by: FAMILY MEDICINE

## 2018-12-19 PROCEDURE — 85027 COMPLETE CBC AUTOMATED: CPT

## 2018-12-19 PROCEDURE — 97116 GAIT TRAINING THERAPY: CPT

## 2018-12-19 PROCEDURE — 6360000002 HC RX W HCPCS: Performed by: INTERNAL MEDICINE

## 2018-12-19 RX ADMIN — OXYCODONE HYDROCHLORIDE 5 MG: 5 TABLET ORAL at 20:36

## 2018-12-19 RX ADMIN — CEFTRIAXONE SODIUM 2 G: 2 INJECTION, POWDER, FOR SOLUTION INTRAMUSCULAR; INTRAVENOUS at 10:14

## 2018-12-19 RX ADMIN — AMLODIPINE BESYLATE 5 MG: 5 TABLET ORAL at 09:25

## 2018-12-19 RX ADMIN — CEFTRIAXONE SODIUM 2 G: 2 INJECTION, POWDER, FOR SOLUTION INTRAMUSCULAR; INTRAVENOUS at 20:36

## 2018-12-19 RX ADMIN — FAMOTIDINE 20 MG: 20 TABLET ORAL at 09:25

## 2018-12-19 RX ADMIN — PRAVASTATIN SODIUM 20 MG: 20 TABLET ORAL at 09:24

## 2018-12-19 RX ADMIN — LISINOPRIL 20 MG: 20 TABLET ORAL at 09:24

## 2018-12-19 RX ADMIN — Medication 10 ML: at 10:16

## 2018-12-19 RX ADMIN — FLUOCINONIDE: 0.5 CREAM TOPICAL at 09:24

## 2018-12-19 RX ADMIN — CALCIUM 500 MG: 500 TABLET ORAL at 09:24

## 2018-12-19 RX ADMIN — DOCUSATE SODIUM 100 MG: 100 CAPSULE, LIQUID FILLED ORAL at 09:25

## 2018-12-19 RX ADMIN — ACETAMINOPHEN 650 MG: 325 TABLET ORAL at 05:12

## 2018-12-19 RX ADMIN — DOCUSATE SODIUM 100 MG: 100 CAPSULE, LIQUID FILLED ORAL at 20:36

## 2018-12-19 RX ADMIN — Medication 1 CAPSULE: at 09:25

## 2018-12-19 RX ADMIN — RIVAROXABAN 20 MG: 20 TABLET, FILM COATED ORAL at 17:50

## 2018-12-19 RX ADMIN — ATENOLOL 25 MG: 25 TABLET ORAL at 09:24

## 2018-12-19 RX ADMIN — Medication 10 ML: at 11:00

## 2018-12-19 ASSESSMENT — PAIN SCALES - GENERAL
PAINLEVEL_OUTOF10: 0
PAINLEVEL_OUTOF10: 5
PAINLEVEL_OUTOF10: 0
PAINLEVEL_OUTOF10: 8
PAINLEVEL_OUTOF10: 2
PAINLEVEL_OUTOF10: 4
PAINLEVEL_OUTOF10: 0
PAINLEVEL_OUTOF10: 4
PAINLEVEL_OUTOF10: 4
PAINLEVEL_OUTOF10: 0

## 2018-12-19 ASSESSMENT — PAIN DESCRIPTION - DESCRIPTORS
DESCRIPTORS: SHARP;DISCOMFORT
DESCRIPTORS: SHARP;DISCOMFORT

## 2018-12-19 ASSESSMENT — PAIN DESCRIPTION - ORIENTATION
ORIENTATION: RIGHT

## 2018-12-19 ASSESSMENT — PAIN DESCRIPTION - LOCATION
LOCATION: LEG

## 2018-12-19 ASSESSMENT — PAIN DESCRIPTION - FREQUENCY
FREQUENCY: CONTINUOUS
FREQUENCY: CONTINUOUS

## 2018-12-19 ASSESSMENT — PAIN DESCRIPTION - ONSET
ONSET: ON-GOING
ONSET: ON-GOING

## 2018-12-19 NOTE — PROGRESS NOTES
mobility and independence prior to returning home. Prognosis: Good          Discharge Recommendations:  Discharge Recommendations: Continue to assess pending progress, Patient would benefit from continued therapy after discharge    Patient Education:  Patient Education: transfers, therex, gait, stair training     Equipment Recommendations:  Equipment Needed: No  Other: continue to monitor    Safety:  Type of devices:  All fall risk precautions in place, Call light within reach, Chair alarm in place, Gait belt, Patient at risk for falls, Left in chair, Nurse notified    Plan:  Times per week: 6x  Current Treatment Recommendations: Strengthening, Home Exercise Program, Safety Education & Training, Balance Training, Functional Mobility Training, Transfer Training, Gait Training, Stair training, Equipment Evaluation, Education, & procurement, Patient/Caregiver Education & Training, Endurance Training    Goals:  Patient goals : move better, go home    Short term goals  Time Frame for Short term goals: 10 days  Short term goal 1: Pt to perform supine to/from sit at [address] to get in and out of bed  Short term goal 2: Pt to perform sit to/from stand at SBA to rise from bed or chair  Short term goal 3: Pt to ambulate >50ft with RW at SBA to walk to and from restroom  Short term goal 4: Pt to negotiate 2 steps with R ascending rail at [address] for home access    Long term goals  Time Frame for Long term goals : 3 weeks  Long term goal 1: Pt to perform supine to/from sit at mod I to get in and out of bed  Long term goal 2: Pt to perform sit to/from stand at mod I to rise from bed or chair  Long term goal 3: Pt to ambulate >100ft with RW at mod I to walk to and from restroom  Long term goal 4: Pt to negotiate 2 steps with R ascending rail at S for home access

## 2018-12-20 LAB
ANION GAP SERPL CALCULATED.3IONS-SCNC: 7 MEQ/L (ref 8–16)
BUN BLDV-MCNC: 10 MG/DL (ref 7–22)
CALCIUM SERPL-MCNC: 8.9 MG/DL (ref 8.5–10.5)
CHLORIDE BLD-SCNC: 93 MEQ/L (ref 98–111)
CO2: 32 MEQ/L (ref 23–33)
CREAT SERPL-MCNC: 0.7 MG/DL (ref 0.4–1.2)
ERYTHROCYTE [DISTWIDTH] IN BLOOD BY AUTOMATED COUNT: 14.3 % (ref 11.5–14.5)
ERYTHROCYTE [DISTWIDTH] IN BLOOD BY AUTOMATED COUNT: 49.2 FL (ref 35–45)
GFR SERPL CREATININE-BSD FRML MDRD: 79 ML/MIN/1.73M2
GLUCOSE BLD-MCNC: 112 MG/DL (ref 70–108)
HCT VFR BLD CALC: 33.4 % (ref 37–47)
HEMOGLOBIN: 10.8 GM/DL (ref 12–16)
MCH RBC QN AUTO: 30.7 PG (ref 26–33)
MCHC RBC AUTO-ENTMCNC: 32.3 GM/DL (ref 32.2–35.5)
MCV RBC AUTO: 94.9 FL (ref 81–99)
PLATELET # BLD: 323 THOU/MM3 (ref 130–400)
PMV BLD AUTO: 8.7 FL (ref 9.4–12.4)
POTASSIUM SERPL-SCNC: 5 MEQ/L (ref 3.5–5.2)
RBC # BLD: 3.52 MILL/MM3 (ref 4.2–5.4)
SODIUM BLD-SCNC: 132 MEQ/L (ref 135–145)
WBC # BLD: 6.7 THOU/MM3 (ref 4.8–10.8)

## 2018-12-20 PROCEDURE — 6370000000 HC RX 637 (ALT 250 FOR IP): Performed by: FAMILY MEDICINE

## 2018-12-20 PROCEDURE — 1290000000 HC SEMI PRIVATE OTHER R&B

## 2018-12-20 PROCEDURE — 85027 COMPLETE CBC AUTOMATED: CPT

## 2018-12-20 PROCEDURE — 6360000002 HC RX W HCPCS: Performed by: INTERNAL MEDICINE

## 2018-12-20 PROCEDURE — 2500000003 HC RX 250 WO HCPCS: Performed by: FAMILY MEDICINE

## 2018-12-20 PROCEDURE — 97110 THERAPEUTIC EXERCISES: CPT

## 2018-12-20 PROCEDURE — 36415 COLL VENOUS BLD VENIPUNCTURE: CPT

## 2018-12-20 PROCEDURE — 2580000003 HC RX 258: Performed by: INTERNAL MEDICINE

## 2018-12-20 PROCEDURE — 97116 GAIT TRAINING THERAPY: CPT

## 2018-12-20 PROCEDURE — 2580000003 HC RX 258: Performed by: FAMILY MEDICINE

## 2018-12-20 PROCEDURE — 80048 BASIC METABOLIC PNL TOTAL CA: CPT

## 2018-12-20 PROCEDURE — 97530 THERAPEUTIC ACTIVITIES: CPT

## 2018-12-20 PROCEDURE — 2709999900 HC NON-CHARGEABLE SUPPLY

## 2018-12-20 RX ADMIN — Medication 5 UNITS: at 10:05

## 2018-12-20 RX ADMIN — Medication 1 CAPSULE: at 09:03

## 2018-12-20 RX ADMIN — LISINOPRIL 20 MG: 20 TABLET ORAL at 09:03

## 2018-12-20 RX ADMIN — CEFTRIAXONE SODIUM 2 G: 2 INJECTION, POWDER, FOR SOLUTION INTRAMUSCULAR; INTRAVENOUS at 09:05

## 2018-12-20 RX ADMIN — FAMOTIDINE 20 MG: 20 TABLET ORAL at 09:03

## 2018-12-20 RX ADMIN — RIVAROXABAN 20 MG: 20 TABLET, FILM COATED ORAL at 17:36

## 2018-12-20 RX ADMIN — Medication 10 ML: at 09:05

## 2018-12-20 RX ADMIN — Medication 10 ML: at 10:06

## 2018-12-20 RX ADMIN — PRAVASTATIN SODIUM 20 MG: 20 TABLET ORAL at 21:33

## 2018-12-20 RX ADMIN — FLUOCINONIDE: 0.5 CREAM TOPICAL at 09:03

## 2018-12-20 RX ADMIN — OXYCODONE HYDROCHLORIDE 5 MG: 5 TABLET ORAL at 18:45

## 2018-12-20 RX ADMIN — DOCUSATE SODIUM 100 MG: 100 CAPSULE, LIQUID FILLED ORAL at 09:03

## 2018-12-20 RX ADMIN — CALCIUM 500 MG: 500 TABLET ORAL at 09:03

## 2018-12-20 RX ADMIN — ATENOLOL 25 MG: 25 TABLET ORAL at 09:03

## 2018-12-20 RX ADMIN — DOCUSATE SODIUM 100 MG: 100 CAPSULE, LIQUID FILLED ORAL at 21:36

## 2018-12-20 RX ADMIN — AMLODIPINE BESYLATE 5 MG: 5 TABLET ORAL at 09:02

## 2018-12-20 RX ADMIN — CEFTRIAXONE SODIUM 2 G: 2 INJECTION, POWDER, FOR SOLUTION INTRAMUSCULAR; INTRAVENOUS at 21:29

## 2018-12-20 ASSESSMENT — PAIN SCALES - GENERAL
PAINLEVEL_OUTOF10: 7
PAINLEVEL_OUTOF10: 0
PAINLEVEL_OUTOF10: 5
PAINLEVEL_OUTOF10: 0
PAINLEVEL_OUTOF10: 5
PAINLEVEL_OUTOF10: 6
PAINLEVEL_OUTOF10: 0

## 2018-12-20 ASSESSMENT — PAIN DESCRIPTION - LOCATION
LOCATION: LEG
LOCATION: LEG

## 2018-12-20 ASSESSMENT — PAIN DESCRIPTION - DESCRIPTORS: DESCRIPTORS: ACHING

## 2018-12-20 ASSESSMENT — PAIN DESCRIPTION - PROGRESSION: CLINICAL_PROGRESSION: NOT CHANGED

## 2018-12-20 ASSESSMENT — PAIN DESCRIPTION - ONSET: ONSET: ON-GOING

## 2018-12-20 ASSESSMENT — PAIN DESCRIPTION - FREQUENCY: FREQUENCY: CONTINUOUS

## 2018-12-20 ASSESSMENT — PAIN DESCRIPTION - ORIENTATION
ORIENTATION: RIGHT
ORIENTATION: RIGHT

## 2018-12-20 ASSESSMENT — PAIN DESCRIPTION - PAIN TYPE: TYPE: ACUTE PAIN

## 2018-12-20 NOTE — PROGRESS NOTES
Monique Boxer        MRN: [id number]    : 1929  (80 y.o.)  Gender: female   Principal Problem: <principal problem not specified>    Section J    Health Conditions    Should Pain Assessment Interview be Conducted? Attempt to conduct interview with all residents. If resident is comatose, skip to , Shortness of Breath (dyspnea)   Enter Code  1 0 - No à (resident is rarely/never understood) à Skip to [address] of Breath  1 - Yes à Continue to , Pain Presence   Pain Assessment Interview   Pain Presence   Enter Code  1 Ask resident: Angelia Langford you had pain or hurting at any time in the last 5 days?   0 - No à Skip to , Shortness of Breath  1 - Yes  à Continue to , Pain Frequency  9 - Unable to answer à Skip to , Shortness of Breath    Pain Frequency   Enter Code          3 Ask resident: Vanita Granados much of the time have you experienced pain or hurting over the last 5 days?   1 - Almost constantly  2 - Frequently  3 - Occasionally  4 - Rarely  9 - Unable to answer    Pain Effect on Function   Enter Code      0 Ask resident: Aliza Dilan the last 5 days, has pain made it hard for you to sleep at night?   0 - No   1 - Yes   9 - Unable to answer    Enter Code      0 Ask resident: Aliza Dilan the last 5 days, have you limited your day-to-day activities because of pain?   0 - No   1 - Yes   9 - Unable to answer     Pain Intensity   Enter Code      06 A. Numeric Rating Scale (00-10)  Ask resident: Carla Bai rate your worst pain over the last 5 days on a zero to ten scale, with zero being no pain and ten as the worst pain you can imagine.  (Show resident 00-10 pain scale)  Enter two-digit response. Enter 99 if unable to answer.

## 2018-12-20 NOTE — PROGRESS NOTES
mobility and independence prior to returning home. Prognosis: Good          Discharge Recommendations:  Discharge Recommendations: Continue to assess pending progress, Patient would benefit from continued therapy after discharge    Patient Education:  Patient Education: transfers, therex, gait, stair training, curb step    Equipment Recommendations:  Equipment Needed: No  Other: continue to monitor    Safety:  Type of devices:  All fall risk precautions in place, Call light within reach, Chair alarm in place, Gait belt, Patient at risk for falls, Left in chair    Plan:  Times per week: 6x  Current Treatment Recommendations: Strengthening, Home Exercise Program, Safety Education & Training, Balance Training, Functional Mobility Training, Transfer Training, Gait Training, Stair training, Equipment Evaluation, Education, & procurement, Patient/Caregiver Education & Training, Endurance Training    Goals:  Patient goals : move better, go home    Short term goals  Time Frame for Short term goals: 10 days  Short term goal 1: Pt to perform supine to/from sit at [facility] to get in and out of bed  Short term goal 2: Pt to perform sit to/from stand at SBA to rise from bed or chair  Short term goal 3: Pt to ambulate >50ft with RW at SBA to walk to and from restroom  Short term goal 4: Pt to negotiate 2 steps with R ascending rail at [facility] for home access    Long term goals  Time Frame for Long term goals : 3 weeks  Long term goal 1: Pt to perform supine to/from sit at mod I to get in and out of bed  Long term goal 2: Pt to perform sit to/from stand at mod I to rise from bed or chair  Long term goal 3: Pt to ambulate >100ft with RW at mod I to walk to and from restroom  Long term goal 4: Pt to negotiate 2 steps with R ascending rail at S for home access

## 2018-12-20 NOTE — PROGRESS NOTES
[address]  INPATIENT OCCUPATIONAL THERAPY  [facility] TCU 8E  DAILY NOTE    Time:  Time In:   Time Out: 9  Timed Code Treatment Minutes: 64 Minutes  Minutes: 56          Date: 2018  Patient Name: Abhilash Sánchez,   Gender: female      Room: [facility]68/068-A  MRN: [id number]  : 1929  (80 y.o.)  Referring Practitioner: Ilene Wild MD ( Ordering ) Dr. Jamel Bernstein ( Attending )   Diagnosis: RLE cellulitis   Additional Pertinent Hx: 80 y.o. female who presented to [facility] with left shoulder pain. Pt notes that symptoms began 3 days ago. Has gradually gotten worse. Rated as 10/10, radiating to the shoulder blade and elbow. Worse with neck extension. Pt developed fever this morning and was brought to ED. Pt denies any cough, no n/v and no change in bowel. CT abd shows Pneumonia. procal mildly elevated    Past Medical History:   Diagnosis Date    Arthritis     Atrial fibrillation ([address]     Cancer ([address]     skin    Gross hematuria     Dr Johnnie Quinn HTN (hypertension)     Hyperlipidemia     Pneumonia 2018     Past Surgical History:   Procedure Laterality Date    CARPAL TUNNEL RELEASE  10/23/2018    right hand    COLONOSCOPY      EYE SURGERY      FOOT SURGERY      x2    HYSTERECTOMY  1982    KNEE SURGERY  2010    LIVER BIOPSY  2017    SKIN BIOPSY         Restrictions/Precautions:  General Precautions, Fall Risk                    Other position/activity restrictions: No heavy lifting L UE  Left Upper Extremity Brace/Splint: Sling  Left Upper Extremity Brace/Splint: for comfort    Prior Level of Function:  ADL Assistance: Independent  Homemaking Assistance: Independent  Ambulation Assistance: Independent  Transfer Assistance: Independent  Additional Comments: Pt very indp at [facility], ambulates without device and manages all ADLs and IADLs independently.   pt uses walk in shower in basement, laundry is in basement along with treadmill and pulleys for strength and activity tolerance required for ADLs and toilet transfers  Exercises  Scapular Retraction: x10 reps x1 set holding exercise for approx 3-5 seconds  Shoulder Elevation: x10 reps x1 set holding exercise for approx 3-5 seconds  Other: Shoulder rolls foward x10 reps x1 set         Activity Tolerance:  Activity Tolerance: Patient Tolerated treatment well    Assessment:     Performance deficits / Impairments: Decreased functional mobility , Decreased ADL status, Decreased endurance, Decreased balance, Decreased safe awareness  Prognosis: Good    Discharge Recommendations:  Discharge Recommendations: Home with assist PRN    Patient Education:  Patient Education: safety with transfers and mobility, BUE exercises    Equipment Recommendations: Other: may need LHAE,     Safety:  Safety Devices in place: Yes  Type of devices: Call light within reach, Chair alarm in place, Gait belt, Left in chair    Plan:  Times per week: 6x  Current Treatment Recommendations: Strengthening, Balance Training, Endurance Training, Functional Mobility Training, Self-Care / ADL, Home Management Training, Safety Education & Training, Equipment Evaluation, Education, & procurement, Patient/Caregiver Education & Training    Goals:  Patient goals : To return to independent lifestyle    Short term goals  Time Frame for Short term goals: 1 week   Short term goal 1: Pt to complete functional mobility with no greater than SBA for increased independence with accessing enviornment  Short term goal 2: Pt to tolerate standing greater than 4 minutes with 1-2 UE release and SBA in prep for simple IADL tasks  Short term goal 3: Pt to complete lower body dressing or bathing while using with no greater than CGA using AE prn for increased indep with self care  Short term goal 4: Pt will complete various transfers including to/from the commode or chair with SBA and min cues needed for hand placement to increase her independence with toileting routine.

## 2018-12-20 NOTE — PROGRESS NOTES
TCU BALANCE TEST:    Balance during to/from sit to stand PT: Stand by assistance (12/19/18 1339)  OT: Stand by assistance (12/19/18 0849)   Balance during walking PT: Stand by assistance;Contact guard assistance (12/19/18 1340)  OT: Stand by assistance (12/19/18 0849)   Balance during turn-around and while walking PT: Stand by assistance;Contact guard assistance (12/19/18 1340)  OT: Stand by assistance (12/19/18 0849)   Balance moving on and off toilet Stand by assistance (12/19/18 0849)   Balance during surface to/from surface transfers     Independent = 0  Modified Independent, Supervision, SBA = 1  CGA, Min Assist, Mod Assist, Max Assist, Dependent = 2  Not Tested/No Response = 8

## 2018-12-20 NOTE — PROGRESS NOTES
Nutrition Assessment    Type and Reason for Visit: Reassess (PO & supplement check)    Nutrition Recommendations:   Continue weight, glucose & lab checks 12/20 Na+ 132, K+ 5, Hgb 10.8  Recommend MVI. Continue Oscal & probiotic. Nutrition Assessment:    Pt improving from a nutritional standpoint AEB better intake. Remains at risk for further nutritional compromise r/t cellulitis. Pt request daily Ensure stop, but can continue yogurt. Encouraged adequate protein & healthy intake. Continue to follow. Malnutrition Assessment:  · Malnutrition Status: At risk for malnutrition    Nutrition Risk Level: Moderate    Nutrient Needs:  · Estimated Daily Total Kcal: ~1692 kcals ( 20 kcals/kg on admit weight of 84.6 kg)  · Estimated Daily Protein (g): ~82 grams protein (1.5 grams protein/kg on IBW of 54.5 kg)  Nutrition Diagnosis:   · Problem: Increased nutrient needs  · Etiology: related to Increased demand for energy/nutrients     Signs and symptoms:  as evidenced by  (cellulitis)    Objective Information:  · Nutrition-Focused Physical Findings: Pt reports does not like to waste food & can not cut up meat well. Per her meat is cut up when she gets it & this works well Had requested daily Ensure stopped. Pt + bm today  · Wound Type:  (RLE cellulitis)  · Current Nutrition Therapies:  · Oral Diet Orders: Cardiac   · Oral Diet intake: %  · Oral Nutrition Supplement (ONS) Orders:  (Pt request daily Ensure stopped.  Continue daily yogurt. )  · Anthropometric Measures:  · Ht: 5' 4\" (162.6 cm)   · Current Body Wt: 184 lb 4.9 oz (83.6 kg) (12/20 +1 +2 edema)  · Admission Body Wt: 186 lb 8.2 oz (84.6 kg) (12/13 +1 + 2 edema )  · Usual Body Wt: 165 lb 5.5 oz (75 kg) (4/29/18 per EMR)  · Ideal Body Wt: 120 lb (54.4 kg), BMI Classification: BMI 30.0 - 34.9 Obese Class I (31.7)    Nutrition Interventions:   Modify current ONS  Continued Inpatient Monitoring, Education Initiated, Coordination of Care    Nutrition

## 2018-12-20 NOTE — PROGRESS NOTES
Patient calm and cheerful. Appropriate interaction with staff and family. Patient oriented to person, place and time. HOMA 3mm-2mm, sluggish. Sclera white. Mucous membranes pink and moist. Hair dry. Skin abrasions scattered on arms. Red wound on coccyx protective barrier cream applied. Right leg dressing changed. Right leg red with swelling  Speech clear. Denies difficulty swallowing. Lung sounds clear in anterior, lateral, and posterior aspects. Respirations easy, deep, and unlabored. Denies cough. Apical pulse 74 irregular. Denies pain. Bowel sounds active in all four quadrants. Abdomen flat, soft, and non-tender. States passing gas. Denies pain or burning urinating. Last bowel movement was this morning medium formed. Radial pulse weak and irregular bilaterally. Hand grasp strong bilaterally. Arm drift negative bilaterally. Right hand INT clear without redness or swelling. Capillary refill less than three seconds, sluggish. Skin turgor sluggish. Edema +1 non-pitting. Pedal pulses weak and irregular. Pedal push and pull strong bilaterally. Ana's sign negative bilaterally. Leg lift strong and equal. Denies numbness and tingling. Patient sitting up in chair. Wheels locked. Call light within reach. Over bed table within reach. Patient denies anything further needs upon exit.

## 2018-12-21 LAB
ANION GAP SERPL CALCULATED.3IONS-SCNC: 9 MEQ/L (ref 8–16)
BUN BLDV-MCNC: 9 MG/DL (ref 7–22)
CALCIUM SERPL-MCNC: 9.1 MG/DL (ref 8.5–10.5)
CHLORIDE BLD-SCNC: 94 MEQ/L (ref 98–111)
CO2: 31 MEQ/L (ref 23–33)
CREAT SERPL-MCNC: 0.6 MG/DL (ref 0.4–1.2)
ERYTHROCYTE [DISTWIDTH] IN BLOOD BY AUTOMATED COUNT: 14 % (ref 11.5–14.5)
ERYTHROCYTE [DISTWIDTH] IN BLOOD BY AUTOMATED COUNT: 48.7 FL (ref 35–45)
GFR SERPL CREATININE-BSD FRML MDRD: > 90 ML/MIN/1.73M2
GLUCOSE BLD-MCNC: 106 MG/DL (ref 70–108)
HCT VFR BLD CALC: 32.3 % (ref 37–47)
HEMOGLOBIN: 10.6 GM/DL (ref 12–16)
MCH RBC QN AUTO: 31.1 PG (ref 26–33)
MCHC RBC AUTO-ENTMCNC: 32.8 GM/DL (ref 32.2–35.5)
MCV RBC AUTO: 94.7 FL (ref 81–99)
PLATELET # BLD: 286 THOU/MM3 (ref 130–400)
PMV BLD AUTO: 8.4 FL (ref 9.4–12.4)
POTASSIUM SERPL-SCNC: 4.6 MEQ/L (ref 3.5–5.2)
RBC # BLD: 3.41 MILL/MM3 (ref 4.2–5.4)
SODIUM BLD-SCNC: 134 MEQ/L (ref 135–145)
WBC # BLD: 5.8 THOU/MM3 (ref 4.8–10.8)

## 2018-12-21 PROCEDURE — 2580000003 HC RX 258: Performed by: FAMILY MEDICINE

## 2018-12-21 PROCEDURE — 2580000003 HC RX 258: Performed by: INTERNAL MEDICINE

## 2018-12-21 PROCEDURE — 6370000000 HC RX 637 (ALT 250 FOR IP): Performed by: FAMILY MEDICINE

## 2018-12-21 PROCEDURE — 1290000000 HC SEMI PRIVATE OTHER R&B

## 2018-12-21 PROCEDURE — 6360000002 HC RX W HCPCS: Performed by: INTERNAL MEDICINE

## 2018-12-21 PROCEDURE — 36415 COLL VENOUS BLD VENIPUNCTURE: CPT

## 2018-12-21 PROCEDURE — 85027 COMPLETE CBC AUTOMATED: CPT

## 2018-12-21 PROCEDURE — 97116 GAIT TRAINING THERAPY: CPT

## 2018-12-21 PROCEDURE — 80048 BASIC METABOLIC PNL TOTAL CA: CPT

## 2018-12-21 PROCEDURE — 97530 THERAPEUTIC ACTIVITIES: CPT

## 2018-12-21 PROCEDURE — 97110 THERAPEUTIC EXERCISES: CPT

## 2018-12-21 PROCEDURE — 2709999900 HC NON-CHARGEABLE SUPPLY

## 2018-12-21 PROCEDURE — 97535 SELF CARE MNGMENT TRAINING: CPT

## 2018-12-21 RX ADMIN — RIVAROXABAN 20 MG: 20 TABLET, FILM COATED ORAL at 17:41

## 2018-12-21 RX ADMIN — FLUOCINONIDE: 0.5 CREAM TOPICAL at 09:23

## 2018-12-21 RX ADMIN — DOCUSATE SODIUM 100 MG: 100 CAPSULE, LIQUID FILLED ORAL at 09:24

## 2018-12-21 RX ADMIN — ACETAMINOPHEN 650 MG: 325 TABLET ORAL at 04:40

## 2018-12-21 RX ADMIN — LISINOPRIL 20 MG: 20 TABLET ORAL at 09:23

## 2018-12-21 RX ADMIN — ATENOLOL 25 MG: 25 TABLET ORAL at 09:24

## 2018-12-21 RX ADMIN — CEFTRIAXONE SODIUM 2 G: 2 INJECTION, POWDER, FOR SOLUTION INTRAMUSCULAR; INTRAVENOUS at 20:18

## 2018-12-21 RX ADMIN — Medication 10 ML: at 10:15

## 2018-12-21 RX ADMIN — FAMOTIDINE 20 MG: 20 TABLET ORAL at 09:24

## 2018-12-21 RX ADMIN — CALCIUM 500 MG: 500 TABLET ORAL at 09:24

## 2018-12-21 RX ADMIN — ACETAMINOPHEN 650 MG: 325 TABLET ORAL at 21:12

## 2018-12-21 RX ADMIN — CEFTRIAXONE SODIUM 2 G: 2 INJECTION, POWDER, FOR SOLUTION INTRAMUSCULAR; INTRAVENOUS at 09:44

## 2018-12-21 RX ADMIN — Medication 10 ML: at 09:45

## 2018-12-21 RX ADMIN — AMLODIPINE BESYLATE 5 MG: 5 TABLET ORAL at 09:24

## 2018-12-21 RX ADMIN — Medication 1 CAPSULE: at 09:24

## 2018-12-21 RX ADMIN — DOCUSATE SODIUM 100 MG: 100 CAPSULE, LIQUID FILLED ORAL at 20:18

## 2018-12-21 RX ADMIN — PRAVASTATIN SODIUM 20 MG: 20 TABLET ORAL at 20:18

## 2018-12-21 ASSESSMENT — PAIN SCALES - GENERAL
PAINLEVEL_OUTOF10: 0
PAINLEVEL_OUTOF10: 6
PAINLEVEL_OUTOF10: 3

## 2018-12-21 ASSESSMENT — PAIN DESCRIPTION - DESCRIPTORS: DESCRIPTORS: SORE;TIGHTNESS

## 2018-12-21 ASSESSMENT — PAIN DESCRIPTION - PROGRESSION: CLINICAL_PROGRESSION: GRADUALLY IMPROVING

## 2018-12-21 ASSESSMENT — PAIN DESCRIPTION - FREQUENCY: FREQUENCY: CONTINUOUS

## 2018-12-21 ASSESSMENT — PAIN DESCRIPTION - ONSET: ONSET: ON-GOING

## 2018-12-21 ASSESSMENT — PAIN DESCRIPTION - LOCATION: LOCATION: LEG

## 2018-12-21 ASSESSMENT — PAIN DESCRIPTION - PAIN TYPE: TYPE: ACUTE PAIN

## 2018-12-21 ASSESSMENT — PAIN DESCRIPTION - ORIENTATION: ORIENTATION: RIGHT;LOWER

## 2018-12-21 ASSESSMENT — PAIN SCALES - WONG BAKER: WONGBAKER_NUMERICALRESPONSE: 2

## 2018-12-21 NOTE — PROGRESS NOTES
[address] [place] [address]  INPATIENT PHYSICAL THERAPY  DAILY NOTE  [facility] TCU 8E - 8E-68/068-A    Time In: 5560  Time Out: 1226  Timed Code Treatment Minutes: 48 Minutes  Minutes: 53          Date: 2018  Patient Name: Abhilash Sánchez,  Gender:  female        MRN: [id number]  : 1929  (80 y.o.)     Referring Practitioner: Ordering: Cricket Pradhan MD. Attending: Seng Lui MD  Diagnosis: RLE cellulitis  Additional Pertinent Hx: 80 y.o. female who presented to [address] [place] [address] with left shoulder pain. Pt notes that symptoms began 3 days ago. Has gradually gotten worse. Rated as 10/10, radiating to the shoulder and elbow. Worse with neck extension. Pt developed fever this morning and was brought to ED. Pt denies any cough, no n/v and no change in bowel. CT abd shows Pneumonia. procal mildly elevated. Doppler R LE negative for DVT. Pt with neck DJD, L shoulder sprain, L RC impingement. To TCU on      Past Medical History:   Diagnosis Date    Arthritis     Atrial fibrillation ([facility] [address]     Cancer ([facility] [address]     skin    Gross hematuria     Dr Johnnie Quinn HTN (hypertension)     Hyperlipidemia     Pneumonia 2018     Past Surgical History:   Procedure Laterality Date    CARPAL TUNNEL RELEASE  10/23/2018    right hand    COLONOSCOPY      EYE SURGERY      FOOT SURGERY      x2    HYSTERECTOMY  1982    KNEE SURGERY  2010    LIVER BIOPSY  2017    SKIN BIOPSY         Restrictions/Precautions:  General Precautions, Fall Risk                    Other position/activity restrictions: No heavy lifting L UE  Left Upper Extremity Brace/Splint: Sling  Left Upper Extremity Brace/Splint: for comfort    Prior Level of Function:  ADL Assistance: Independent  Homemaking Assistance: Independent  Ambulation Assistance: Independent  Transfer Assistance: Independent  Additional Comments: Pt very indp at [facility], ambulates without device and manages all ADLs and IADLs independently.   pt uses walk in

## 2018-12-21 NOTE — PROGRESS NOTES
[facility]  INPATIENT OCCUPATIONAL THERAPY  [facility] T[facility] 8E  DAILY NOTE    Time:  Time In: 315  Time Out: 7801  Timed Code Treatment Minutes: 54 Minutes  Minutes: 55          Date: 2018  Patient Name: Walker Rose,   Gender: female      Room: -68/068-A  MRN: [id number]  : 1929  (80 y.o.)  Referring Practitioner: Gavin Barthel, MD ( Ordering ) Dr. Sarah Clark ( Attending )   Diagnosis: RLE cellulitis   Additional Pertinent Hx: 80 y.o. female who presented to [facility] with left shoulder pain. Pt notes that symptoms began 3 days ago. Has gradually gotten worse. Rated as 10/10, radiating to the shoulder blade and elbow. Worse with neck extension. Pt developed fever this morning and was brought to ED. Pt denies any cough, no n/v and no change in bowel. CT abd shows Pneumonia. procal mildly elevated    Past Medical History:   Diagnosis Date    Arthritis     Atrial fibrillation ([facility] [address]     Cancer ([facility] [address]     skin    Gross hematuria     Dr Homar Smith HTN (hypertension)     Hyperlipidemia     Pneumonia 2018     Past Surgical History:   Procedure Laterality Date    CARPAL TUNNEL RELEASE  10/23/2018    right hand    COLONOSCOPY      EYE SURGERY      FOOT SURGERY      x2    HYSTERECTOMY  1982    KNEE SURGERY  2010    LIVER BIOPSY  2017    SKIN BIOPSY         Restrictions/Precautions:  General Precautions, Fall Risk                    Other position/activity restrictions: No heavy lifting L UE  Left Upper Extremity Brace/Splint: Sling  Left Upper Extremity Brace/Splint: for comfort    Prior Level of Function:  ADL Assistance: Independent  Homemaking Assistance: Independent  Ambulation Assistance: Independent  Transfer Assistance: Independent  Additional Comments: Pt very indp at [facility], ambulates without device and manages all ADLs and IADLs independently.   pt uses walk in shower in basement, laundry is in basement along with treadmill and pulleys for home therex    Subjective       Subjective: Patient call light on upon arrival, requesting to use bathroom. Agreeable to OT session    Overall Orientation Status: Within Normal Limits         Pain:  Pain Assessment  Patient Currently in Pain: Denies       Objective  Overall Cognitive Status: WFL         ADL  Feeding: Independent  Grooming: Supervision (standing sinkside to complete oral care x4 minutes. Setup for hair care seated in chair after washed in shower)  UE Bathing: Modified independent  (seated on tub bench)  LE Bathing: Stand by assistance (seated on tub bench to wash LLE including foot and stood to wash ha area/bottom holding onto grab bar as needed for stability. Did not wash RLE d/t dressing)  UE Dressing: Setup (doff gown and don sweater seated on RTS)  LE Dressing: Minimal assistance (to don B socks. Pt able to doff socks and thread BLE into pants/underwear seated on RTS. Able to pull up pants over hips with SBA)  Toileting: Stand by assistance          Bed mobility  Rolling to Right: Supervision  Supine to Sit: Stand by assistance  Scooting: Supervision    Transfers  Sit to stand: Stand by assistance  Stand to sit: Stand by assistance  Toilet Transfers  Equipment Used: Raised toilet seat with rails  Toilet Transfer: Stand by assistance  Shower Transfers  Shower - Transfer Type: To and From  Shower - Transfer To: Transfer tub bench  Shower Transfers: Stand by assistance  Shower Transfers Comments: Use of grab bars as needed    Balance  Sitting Balance: Supervision  Standing Balance: Stand by assistance         Functional Mobility  Functional - Mobility Device: Rolling Walker  Activity: To/from bathroom; Other  Assist Level: Stand by assistance  Functional Mobility Comments: To/from walk in shower at slow pace. No LOB noted.          Activity Tolerance:  Activity Tolerance: Patient Tolerated treatment well    Assessment:     Performance deficits / Impairments: Decreased functional mobility , Decreased ADL status, Decreased endurance, Decreased balance, Decreased safe awareness  Prognosis: Good    Discharge Recommendations:  Discharge Recommendations: Home with assist PRN    Patient Education:  Patient Education: safety with transfers and mobility, ADL strategies    Equipment Recommendations: Other: may need LHAE,     Safety:  Safety Devices in place: Yes  Type of devices: Call light within reach, Chair alarm in place, Gait belt, Left in chair    Plan:  Times per week: 6x  Current Treatment Recommendations: Strengthening, Balance Training, Endurance Training, Functional Mobility Training, Self-Care / ADL, Home Management Training, Safety Education & Training, Equipment Evaluation, Education, & procurement, Patient/Caregiver Education & Training    Goals:  Patient goals : To return to independent lifestyle    Short term goals  Time Frame for Short term goals: 1 week   Short term goal 1: Pt to complete functional mobility with no greater than SBA for increased independence with accessing enviornment  Short term goal 2: Pt to tolerate standing greater than 4 minutes with 1-2 UE release and SBA in prep for simple IADL tasks  Short term goal 3: Pt to complete lower body dressing or bathing while using with no greater than CGA using AE prn for increased indep with self care  Short term goal 4: Pt will complete various transfers including to/from the commode or chair with SBA and min cues needed for hand placement to increase her independence with toileting routine. Long term goals  Time Frame for Long term goals : 3 weeks  Long term goal 1: Pt will complete simple IADL task Mod I with no cues for safety to increase indep with laundry tasks.    Long term goal 2: Pt will complete BADL routine Mod I with no cues for safety to improve indep with bathing and dressing

## 2018-12-22 LAB
ANION GAP SERPL CALCULATED.3IONS-SCNC: 12 MEQ/L (ref 8–16)
BUN BLDV-MCNC: 8 MG/DL (ref 7–22)
CALCIUM SERPL-MCNC: 9.4 MG/DL (ref 8.5–10.5)
CHLORIDE BLD-SCNC: 95 MEQ/L (ref 98–111)
CO2: 30 MEQ/L (ref 23–33)
CREAT SERPL-MCNC: 0.6 MG/DL (ref 0.4–1.2)
ERYTHROCYTE [DISTWIDTH] IN BLOOD BY AUTOMATED COUNT: 14.1 % (ref 11.5–14.5)
ERYTHROCYTE [DISTWIDTH] IN BLOOD BY AUTOMATED COUNT: 48.3 FL (ref 35–45)
GFR SERPL CREATININE-BSD FRML MDRD: > 90 ML/MIN/1.73M2
GLUCOSE BLD-MCNC: 105 MG/DL (ref 70–108)
GLUCOSE BLD-MCNC: 111 MG/DL (ref 70–108)
HCT VFR BLD CALC: 35.4 % (ref 37–47)
HEMOGLOBIN: 11.5 GM/DL (ref 12–16)
MCH RBC QN AUTO: 30.7 PG (ref 26–33)
MCHC RBC AUTO-ENTMCNC: 32.5 GM/DL (ref 32.2–35.5)
MCV RBC AUTO: 94.7 FL (ref 81–99)
PLATELET # BLD: 341 THOU/MM3 (ref 130–400)
PMV BLD AUTO: 8.6 FL (ref 9.4–12.4)
POTASSIUM SERPL-SCNC: 4.1 MEQ/L (ref 3.5–5.2)
RBC # BLD: 3.74 MILL/MM3 (ref 4.2–5.4)
SODIUM BLD-SCNC: 137 MEQ/L (ref 135–145)
WBC # BLD: 5.9 THOU/MM3 (ref 4.8–10.8)

## 2018-12-22 PROCEDURE — 6360000002 HC RX W HCPCS: Performed by: INTERNAL MEDICINE

## 2018-12-22 PROCEDURE — 36415 COLL VENOUS BLD VENIPUNCTURE: CPT

## 2018-12-22 PROCEDURE — 6370000000 HC RX 637 (ALT 250 FOR IP): Performed by: FAMILY MEDICINE

## 2018-12-22 PROCEDURE — 2580000003 HC RX 258: Performed by: INTERNAL MEDICINE

## 2018-12-22 PROCEDURE — 1290000000 HC SEMI PRIVATE OTHER R&B

## 2018-12-22 PROCEDURE — 97530 THERAPEUTIC ACTIVITIES: CPT

## 2018-12-22 PROCEDURE — 82948 REAGENT STRIP/BLOOD GLUCOSE: CPT

## 2018-12-22 PROCEDURE — 80048 BASIC METABOLIC PNL TOTAL CA: CPT

## 2018-12-22 PROCEDURE — 85027 COMPLETE CBC AUTOMATED: CPT

## 2018-12-22 PROCEDURE — 97535 SELF CARE MNGMENT TRAINING: CPT

## 2018-12-22 PROCEDURE — 2709999900 HC NON-CHARGEABLE SUPPLY

## 2018-12-22 RX ADMIN — FLUOCINONIDE: 0.5 CREAM TOPICAL at 09:51

## 2018-12-22 RX ADMIN — OXYCODONE HYDROCHLORIDE 5 MG: 5 TABLET ORAL at 23:24

## 2018-12-22 RX ADMIN — DOCUSATE SODIUM 100 MG: 100 CAPSULE, LIQUID FILLED ORAL at 19:56

## 2018-12-22 RX ADMIN — FAMOTIDINE 20 MG: 20 TABLET ORAL at 09:50

## 2018-12-22 RX ADMIN — ACETAMINOPHEN 650 MG: 325 TABLET ORAL at 21:47

## 2018-12-22 RX ADMIN — LISINOPRIL 20 MG: 20 TABLET ORAL at 09:50

## 2018-12-22 RX ADMIN — RIVAROXABAN 20 MG: 20 TABLET, FILM COATED ORAL at 18:36

## 2018-12-22 RX ADMIN — DOCUSATE SODIUM 100 MG: 100 CAPSULE, LIQUID FILLED ORAL at 09:51

## 2018-12-22 RX ADMIN — ATENOLOL 25 MG: 25 TABLET ORAL at 09:51

## 2018-12-22 RX ADMIN — CALCIUM 500 MG: 500 TABLET ORAL at 09:50

## 2018-12-22 RX ADMIN — CEFTRIAXONE SODIUM 2 G: 2 INJECTION, POWDER, FOR SOLUTION INTRAMUSCULAR; INTRAVENOUS at 19:56

## 2018-12-22 RX ADMIN — Medication 1 CAPSULE: at 09:51

## 2018-12-22 RX ADMIN — PRAVASTATIN SODIUM 20 MG: 20 TABLET ORAL at 19:56

## 2018-12-22 RX ADMIN — AMLODIPINE BESYLATE 5 MG: 5 TABLET ORAL at 09:51

## 2018-12-22 RX ADMIN — CEFTRIAXONE SODIUM 2 G: 2 INJECTION, POWDER, FOR SOLUTION INTRAMUSCULAR; INTRAVENOUS at 09:53

## 2018-12-22 ASSESSMENT — PAIN DESCRIPTION - ORIENTATION: ORIENTATION: RIGHT;LOWER

## 2018-12-22 ASSESSMENT — PAIN SCALES - GENERAL
PAINLEVEL_OUTOF10: 4
PAINLEVEL_OUTOF10: 0
PAINLEVEL_OUTOF10: 0
PAINLEVEL_OUTOF10: 9
PAINLEVEL_OUTOF10: 4
PAINLEVEL_OUTOF10: 3
PAINLEVEL_OUTOF10: 6

## 2018-12-22 ASSESSMENT — PAIN DESCRIPTION - PAIN TYPE: TYPE: ACUTE PAIN

## 2018-12-22 ASSESSMENT — PAIN DESCRIPTION - LOCATION: LOCATION: LEG

## 2018-12-23 PROBLEM — R78.81 STREPTOCOCCAL BACTEREMIA: Status: ACTIVE | Noted: 2018-12-23

## 2018-12-23 PROBLEM — B95.5 STREPTOCOCCAL BACTEREMIA: Status: ACTIVE | Noted: 2018-12-23

## 2018-12-23 LAB
ANION GAP SERPL CALCULATED.3IONS-SCNC: 6 MEQ/L (ref 8–16)
BUN BLDV-MCNC: 8 MG/DL (ref 7–22)
CALCIUM SERPL-MCNC: 9 MG/DL (ref 8.5–10.5)
CHLORIDE BLD-SCNC: 96 MEQ/L (ref 98–111)
CO2: 34 MEQ/L (ref 23–33)
CREAT SERPL-MCNC: 0.7 MG/DL (ref 0.4–1.2)
ERYTHROCYTE [DISTWIDTH] IN BLOOD BY AUTOMATED COUNT: 14.3 % (ref 11.5–14.5)
ERYTHROCYTE [DISTWIDTH] IN BLOOD BY AUTOMATED COUNT: 49.1 FL (ref 35–45)
GFR SERPL CREATININE-BSD FRML MDRD: 79 ML/MIN/1.73M2
GLUCOSE BLD-MCNC: 104 MG/DL (ref 70–108)
HCT VFR BLD CALC: 32.6 % (ref 37–47)
HEMOGLOBIN: 10.6 GM/DL (ref 12–16)
MCH RBC QN AUTO: 30.7 PG (ref 26–33)
MCHC RBC AUTO-ENTMCNC: 32.5 GM/DL (ref 32.2–35.5)
MCV RBC AUTO: 94.5 FL (ref 81–99)
PLATELET # BLD: 279 THOU/MM3 (ref 130–400)
PMV BLD AUTO: 8.3 FL (ref 9.4–12.4)
POTASSIUM SERPL-SCNC: 4.8 MEQ/L (ref 3.5–5.2)
RBC # BLD: 3.45 MILL/MM3 (ref 4.2–5.4)
SODIUM BLD-SCNC: 136 MEQ/L (ref 135–145)
WBC # BLD: 5.7 THOU/MM3 (ref 4.8–10.8)

## 2018-12-23 PROCEDURE — 6370000000 HC RX 637 (ALT 250 FOR IP): Performed by: FAMILY MEDICINE

## 2018-12-23 PROCEDURE — 97530 THERAPEUTIC ACTIVITIES: CPT

## 2018-12-23 PROCEDURE — 2580000003 HC RX 258: Performed by: FAMILY MEDICINE

## 2018-12-23 PROCEDURE — 2709999900 HC NON-CHARGEABLE SUPPLY

## 2018-12-23 PROCEDURE — 80048 BASIC METABOLIC PNL TOTAL CA: CPT

## 2018-12-23 PROCEDURE — 6360000002 HC RX W HCPCS: Performed by: INTERNAL MEDICINE

## 2018-12-23 PROCEDURE — 97110 THERAPEUTIC EXERCISES: CPT

## 2018-12-23 PROCEDURE — 97116 GAIT TRAINING THERAPY: CPT

## 2018-12-23 PROCEDURE — 36415 COLL VENOUS BLD VENIPUNCTURE: CPT

## 2018-12-23 PROCEDURE — 6360000002 HC RX W HCPCS: Performed by: FAMILY MEDICINE

## 2018-12-23 PROCEDURE — 85027 COMPLETE CBC AUTOMATED: CPT

## 2018-12-23 PROCEDURE — 2580000003 HC RX 258: Performed by: INTERNAL MEDICINE

## 2018-12-23 PROCEDURE — 1290000000 HC SEMI PRIVATE OTHER R&B

## 2018-12-23 RX ORDER — METHYLPREDNISOLONE SODIUM SUCCINATE 40 MG/ML
40 INJECTION, POWDER, LYOPHILIZED, FOR SOLUTION INTRAMUSCULAR; INTRAVENOUS ONCE
Status: COMPLETED | OUTPATIENT
Start: 2018-12-23 | End: 2018-12-23

## 2018-12-23 RX ADMIN — RIVAROXABAN 20 MG: 20 TABLET, FILM COATED ORAL at 17:30

## 2018-12-23 RX ADMIN — CEFTRIAXONE SODIUM 2 G: 2 INJECTION, POWDER, FOR SOLUTION INTRAMUSCULAR; INTRAVENOUS at 09:13

## 2018-12-23 RX ADMIN — CEFTRIAXONE SODIUM 2 G: 2 INJECTION, POWDER, FOR SOLUTION INTRAMUSCULAR; INTRAVENOUS at 20:30

## 2018-12-23 RX ADMIN — TRAMADOL HYDROCHLORIDE 25 MG: 50 TABLET, FILM COATED ORAL at 22:31

## 2018-12-23 RX ADMIN — PRAVASTATIN SODIUM 20 MG: 20 TABLET ORAL at 20:30

## 2018-12-23 RX ADMIN — CALCIUM 500 MG: 500 TABLET ORAL at 09:12

## 2018-12-23 RX ADMIN — FAMOTIDINE 20 MG: 20 TABLET ORAL at 09:12

## 2018-12-23 RX ADMIN — AMLODIPINE BESYLATE 5 MG: 5 TABLET ORAL at 09:12

## 2018-12-23 RX ADMIN — Medication 1 CAPSULE: at 09:12

## 2018-12-23 RX ADMIN — METHYLPREDNISOLONE SODIUM SUCCINATE 40 MG: 40 INJECTION, POWDER, FOR SOLUTION INTRAMUSCULAR; INTRAVENOUS at 17:42

## 2018-12-23 RX ADMIN — Medication 10 ML: at 17:42

## 2018-12-23 RX ADMIN — DOCUSATE SODIUM 100 MG: 100 CAPSULE, LIQUID FILLED ORAL at 09:12

## 2018-12-23 RX ADMIN — ATENOLOL 25 MG: 25 TABLET ORAL at 09:12

## 2018-12-23 RX ADMIN — LISINOPRIL 20 MG: 20 TABLET ORAL at 09:12

## 2018-12-23 ASSESSMENT — PAIN DESCRIPTION - LOCATION: LOCATION: SHOULDER

## 2018-12-23 ASSESSMENT — PAIN SCALES - GENERAL
PAINLEVEL_OUTOF10: 0
PAINLEVEL_OUTOF10: 5
PAINLEVEL_OUTOF10: 0
PAINLEVEL_OUTOF10: 0

## 2018-12-23 ASSESSMENT — PAIN DESCRIPTION - PAIN TYPE: TYPE: ACUTE PAIN

## 2018-12-23 ASSESSMENT — PAIN DESCRIPTION - FREQUENCY: FREQUENCY: INTERMITTENT

## 2018-12-23 ASSESSMENT — PAIN DESCRIPTION - ORIENTATION: ORIENTATION: LEFT

## 2018-12-23 ASSESSMENT — PAIN DESCRIPTION - ONSET: ONSET: ON-GOING

## 2018-12-23 ASSESSMENT — PAIN DESCRIPTION - DESCRIPTORS: DESCRIPTORS: ACHING

## 2018-12-23 ASSESSMENT — PAIN DESCRIPTION - PROGRESSION: CLINICAL_PROGRESSION: GRADUALLY IMPROVING

## 2018-12-23 NOTE — PROGRESS NOTES
shower in basement, laundry is in basement along with treadmill and pulleys for home therex    Subjective:  Response To Previous Treatment: Patient with no complaints from previous session. Family / Caregiver Present: No  Comments: pt. reported alot of left shoulder pain today and when speaking about going home tomorrow pt. became tearful stating\"I don't want to a burden to my family, now I have my shoulder to deal with\"  Subjective: Patient in recliner  upon therapy arrival requesting to go to the bathroom. Patient agreeable to therapy. pt. reports DR. Rodney Lopez was in to check her leg this morning and said I'm going home tomorrow\"    Pain:   .  Pain Assessment  Pain Level: 0 (no # given for left shoulder pain)       Social/Functional:  Lives With: Alone  Type of Home: House  Home Layout: Two level, Laundry in basement, Able to Live on Main level with bedroom/bathroom  Home Access: Stairs to enter with rails  Entrance Stairs - Number of Steps: 2 steps to enter home from the garage  Entrance Stairs - Rails: Right  Home Equipment: Cane, Rolling walker     Objective:  Scooting: Modified independent    Transfers  Sit to Stand: Stand by assistance  Stand to sit: Stand by assistance     (sba with toilet transfer with rolling walker, ets. no assist with hygiene and clothing management)       Ambulation 1  Surface: level tile  Device: Rolling Walker  Other Apparatus: Wheelchair follow  Assistance: Stand by assistance  Quality of Gait: decreased alie, step to pattern, forward flexed posture, dowanward gaze, no LOB, steady, good foot clearance  Distance:  10' x 1,180' x 1,200' x 1  Comments: VCs for posture and fwd gaze, cueing for step through gait pattern.  Able to self correct and cont with correction for short periods of time         Balance  Comments: patient performed bathroom tasks standing at sink -> washing face, brushing teeth, combing hair with SBA-> supervision, no LOB, ~10 min    Exercises:  Exercises  Comments: Performed seated BLE exercises: heel-toe raises. with 1# wt to B LE'S :  marches, long arc quads, hip abd/ add , hip add ball squeezes, green tband hamstring curls, hip abd/add all x15 reps  each  and 12 reps each b le standing heelraises, marching, hip abd/add, ham curls, str. leg hip flexion with b ue support with close sba to increase strength for improved functional mobility. Activity Tolerance:  Activity Tolerance: Patient Tolerated treatment well  Activity Tolerance: good motivation today      Discharge Recommendations:  Discharge Recommendations: Home with assist PRN, Patient would benefit from continued therapy after discharge, Continue to assess pending progress    Patient Education:  Patient Education: transfers, therex, gait, stair training, curb step   (12-21-18 am: car transfer ,amb. various surfaces with rolling walker, ascend/descend porch step with rolling walker, ther. ex review)    Equipment Recommendations:  Equipment Needed: No  Other: continue to monitor    Safety:  Type of devices:  All fall risk precautions in place, Patient at risk for falls, Call light within reach, Left in chair, Chair alarm in place, Gait belt    Plan:  Times per week: 6x  Current Treatment Recommendations: Strengthening, Home Exercise Program, Safety Education & Training, Balance Training, Functional Mobility Training, Transfer Training, Gait Training, Stair training, Equipment Evaluation, Education, & procurement, Patient/Caregiver Education & Training, Endurance Training    Goals:  Patient goals : move better, go home    Short term goals  Time Frame for Short term goals: 10 days  Short term goal 1: Pt to perform supine to/from sit at [facility] to get in and out of bed  Short term goal 2: Pt to perform sit to/from stand at [facility] to rise from bed or chair  Short term goal 3: Pt to ambulate >50ft with RW at A to walk to and from restroom  Short term goal 4: Pt to negotiate 2 steps with R ascending rail at [facility] for home

## 2018-12-24 VITALS
BODY MASS INDEX: 31.09 KG/M2 | DIASTOLIC BLOOD PRESSURE: 67 MMHG | TEMPERATURE: 98.2 F | HEART RATE: 74 BPM | HEIGHT: 64 IN | RESPIRATION RATE: 20 BRPM | OXYGEN SATURATION: 94 % | WEIGHT: 182.1 LBS | SYSTOLIC BLOOD PRESSURE: 155 MMHG

## 2018-12-24 PROBLEM — E87.1 HYPONATREMIA: Status: ACTIVE | Noted: 2018-12-24

## 2018-12-24 LAB
ANION GAP SERPL CALCULATED.3IONS-SCNC: 14 MEQ/L (ref 8–16)
BUN BLDV-MCNC: 9 MG/DL (ref 7–22)
CALCIUM SERPL-MCNC: 9.7 MG/DL (ref 8.5–10.5)
CHLORIDE BLD-SCNC: 93 MEQ/L (ref 98–111)
CO2: 27 MEQ/L (ref 23–33)
CREAT SERPL-MCNC: 0.7 MG/DL (ref 0.4–1.2)
ERYTHROCYTE [DISTWIDTH] IN BLOOD BY AUTOMATED COUNT: 14.1 % (ref 11.5–14.5)
ERYTHROCYTE [DISTWIDTH] IN BLOOD BY AUTOMATED COUNT: 48.3 FL (ref 35–45)
GFR SERPL CREATININE-BSD FRML MDRD: 79 ML/MIN/1.73M2
GLUCOSE BLD-MCNC: 185 MG/DL (ref 70–108)
HCT VFR BLD CALC: 40.1 % (ref 37–47)
HEMOGLOBIN: 12.9 GM/DL (ref 12–16)
MCH RBC QN AUTO: 30.3 PG (ref 26–33)
MCHC RBC AUTO-ENTMCNC: 32.2 GM/DL (ref 32.2–35.5)
MCV RBC AUTO: 94.1 FL (ref 81–99)
PLATELET # BLD: 386 THOU/MM3 (ref 130–400)
PMV BLD AUTO: 8.8 FL (ref 9.4–12.4)
POTASSIUM SERPL-SCNC: 4.3 MEQ/L (ref 3.5–5.2)
RBC # BLD: 4.26 MILL/MM3 (ref 4.2–5.4)
SODIUM BLD-SCNC: 134 MEQ/L (ref 135–145)
WBC # BLD: 6.8 THOU/MM3 (ref 4.8–10.8)

## 2018-12-24 PROCEDURE — 97530 THERAPEUTIC ACTIVITIES: CPT

## 2018-12-24 PROCEDURE — 6360000002 HC RX W HCPCS: Performed by: INTERNAL MEDICINE

## 2018-12-24 PROCEDURE — 2709999900 HC NON-CHARGEABLE SUPPLY

## 2018-12-24 PROCEDURE — 6370000000 HC RX 637 (ALT 250 FOR IP): Performed by: FAMILY MEDICINE

## 2018-12-24 PROCEDURE — 97535 SELF CARE MNGMENT TRAINING: CPT

## 2018-12-24 PROCEDURE — 80048 BASIC METABOLIC PNL TOTAL CA: CPT

## 2018-12-24 PROCEDURE — 2580000003 HC RX 258: Performed by: INTERNAL MEDICINE

## 2018-12-24 PROCEDURE — 97110 THERAPEUTIC EXERCISES: CPT

## 2018-12-24 PROCEDURE — 36415 COLL VENOUS BLD VENIPUNCTURE: CPT

## 2018-12-24 PROCEDURE — 85027 COMPLETE CBC AUTOMATED: CPT

## 2018-12-24 RX ORDER — TRAMADOL HYDROCHLORIDE 50 MG/1
50 TABLET ORAL EVERY 6 HOURS PRN
Qty: 30 TABLET | Refills: 0 | Status: SHIPPED | OUTPATIENT
Start: 2018-12-24 | End: 2018-12-31

## 2018-12-24 RX ORDER — AMOXICILLIN AND CLAVULANATE POTASSIUM 875; 125 MG/1; MG/1
1 TABLET, FILM COATED ORAL 2 TIMES DAILY
Qty: 20 TABLET | Refills: 0 | Status: SHIPPED | OUTPATIENT
Start: 2018-12-24 | End: 2019-01-03

## 2018-12-24 RX ORDER — LACTOBACILLUS RHAMNOSUS GG 10B CELL
1 CAPSULE ORAL
Status: ON HOLD | COMMUNITY
Start: 2018-12-24 | End: 2022-05-16 | Stop reason: HOSPADM

## 2018-12-24 RX ORDER — LISINOPRIL 20 MG/1
20 TABLET ORAL DAILY
Qty: 30 TABLET | Refills: 0 | Status: ON HOLD | OUTPATIENT
Start: 2018-12-24 | End: 2022-05-24 | Stop reason: HOSPADM

## 2018-12-24 RX ADMIN — AMLODIPINE BESYLATE 5 MG: 5 TABLET ORAL at 08:48

## 2018-12-24 RX ADMIN — Medication 1 CAPSULE: at 08:49

## 2018-12-24 RX ADMIN — LISINOPRIL 20 MG: 20 TABLET ORAL at 08:49

## 2018-12-24 RX ADMIN — FAMOTIDINE 20 MG: 20 TABLET ORAL at 08:48

## 2018-12-24 RX ADMIN — ATENOLOL 25 MG: 25 TABLET ORAL at 08:48

## 2018-12-24 RX ADMIN — FLUOCINONIDE: 0.5 CREAM TOPICAL at 08:50

## 2018-12-24 RX ADMIN — CEFTRIAXONE SODIUM 2 G: 2 INJECTION, POWDER, FOR SOLUTION INTRAMUSCULAR; INTRAVENOUS at 08:50

## 2018-12-24 RX ADMIN — CALCIUM 500 MG: 500 TABLET ORAL at 08:48

## 2018-12-24 ASSESSMENT — PAIN SCALES - GENERAL
PAINLEVEL_OUTOF10: 0
PAINLEVEL_OUTOF10: 0

## 2018-12-24 NOTE — DISCHARGE INSTR - COC
Continuity of Care Form    Patient Name: Jessica Gaines   :  1929  MRN:  [id number]    Admit date:  2018  Discharge date: 18      Code Status Order: Limited   Advance Directives:   [facility] Documentation     Date/Time Healthcare Directive Type of Healthcare Directive Copy in [address] Agent's Name Healthcare Agent's Phone Number    18 1249  Yes, patient has an advance directive for healthcare treatment  Living will;Durable power of  for health care  No, copy requested from family  Adult Children  Veronica Herrera and Chantal Griggs  [phone]           Admitting Physician:  Parrish Tipton MD  PCP: Shira Mittal    Discharging Nurse: [facility] Unit/Room#: 8E-68/068-A  Discharging Unit Phone Number: ***    Emergency Contact:   Extended Emergency Contact Information  Primary Emergency Contact: [facility] Phone: [phone]  Relation: Child  Secondary Emergency Contact:  [address] [place] [address] Phone: [phone]  Relation: Child    Past Surgical History:  Past Surgical History:   Procedure Laterality Date    CARPAL TUNNEL RELEASE  10/23/2018    right hand    COLONOSCOPY      EYE SURGERY      FOOT SURGERY      x2    HYSTERECTOMY  1982    KNEE SURGERY  2010    LIVER BIOPSY  2017    SKIN BIOPSY         Immunization History:   Immunization History   Administered Date(s) Administered    PPD Test 2018, 2018       Active Problems:  Patient Active Problem List   Diagnosis Code    Renal mass, right N28.89    GISELL (stress urinary incontinence, female) N39.3    [place] R23.2    Uncontrolled hypertension I10    Chronic atrial fibrillation (HCC) I48.2    Hepatic lesion K76.9    Chest pain R07.9    Hypertensive urgency I16.0    Atypical chest pain R07.89    Pneumonia J18.9    Acute pain of left shoulder Risk for Falls    Impairments/Disabilities:      None    Nutrition Therapy:  Current Nutrition Therapy:   - Oral Diet:  General    Routes of Feeding: Oral  Liquids: No Restrictions  Daily Fluid Restriction: no  Last Modified Barium Swallow with Video (Video Swallowing Test): not done    Treatments at the Time of Hospital Discharge:   Respiratory Treatments: ***  Oxygen Therapy:  is not on home oxygen therapy. Ventilator:    - No ventilator support    Rehab Therapies: Physical Therapy and Occupational Therapy  Weight Bearing Status/Restrictions: No weight bearing restirctions  Other Medical Equipment (for information only, NOT a DME order):  walker and bedside commode  Other Treatments: none    Patient's personal belongings (please select all that are sent with patient):  Jose    RN SIGNATURE:  {Esignature:[id number]}    CASE MANAGEMENT/SOCIAL WORK SECTION    Inpatient Status Date: ***    Readmission Risk Assessment Score:  Readmission Risk              Risk of Unplanned Readmission:        15           Discharging to Facility/ Agency   · Name:   · Address:  · Phone:  · Fax:    Dialysis Facility (if applicable)   · Name:  · Address:  · Dialysis Schedule:  · Phone:  · Fax:    / signature: {Esignature:[id number]}    PHYSICIAN SECTION    Prognosis: {Prognosis:7787291256}    Condition at Discharge: [facility] Patient Condition:277810442}    Rehab Potential (if transferring to Rehab): {Prognosis:6585682133}    Recommended Labs or Other Treatments After Discharge: ***    Physician Certification: I certify the above information and transfer of Sammy Be  is necessary for the continuing treatment of the diagnosis listed and that she requires {Admit to Appropriate Level of Care:78283} for {GREATER/LESS:439745617} 30 days.      Update Admission H&P: {CHP DME Changes in [facility]:594380142}    PHYSICIAN SIGNATURE:  {Esignature:[id number]}

## 2018-12-24 NOTE — PROGRESS NOTES
Tolerance:  Activity Tolerance: Patient Tolerated treatment well    Assessment: Body structures, Functions, Activity limitations: Decreased functional mobility , Decreased endurance, Decreased balance, Decreased strength  Assessment: Patient tolerated session well. Patient motivated to return home. Patient would benefit from contineud skilled physical therapy to improve functional mobility and independence prior to returning home. Prognosis: Good          Discharge Recommendations:  Discharge Recommendations: Home with assist PRN, Patient would benefit from continued therapy after discharge, Continue to assess pending progress    Patient Education:  Patient Education: transfers, therex, gait, stair training, curb step   (12-21-18 am: car transfer ,amb. various surfaces with rolling walker, ascend/descend porch step with rolling walker, ther. ex review)    Equipment Recommendations:  Equipment Needed: No  Other: continue to monitor    Safety:  Type of devices:  All fall risk precautions in place, Patient at risk for falls, Call light within reach, Left in chair, Chair alarm in place, Gait belt    Plan:  Times per week: d/c to home with [facility] and [facility] RN 12/24  Current Treatment Recommendations: Strengthening, Home Exercise Program, Safety Education & Training, Balance Training, Functional Mobility Training, Transfer Training, Gait Training, Stair training, Equipment Evaluation, Education, & procurement, Patient/Caregiver Education & Training, Endurance Training    Goals:  Patient goals : move better, go home    Short term goals  Time Frame for Short term goals: 10 days  Short term goal 1: Pt to perform supine to/from sit at [facility] to get in and out of bed MET  Short term goal 2: Pt to perform sit to/from stand at [facility] to rise from bed or chair MET  Short term goal 3: Pt to ambulate >50ft with RW at [facility] to walk to and from restroom MET  Short term goal 4: Pt to negotiate 2 steps with R ascending rail at [facility] for home access MET    Long term goals  Time Frame for Long term goals : 3 weeks  Long term goal 1: Pt to perform supine to/from sit at mod I to get in and out of bed MET  Long term goal 2: Pt to perform sit to/from stand at mod I to rise from bed or chair MET  Long term goal 3: Pt to ambulate >100ft with RW at mod I to walk to and from restroom MET  Long term goal 4: Pt to negotiate 2 steps with R ascending rail at S for home access MET

## 2018-12-27 ENCOUNTER — TELEPHONE (OUTPATIENT)
Dept: WOUND CARE | Age: 83
End: 2018-12-27

## 2018-12-27 NOTE — PROGRESS NOTES
opposite-being so fidgety or restless that s/he has been moving around a lot more than usual   0   0   I. Thoughts that you would be better off dead, or of hurting yourself in some way. 0 0              Patient Name: Walker Roes        MRN: [id number]    : 1929  (80 y.o.)  Gender: female   Principal Problem: <principal problem not specified>    Section J    Health Conditions    Should Pain Assessment Interview be Conducted? Attempt to conduct interview with all residents. If resident is comatose, skip to , Shortness of Breath (dyspnea)   Enter Code  1 0 - No à (resident is rarely/never understood) à Skip to [address] of Breath  1 - Yes à Continue to , Pain Presence   Pain Assessment Interview   Pain Presence   Enter Code  1 Ask resident: Fercho Gracia you had pain or hurting at any time in the last 5 days?   0 - No à Skip to , Shortness of Breath  1 - Yes  à Continue to , Pain Frequency  9 - Unable to answer à Skip to , Shortness of Breath    Pain Frequency   Enter Code          3 Ask resident: Ben Najera much of the time have you experienced pain or hurting over the last 5 days?   1 - Almost constantly  2 - Frequently  3 - Occasionally  4 - Rarely  9 - Unable to answer    Pain Effect on Function   Enter Code      0 Ask resident: Dinah Colon the last 5 days, has pain made it hard for you to sleep at night?   0 - No   1 - Yes   9 - Unable to answer    Enter Code      0 Ask resident: Dinah Schaeferari the last 5 days, have you limited your day-to-day activities because of pain?   0 - No   1 - Yes   9 - Unable to answer     Pain Intensity   Enter Code      06 A. Numeric Rating Scale (00-10)  Ask resident: Antonina Mcdermott rate your worst pain over the last 5 days on a zero to ten scale, with zero being no pain and ten as the worst pain you can imagine.  (Show resident 00-10 pain scale)  Enter two-digit response. Enter 99 if unable to answer.

## 2018-12-27 NOTE — PROGRESS NOTES
Pt calls stating she is having diarrhea and fecal incontinence since she has started ATB Augmentin. Called Dr. Guille Jimenez to inform of patient's side effect. Dr. Guille Jimenez states to hold antibiotic for now. If diarrhea persists after a 2-3 days, pt to call and we may need to culture stool for cdiff. Pt to drink gatorade. Called and informed pt to hold Augmentin, to drink gatorade and to call wound clinic if diarrhea does not get better in 2-3 days. Pt verbalizes understanding.

## 2019-01-09 ENCOUNTER — HOSPITAL ENCOUNTER (OUTPATIENT)
Dept: WOUND CARE | Age: 84
Discharge: HOME | End: 2019-01-09
Payer: MEDICARE

## 2019-01-09 VITALS
DIASTOLIC BLOOD PRESSURE: 80 MMHG | RESPIRATION RATE: 18 BRPM | BODY MASS INDEX: 30.73 KG/M2 | HEART RATE: 91 BPM | OXYGEN SATURATION: 97 % | WEIGHT: 180 LBS | TEMPERATURE: 98.6 F | HEIGHT: 64 IN | SYSTOLIC BLOOD PRESSURE: 164 MMHG

## 2019-01-09 PROCEDURE — 99213 OFFICE O/P EST LOW 20 MIN: CPT

## 2019-01-09 PROCEDURE — 2709999900 HC NON-CHARGEABLE SUPPLY

## 2019-01-15 NOTE — DISCHARGE SUMMARY
PCP             Multiple Vitamins-Minerals (OCUVITE PO)  Take 1 tablet by mouth daily              pravastatin (PRAVACHOL) 20 MG tablet  Take 20 mg by mouth daily.              rivaroxaban (XARELTO) 20 MG TABS tablet  Take 20 mg by mouth daily                  35 minutes spent preparing the patient for discharge    Jenelle Olivier MD

## 2019-01-23 ENCOUNTER — HOSPITAL ENCOUNTER (OUTPATIENT)
Dept: WOUND CARE | Age: 84
Discharge: HOME | End: 2019-01-23
Payer: MEDICARE

## 2019-01-23 VITALS
WEIGHT: 180 LBS | HEART RATE: 75 BPM | TEMPERATURE: 97.8 F | BODY MASS INDEX: 30.73 KG/M2 | HEIGHT: 64 IN | DIASTOLIC BLOOD PRESSURE: 73 MMHG | RESPIRATION RATE: 18 BRPM | OXYGEN SATURATION: 93 % | SYSTOLIC BLOOD PRESSURE: 167 MMHG

## 2019-01-23 DIAGNOSIS — L03.115 CELLULITIS OF RIGHT LOWER EXTREMITY: Primary | ICD-10-CM

## 2019-01-23 PROCEDURE — 2709999900 HC NON-CHARGEABLE SUPPLY

## 2019-01-23 PROCEDURE — 99213 OFFICE O/P EST LOW 20 MIN: CPT

## 2019-01-23 RX ORDER — FUROSEMIDE 40 MG/1
40 TABLET ORAL 2 TIMES DAILY
Status: ON HOLD | COMMUNITY
End: 2022-05-24 | Stop reason: HOSPADM

## 2019-01-23 ASSESSMENT — PAIN SCALES - GENERAL: PAINLEVEL_OUTOF10: 0

## 2019-04-17 ENCOUNTER — HOSPITAL ENCOUNTER (OUTPATIENT)
Age: 84
Discharge: HOME | End: 2019-04-17
Payer: MEDICARE

## 2019-04-17 LAB
ANION GAP SERPL CALCULATED.3IONS-SCNC: 13 MEQ/L (ref 8–16)
BUN BLDV-MCNC: 23 MG/DL (ref 7–22)
CALCIUM SERPL-MCNC: 10.1 MG/DL (ref 8.5–10.5)
CHLORIDE BLD-SCNC: 95 MEQ/L (ref 98–111)
CO2: 31 MEQ/L (ref 23–33)
CREAT SERPL-MCNC: 0.8 MG/DL (ref 0.4–1.2)
GFR SERPL CREATININE-BSD FRML MDRD: 67 ML/MIN/1.73M2
GLUCOSE BLD-MCNC: 117 MG/DL (ref 70–108)
POTASSIUM SERPL-SCNC: 5.3 MEQ/L (ref 3.5–5.2)
SODIUM BLD-SCNC: 139 MEQ/L (ref 135–145)

## 2019-04-17 PROCEDURE — 36415 COLL VENOUS BLD VENIPUNCTURE: CPT

## 2019-04-17 PROCEDURE — 80048 BASIC METABOLIC PNL TOTAL CA: CPT

## 2019-07-01 ENCOUNTER — HOSPITAL ENCOUNTER (OUTPATIENT)
Age: 84
Discharge: HOME | End: 2019-07-01
Payer: MEDICARE

## 2019-07-01 LAB
ALBUMIN SERPL-MCNC: 4.4 G/DL (ref 3.5–5.1)
ALP BLD-CCNC: 66 U/L (ref 38–126)
ALT SERPL-CCNC: 19 U/L (ref 11–66)
ANION GAP SERPL CALCULATED.3IONS-SCNC: 13 MEQ/L (ref 8–16)
AST SERPL-CCNC: 25 U/L (ref 5–40)
BILIRUB SERPL-MCNC: 0.8 MG/DL (ref 0.3–1.2)
BILIRUBIN DIRECT: < 0.2 MG/DL (ref 0–0.3)
BUN BLDV-MCNC: 21 MG/DL (ref 7–22)
CALCIUM SERPL-MCNC: 10.3 MG/DL (ref 8.5–10.5)
CHLORIDE BLD-SCNC: 100 MEQ/L (ref 98–111)
CHOLESTEROL, TOTAL: 139 MG/DL (ref 100–199)
CO2: 31 MEQ/L (ref 23–33)
CREAT SERPL-MCNC: 0.7 MG/DL (ref 0.4–1.2)
GFR SERPL CREATININE-BSD FRML MDRD: 79 ML/MIN/1.73M2
GLUCOSE BLD-MCNC: 111 MG/DL (ref 70–108)
HDLC SERPL-MCNC: 53 MG/DL
LDL CHOLESTEROL CALCULATED: 63 MG/DL
POTASSIUM SERPL-SCNC: 4.4 MEQ/L (ref 3.5–5.2)
SODIUM BLD-SCNC: 144 MEQ/L (ref 135–145)
TOTAL PROTEIN: 7.4 G/DL (ref 6.1–8)
TRIGL SERPL-MCNC: 116 MG/DL (ref 0–199)

## 2019-07-01 PROCEDURE — 80061 LIPID PANEL: CPT

## 2019-07-01 PROCEDURE — 82248 BILIRUBIN DIRECT: CPT

## 2019-07-01 PROCEDURE — 80053 COMPREHEN METABOLIC PANEL: CPT

## 2019-07-01 PROCEDURE — 36415 COLL VENOUS BLD VENIPUNCTURE: CPT

## 2019-07-13 ENCOUNTER — HOSPITAL ENCOUNTER (OUTPATIENT)
Age: 84
Discharge: HOME | End: 2019-07-13
Payer: MEDICARE

## 2019-07-13 LAB
BACTERIA: ABNORMAL /HPF
BILIRUBIN URINE: NEGATIVE
BLOOD, URINE: ABNORMAL
CASTS 2: ABNORMAL /LPF
CASTS UA: ABNORMAL /LPF
CHARACTER, URINE: CLEAR
COLOR: YELLOW
CRYSTALS, UA: ABNORMAL
EPITHELIAL CELLS, UA: ABNORMAL /HPF
GLUCOSE URINE: NEGATIVE MG/DL
KETONES, URINE: NEGATIVE
LEUKOCYTE ESTERASE, URINE: NEGATIVE
MISCELLANEOUS 2: ABNORMAL
NITRITE, URINE: NEGATIVE
PH UA: 7 (ref 5–9)
PROTEIN UA: NEGATIVE
RBC URINE: ABNORMAL /HPF
RENAL EPITHELIAL, UA: ABNORMAL
SPECIFIC GRAVITY, URINE: <= 1.005 (ref 1–1.03)
UROBILINOGEN, URINE: 0.2 EU/DL (ref 0–1)
WBC UA: ABNORMAL /HPF
YEAST: ABNORMAL

## 2019-07-13 PROCEDURE — 81001 URINALYSIS AUTO W/SCOPE: CPT

## 2019-10-23 ENCOUNTER — OFFICE VISIT (OUTPATIENT)
Dept: UROLOGY | Age: 84
End: 2019-10-23
Payer: MEDICARE

## 2019-10-23 VITALS
DIASTOLIC BLOOD PRESSURE: 64 MMHG | HEIGHT: 61 IN | SYSTOLIC BLOOD PRESSURE: 98 MMHG | BODY MASS INDEX: 30.58 KG/M2 | WEIGHT: 162 LBS

## 2019-10-23 DIAGNOSIS — R31.29 MICROSCOPIC HEMATURIA: ICD-10-CM

## 2019-10-23 DIAGNOSIS — N28.1 RENAL CYST: Primary | ICD-10-CM

## 2019-10-23 LAB
BACTERIA: ABNORMAL
BILIRUBIN URINE: NEGATIVE
BILIRUBIN URINE: NEGATIVE
BLOOD URINE, POC: ABNORMAL
BLOOD, URINE: NEGATIVE
CASTS: ABNORMAL /LPF
CASTS: ABNORMAL /LPF
CHARACTER, URINE: CLEAR
CHARACTER, URINE: CLEAR
COLOR, URINE: YELLOW
COLOR: YELLOW
CRYSTALS: ABNORMAL
EPITHELIAL CELLS, UA: ABNORMAL /HPF
GLUCOSE URINE: NEGATIVE MG/DL
GLUCOSE, URINE: NEGATIVE MG/DL
KETONES, URINE: NEGATIVE
KETONES, URINE: NEGATIVE
LEUKOCYTE CLUMPS, URINE: ABNORMAL
LEUKOCYTE ESTERASE, URINE: ABNORMAL
MISCELLANEOUS LAB TEST RESULT: ABNORMAL
NITRITE, URINE: NEGATIVE
NITRITE, URINE: NEGATIVE
PH UA: 7.5 (ref 5–9)
PH, URINE: 7 (ref 5–9)
PROTEIN UA: NEGATIVE MG/DL
PROTEIN, URINE: NEGATIVE MG/DL
RBC URINE: ABNORMAL /HPF
RENAL EPITHELIAL, UA: ABNORMAL
SPECIFIC GRAVITY UA: 1.01 (ref 1–1.03)
SPECIFIC GRAVITY, URINE: 1.02 (ref 1–1.03)
UROBILINOGEN, URINE: 0.2 EU/DL (ref 0–1)
UROBILINOGEN, URINE: 0.2 EU/DL (ref 0–1)
WBC UA: ABNORMAL /HPF
YEAST: ABNORMAL

## 2019-10-23 PROCEDURE — 81003 URINALYSIS AUTO W/O SCOPE: CPT | Performed by: NURSE PRACTITIONER

## 2019-10-23 PROCEDURE — 99214 OFFICE O/P EST MOD 30 MIN: CPT | Performed by: NURSE PRACTITIONER

## 2019-10-24 ASSESSMENT — ENCOUNTER SYMPTOMS
ABDOMINAL PAIN: 0
BACK PAIN: 0

## 2019-11-04 ENCOUNTER — HOSPITAL ENCOUNTER (OUTPATIENT)
Dept: MRI IMAGING | Age: 84
Discharge: HOME | End: 2019-11-04
Payer: MEDICARE

## 2019-11-04 DIAGNOSIS — N28.1 RENAL CYST: ICD-10-CM

## 2019-11-04 LAB — POC CREATININE WHOLE BLOOD: 0.9 MG/DL (ref 0.5–1.2)

## 2019-11-04 PROCEDURE — 6360000004 HC RX CONTRAST MEDICATION: Performed by: NURSE PRACTITIONER

## 2019-11-04 PROCEDURE — 74183 MRI ABD W/O CNTR FLWD CNTR: CPT

## 2019-11-04 PROCEDURE — A9579 GAD-BASE MR CONTRAST NOS,1ML: HCPCS | Performed by: NURSE PRACTITIONER

## 2019-11-04 PROCEDURE — 82565 ASSAY OF CREATININE: CPT

## 2019-11-04 RX ADMIN — GADOTERIDOL 15 ML: 279.3 INJECTION, SOLUTION INTRAVENOUS at 18:23

## 2019-11-05 ENCOUNTER — TELEPHONE (OUTPATIENT)
Dept: UROLOGY | Age: 84
End: 2019-11-05

## 2019-11-05 DIAGNOSIS — N28.9 RENAL LESION: Primary | ICD-10-CM

## 2019-11-05 DIAGNOSIS — K76.9 HEPATIC LESION: ICD-10-CM

## 2019-11-19 ENCOUNTER — TELEPHONE (OUTPATIENT)
Dept: UROLOGY | Age: 84
End: 2019-11-19

## 2020-01-11 ENCOUNTER — HOSPITAL ENCOUNTER (OUTPATIENT)
Age: 85
Discharge: HOME | End: 2020-01-11
Payer: MEDICARE

## 2020-01-11 LAB
ANION GAP SERPL CALCULATED.3IONS-SCNC: 12 MEQ/L (ref 8–16)
BUN BLDV-MCNC: 23 MG/DL (ref 7–22)
CALCIUM SERPL-MCNC: 10.2 MG/DL (ref 8.5–10.5)
CHLORIDE BLD-SCNC: 98 MEQ/L (ref 98–111)
CO2: 31 MEQ/L (ref 23–33)
CREAT SERPL-MCNC: 0.8 MG/DL (ref 0.4–1.2)
ERYTHROCYTE [DISTWIDTH] IN BLOOD BY AUTOMATED COUNT: 13.7 % (ref 11.5–14.5)
ERYTHROCYTE [DISTWIDTH] IN BLOOD BY AUTOMATED COUNT: 48.7 FL (ref 35–45)
GFR SERPL CREATININE-BSD FRML MDRD: 67 ML/MIN/1.73M2
GLUCOSE BLD-MCNC: 108 MG/DL (ref 70–108)
HCT VFR BLD CALC: 42.9 % (ref 37–47)
HEMOGLOBIN: 13.9 GM/DL (ref 12–16)
MCH RBC QN AUTO: 31.1 PG (ref 26–33)
MCHC RBC AUTO-ENTMCNC: 32.4 GM/DL (ref 32.2–35.5)
MCV RBC AUTO: 96 FL (ref 81–99)
PLATELET # BLD: 156 THOU/MM3 (ref 130–400)
PMV BLD AUTO: 9.5 FL (ref 9.4–12.4)
POTASSIUM SERPL-SCNC: 5.1 MEQ/L (ref 3.5–5.2)
RBC # BLD: 4.47 MILL/MM3 (ref 4.2–5.4)
SODIUM BLD-SCNC: 141 MEQ/L (ref 135–145)
WBC # BLD: 6.2 THOU/MM3 (ref 4.8–10.8)

## 2020-01-11 PROCEDURE — 36415 COLL VENOUS BLD VENIPUNCTURE: CPT

## 2020-01-11 PROCEDURE — 80048 BASIC METABOLIC PNL TOTAL CA: CPT

## 2020-01-11 PROCEDURE — 85027 COMPLETE CBC AUTOMATED: CPT

## 2020-02-14 ENCOUNTER — HOSPITAL ENCOUNTER (OUTPATIENT)
Dept: WOMENS IMAGING | Age: 85
Discharge: HOME | End: 2020-02-14
Payer: MEDICARE

## 2020-02-14 PROCEDURE — 77080 DXA BONE DENSITY AXIAL: CPT

## 2020-02-14 PROCEDURE — 77067 SCR MAMMO BI INCL CAD: CPT

## 2020-04-28 ENCOUNTER — HOSPITAL ENCOUNTER (OUTPATIENT)
Age: 85
Discharge: HOME | End: 2020-04-28
Payer: MEDICARE

## 2020-04-28 LAB
ALBUMIN SERPL-MCNC: 4.3 G/DL (ref 3.5–5.1)
ALP BLD-CCNC: 72 U/L (ref 38–126)
ALT SERPL-CCNC: 21 U/L (ref 11–66)
ANION GAP SERPL CALCULATED.3IONS-SCNC: 10 MEQ/L (ref 8–16)
AST SERPL-CCNC: 31 U/L (ref 5–40)
BILIRUB SERPL-MCNC: 0.7 MG/DL (ref 0.3–1.2)
BUN BLDV-MCNC: 22 MG/DL (ref 7–22)
CALCIUM SERPL-MCNC: 10.2 MG/DL (ref 8.5–10.5)
CHLORIDE BLD-SCNC: 95 MEQ/L (ref 98–111)
CHOLESTEROL, TOTAL: 134 MG/DL (ref 100–199)
CO2: 32 MEQ/L (ref 23–33)
CREAT SERPL-MCNC: 0.8 MG/DL (ref 0.4–1.2)
CREATININE, URINE: 92.6 MG/DL
GFR SERPL CREATININE-BSD FRML MDRD: 67 ML/MIN/1.73M2
GLUCOSE BLD-MCNC: 112 MG/DL (ref 70–108)
HDLC SERPL-MCNC: 47 MG/DL
LDL CHOLESTEROL CALCULATED: 62 MG/DL
MICROALBUMIN UR-MCNC: 4.74 MG/DL
MICROALBUMIN/CREAT UR-RTO: 51 MG/G (ref 0–30)
POTASSIUM SERPL-SCNC: 4.5 MEQ/L (ref 3.5–5.2)
SODIUM BLD-SCNC: 137 MEQ/L (ref 135–145)
TOTAL CK: 90 U/L (ref 30–135)
TOTAL PROTEIN: 7.4 G/DL (ref 6.1–8)
TRIGL SERPL-MCNC: 123 MG/DL (ref 0–199)

## 2020-04-28 PROCEDURE — 80053 COMPREHEN METABOLIC PANEL: CPT

## 2020-04-28 PROCEDURE — 36415 COLL VENOUS BLD VENIPUNCTURE: CPT

## 2020-04-28 PROCEDURE — 82043 UR ALBUMIN QUANTITATIVE: CPT

## 2020-04-28 PROCEDURE — 82550 ASSAY OF CK (CPK): CPT

## 2020-04-28 PROCEDURE — 80061 LIPID PANEL: CPT

## 2020-05-14 ENCOUNTER — APPOINTMENT (OUTPATIENT)
Dept: GENERAL RADIOLOGY | Age: 85
End: 2020-05-14
Payer: MEDICARE

## 2020-05-14 ENCOUNTER — HOSPITAL ENCOUNTER (EMERGENCY)
Age: 85
Discharge: HOME | End: 2020-05-14
Attending: EMERGENCY MEDICINE
Payer: MEDICARE

## 2020-05-14 VITALS
RESPIRATION RATE: 19 BRPM | DIASTOLIC BLOOD PRESSURE: 50 MMHG | TEMPERATURE: 98.2 F | SYSTOLIC BLOOD PRESSURE: 136 MMHG | WEIGHT: 165 LBS | OXYGEN SATURATION: 94 % | HEART RATE: 50 BPM | BODY MASS INDEX: 31.18 KG/M2

## 2020-05-14 LAB
ANION GAP SERPL CALCULATED.3IONS-SCNC: 8 MEQ/L (ref 8–16)
BACTERIA: ABNORMAL
BASOPHILS # BLD: 0.3 %
BASOPHILS ABSOLUTE: 0 THOU/MM3 (ref 0–0.1)
BILIRUBIN URINE: NEGATIVE
BLOOD, URINE: NEGATIVE
BUN BLDV-MCNC: 26 MG/DL (ref 7–22)
CALCIUM SERPL-MCNC: 9.9 MG/DL (ref 8.5–10.5)
CASTS: ABNORMAL /LPF
CASTS: ABNORMAL /LPF
CHARACTER, URINE: ABNORMAL
CHLORIDE BLD-SCNC: 97 MEQ/L (ref 98–111)
CO2: 33 MEQ/L (ref 23–33)
COLOR: YELLOW
CREAT SERPL-MCNC: 0.9 MG/DL (ref 0.4–1.2)
CRYSTALS: ABNORMAL
EKG ATRIAL RATE: 227 BPM
EKG Q-T INTERVAL: 448 MS
EKG QRS DURATION: 142 MS
EKG QTC CALCULATION (BAZETT): 443 MS
EKG R AXIS: -57 DEGREES
EKG T AXIS: -39 DEGREES
EKG VENTRICULAR RATE: 59 BPM
EOSINOPHIL # BLD: 1.2 %
EOSINOPHILS ABSOLUTE: 0.1 THOU/MM3 (ref 0–0.4)
EPITHELIAL CELLS, UA: ABNORMAL /HPF
ERYTHROCYTE [DISTWIDTH] IN BLOOD BY AUTOMATED COUNT: 13.9 % (ref 11.5–14.5)
ERYTHROCYTE [DISTWIDTH] IN BLOOD BY AUTOMATED COUNT: 48.3 FL (ref 35–45)
GFR SERPL CREATININE-BSD FRML MDRD: 59 ML/MIN/1.73M2
GLUCOSE BLD-MCNC: 137 MG/DL (ref 70–108)
GLUCOSE, URINE: NEGATIVE MG/DL
HCT VFR BLD CALC: 42.8 % (ref 37–47)
HEMOGLOBIN: 14 GM/DL (ref 12–16)
IMMATURE GRANS (ABS): 0.01 THOU/MM3 (ref 0–0.07)
IMMATURE GRANULOCYTES: 0.2 %
KETONES, URINE: NEGATIVE
LEUKOCYTE EST, POC: ABNORMAL
LYMPHOCYTES # BLD: 29 %
LYMPHOCYTES ABSOLUTE: 1.7 THOU/MM3 (ref 1–4.8)
MCH RBC QN AUTO: 31.1 PG (ref 26–33)
MCHC RBC AUTO-ENTMCNC: 32.7 GM/DL (ref 32.2–35.5)
MCV RBC AUTO: 95.1 FL (ref 81–99)
MISCELLANEOUS LAB TEST RESULT: ABNORMAL
MONOCYTES # BLD: 8.3 %
MONOCYTES ABSOLUTE: 0.5 THOU/MM3 (ref 0.4–1.3)
NITRITE, URINE: NEGATIVE
NUCLEATED RED BLOOD CELLS: 0 /100 WBC
OSMOLALITY CALCULATION: 282.6 MOSMOL/KG (ref 275–300)
PH UA: 6.5 (ref 5–9)
PLATELET # BLD: 152 THOU/MM3 (ref 130–400)
PMV BLD AUTO: 9.4 FL (ref 9.4–12.4)
POTASSIUM REFLEX MAGNESIUM: 4.8 MEQ/L (ref 3.5–5.2)
PRO-BNP: 1092 PG/ML (ref 0–1800)
PROTEIN UA: ABNORMAL MG/DL
RBC # BLD: 4.5 MILL/MM3 (ref 4.2–5.4)
RBC URINE: ABNORMAL /HPF
RENAL EPITHELIAL, UA: ABNORMAL
SEG NEUTROPHILS: 61 %
SEGMENTED NEUTROPHILS ABSOLUTE COUNT: 3.7 THOU/MM3 (ref 1.8–7.7)
SODIUM BLD-SCNC: 138 MEQ/L (ref 135–145)
SPECIFIC GRAVITY UA: 1.01 (ref 1–1.03)
TROPONIN T: < 0.01 NG/ML
TROPONIN T: < 0.01 NG/ML
UROBILINOGEN, URINE: 0.2 EU/DL (ref 0–1)
WBC # BLD: 6 THOU/MM3 (ref 4.8–10.8)
WBC UA: ABNORMAL /HPF
YEAST: ABNORMAL

## 2020-05-14 PROCEDURE — 80048 BASIC METABOLIC PNL TOTAL CA: CPT

## 2020-05-14 PROCEDURE — 71045 X-RAY EXAM CHEST 1 VIEW: CPT

## 2020-05-14 PROCEDURE — 84484 ASSAY OF TROPONIN QUANT: CPT

## 2020-05-14 PROCEDURE — 93005 ELECTROCARDIOGRAM TRACING: CPT | Performed by: EMERGENCY MEDICINE

## 2020-05-14 PROCEDURE — 85025 COMPLETE CBC W/AUTO DIFF WBC: CPT

## 2020-05-14 PROCEDURE — 99283 EMERGENCY DEPT VISIT LOW MDM: CPT

## 2020-05-14 PROCEDURE — 93010 ELECTROCARDIOGRAM REPORT: CPT | Performed by: INTERNAL MEDICINE

## 2020-05-14 PROCEDURE — 36415 COLL VENOUS BLD VENIPUNCTURE: CPT

## 2020-05-14 PROCEDURE — 83880 ASSAY OF NATRIURETIC PEPTIDE: CPT

## 2020-05-14 PROCEDURE — 81001 URINALYSIS AUTO W/SCOPE: CPT

## 2020-05-14 ASSESSMENT — ENCOUNTER SYMPTOMS
COLOR CHANGE: 1
CONSTIPATION: 0
NAUSEA: 0
SORE THROAT: 0
VOMITING: 0
RHINORRHEA: 0
WHEEZING: 0
ABDOMINAL PAIN: 0
DIARRHEA: 0
COUGH: 0
BACK PAIN: 0

## 2020-05-14 NOTE — ED NOTES
Pt ambulated to restroom to provide urine specimen without complication. Pt returned to cot. VS updated. Pt alert and oriented. Pt breathing easy and unlabored. Urine specimen sent to lab.       Layla Hebert RN  05/14/20 1272

## 2020-05-14 NOTE — ED PROVIDER NOTES
[facility]  eMERGENCY dEPARTMENT eNCOUnter          CHIEF COMPLAINT       Chief Complaint   Patient presents with    Hypertension       Nurses Notes reviewed and I agree except as noted in the HPI. HISTORY OF PRESENT ILLNESS    Marcela Burton is a 80 y.o. female who presents to the Emergency Department for the evaluation of hypertension. The patient reports that her blood pressure has been fluctuating abnormally over that last few months. She reports a long history of hypertension, however, it has been stable for many years with proper medications. Patient reports that the elevated blood pressure feeling flushed. She reports that she followed up with her PCP, Dr. Merritt Rubio (family medicine), on 05/05/2020. She reports that labs were taken but no serious abnormalities were noted. The patient states that her symptoms worsened today after spending time outside unreeling a garden hose. She reports pain over the bilateral shoulders when reeling hose. The patient states that the sensation in her shoulders isn't entirely painful and notes that she, \"just dosent feel like normal.\" Felt sore. Resolved currently. The patient states that she took her blood pressure multiple times today, recording 193/84 in the morning and 164/96 in the afternoon. She reports a pulse rate typically varies between 88-48 bpm. The patient states that she is taking all of her medications as instructed and at their designated times. She denies any chest pain or cough with her symptoms. She denies any abnormalities with her bowel or bladder movements. The patient has an additional medical history of atrial fibrillation and hyperlipidemia. She is currently on a diuretic and denies any swelling or sudden weight gain. The patient denies any other symptoms or relevant history at this time. The HPI was provided by the patient.      REVIEW OF SYSTEMS     Review of Systems   Constitutional: Negative for activity change, chills friction rub. No gallop. Pulmonary:      Effort: Pulmonary effort is normal. No respiratory distress. Breath sounds: Normal breath sounds. No decreased breath sounds, wheezing, rhonchi or rales. Comments: Lung sounds clear to ausculation. Abdominal:      General: Bowel sounds are normal. There is no distension. Palpations: Abdomen is soft. Tenderness: There is no abdominal tenderness. There is no guarding or rebound. Musculoskeletal: Normal range of motion. Right lower leg: No edema. Left lower leg: No edema. Comments: Mild tenderness over the trapezius bilaterally. Skin:     General: Skin is warm and dry. Findings: No rash. Neurological:      Mental Status: She is alert and oriented to person, place, and time. Motor: No abnormal muscle tone. Coordination: Coordination normal.         DIFFERENTIAL DIAGNOSIS:   Including but not limited to hypertensive emergency, musculoskeletal pain, muscle strain, non compliance with medications, dehydration, electrolyte imbalance. DIAGNOSTIC RESULTS     EKG: All EKG's are interpreted by the Emergency Department Physician who either signs or Co-signs this chart in the absence of a cardiologist.  EKG interpreted by Rebeca Durbin, DO:    Vent. Rate: 59 bpm  QRS duration: 142 ms  QTc: 443 ms  P-R-T axes:  -57, -39  Atrial fibrillation with slow ventricular response, no STEMI      RADIOLOGY: non-plain film images(s) such as CT, Ultrasound and MRI are read by the radiologist.    XR CHEST PORTABLE   Final Result      Mild interstitial pulmonary edema versus atypical pneumonia. Mild to moderate cardiomegaly. **This report has been created using voice recognition software. It may contain minor errors which are inherent in voice recognition technology. **      Final report electronically signed by Dr. Kayla Horton on 5/14/2020 1:15 AM           LABS:   Labs Reviewed   CBC WITH AUTO DIFFERENTIAL - Abnormal; Notable for

## 2020-05-15 ASSESSMENT — ENCOUNTER SYMPTOMS
ABDOMINAL DISTENTION: 0
CHEST TIGHTNESS: 0
SINUS PRESSURE: 0
EYE REDNESS: 0
SHORTNESS OF BREATH: 0

## 2020-07-08 ENCOUNTER — HOSPITAL ENCOUNTER (OUTPATIENT)
Age: 85
Discharge: HOME | End: 2020-07-08
Payer: MEDICARE

## 2020-07-08 LAB
ANION GAP SERPL CALCULATED.3IONS-SCNC: 14 MEQ/L (ref 8–16)
BUN BLDV-MCNC: 20 MG/DL (ref 7–22)
CALCIUM SERPL-MCNC: 9.9 MG/DL (ref 8.5–10.5)
CHLORIDE BLD-SCNC: 96 MEQ/L (ref 98–111)
CO2: 28 MEQ/L (ref 23–33)
CREAT SERPL-MCNC: 0.8 MG/DL (ref 0.4–1.2)
ERYTHROCYTE [DISTWIDTH] IN BLOOD BY AUTOMATED COUNT: 13.7 % (ref 11.5–14.5)
ERYTHROCYTE [DISTWIDTH] IN BLOOD BY AUTOMATED COUNT: 49.4 FL (ref 35–45)
GFR SERPL CREATININE-BSD FRML MDRD: 67 ML/MIN/1.73M2
GLUCOSE BLD-MCNC: 105 MG/DL (ref 70–108)
HCT VFR BLD CALC: 45 % (ref 37–47)
HEMOGLOBIN: 14.6 GM/DL (ref 12–16)
MCH RBC QN AUTO: 31.9 PG (ref 26–33)
MCHC RBC AUTO-ENTMCNC: 32.4 GM/DL (ref 32.2–35.5)
MCV RBC AUTO: 98.3 FL (ref 81–99)
PLATELET # BLD: 164 THOU/MM3 (ref 130–400)
PMV BLD AUTO: 9.5 FL (ref 9.4–12.4)
POTASSIUM SERPL-SCNC: 4.3 MEQ/L (ref 3.5–5.2)
RBC # BLD: 4.58 MILL/MM3 (ref 4.2–5.4)
SODIUM BLD-SCNC: 138 MEQ/L (ref 135–145)
WBC # BLD: 6.7 THOU/MM3 (ref 4.8–10.8)

## 2020-07-08 PROCEDURE — 36415 COLL VENOUS BLD VENIPUNCTURE: CPT

## 2020-07-08 PROCEDURE — 85027 COMPLETE CBC AUTOMATED: CPT

## 2020-07-08 PROCEDURE — 80048 BASIC METABOLIC PNL TOTAL CA: CPT

## 2020-10-29 ENCOUNTER — HOSPITAL ENCOUNTER (OUTPATIENT)
Dept: MRI IMAGING | Age: 85
Discharge: HOME | End: 2020-10-29
Payer: MEDICARE

## 2020-10-29 LAB — POC CREATININE WHOLE BLOOD: 0.9 MG/DL (ref 0.5–1.2)

## 2020-10-29 PROCEDURE — 82565 ASSAY OF CREATININE: CPT

## 2020-10-29 PROCEDURE — A9579 GAD-BASE MR CONTRAST NOS,1ML: HCPCS | Performed by: NURSE PRACTITIONER

## 2020-10-29 PROCEDURE — 6360000004 HC RX CONTRAST MEDICATION: Performed by: NURSE PRACTITIONER

## 2020-10-29 PROCEDURE — 74183 MRI ABD W/O CNTR FLWD CNTR: CPT

## 2020-10-29 RX ADMIN — GADOTERIDOL 15 ML: 279.3 INJECTION, SOLUTION INTRAVENOUS at 11:29

## 2020-11-04 ENCOUNTER — HOSPITAL ENCOUNTER (OUTPATIENT)
Age: 85
Discharge: HOME | End: 2020-11-04
Payer: MEDICARE

## 2020-11-04 LAB
ALBUMIN SERPL-MCNC: 4.3 G/DL (ref 3.5–5.1)
ALP BLD-CCNC: 69 U/L (ref 38–126)
ALT SERPL-CCNC: 19 U/L (ref 11–66)
ANION GAP SERPL CALCULATED.3IONS-SCNC: 9 MEQ/L (ref 8–16)
AST SERPL-CCNC: 26 U/L (ref 5–40)
AVERAGE GLUCOSE: 135 MG/DL (ref 70–126)
BILIRUB SERPL-MCNC: 0.7 MG/DL (ref 0.3–1.2)
BUN BLDV-MCNC: 23 MG/DL (ref 7–22)
CALCIUM SERPL-MCNC: 10.4 MG/DL (ref 8.5–10.5)
CHLORIDE BLD-SCNC: 97 MEQ/L (ref 98–111)
CHOLESTEROL, TOTAL: 133 MG/DL (ref 100–199)
CO2: 32 MEQ/L (ref 23–33)
CREAT SERPL-MCNC: 0.8 MG/DL (ref 0.4–1.2)
GFR SERPL CREATININE-BSD FRML MDRD: 67 ML/MIN/1.73M2
GLUCOSE BLD-MCNC: 128 MG/DL (ref 70–108)
HBA1C MFR BLD: 6.5 % (ref 4.4–6.4)
HDLC SERPL-MCNC: 45 MG/DL
LDL CHOLESTEROL CALCULATED: 67 MG/DL
POTASSIUM SERPL-SCNC: 5 MEQ/L (ref 3.5–5.2)
SODIUM BLD-SCNC: 138 MEQ/L (ref 135–145)
TOTAL PROTEIN: 6.9 G/DL (ref 6.1–8)
TRIGL SERPL-MCNC: 107 MG/DL (ref 0–199)

## 2020-11-04 PROCEDURE — 36415 COLL VENOUS BLD VENIPUNCTURE: CPT

## 2020-11-04 PROCEDURE — 80061 LIPID PANEL: CPT

## 2020-11-04 PROCEDURE — 83036 HEMOGLOBIN GLYCOSYLATED A1C: CPT

## 2020-11-04 PROCEDURE — 80053 COMPREHEN METABOLIC PANEL: CPT

## 2020-11-17 ENCOUNTER — OFFICE VISIT (OUTPATIENT)
Dept: UROLOGY | Age: 85
End: 2020-11-17
Payer: MEDICARE

## 2020-11-17 VITALS — BODY MASS INDEX: 31.15 KG/M2 | TEMPERATURE: 97.2 F | WEIGHT: 165 LBS | HEIGHT: 61 IN

## 2020-11-17 PROCEDURE — 99214 OFFICE O/P EST MOD 30 MIN: CPT | Performed by: UROLOGY

## 2020-11-17 RX ORDER — CLONIDINE HYDROCHLORIDE 0.1 MG/1
0.1 TABLET ORAL PRN
Status: ON HOLD | COMMUNITY
End: 2022-05-16 | Stop reason: SDUPTHER

## 2020-11-17 NOTE — PROGRESS NOTES
Kaz Price MD        [address] [address]  Dept: [phone]  Dept Fax: [phone]: [address] [address] Urology Office Note -     Patient:  Ana Parry  YOB: 1929    The patient is a 80 y.o. female who presents today for evaluation of the following problems: Bilateral renal cysts  Chief Complaint   Patient presents with    Results     MRI         HISTORY OF PRESENT ILLNESS:     Bilateral renal cysts  Onset was  Months ago. Overall, the problem(s) are unchanged. Severity is described as moderate. Associated Symptoms: No dysuria, no gross hematuria. Current Pain Severity: 0      Pt of [place]. Here for MRI results. 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 Pt also has simple cysts in both kidneys on MRI. She has no urinary issues. Summary of Previous Records:  HPI   Pt seen in follow up for renal lesions. 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 Pt was recommended to have repeat MRI in 6 months however this was not completed. Pt denies any gross hematuria, fever, chills, flank pain, change in urinary symptoms. Requested/reviewed records from [facility] office and/or outside [de-identified]    (Patient's old records have been requested, reviewed and pertinent findings summarized in today's note.)    Procedures Today: N/A    Last several PSA's:  No results found for: PSA    Last total testosterone:  No results found for: TESTOSTERONE    Urinalysis today:  No results found for this visit on 11/17/20.     Last BUN and creatinine:  Lab Results   Component Value Date    BUN 23 (H) 11/04/2020     Lab Results   Component Value Date    CREATININE 0.8 11/04/2020       Imaging Reviewed during this Office Visit:   Jose G Sargent Cholecalciferol (VITAMIN D-3 PO) Take 1,000 Units by mouth daily       Multiple Vitamins-Minerals (OCUVITE PO) Take 1 tablet by mouth daily       pravastatin (PRAVACHOL) 20 MG tablet Take 20 mg by mouth daily.  atenolol (TENORMIN) 25 MG tablet Take 25 mg by mouth daily.  amLODIPine (NORVASC) 5 MG tablet Take 5 mg by mouth daily.  calcium carbonate 600 MG TABS tablet Take 1 tablet by mouth daily.  rivaroxaban (XARELTO) 20 MG TABS tablet Take 20 mg by mouth daily          Bactrim [sulfamethoxazole-trimethoprim]; Ciprofloxacin; and Sulfa antibiotics  Social History     Tobacco Use   Smoking Status Never Smoker   Smokeless Tobacco Never Used      (If patient a smoker, smoking cessation counseling offered)   Social History     Substance and Sexual Activity   Alcohol Use No       REVIEW OF SYSTEMS:  Constitutional: negative  Eyes: negative  Respiratory: negative  Cardiovascular: negative  Gastrointestinal: negative  Genitourinary: see HPI  Musculoskeletal: negative  Skin: negative   Neurological: negative  Hematological/Lymphatic: negative  Psychological: negative        Physical Exam:    This a 80 y.o. female  Vitals:    11/17/20 1334   Temp: 97.2 °F (36.2 °C)     Body mass index is 31.18 kg/m². Constitutional: Patient in no acute distress;         Assessment and Plan        1. Bilateral renal cysts    2. Renal lesion               Plan:      Bilateral renal cysts- MRI obtained. No changes noted. Will continue to monitor with imaging. Not concerning for malignancy. Renal mass- Less than a 3 cm mass. Age 80 . Discussed poss this is malignancy- pt is adamant of having NO treatment. Return in a year with a MRI. We will follow size and growth. Prescriptions Ordered:  No orders of the defined types were placed in this encounter.      Orders Placed:  Orders Placed This Encounter   Procedures    MRI ABDOMEN W WO CONTRAST     Standing Status:   Future     Standing Expiration Date:   2/17/2022 Tiana John MD

## 2020-12-28 ENCOUNTER — HOSPITAL ENCOUNTER (EMERGENCY)
Age: 85
Discharge: HOME | End: 2020-12-28
Attending: EMERGENCY MEDICINE
Payer: MEDICARE

## 2020-12-28 VITALS
RESPIRATION RATE: 16 BRPM | TEMPERATURE: 98.6 F | WEIGHT: 160 LBS | HEART RATE: 64 BPM | BODY MASS INDEX: 30.23 KG/M2 | SYSTOLIC BLOOD PRESSURE: 126 MMHG | OXYGEN SATURATION: 94 % | DIASTOLIC BLOOD PRESSURE: 75 MMHG

## 2020-12-28 LAB
ANION GAP SERPL CALCULATED.3IONS-SCNC: 9 MEQ/L (ref 8–16)
BASOPHILS # BLD: 0.5 %
BASOPHILS ABSOLUTE: 0 THOU/MM3 (ref 0–0.1)
BILIRUBIN URINE: NEGATIVE
BLOOD, URINE: NEGATIVE
BUN BLDV-MCNC: 21 MG/DL (ref 7–22)
CALCIUM SERPL-MCNC: 10.1 MG/DL (ref 8.5–10.5)
CHARACTER, URINE: CLEAR
CHLORIDE BLD-SCNC: 99 MEQ/L (ref 98–111)
CO2: 32 MEQ/L (ref 23–33)
COLOR: YELLOW
CREAT SERPL-MCNC: 0.8 MG/DL (ref 0.4–1.2)
EKG ATRIAL RATE: 97 BPM
EKG Q-T INTERVAL: 402 MS
EKG QRS DURATION: 140 MS
EKG QTC CALCULATION (BAZETT): 472 MS
EKG R AXIS: -55 DEGREES
EKG T AXIS: 2 DEGREES
EKG VENTRICULAR RATE: 83 BPM
EOSINOPHIL # BLD: 0.9 %
EOSINOPHILS ABSOLUTE: 0.1 THOU/MM3 (ref 0–0.4)
ERYTHROCYTE [DISTWIDTH] IN BLOOD BY AUTOMATED COUNT: 13.9 % (ref 11.5–14.5)
ERYTHROCYTE [DISTWIDTH] IN BLOOD BY AUTOMATED COUNT: 48.7 FL (ref 35–45)
GFR SERPL CREATININE-BSD FRML MDRD: 67 ML/MIN/1.73M2
GLUCOSE BLD-MCNC: 129 MG/DL (ref 70–108)
GLUCOSE URINE: NEGATIVE MG/DL
HCT VFR BLD CALC: 40.9 % (ref 37–47)
HEMOGLOBIN: 13.5 GM/DL (ref 12–16)
IMMATURE GRANS (ABS): 0.02 THOU/MM3 (ref 0–0.07)
IMMATURE GRANULOCYTES: 0.3 %
KETONES, URINE: NEGATIVE
LEUKOCYTE ESTERASE, URINE: NEGATIVE
LYMPHOCYTES # BLD: 18.8 %
LYMPHOCYTES ABSOLUTE: 1.2 THOU/MM3 (ref 1–4.8)
MCH RBC QN AUTO: 31.5 PG (ref 26–33)
MCHC RBC AUTO-ENTMCNC: 33 GM/DL (ref 32.2–35.5)
MCV RBC AUTO: 95.3 FL (ref 81–99)
MONOCYTES # BLD: 7.8 %
MONOCYTES ABSOLUTE: 0.5 THOU/MM3 (ref 0.4–1.3)
NITRITE, URINE: NEGATIVE
NUCLEATED RED BLOOD CELLS: 0 /100 WBC
OSMOLALITY CALCULATION: 284.1 MOSMOL/KG (ref 275–300)
PH UA: 7 (ref 5–9)
PLATELET # BLD: 157 THOU/MM3 (ref 130–400)
PMV BLD AUTO: 9.7 FL (ref 9.4–12.4)
POTASSIUM SERPL-SCNC: 4.4 MEQ/L (ref 3.5–5.2)
PROTEIN UA: NEGATIVE
RBC # BLD: 4.29 MILL/MM3 (ref 4.2–5.4)
SEG NEUTROPHILS: 71.7 %
SEGMENTED NEUTROPHILS ABSOLUTE COUNT: 4.6 THOU/MM3 (ref 1.8–7.7)
SODIUM BLD-SCNC: 140 MEQ/L (ref 135–145)
SPECIFIC GRAVITY, URINE: 1.02 (ref 1–1.03)
TROPONIN T: < 0.01 NG/ML
UROBILINOGEN, URINE: 0.2 EU/DL (ref 0–1)
WBC # BLD: 6.4 THOU/MM3 (ref 4.8–10.8)

## 2020-12-28 PROCEDURE — 99283 EMERGENCY DEPT VISIT LOW MDM: CPT

## 2020-12-28 PROCEDURE — 93010 ELECTROCARDIOGRAM REPORT: CPT | Performed by: NUCLEAR MEDICINE

## 2020-12-28 PROCEDURE — 85025 COMPLETE CBC W/AUTO DIFF WBC: CPT

## 2020-12-28 PROCEDURE — 36415 COLL VENOUS BLD VENIPUNCTURE: CPT

## 2020-12-28 PROCEDURE — 80048 BASIC METABOLIC PNL TOTAL CA: CPT

## 2020-12-28 PROCEDURE — 81003 URINALYSIS AUTO W/O SCOPE: CPT

## 2020-12-28 PROCEDURE — 93005 ELECTROCARDIOGRAM TRACING: CPT | Performed by: EMERGENCY MEDICINE

## 2020-12-28 PROCEDURE — 84484 ASSAY OF TROPONIN QUANT: CPT

## 2020-12-28 ASSESSMENT — ENCOUNTER SYMPTOMS
ABDOMINAL PAIN: 0
BACK PAIN: 0
COUGH: 0
CHEST TIGHTNESS: 0
DIARRHEA: 0
SHORTNESS OF BREATH: 0
CONSTIPATION: 0
RHINORRHEA: 0
EYE REDNESS: 0
VOMITING: 0
WHEEZING: 0
NAUSEA: 0
SORE THROAT: 0

## 2020-12-28 NOTE — ED NOTES
Patient resting in bed. Respirations easy and unlabored. No distress noted. Call light within reach.        Eloisa Clifford RN  12/28/20 2079

## 2020-12-28 NOTE — ED NOTES
Patient resting in bed. Respirations easy and unlabored. No distress noted. Call light within reach.        Gen Ahn RN  12/28/20 4857

## 2020-12-28 NOTE — ACP (ADVANCE CARE PLANNING)
Advance Care Planning     Advance Care Planning Activator (Inpatient)  Conversation Note      Date of ACP Conversation: 12/28/2020    Conversation Conducted with: Patient with Decision Making Capacity    ACP Activator: Moon Smith RN, BSN, [facility]      Health Care Decision Maker:     Current Designated Health Care Decision Maker:     \"Who would you like to name as your primary health care decision-maker? \"               Name: Taylor Hung      Relationship: son          Phone number: [phone]  Ulysess Prim this person be reached easily? \" Yes  \"Who would you like to name as your back-up decision maker? \"   Name: Deshawn Locke        Relationship: daughter          Phone number: [phone]  Ulysess Prim this person be reached easily? \" Yes      Care Preferences    Ventilation: \"If you were in your present state of health and suddenly became very ill and were unable to breathe on your own, what would your preference be about the use of a ventilator (breathing machine) if it were available to you? \"      Would the patient desire the use of ventilator (breathing machine)?: no    \"If your health worsens and it becomes clear that your chance of recovery is unlikely, what would your preference be about the use of a ventilator (breathing machine) if it were available to you? \"     Would the patient desire the use of ventilator (breathing machine)?: No      Resuscitation  \"CPR works best to restart the heart when there is a sudden event, like a heart attack, in someone who is otherwise healthy. Unfortunately, CPR does not typically restart the heart for people who have serious health conditions or who are very sick. \"    \"In the event your heart stopped as a result of an underlying serious health condition, would you want attempts to be made to restart your heart (answer \"yes\" for attempt to resuscitate) or would you prefer a natural death (answer \"no\" for do not attempt to resuscitate)? \" no       [x] Yes   [] No   Educated Patient / Decision Maker regarding differences between Advance Directives and portable DNR orders. Length of ACP Conversation in minutes:  40  Conversation Outcomes:  [x] ACP discussion completed  [x] Existing advance directive reviewed with patient; no changes to patient's previously recorded wishes  [] New Advance Directive completed  [] Portable Do Not Rescitate prepared for Provider review and signature  [] POLST/POST/MOLST/MOST prepared for Provider review and signature      Follow-up plan:    [] Schedule follow-up conversation to continue planning  [] Referred individual to Provider for additional questions/concerns   [] Advised patient/agent/surrogate to review completed ACP document and update if needed with changes in condition, patient preferences or care setting    [] This note routed to one or more involved healthcare providers      Approached by DO on case, stated patient had questions about her code status. She had previously had DNRCCA and limited x4 code status in past.  She believes she may have a copy of the DNR in her safe. [facility] signed this date. Original for Medical Records, 4 copies for patient. I spoke with son Laxmi Santos on the phone and instructed him to have a copy at his home, one for [place], one for patient's home and one for her purse. No follow up at this time.

## 2020-12-28 NOTE — ED PROVIDER NOTES
[facility] ENCOUNTER          Pt Name: Garfield Beebe  MRN: [id number]  [place] 4/22/1929  Date of evaluation: 12/28/2020  Emergency Physician: Melvi Martins DO    CHIEF COMPLAINT       Chief Complaint   Patient presents with    Hypertension     History obtained from the patient. HISTORY OF PRESENT ILLNESS    HPI  Garfield Beebe is a 80 y.o. female who presents to the emergency department for evaluation of HTN. Onset this am.  Patient has been dealing with family stress over the holidays. She states that she lost her  a number of years ago. Then additionally this year COVID-19 place additional stress on her family causing contention amongst her children whether to have [place] or not. She states then this morning after her morning oatmeal and coffee her face felt flushed, she felt shaky. She then decided to take her blood pressure. She states the top number was approximately 188. She states typically her blood pressure runs in the 160s. Her cardiologist had prescribed her clonidine as needed for blood pressures greater than 180. She states since that time her symptoms have abated and she feels back to herself. She denies chest pain denies shortness of breath denies back pain denies dyspnea with exertion denies chest pain with exertion. She has a history of chronic atrial fibrillation she is on Xarelto and denies palpitations. Patient denies other acute complaints at this time. REVIEW OF SYSTEMS   Review of Systems   Constitutional: Negative for chills and fever. HENT: Negative for congestion, rhinorrhea and sore throat. Eyes: Negative for redness and visual disturbance. Respiratory: Negative for cough, chest tightness, shortness of breath and wheezing. Cardiovascular: Negative for chest pain and leg swelling. Gastrointestinal: Negative for abdominal pain, constipation, diarrhea, nausea and vomiting. Endocrine: Negative for polydipsia and polyuria. Genitourinary: Negative for decreased urine volume, dysuria and urgency. Musculoskeletal: Negative for back pain and myalgias. Skin: Negative for rash and wound. Neurological: Negative for light-headedness and headaches. Hematological: Does not bruise/bleed easily. Psychiatric/Behavioral: Negative for decreased concentration and dysphoric mood. PAST MEDICAL AND SURGICAL HISTORY     Past Medical History:   Diagnosis Date    Arthritis     [facility]rial fibrillation ([facility]     Cancer ([facility]     skin    Gross hematuria 2014    Dr Main Pace    HTN (hypertension)     Hyperlipidemia     Pneumonia 12/2/2018     Past Surgical History:   Procedure Laterality Date    CARPAL TUNNEL RELEASE  10/23/2018    right hand    COLONOSCOPY      EYE SURGERY      FOOT SURGERY      x2    HYSTERECTOMY  1982    KNEE SURGERY  2010    LIVER BIOPSY  08/23/2017    SKIN BIOPSY           MEDICATIONS   No current facility-administered medications for this encounter. Current Outpatient Medications:     cloNIDine (CATAPRES) 0.1 MG tablet, Take 0.1 mg by mouth as needed for High Blood Pressure If over 160/100, Disp: , Rfl:     furosemide (LASIX) 40 MG tablet, Take 40 mg by mouth 2 times daily, Disp: , Rfl:     lactobacillus (CULTURELLE) capsule, Take 1 capsule by mouth daily (with breakfast), Disp: , Rfl:     lisinopril (PRINIVIL;ZESTRIL) 20 MG tablet, Take 1 tablet by mouth daily Additional RF per her PCP, Disp: 30 tablet, Rfl: 0    Glucosamine-Chondroitin (GLUCOSAMINE CHONDR COMPLEX PO), Take 2 tablets by mouth daily Move Free, Disp: , Rfl:     Cholecalciferol (VITAMIN D-3 PO), Take 1,000 Units by mouth daily , Disp: , Rfl:     Multiple Vitamins-Minerals (OCUVITE PO), Take 1 tablet by mouth daily , Disp: , Rfl:     pravastatin (PRAVACHOL) 20 MG tablet, Take 20 mg by mouth daily. , Disp: , Rfl:     atenolol (TENORMIN) 25 MG tablet, Take 25 mg by mouth daily. , Disp: , Rfl:     amLODIPine (NORVASC) 5 MG tablet, Take 5 mg by mouth daily. , Disp: , Rfl:     calcium carbonate 600 MG TABS tablet, Take 1 tablet by mouth daily. , Disp: , Rfl:     rivaroxaban (XARELTO) 20 MG TABS tablet, Take 20 mg by mouth daily , Disp: , Rfl:       SOCIAL HISTORY     Social History     Social History Narrative    - Never     Social History     Tobacco Use    Smoking status: Never Smoker    Smokeless tobacco: Never Used   Substance Use Topics    Alcohol use: No    Drug use: No         ALLERGIES     Allergies   Allergen Reactions    Bactrim [Sulfamethoxazole-Trimethoprim]     Ciprofloxacin Diarrhea    Sulfa Antibiotics          FAMILY HISTORY     Family History   Problem Relation Age of Onset    Cancer Mother     High Blood Pressure Father     Arthritis Father     Heart Attack Father     Heart Disease Father     Cancer Sister     Diabetes Sister     Cancer Brother     Early Death Brother     Cancer Sister     Diabetes Sister          PHYSICAL EXAM     ED Triage Vitals [12/28/20 1053]   BP Temp Temp Source Pulse Resp SpO2 Height Weight   -- 98.6 °F (37 °C) Oral 82 16 95 % -- 160 lb (72.6 kg)         Additional Vital Signs:  Vitals:    12/28/20 1053   Pulse: 82   Resp: 16   Temp: 98.6 °F (37 °C)   SpO2: 95%       Physical Exam  Constitutional:       General: She is not in acute distress. Appearance: She is well-developed. She is not diaphoretic. HENT:      Head: Normocephalic and atraumatic. Eyes:      General:         Right eye: No discharge. Left eye: No discharge. Conjunctiva/sclera: Conjunctivae normal.      Pupils: Pupils are equal, round, and reactive to light. Neck:      Musculoskeletal: Normal range of motion and neck supple. Thyroid: No thyromegaly. Vascular: No JVD. Cardiovascular:      Rate and Rhythm: Normal rate and regular rhythm. Heart sounds: Normal heart sounds.    Pulmonary:      Effort: Pulmonary effort is normal. No respiratory distress. Breath sounds: Normal breath sounds. Abdominal:      General: Bowel sounds are normal. There is no distension. Palpations: Abdomen is soft. Tenderness: There is no abdominal tenderness. Musculoskeletal: Normal range of motion. General: No tenderness. Lymphadenopathy:      Cervical: No cervical adenopathy. Skin:     General: Skin is warm and dry. Capillary Refill: Capillary refill takes less than 2 seconds. Findings: No rash. Neurological:      Mental Status: She is alert and oriented to person, place, and time. She is not disoriented. GCS: GCS eye subscore is 4. GCS verbal subscore is 5. GCS motor subscore is 6. Motor: No abnormal muscle tone or seizure activity. Gait: Gait normal.      Comments: Awake and alert. Moves all extremities. Non-focal exam with steady gait. Initial vital signs and nursing assessment reviewed and abnormal from Blood pressure from chronic hypertension. Pulsoximetry is normal per my interpretation. MEDICAL DECISION MAKING   Initial Assessment: Given the patient's above chief complaint and findings on history and physical examination, I thought it was appropriate to consider the following emergency medical conditions:HTN emergency, ACS, acute stress reaction, electrolyte abnormality, ALINE, dehydration, caffeine adverse effect although some of these diagnoses are unlikely they were considered in my medical decision making. Plan: CBC, BMP, ECG, UA, troponin.   Patient remains asymptomatic continue to monitor and reassess        ED RESULTS   Laboratory results:  Labs Reviewed   CBC WITH AUTO DIFFERENTIAL   BASIC METABOLIC PANEL   TROPONIN       Radiologic studies results:  No orders to display       ED Medications administered this visit: Medications - No data to display      ED COURSE     ED Course as of Dec 28 1342   Mon Dec 28, 2020   4028 Basic Metabolic Panel(!):    Sodium 140   Potassium 4.4

## 2020-12-28 NOTE — ED TRIAGE NOTES
Presents to ED with c/o hypertension that she noticed this morning. States she was \"shaking\". Denies pain. Alert and oriented.

## 2020-12-29 ENCOUNTER — TELEPHONE (OUTPATIENT)
Dept: UROLOGY | Age: 85
End: 2020-12-29

## 2020-12-29 NOTE — TELEPHONE ENCOUNTER
Patient scheduled for a MRI Abdomen w/wo contrast on 11- at 1:00pm with an arrival of 12:30pm at [address] [place] [address]. Patient needs to be NPO 4-hours prior.   Order mailed to patient

## 2021-02-22 ENCOUNTER — APPOINTMENT (OUTPATIENT)
Dept: GENERAL RADIOLOGY | Age: 86
End: 2021-02-22
Payer: MEDICARE

## 2021-02-22 ENCOUNTER — HOSPITAL ENCOUNTER (EMERGENCY)
Age: 86
Discharge: HOME | End: 2021-02-23
Attending: EMERGENCY MEDICINE
Payer: MEDICARE

## 2021-02-22 DIAGNOSIS — I10 ESSENTIAL HYPERTENSION: Primary | ICD-10-CM

## 2021-02-22 LAB
ALBUMIN SERPL-MCNC: 4 G/DL (ref 3.5–5.1)
ALP BLD-CCNC: 62 U/L (ref 38–126)
ALT SERPL-CCNC: 20 U/L (ref 11–66)
ANION GAP SERPL CALCULATED.3IONS-SCNC: 6 MEQ/L (ref 8–16)
AST SERPL-CCNC: 26 U/L (ref 5–40)
BASOPHILS # BLD: 0.4 %
BASOPHILS ABSOLUTE: 0 THOU/MM3 (ref 0–0.1)
BILIRUB SERPL-MCNC: 0.4 MG/DL (ref 0.3–1.2)
BILIRUBIN DIRECT: < 0.2 MG/DL (ref 0–0.3)
BUN BLDV-MCNC: 23 MG/DL (ref 7–22)
CALCIUM SERPL-MCNC: 10 MG/DL (ref 8.5–10.5)
CHLORIDE BLD-SCNC: 96 MEQ/L (ref 98–111)
CO2: 32 MEQ/L (ref 23–33)
CREAT SERPL-MCNC: 0.8 MG/DL (ref 0.4–1.2)
EKG ATRIAL RATE: 312 BPM
EKG Q-T INTERVAL: 428 MS
EKG QRS DURATION: 144 MS
EKG QTC CALCULATION (BAZETT): 487 MS
EKG R AXIS: -50 DEGREES
EKG T AXIS: -35 DEGREES
EKG VENTRICULAR RATE: 78 BPM
EOSINOPHIL # BLD: 1.8 %
EOSINOPHILS ABSOLUTE: 0.1 THOU/MM3 (ref 0–0.4)
ERYTHROCYTE [DISTWIDTH] IN BLOOD BY AUTOMATED COUNT: 13.4 % (ref 11.5–14.5)
ERYTHROCYTE [DISTWIDTH] IN BLOOD BY AUTOMATED COUNT: 46.6 FL (ref 35–45)
GFR SERPL CREATININE-BSD FRML MDRD: 67 ML/MIN/1.73M2
GLUCOSE BLD-MCNC: 135 MG/DL (ref 70–108)
HCT VFR BLD CALC: 42.3 % (ref 37–47)
HEMOGLOBIN: 13.6 GM/DL (ref 12–16)
IMMATURE GRANS (ABS): 0.01 THOU/MM3 (ref 0–0.07)
IMMATURE GRANULOCYTES: 0.2 %
LIPASE: 28.5 U/L (ref 5.6–51.3)
LYMPHOCYTES # BLD: 32.7 %
LYMPHOCYTES ABSOLUTE: 1.6 THOU/MM3 (ref 1–4.8)
MAGNESIUM: 2.1 MG/DL (ref 1.6–2.4)
MCH RBC QN AUTO: 30.4 PG (ref 26–33)
MCHC RBC AUTO-ENTMCNC: 32.2 GM/DL (ref 32.2–35.5)
MCV RBC AUTO: 94.6 FL (ref 81–99)
MONOCYTES # BLD: 6.3 %
MONOCYTES ABSOLUTE: 0.3 THOU/MM3 (ref 0.4–1.3)
NUCLEATED RED BLOOD CELLS: 0 /100 WBC
OSMOLALITY CALCULATION: 274 MOSMOL/KG (ref 275–300)
PLATELET # BLD: 153 THOU/MM3 (ref 130–400)
PMV BLD AUTO: 9.3 FL (ref 9.4–12.4)
POTASSIUM REFLEX MAGNESIUM: 4.1 MEQ/L (ref 3.5–5.2)
RBC # BLD: 4.47 MILL/MM3 (ref 4.2–5.4)
SEG NEUTROPHILS: 58.6 %
SEGMENTED NEUTROPHILS ABSOLUTE COUNT: 2.9 THOU/MM3 (ref 1.8–7.7)
SODIUM BLD-SCNC: 134 MEQ/L (ref 135–145)
TOTAL PROTEIN: 6.9 G/DL (ref 6.1–8)
WBC # BLD: 5 THOU/MM3 (ref 4.8–10.8)

## 2021-02-22 PROCEDURE — 84484 ASSAY OF TROPONIN QUANT: CPT

## 2021-02-22 PROCEDURE — 83735 ASSAY OF MAGNESIUM: CPT

## 2021-02-22 PROCEDURE — 80076 HEPATIC FUNCTION PANEL: CPT

## 2021-02-22 PROCEDURE — 93005 ELECTROCARDIOGRAM TRACING: CPT | Performed by: EMERGENCY MEDICINE

## 2021-02-22 PROCEDURE — 80048 BASIC METABOLIC PNL TOTAL CA: CPT

## 2021-02-22 PROCEDURE — 36415 COLL VENOUS BLD VENIPUNCTURE: CPT

## 2021-02-22 PROCEDURE — 83880 ASSAY OF NATRIURETIC PEPTIDE: CPT

## 2021-02-22 PROCEDURE — 99285 EMERGENCY DEPT VISIT HI MDM: CPT

## 2021-02-22 PROCEDURE — 71045 X-RAY EXAM CHEST 1 VIEW: CPT

## 2021-02-22 PROCEDURE — 83690 ASSAY OF LIPASE: CPT

## 2021-02-22 PROCEDURE — 85025 COMPLETE CBC W/AUTO DIFF WBC: CPT

## 2021-02-22 ASSESSMENT — ENCOUNTER SYMPTOMS
CHOKING: 0
ABDOMINAL DISTENTION: 0
NAUSEA: 0
CONSTIPATION: 0
SHORTNESS OF BREATH: 1
EYE DISCHARGE: 0
VOICE CHANGE: 0
SINUS PRESSURE: 0
WHEEZING: 0
BACK PAIN: 0
COUGH: 0
BLOOD IN STOOL: 0
SORE THROAT: 0
CHEST TIGHTNESS: 0
PHOTOPHOBIA: 0
DIARRHEA: 0
EYE PAIN: 0
VOMITING: 0
EYE ITCHING: 0
ABDOMINAL PAIN: 0
TROUBLE SWALLOWING: 0
RHINORRHEA: 0
EYE REDNESS: 0

## 2021-02-23 VITALS
WEIGHT: 160 LBS | OXYGEN SATURATION: 95 % | HEART RATE: 76 BPM | RESPIRATION RATE: 16 BRPM | HEIGHT: 64 IN | DIASTOLIC BLOOD PRESSURE: 71 MMHG | SYSTOLIC BLOOD PRESSURE: 133 MMHG | BODY MASS INDEX: 27.31 KG/M2 | TEMPERATURE: 98.2 F

## 2021-02-23 LAB
PRO-BNP: 934.5 PG/ML (ref 0–1800)
TROPONIN T: < 0.01 NG/ML

## 2021-02-23 PROCEDURE — 93010 ELECTROCARDIOGRAM REPORT: CPT | Performed by: NUCLEAR MEDICINE

## 2021-02-23 NOTE — ED NOTES
Pt to ED via EMS for complaint of shortness of breath earlier this afternoon and HTN this evening. Pt states taking a Clonodine this morning and evening to control BP that was over 027 systolic. Pt states feeling flushed, and denies pain at this time. Pt states becoming SOB traveling up stairs. Pt sat down to collect breath. No distress noted, daughter at bedside.        [facility]  02/22/21 4027

## 2021-02-23 NOTE — ED NOTES
Bed: 023A  Expected date: 2/22/21  Expected time: 10:33 PM  Means of arrival: [facility]  Comments:     Ryley Santos RN  02/22/21 5148

## 2021-02-23 NOTE — ED NOTES
Pt resting in bed with call light in reach and states no further needs at this time. Family at bedside, no distress noted at this time.        [facility]  02/23/21 7836

## 2021-02-23 NOTE — ED PROVIDER NOTES
(TENORMIN) 25 MG tablet Take 25 mg by mouth daily. amLODIPine (NORVASC) 5 MG tablet Take 5 mg by mouth daily. calcium carbonate 600 MG TABS tablet Take 1 tablet by mouth daily. rivaroxaban (XARELTO) 20 MG TABS tablet Take 20 mg by mouth daily Historical Med      lactobacillus (CULTURELLE) capsule Take 1 capsule by mouth daily (with breakfast)OTC      Glucosamine-Chondroitin (GLUCOSAMINE CHONDR COMPLEX PO) Take 2 tablets by mouth daily Move FreeHistorical Med             ALLERGIES     is allergic to bactrim [sulfamethoxazole-trimethoprim]; ciprofloxacin; and sulfa antibiotics. FAMILY HISTORY     She indicated that the status of her mother is unknown. She indicated that the status of her father is unknown. She indicated that the status of her brother is unknown.   family history includes Arthritis in her father; Cancer in her brother, mother, sister, and sister; Diabetes in her sister and sister; Early Death in her brother; Heart Attack in her father; Heart Disease in her father; High Blood Pressure in her father. SOCIAL HISTORY      reports that she has never smoked. She has never used smokeless tobacco. She reports that she does not drink alcohol or use drugs. PHYSICAL EXAM     INITIAL VITALS:  height is 5' 4\" (1.626 m) and weight is 160 lb (72.6 kg). Her oral temperature is 98.2 °F (36.8 °C). Her blood pressure is 133/71 and her pulse is 76. Her respiration is 16 and oxygen saturation is 95%. Physical Exam  Vitals signs and nursing note reviewed. Constitutional:       General: She is not in acute distress. Appearance: She is well-developed. She is not diaphoretic. HENT:      Head: Normocephalic and atraumatic. Right Ear: External ear normal.      Left Ear: External ear normal.      Nose: Nose normal.      Mouth/Throat:      Mouth: Mucous membranes are moist.      Pharynx: Oropharynx is clear. No oropharyngeal exudate. Eyes:      General: No scleral icterus.         Right eye: No discharge. Left eye: No discharge. Conjunctiva/sclera: Conjunctivae normal.      Pupils: Pupils are equal, round, and reactive to light. Neck:      Musculoskeletal: Normal range of motion and neck supple. Thyroid: No thyromegaly. Vascular: No JVD. Trachea: No tracheal deviation. Cardiovascular:      Rate and Rhythm: Normal rate. Rhythm irregularly irregular. Pulses:           Carotid pulses are 2+ on the right side and 2+ on the left side. Radial pulses are 2+ on the right side and 2+ on the left side. Femoral pulses are 2+ on the right side and 2+ on the left side. Popliteal pulses are 2+ on the right side and 2+ on the left side. Dorsalis pedis pulses are 2+ on the right side and 2+ on the left side. Posterior tibial pulses are 2+ on the right side and 2+ on the left side. Heart sounds: Normal heart sounds, S1 normal and S2 normal. No murmur. No friction rub. No gallop. Pulmonary:      Effort: Pulmonary effort is normal.      Breath sounds: No stridor. Examination of the right-lower field reveals decreased breath sounds. Examination of the left-lower field reveals decreased breath sounds. Decreased breath sounds present. No wheezing, rhonchi or rales. Chest:      Chest wall: No tenderness. Abdominal:      General: Bowel sounds are normal. There is no distension. Palpations: Abdomen is soft. There is no mass. Tenderness: There is no abdominal tenderness. There is no guarding or rebound. Hernia: No hernia is present. Musculoskeletal: Normal range of motion. General: No tenderness. Right lower le+ Pitting Edema present. Left lower le+ Pitting Edema present. Lymphadenopathy:      Cervical: No cervical adenopathy. Skin:     General: Skin is warm and dry. Findings: No bruising, ecchymosis, lesion or rash.    Neurological:      Mental Status: She is alert and oriented to person, place, and time. Cranial Nerves: No cranial nerve deficit. Coordination: Coordination normal.      Deep Tendon Reflexes: Reflexes are normal and symmetric. Psychiatric:         Speech: Speech normal.         Behavior: Behavior normal.         Thought Content: Thought content normal.         Judgment: Judgment normal.           DIFFERENTIAL DIAGNOSIS:   Uncontrolled hypertension, atrial fibrillation, congestive heart failure    DIAGNOSTIC RESULTS     EKG: All EKG's are interpreted by the Emergency Department Physician who either signs or Co-signs this chart in the absence of a cardiologist.  Atrial fibrillation, normal ventricular response, indeterminate [place], QRS duration 144 QT interval 428 QTC of 487. When compared with EKG dated December 28, 2020 no significant changes found. RADIOLOGY: non-plain film images(s) such as CT, Ultrasound and MRI are read by the radiologist.  XR CHEST PORTABLE   Final Result   1. Cardiomegaly. 2.  Mild right basilar atelectasis.       This document has been electronically signed by: Tracey Delgado M.D. on    02/23/2021 12:27 AM            LABS:   Labs Reviewed   CBC WITH AUTO DIFFERENTIAL - Abnormal; Notable for the following components:       Result Value    RDW-SD 46.6 (*)     MPV 9.3 (*)     Monocytes Absolute 0.3 (*)     All other components within normal limits   BASIC METABOLIC PANEL W/ REFLEX TO MG FOR LOW K - Abnormal; Notable for the following components:    Sodium 134 (*)     Chloride 96 (*)     Glucose 135 (*)     BUN 23 (*)     All other components within normal limits   ANION GAP - Abnormal; Notable for the following components:    Anion Gap 6.0 (*)     All other components within normal limits   GLOMERULAR FILTRATION RATE, ESTIMATED - Abnormal; Notable for the following components:    Est, Glom Filt Rate 67 (*)     All other components within normal limits   OSMOLALITY - Abnormal; Notable for the following components:    Osmolality Calc 274.0 (*)     All other components within normal limits   BRAIN NATRIURETIC PEPTIDE   HEPATIC FUNCTION PANEL   LIPASE   MAGNESIUM   TROPONIN       EMERGENCY DEPARTMENT COURSE:   Vitals:    Vitals:    02/22/21 2240 02/22/21 2342 02/23/21 0056   BP: (!) 152/95 (!) 155/108 133/71   Pulse: 79 77 76   Resp: 16 16 16   Temp: 98.2 °F (36.8 °C)     TempSrc: Oral     SpO2: 95% 96% 95%   Weight: 160 lb (72.6 kg)     Height: 5' 4\" (1.626 m)       Patient was assessed at bedside appropriate labs and imaging were ordered. Patient was not given any medications here. Her blood pressure did come down appropriately. I do believe that there is some component of anxiety underneath this. The patient does live alone. I reviewed all labs and imaging with back to reassess the patient she is doing quite well blood pressures come down significantly. She is not having any symptoms at this time. I feel the patient be safely discharged home. She is instructed to use the clonidine as a rescue medication as previously prescribed. She is instructed to follow-up with her primary care physician and do so within the next 1 to 2 days as she may need some adjustment to her blood pressure medication. Patient and daughter at bedside understood and agreed with the plan. Patient is subsequently discharged home in good condition. Patient has what appears to be hypertension. Patient is instructed take her blood pressure medications as prescribed. She is instructed to follow-up with her primary care physician to do so within the next 1 to 2 days. Patient is instructed to use her clonidine if her systolic number or top number is above 025 or her diastolic number or bottom number is above 100 she is instructed to take 1 tablet and rest for 1 hour and then she should take her blood pressure again if it still remains significantly elevated she should call her primary care physician or return to the emergency room immediately.       CRITICAL CARE: None    CONSULTS:  None    PROCEDURES:  None    FINAL IMPRESSION      1.  Essential hypertension          DISPOSITION/PLAN   Discharge    PATIENT REFERRED TO:  Sandra Lucas  [address] [facility] [address]  [phone]    Call in 1 day        DISCHARGE MEDICATIONS:  Discharge Medication List as of 2/23/2021  1:43 AM          (Please note that portions of this note were completed with a voice recognition program.  Efforts were made to edit the dictations but occasionally words are mis-transcribed.)    Juliana Cole, [address] [place] [address], DO  02/23/21 0429

## 2021-06-21 ENCOUNTER — HOSPITAL ENCOUNTER (OUTPATIENT)
Age: 86
Discharge: HOME | End: 2021-06-21
Payer: MEDICARE

## 2021-06-21 LAB
ANION GAP SERPL CALCULATED.3IONS-SCNC: 10 MEQ/L (ref 8–16)
BUN BLDV-MCNC: 19 MG/DL (ref 7–22)
CALCIUM SERPL-MCNC: 10.5 MG/DL (ref 8.5–10.5)
CHLORIDE BLD-SCNC: 97 MEQ/L (ref 98–111)
CO2: 32 MEQ/L (ref 23–33)
CREAT SERPL-MCNC: 0.9 MG/DL (ref 0.4–1.2)
ERYTHROCYTE [DISTWIDTH] IN BLOOD BY AUTOMATED COUNT: 13.7 % (ref 11.5–14.5)
ERYTHROCYTE [DISTWIDTH] IN BLOOD BY AUTOMATED COUNT: 48.3 FL (ref 35–45)
GFR SERPL CREATININE-BSD FRML MDRD: 59 ML/MIN/1.73M2
GLUCOSE BLD-MCNC: 115 MG/DL (ref 70–108)
HCT VFR BLD CALC: 44.6 % (ref 37–47)
HEMOGLOBIN: 14.3 GM/DL (ref 12–16)
MCH RBC QN AUTO: 30.7 PG (ref 26–33)
MCHC RBC AUTO-ENTMCNC: 32.1 GM/DL (ref 32.2–35.5)
MCV RBC AUTO: 95.7 FL (ref 81–99)
PLATELET # BLD: 183 THOU/MM3 (ref 130–400)
PMV BLD AUTO: 9.5 FL (ref 9.4–12.4)
POTASSIUM SERPL-SCNC: 4.9 MEQ/L (ref 3.5–5.2)
RBC # BLD: 4.66 MILL/MM3 (ref 4.2–5.4)
SODIUM BLD-SCNC: 139 MEQ/L (ref 135–145)
T4 FREE: 1.63 NG/DL (ref 0.93–1.76)
TSH SERPL DL<=0.05 MIU/L-ACNC: 2.41 UIU/ML (ref 0.4–4.2)
WBC # BLD: 6.4 THOU/MM3 (ref 4.8–10.8)

## 2021-06-21 PROCEDURE — 84443 ASSAY THYROID STIM HORMONE: CPT

## 2021-06-21 PROCEDURE — 85027 COMPLETE CBC AUTOMATED: CPT

## 2021-06-21 PROCEDURE — 80048 BASIC METABOLIC PNL TOTAL CA: CPT

## 2021-06-21 PROCEDURE — 84439 ASSAY OF FREE THYROXINE: CPT

## 2021-06-21 PROCEDURE — 36415 COLL VENOUS BLD VENIPUNCTURE: CPT

## 2021-11-11 ENCOUNTER — HOSPITAL ENCOUNTER (OUTPATIENT)
Age: 86
Discharge: HOME | End: 2021-11-11
Payer: MEDICARE

## 2021-11-11 LAB
ALBUMIN SERPL-MCNC: 4.5 G/DL (ref 3.5–5.1)
ALP BLD-CCNC: 73 U/L (ref 38–126)
ALT SERPL-CCNC: 26 U/L (ref 11–66)
ANION GAP SERPL CALCULATED.3IONS-SCNC: 12 MEQ/L (ref 8–16)
AST SERPL-CCNC: 28 U/L (ref 5–40)
AVERAGE GLUCOSE: 150 MG/DL (ref 70–126)
BASOPHILS # BLD: 0.3 %
BASOPHILS ABSOLUTE: 0 THOU/MM3 (ref 0–0.1)
BILIRUB SERPL-MCNC: 0.7 MG/DL (ref 0.3–1.2)
BUN BLDV-MCNC: 21 MG/DL (ref 7–22)
CALCIUM SERPL-MCNC: 10 MG/DL (ref 8.5–10.5)
CHLORIDE BLD-SCNC: 96 MEQ/L (ref 98–111)
CHOLESTEROL, TOTAL: 144 MG/DL (ref 100–199)
CO2: 31 MEQ/L (ref 23–33)
CREAT SERPL-MCNC: 0.8 MG/DL (ref 0.4–1.2)
EOSINOPHIL # BLD: 0.4 %
EOSINOPHILS ABSOLUTE: 0 THOU/MM3 (ref 0–0.4)
ERYTHROCYTE [DISTWIDTH] IN BLOOD BY AUTOMATED COUNT: 13.4 % (ref 11.5–14.5)
ERYTHROCYTE [DISTWIDTH] IN BLOOD BY AUTOMATED COUNT: 47.8 FL (ref 35–45)
GFR SERPL CREATININE-BSD FRML MDRD: 67 ML/MIN/1.73M2
GLUCOSE BLD-MCNC: 106 MG/DL (ref 70–108)
HBA1C MFR BLD: 7 % (ref 4.4–6.4)
HCT VFR BLD CALC: 43.9 % (ref 37–47)
HDLC SERPL-MCNC: 49 MG/DL
HEMOGLOBIN: 14.4 GM/DL (ref 12–16)
IMMATURE GRANS (ABS): 0.02 THOU/MM3 (ref 0–0.07)
IMMATURE GRANULOCYTES: 0.3 %
LDL CHOLESTEROL CALCULATED: 74 MG/DL
LYMPHOCYTES # BLD: 23.9 %
LYMPHOCYTES ABSOLUTE: 1.6 THOU/MM3 (ref 1–4.8)
MCH RBC QN AUTO: 31.4 PG (ref 26–33)
MCHC RBC AUTO-ENTMCNC: 32.8 GM/DL (ref 32.2–35.5)
MCV RBC AUTO: 95.6 FL (ref 81–99)
MONOCYTES # BLD: 7.4 %
MONOCYTES ABSOLUTE: 0.5 THOU/MM3 (ref 0.4–1.3)
NUCLEATED RED BLOOD CELLS: 0 /100 WBC
PLATELET # BLD: 176 THOU/MM3 (ref 130–400)
PMV BLD AUTO: 9.4 FL (ref 9.4–12.4)
POTASSIUM SERPL-SCNC: 4.4 MEQ/L (ref 3.5–5.2)
RBC # BLD: 4.59 MILL/MM3 (ref 4.2–5.4)
SEG NEUTROPHILS: 67.7 %
SEGMENTED NEUTROPHILS ABSOLUTE COUNT: 4.5 THOU/MM3 (ref 1.8–7.7)
SODIUM BLD-SCNC: 139 MEQ/L (ref 135–145)
TOTAL PROTEIN: 6.7 G/DL (ref 6.1–8)
TRIGL SERPL-MCNC: 103 MG/DL (ref 0–199)
WBC # BLD: 6.7 THOU/MM3 (ref 4.8–10.8)

## 2021-11-11 PROCEDURE — 80053 COMPREHEN METABOLIC PANEL: CPT

## 2021-11-11 PROCEDURE — 80061 LIPID PANEL: CPT

## 2021-11-11 PROCEDURE — 83036 HEMOGLOBIN GLYCOSYLATED A1C: CPT

## 2021-11-11 PROCEDURE — 36415 COLL VENOUS BLD VENIPUNCTURE: CPT

## 2021-11-11 PROCEDURE — 85025 COMPLETE CBC W/AUTO DIFF WBC: CPT

## 2021-11-16 ENCOUNTER — HOSPITAL ENCOUNTER (OUTPATIENT)
Dept: MRI IMAGING | Age: 86
Discharge: HOME | End: 2021-11-16
Payer: MEDICARE

## 2021-11-16 DIAGNOSIS — N28.9 RENAL LESION: ICD-10-CM

## 2021-11-16 DIAGNOSIS — N28.1 BILATERAL RENAL CYSTS: ICD-10-CM

## 2021-11-16 PROCEDURE — 74183 MRI ABD W/O CNTR FLWD CNTR: CPT

## 2021-11-16 PROCEDURE — A9579 GAD-BASE MR CONTRAST NOS,1ML: HCPCS | Performed by: UROLOGY

## 2021-11-16 PROCEDURE — 6360000004 HC RX CONTRAST MEDICATION: Performed by: UROLOGY

## 2021-11-16 RX ADMIN — GADOTERIDOL 15 ML: 279.3 INJECTION, SOLUTION INTRAVENOUS at 13:53

## 2021-11-23 ENCOUNTER — OFFICE VISIT (OUTPATIENT)
Dept: UROLOGY | Age: 86
End: 2021-11-23
Payer: MEDICARE

## 2021-11-23 VITALS — BODY MASS INDEX: 26.12 KG/M2 | WEIGHT: 153 LBS | RESPIRATION RATE: 16 BRPM | HEIGHT: 64 IN

## 2021-11-23 DIAGNOSIS — N28.1 BILATERAL RENAL CYSTS: Primary | ICD-10-CM

## 2021-11-23 DIAGNOSIS — N28.9 RENAL LESION: ICD-10-CM

## 2021-11-23 PROBLEM — E11.9 TYPE 2 DIABETES MELLITUS (HCC): Status: ACTIVE | Noted: 2021-11-23

## 2021-11-23 PROCEDURE — 99213 OFFICE O/P EST LOW 20 MIN: CPT | Performed by: UROLOGY

## 2021-11-23 NOTE — PROGRESS NOTES
Navid Morrison MD        [address] [address]  Dept: [phone]  Dept Fax: [phone]: [address] [address] Urology Office Note -     Patient:  Antione Arriaga  YOB: 1929    The patient is a 80 y.o. female who presents today for evaluation of the following problems: Bilateral renal cysts  Chief Complaint   Patient presents with    Follow-up     renal cysts mri prior         HISTORY OF PRESENT ILLNESS:     Bilateral renal cysts  Pt of [facility]. Here for MRI results. 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 Pt also has simple cysts in both kidneys on MRI. She has no urinary issues. Summary of Previous Records:  HPI   Pt seen in follow up for renal lesions. 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 Pt was recommended to have repeat MRI in 6 months however this was not completed. Pt denies any gross hematuria, fever, chills, flank pain, change in urinary symptoms. Requested/reviewed records from [facility] office and/or outside [de-identified]    (Patient's old records have been requested, reviewed and pertinent findings summarized in today's note.)    Procedures Today: N/A    Last several PSA's:  No results found for: PSA    Last total testosterone:  No results found for: TESTOSTERONE    Urinalysis today:  No results found for this visit on 11/23/21.     Last BUN and creatinine:  Lab Results   Component Value Date    BUN 21 11/11/2021     Lab Results   Component Value Date    CREATININE 0.8 11/11/2021       Imaging Reviewed during this Office Visit:   Navid Morrison MD independently reviewed the images and verified the radiology reports from:    imaging independently reviewed by Navid Morrison MD and radiology report verified visualized. 2. Additional stable chronic findings are discussed. **This report has been created using voice recognition software. It may contain minor errors which are inherent in voice recognition technology. ** Final report electronically signed by Dr Nathalia Witt on 11/16/2021 2:46 PM      MRI       Impression    1. 2.1 x 2 cm probable Bosniak type III cyst arising from the right kidney posteriorly unchanged. 2. Probable simple cysts in the right and left kidneys. 3. Small areas of abnormal signal intensity right lobe of liver laterally, unchanged. 4. Levoscoliosis.     5. Otherwise negative MRI scan  of the abdomen with and without intravenous contrast.                 PAST MEDICAL, FAMILY AND SOCIAL HISTORY:  Past Medical History:   Diagnosis Date    Arthritis     Atrial fibrillation ([address]     Cancer ([address]     skin    Gross hematuria 2014    Dr Shanell Panda    HTN (hypertension)     Hyperlipidemia     Pneumonia 12/2/2018    Type 2 diabetes mellitus 11/23/2021     Past Surgical History:   Procedure Laterality Date    CARPAL TUNNEL RELEASE  10/23/2018    right hand    COLONOSCOPY      EYE SURGERY      FOOT SURGERY      x2    HYSTERECTOMY  1982    KNEE SURGERY  2010    LIVER BIOPSY  08/23/2017    SKIN BIOPSY       Family History   Problem Relation Age of Onset    Cancer Mother     High Blood Pressure Father     Arthritis Father     Heart Attack Father     Heart Disease Father     Cancer Sister     Diabetes Sister     Cancer Brother     Early Death Brother     Cancer Sister     Diabetes Sister      Outpatient Medications Marked as Taking for the 11/23/21 encounter (Office Visit) with Ursula Cranker, MD   Medication Sig Dispense Refill    Multiple Vitamin (MULTI-VITAMIN DAILY PO) Take by mouth daily      cloNIDine (CATAPRES) 0.1 MG tablet Take 0.1 mg by mouth as needed for High Blood Pressure If over 160/100      furosemide (LASIX) 40 MG tablet Take 40 mg by mouth 2 times daily      lactobacillus (CULTURELLE) capsule Take 1 capsule by mouth daily (with breakfast)      lisinopril (PRINIVIL;ZESTRIL) 20 MG tablet Take 1 tablet by mouth daily Additional RF per her PCP 30 tablet 0    Glucosamine-Chondroitin (GLUCOSAMINE CHONDR COMPLEX PO) Take 2 tablets by mouth daily Move Free      Cholecalciferol (VITAMIN D-3 PO) Take 1,000 Units by mouth daily       Multiple Vitamins-Minerals (OCUVITE PO) Take 1 tablet by mouth daily       pravastatin (PRAVACHOL) 20 MG tablet Take 20 mg by mouth daily.  atenolol (TENORMIN) 25 MG tablet Take 25 mg by mouth daily.  amLODIPine (NORVASC) 5 MG tablet Take 5 mg by mouth daily.  calcium carbonate 600 MG TABS tablet Take 1 tablet by mouth daily.  rivaroxaban (XARELTO) 20 MG TABS tablet Take 20 mg by mouth daily          Bactrim [sulfamethoxazole-trimethoprim], Ciprofloxacin, and Sulfa antibiotics  Social History     Tobacco Use   Smoking Status Never Smoker   Smokeless Tobacco Never Used      (If patient a smoker, smoking cessation counseling offered)   Social History     Substance and Sexual Activity   Alcohol Use No       REVIEW OF SYSTEMS:  Constitutional: negative  Eyes: negative  Respiratory: negative  Cardiovascular: negative  Gastrointestinal: negative  Genitourinary: see HPI  Musculoskeletal: negative  Skin: negative   Neurological: negative  Hematological/Lymphatic: negative  Psychological: negative        Physical Exam:    This a 80 y.o. female  Vitals:    11/23/21 1449   Resp: 16     Body mass index is 26.26 kg/m². Constitutional: Patient in no acute distress;         Assessment and Plan        1. Bilateral renal cysts    2. Renal lesion               Plan:      Bilateral renal cysts- MRI obtained. No changes noted. Will continue to monitor with imaging. Not concerning for malignancy. Renal mass- Less than a 3 cm mass. Stable. Discussed poss this is malignancy- pt is adamant of having NO treatment. Return in a year with a MRI. We will follow size and growth. Prescriptions Ordered:  No orders of the defined types were placed in this encounter. Orders Placed:  No orders of the defined types were placed in this encounter.            Kevin Euceda MD

## 2021-12-13 ENCOUNTER — HOSPITAL ENCOUNTER (INPATIENT)
Age: 86
LOS: 8 days | Discharge: HOME HEALTH | DRG: 243 | End: 2021-12-21
Attending: INTERNAL MEDICINE | Admitting: INTERNAL MEDICINE
Payer: MEDICARE

## 2021-12-13 ENCOUNTER — APPOINTMENT (OUTPATIENT)
Dept: GENERAL RADIOLOGY | Age: 86
DRG: 243 | End: 2021-12-13
Payer: MEDICARE

## 2021-12-13 DIAGNOSIS — R00.1 SYMPTOMATIC BRADYCARDIA: Primary | ICD-10-CM

## 2021-12-13 LAB
ALBUMIN SERPL-MCNC: 4.3 G/DL (ref 3.5–5.1)
ALP BLD-CCNC: 76 U/L (ref 38–126)
ALT SERPL-CCNC: 51 U/L (ref 11–66)
ANION GAP SERPL CALCULATED.3IONS-SCNC: 12 MEQ/L (ref 8–16)
AST SERPL-CCNC: 45 U/L (ref 5–40)
BASOPHILS # BLD: 0.5 %
BASOPHILS ABSOLUTE: 0 THOU/MM3 (ref 0–0.1)
BILIRUB SERPL-MCNC: 0.5 MG/DL (ref 0.3–1.2)
BUN BLDV-MCNC: 25 MG/DL (ref 7–22)
CALCIUM SERPL-MCNC: 9.8 MG/DL (ref 8.5–10.5)
CHLORIDE BLD-SCNC: 99 MEQ/L (ref 98–111)
CO2: 26 MEQ/L (ref 23–33)
CREAT SERPL-MCNC: 0.8 MG/DL (ref 0.4–1.2)
EOSINOPHIL # BLD: 0.8 %
EOSINOPHILS ABSOLUTE: 0.1 THOU/MM3 (ref 0–0.4)
ERYTHROCYTE [DISTWIDTH] IN BLOOD BY AUTOMATED COUNT: 14 % (ref 11.5–14.5)
ERYTHROCYTE [DISTWIDTH] IN BLOOD BY AUTOMATED COUNT: 48 FL (ref 35–45)
GFR SERPL CREATININE-BSD FRML MDRD: 67 ML/MIN/1.73M2
GLUCOSE BLD-MCNC: 153 MG/DL (ref 70–108)
HCT VFR BLD CALC: 41 % (ref 37–47)
HEMOGLOBIN: 13.7 GM/DL (ref 12–16)
IMMATURE GRANS (ABS): 0.02 THOU/MM3 (ref 0–0.07)
IMMATURE GRANULOCYTES: 0.3 %
LYMPHOCYTES # BLD: 19.2 %
LYMPHOCYTES ABSOLUTE: 1.2 THOU/MM3 (ref 1–4.8)
MAGNESIUM: 2.1 MG/DL (ref 1.6–2.4)
MCH RBC QN AUTO: 31.5 PG (ref 26–33)
MCHC RBC AUTO-ENTMCNC: 33.4 GM/DL (ref 32.2–35.5)
MCV RBC AUTO: 94.3 FL (ref 81–99)
MONOCYTES # BLD: 6.9 %
MONOCYTES ABSOLUTE: 0.4 THOU/MM3 (ref 0.4–1.3)
NUCLEATED RED BLOOD CELLS: 0 /100 WBC
OSMOLALITY CALCULATION: 281.2 MOSMOL/KG (ref 275–300)
PLATELET # BLD: 194 THOU/MM3 (ref 130–400)
PMV BLD AUTO: 9.4 FL (ref 9.4–12.4)
POTASSIUM SERPL-SCNC: 4.4 MEQ/L (ref 3.5–5.2)
PRO-BNP: 1503 PG/ML (ref 0–1800)
RBC # BLD: 4.35 MILL/MM3 (ref 4.2–5.4)
SARS-COV-2, NAAT: NOT  DETECTED
SEG NEUTROPHILS: 72.3 %
SEGMENTED NEUTROPHILS ABSOLUTE COUNT: 4.6 THOU/MM3 (ref 1.8–7.7)
SODIUM BLD-SCNC: 137 MEQ/L (ref 135–145)
TOTAL PROTEIN: 6.7 G/DL (ref 6.1–8)
TROPONIN T: < 0.01 NG/ML
WBC # BLD: 6.4 THOU/MM3 (ref 4.8–10.8)

## 2021-12-13 PROCEDURE — 71045 X-RAY EXAM CHEST 1 VIEW: CPT

## 2021-12-13 PROCEDURE — 84484 ASSAY OF TROPONIN QUANT: CPT

## 2021-12-13 PROCEDURE — 99284 EMERGENCY DEPT VISIT MOD MDM: CPT

## 2021-12-13 PROCEDURE — 87635 SARS-COV-2 COVID-19 AMP PRB: CPT

## 2021-12-13 PROCEDURE — 83880 ASSAY OF NATRIURETIC PEPTIDE: CPT

## 2021-12-13 PROCEDURE — 93005 ELECTROCARDIOGRAM TRACING: CPT | Performed by: INTERNAL MEDICINE

## 2021-12-13 PROCEDURE — 6360000002 HC RX W HCPCS

## 2021-12-13 PROCEDURE — 96374 THER/PROPH/DIAG INJ IV PUSH: CPT

## 2021-12-13 PROCEDURE — 85025 COMPLETE CBC W/AUTO DIFF WBC: CPT

## 2021-12-13 PROCEDURE — 83735 ASSAY OF MAGNESIUM: CPT

## 2021-12-13 PROCEDURE — 80053 COMPREHEN METABOLIC PANEL: CPT

## 2021-12-13 PROCEDURE — 2060000000 HC ICU INTERMEDIATE R&B

## 2021-12-13 RX ORDER — ATROPINE SULFATE 0.1 MG/ML
1 INJECTION INTRAVENOUS ONCE
Status: COMPLETED | OUTPATIENT
Start: 2021-12-13 | End: 2021-12-13

## 2021-12-13 RX ORDER — ATROPINE SULFATE 0.1 MG/ML
INJECTION INTRAVENOUS
Status: COMPLETED
Start: 2021-12-13 | End: 2021-12-13

## 2021-12-13 RX ADMIN — ATROPINE SULFATE 1 MG: 0.1 INJECTION, SOLUTION INTRAVENOUS at 20:40

## 2021-12-13 RX ADMIN — ATROPINE SULFATE 1 MG: 0.1 INJECTION INTRAVENOUS at 20:40

## 2021-12-13 ASSESSMENT — ENCOUNTER SYMPTOMS
ABDOMINAL DISTENTION: 0
RHINORRHEA: 0
VOMITING: 0
CHEST TIGHTNESS: 0
SINUS PRESSURE: 0
SORE THROAT: 0
ABDOMINAL PAIN: 0
BACK PAIN: 0
SHORTNESS OF BREATH: 1
WHEEZING: 0

## 2021-12-13 NOTE — Clinical Note
Patient Class: Inpatient [101]   REQUIRED: Diagnosis: Symptomatic bradycardia [049084]   Estimated Length of Stay: Estimated stay of more than 2 midnights   Admitting Provider: Lori Mario [[id number]]   Telemetry/Cardiac Monitoring Required?: Yes

## 2021-12-14 LAB
EKG Q-T INTERVAL: 472 MS
EKG QRS DURATION: 134 MS
EKG QTC CALCULATION (BAZETT): 384 MS
EKG R AXIS: -44 DEGREES
EKG T AXIS: -39 DEGREES
EKG VENTRICULAR RATE: 40 BPM
MRSA SCREEN RT-PCR: NEGATIVE
TROPONIN T: < 0.01 NG/ML
TROPONIN T: < 0.01 NG/ML

## 2021-12-14 PROCEDURE — 84484 ASSAY OF TROPONIN QUANT: CPT

## 2021-12-14 PROCEDURE — 6370000000 HC RX 637 (ALT 250 FOR IP): Performed by: INTERNAL MEDICINE

## 2021-12-14 PROCEDURE — 2060000000 HC ICU INTERMEDIATE R&B

## 2021-12-14 PROCEDURE — 87641 MR-STAPH DNA AMP PROBE: CPT

## 2021-12-14 PROCEDURE — 36415 COLL VENOUS BLD VENIPUNCTURE: CPT

## 2021-12-14 PROCEDURE — 6360000002 HC RX W HCPCS: Performed by: INTERNAL MEDICINE

## 2021-12-14 PROCEDURE — 2580000003 HC RX 258: Performed by: INTERNAL MEDICINE

## 2021-12-14 PROCEDURE — 93010 ELECTROCARDIOGRAM REPORT: CPT | Performed by: INTERNAL MEDICINE

## 2021-12-14 PROCEDURE — 6360000002 HC RX W HCPCS

## 2021-12-14 RX ORDER — ACETAMINOPHEN 325 MG/1
650 TABLET ORAL EVERY 6 HOURS PRN
Status: DISCONTINUED | OUTPATIENT
Start: 2021-12-14 | End: 2021-12-21 | Stop reason: HOSPADM

## 2021-12-14 RX ORDER — POTASSIUM CHLORIDE 20 MEQ/1
40 TABLET, EXTENDED RELEASE ORAL PRN
Status: DISCONTINUED | OUTPATIENT
Start: 2021-12-14 | End: 2021-12-21 | Stop reason: HOSPADM

## 2021-12-14 RX ORDER — SODIUM CHLORIDE 9 MG/ML
25 INJECTION, SOLUTION INTRAVENOUS PRN
Status: DISCONTINUED | OUTPATIENT
Start: 2021-12-14 | End: 2021-12-21 | Stop reason: HOSPADM

## 2021-12-14 RX ORDER — NITROGLYCERIN 0.4 MG/1
0.4 TABLET SUBLINGUAL EVERY 5 MIN PRN
Status: DISCONTINUED | OUTPATIENT
Start: 2021-12-14 | End: 2021-12-21 | Stop reason: HOSPADM

## 2021-12-14 RX ORDER — POTASSIUM CHLORIDE 7.45 MG/ML
10 INJECTION INTRAVENOUS PRN
Status: DISCONTINUED | OUTPATIENT
Start: 2021-12-14 | End: 2021-12-21 | Stop reason: HOSPADM

## 2021-12-14 RX ORDER — POLYETHYLENE GLYCOL 3350 17 G/17G
17 POWDER, FOR SOLUTION ORAL DAILY PRN
Status: DISCONTINUED | OUTPATIENT
Start: 2021-12-14 | End: 2021-12-21 | Stop reason: HOSPADM

## 2021-12-14 RX ORDER — ATROPINE SULFATE 0.1 MG/ML
0.5 INJECTION INTRAVENOUS PRN
Status: COMPLETED | OUTPATIENT
Start: 2021-12-14 | End: 2021-12-14

## 2021-12-14 RX ORDER — ATROPINE SULFATE 0.1 MG/ML
INJECTION INTRAVENOUS
Status: COMPLETED
Start: 2021-12-14 | End: 2021-12-14

## 2021-12-14 RX ORDER — LACTOBACILLUS RHAMNOSUS GG 10B CELL
1 CAPSULE ORAL
Status: DISCONTINUED | OUTPATIENT
Start: 2021-12-14 | End: 2021-12-21 | Stop reason: HOSPADM

## 2021-12-14 RX ORDER — FUROSEMIDE 40 MG/1
40 TABLET ORAL 2 TIMES DAILY
Status: DISCONTINUED | OUTPATIENT
Start: 2021-12-14 | End: 2021-12-21 | Stop reason: HOSPADM

## 2021-12-14 RX ORDER — ATORVASTATIN CALCIUM 80 MG/1
80 TABLET, FILM COATED ORAL NIGHTLY
Status: DISCONTINUED | OUTPATIENT
Start: 2021-12-14 | End: 2021-12-21 | Stop reason: HOSPADM

## 2021-12-14 RX ORDER — AMLODIPINE BESYLATE 5 MG/1
5 TABLET ORAL DAILY
Status: DISCONTINUED | OUTPATIENT
Start: 2021-12-14 | End: 2021-12-14

## 2021-12-14 RX ORDER — ONDANSETRON 4 MG/1
4 TABLET, ORALLY DISINTEGRATING ORAL EVERY 8 HOURS PRN
Status: DISCONTINUED | OUTPATIENT
Start: 2021-12-14 | End: 2021-12-21 | Stop reason: HOSPADM

## 2021-12-14 RX ORDER — ONDANSETRON 2 MG/ML
4 INJECTION INTRAMUSCULAR; INTRAVENOUS EVERY 6 HOURS PRN
Status: DISCONTINUED | OUTPATIENT
Start: 2021-12-14 | End: 2021-12-21 | Stop reason: HOSPADM

## 2021-12-14 RX ORDER — VITAMIN B COMPLEX
1000 TABLET ORAL DAILY
Status: DISCONTINUED | OUTPATIENT
Start: 2021-12-14 | End: 2021-12-21 | Stop reason: HOSPADM

## 2021-12-14 RX ORDER — ASPIRIN 81 MG/1
81 TABLET, CHEWABLE ORAL DAILY
Status: DISCONTINUED | OUTPATIENT
Start: 2021-12-15 | End: 2021-12-21 | Stop reason: HOSPADM

## 2021-12-14 RX ORDER — SODIUM CHLORIDE 0.9 % (FLUSH) 0.9 %
5-40 SYRINGE (ML) INJECTION PRN
Status: DISCONTINUED | OUTPATIENT
Start: 2021-12-14 | End: 2021-12-21 | Stop reason: HOSPADM

## 2021-12-14 RX ORDER — AMLODIPINE BESYLATE 5 MG/1
5 TABLET ORAL DAILY
Status: DISCONTINUED | OUTPATIENT
Start: 2021-12-14 | End: 2021-12-21 | Stop reason: HOSPADM

## 2021-12-14 RX ORDER — CLONIDINE HYDROCHLORIDE 0.1 MG/1
0.1 TABLET ORAL DAILY PRN
Status: DISCONTINUED | OUTPATIENT
Start: 2021-12-14 | End: 2021-12-21 | Stop reason: HOSPADM

## 2021-12-14 RX ORDER — SODIUM PHOSPHATE,MONO-DIBASIC 19G-7G/118
1 ENEMA (ML) RECTAL DAILY
Status: DISCONTINUED | OUTPATIENT
Start: 2021-12-14 | End: 2021-12-14 | Stop reason: RX

## 2021-12-14 RX ORDER — MULTIVITAMIN WITH IRON
1 TABLET ORAL DAILY
Status: DISCONTINUED | OUTPATIENT
Start: 2021-12-14 | End: 2021-12-21 | Stop reason: HOSPADM

## 2021-12-14 RX ORDER — LISINOPRIL 20 MG/1
20 TABLET ORAL DAILY
Status: DISCONTINUED | OUTPATIENT
Start: 2021-12-14 | End: 2021-12-21 | Stop reason: HOSPADM

## 2021-12-14 RX ORDER — CALCIUM CARBONATE 500(1250)
1 TABLET ORAL DAILY
Status: DISCONTINUED | OUTPATIENT
Start: 2021-12-14 | End: 2021-12-21 | Stop reason: HOSPADM

## 2021-12-14 RX ORDER — SODIUM CHLORIDE 0.9 % (FLUSH) 0.9 %
5-40 SYRINGE (ML) INJECTION EVERY 12 HOURS SCHEDULED
Status: DISCONTINUED | OUTPATIENT
Start: 2021-12-14 | End: 2021-12-21 | Stop reason: HOSPADM

## 2021-12-14 RX ORDER — ACETAMINOPHEN 650 MG/1
650 SUPPOSITORY RECTAL EVERY 6 HOURS PRN
Status: DISCONTINUED | OUTPATIENT
Start: 2021-12-14 | End: 2021-12-21 | Stop reason: HOSPADM

## 2021-12-14 RX ORDER — SODIUM CHLORIDE 9 MG/ML
INJECTION, SOLUTION INTRAVENOUS CONTINUOUS
Status: DISCONTINUED | OUTPATIENT
Start: 2021-12-14 | End: 2021-12-21 | Stop reason: HOSPADM

## 2021-12-14 RX ADMIN — ATROPINE SULFATE 0.5 MG: 0.1 INJECTION, SOLUTION INTRAVENOUS at 03:02

## 2021-12-14 RX ADMIN — ATROPINE SULFATE 0.5 MG: 0.1 INJECTION, SOLUTION INTRAVENOUS at 16:31

## 2021-12-14 RX ADMIN — SODIUM CHLORIDE, PRESERVATIVE FREE 10 ML: 5 INJECTION INTRAVENOUS at 09:15

## 2021-12-14 RX ADMIN — RIVAROXABAN 20 MG: 20 TABLET, FILM COATED ORAL at 09:12

## 2021-12-14 RX ADMIN — FUROSEMIDE 40 MG: 40 TABLET ORAL at 17:55

## 2021-12-14 RX ADMIN — Medication 1000 UNITS: at 09:13

## 2021-12-14 RX ADMIN — Medication 1 TABLET: at 09:12

## 2021-12-14 RX ADMIN — FUROSEMIDE 40 MG: 40 TABLET ORAL at 09:14

## 2021-12-14 RX ADMIN — Medication 500 MG: at 09:14

## 2021-12-14 RX ADMIN — LISINOPRIL 20 MG: 20 TABLET ORAL at 09:14

## 2021-12-14 RX ADMIN — SODIUM CHLORIDE, PRESERVATIVE FREE 10 ML: 5 INJECTION INTRAVENOUS at 21:57

## 2021-12-14 RX ADMIN — AMLODIPINE BESYLATE 5 MG: 5 TABLET ORAL at 17:56

## 2021-12-14 RX ADMIN — ATROPINE SULFATE 0.5 MG: 0.1 INJECTION, SOLUTION ENDOTRACHEAL; INTRAMUSCULAR; INTRAVENOUS; SUBCUTANEOUS at 03:02

## 2021-12-14 RX ADMIN — Medication 1 CAPSULE: at 09:14

## 2021-12-14 RX ADMIN — CLONIDINE HYDROCHLORIDE 0.1 MG: 0.1 TABLET ORAL at 04:38

## 2021-12-14 RX ADMIN — ATORVASTATIN CALCIUM 80 MG: 80 TABLET, FILM COATED ORAL at 22:51

## 2021-12-14 RX ADMIN — SODIUM CHLORIDE: 9 INJECTION, SOLUTION INTRAVENOUS at 03:05

## 2021-12-14 ASSESSMENT — PAIN SCALES - GENERAL
PAINLEVEL_OUTOF10: 0

## 2021-12-14 NOTE — ED NOTES
RN dimmed lights for patient comfort. Pt respirations even and unlabored.      Mehran Harding RN  12/14/21 8524

## 2021-12-14 NOTE — ED NOTES
Assisted patient back to bed. Alert and oriented. Respirations easy and unlabored. Will monitor.       Joni Domingo RN  12/14/21 3003

## 2021-12-14 NOTE — ED NOTES
Patient resting in bed with eyes closed. Respirations easy and unlabored.      Drea Hooker RN  12/14/21 2516

## 2021-12-14 NOTE — ED PROVIDER NOTES
biopsy; and Carpal tunnel release (10/23/2018). CURRENT MEDICATIONS       Previous Medications    AMLODIPINE (NORVASC) 5 MG TABLET    Take 5 mg by mouth daily. ATENOLOL (TENORMIN) 25 MG TABLET    Take 25 mg by mouth daily. CALCIUM CARBONATE 600 MG TABS TABLET    Take 1 tablet by mouth daily. CHOLECALCIFEROL (VITAMIN D-3 PO)    Take 1,000 Units by mouth daily     CLONIDINE (CATAPRES) 0.1 MG TABLET    Take 0.1 mg by mouth as needed for High Blood Pressure If over 160/100    FUROSEMIDE (LASIX) 40 MG TABLET    Take 40 mg by mouth 2 times daily    GLUCOSAMINE-CHONDROITIN (GLUCOSAMINE CHONDR COMPLEX PO)    Take 2 tablets by mouth daily Move Free    LACTOBACILLUS (CULTURELLE) CAPSULE    Take 1 capsule by mouth daily (with breakfast)    LISINOPRIL (PRINIVIL;ZESTRIL) 20 MG TABLET    Take 1 tablet by mouth daily Additional RF per her PCP    MULTIPLE VITAMIN (MULTI-VITAMIN DAILY PO)    Take by mouth daily    MULTIPLE VITAMINS-MINERALS (OCUVITE PO)    Take 1 tablet by mouth daily     PRAVASTATIN (PRAVACHOL) 20 MG TABLET    Take 20 mg by mouth daily. RIVAROXABAN (XARELTO) 20 MG TABS TABLET    Take 20 mg by mouth daily        ALLERGIES     is allergic to bactrim [sulfamethoxazole-trimethoprim], ciprofloxacin, and sulfa antibiotics. FAMILY HISTORY     She indicated that the status of her mother is unknown. She indicated that the status of her father is unknown. She indicated that the status of her brother is unknown.   family history includes Arthritis in her father; Cancer in her brother, mother, sister, and sister; Diabetes in her sister and sister; Early Death in her brother; Heart Attack in her father; Heart Disease in her father; High Blood Pressure in her father. SOCIAL HISTORY       Social History     Socioeconomic History    Marital status:       Spouse name: Not on file    Number of children: Not on file    Years of education: Not on file    Highest education level: Not on file Occupational History    Occupation: Retired   Tobacco Use    Smoking status: Never Smoker    Smokeless tobacco: Never Used   Substance and Sexual Activity    Alcohol use: No    Drug use: No    Sexual activity: Not on file   Other Topics Concern    Not on file   Social History Narrative    - Never     Social Determinants of Health     Financial Resource Strain:     Difficulty of Paying Living Expenses: Not on file   Food Insecurity:     Worried About Running Out of Food in the Last Year: Not on file    Abhay of Food in the Last Year: Not on file   Transportation Needs:     Lack of Transportation (Medical): Not on file    Lack of Transportation (Non-Medical): Not on file   Physical Activity:     Days of Exercise per Week: Not on file    Minutes of Exercise per Session: Not on file   Stress:     Feeling of Stress : Not on file   Social Connections:     Frequency of Communication with Friends and Family: Not on file    Frequency of Social Gatherings with Friends and Family: Not on file    Attends Restoration Services: Not on file    Active Member of [address] [place] [address] or Organizations: Not on file    Attends Club or Organization Meetings: Not on file    Marital Status: Not on file   Intimate Partner Violence:     Fear of Current or Ex-Partner: Not on file    Emotionally Abused: Not on file    Physically Abused: Not on file    Sexually Abused: Not on file   Housing Stability:     Unable to Pay for Housing in the Last Year: Not on file    Number of [place] in the Last Year: Not on file    Unstable Housing in the Last Year: Not on file       PHYSICAL EXAM     INITIAL VITALS:  height is 5' 4\" (1.626 m) and weight is 153 lb (69.4 kg). Her oral temperature is 97.8 °F (36.6 °C). Her blood pressure is 157/54 (abnormal) and her pulse is 40 (abnormal). Her respiration is 16 and oxygen saturation is 94%. Physical Exam  Vitals and nursing note reviewed.    Constitutional:       Appearance: Normal appearance. She is well-developed. HENT:      Head: Normocephalic. Mouth/Throat:      Pharynx: Uvula midline. Eyes:      Conjunctiva/sclera: Conjunctivae normal.   Cardiovascular:      Rate and Rhythm: Regular rhythm. Bradycardia present. No extrasystoles are present. Heart sounds: Normal heart sounds, S1 normal and S2 normal.   Pulmonary:      Effort: Pulmonary effort is normal. No tachypnea or respiratory distress. Breath sounds: Normal breath sounds. No decreased breath sounds, wheezing, rhonchi or rales. Chest:      Chest wall: No tenderness. Abdominal:      General: Bowel sounds are normal. There is no distension. Palpations: Abdomen is soft. Tenderness: There is no abdominal tenderness. Musculoskeletal:         General: Normal range of motion. Cervical back: Normal range of motion and neck supple. Lymphadenopathy:      Cervical: No cervical adenopathy. Skin:     General: Skin is warm and dry. Capillary Refill: Capillary refill takes less than 2 seconds. Neurological:      Mental Status: She is alert and oriented to person, place, and time. Psychiatric:         Speech: Speech normal.         Behavior: Behavior normal.         Thought Content: Thought content normal.         DIFFERENTIAL DIAGNOSIS:   Symptomatic bradycardia, electrolyte abnormality, sick sinus syndrome,    DIAGNOSTIC RESULTS     EKG: All EKG's are interpreted by the Emergency Department Physician who eithersigns or Co-signs this chart in the absence of a cardiologist.    EKG read and interpreted by myself with comparison to December 28, 2020 gives impression atrial fibrillation with slow ventricular response of 40; ;QTc 384; axis R--44, T--39. RADIOLOGY: non-plainfilm images(s) such as CT, Ultrasound and MRI are read by the radiologist.  Plain radiographic images are visualized and preliminarily interpreted by the emergency physician unless otherwise stated below.   XR CHEST PORTABLE Final Result   Cardiomegaly with asymmetric vascular congestion and right lower lobe atelectasis and effusion. **This report has been created using voice recognition software. It may contain minor errors which are inherent in voice recognition technology. **      Final report electronically signed by Dr. Mukesh Jackson on 12/13/2021 8:34 PM            LABS:   Labs Reviewed   CBC WITH AUTO DIFFERENTIAL - Abnormal; Notable for the following components:       Result Value    RDW-SD 48.0 (*)     All other components within normal limits   COMPREHENSIVE METABOLIC PANEL - Abnormal; Notable for the following components:    Glucose 153 (*)     BUN 25 (*)     AST 45 (*)     All other components within normal limits   GLOMERULAR FILTRATION RATE, ESTIMATED - Abnormal; Notable for the following components:    Est, Glom Filt Rate 67 (*)     All other components within normal limits   COVID-19, RAPID   MAGNESIUM   TROPONIN   BRAIN NATRIURETIC PEPTIDE   ANION GAP   OSMOLALITY       EMERGENCY DEPARTMENT COURSE:   Vitals:    Vitals:    12/13/21 2038 12/13/21 2058 12/13/21 2124 12/13/21 2211   BP: (!) 145/78  (!) 169/68 (!) 157/54   Pulse: (!) 45 56 (!) 44 (!) 40   Resp: 18  16 16   Temp:       TempSrc:       SpO2: 94%  94% 94%   Weight:       Height:           MDM    Patient was seen and evaluated in the emergency department, patient appeared to be in no acute distress, vital signs reviewed, significant bradycardia noted. Patient was given atropine, noted improvement in heart rate or but soon went back to low 40s. Patient has symptoms of shortness of breath with activity. Chest x-ray reveals pleural effusion. Labs were obtained, no significant findings noted. Discussed the case with Dr. Isabelle Maguire who accepts patient for admission. Discussed my findings and plan of care with the patient and she is amenable with admission.   Medications   atropine injection 1 mg (1 mg IntraVENous Given 12/13/21 2040)       Patient was seenindependently by myself. The patient's final impression and disposition and plan was determined by myself. CRITICAL CARE:   None    CONSULTS:  Dr. Yo Newman:  None    FINAL IMPRESSION     1. Symptomatic bradycardia          DISPOSITION/PLAN   Patient admitted to Dr. Singer Cancer:  No follow-up provider specified. DISCHARGE MEDICATIONS:  New Prescriptions    No medications on file       (Please note that portions of this note were completed with a voice recognition program.  Efforts were made to edit the dictations but occasionally words are mis-transcribed.)    Provider:  I personally performed the services described in the documentation,reviewed and edited the documentation which was dictated to the scribe in my presence, and it accurately records my words and actions.     Hilario Gaffney, TRISHA 12/13/21 10:30 PM    [place]on Gaffney, APRN - CNP        [place], APRN - CNP  12/13/21 3930

## 2021-12-14 NOTE — ED NOTES
Assisted patient to the restroom via wheelchair. Pt respirations even and unlabored.      Johana Oliveira RN  12/13/21 8133

## 2021-12-14 NOTE — ED NOTES
RN provided patient with socks. Pt respirations even and unlabored. Provided patient with ice chips.       Daniel Hartmann RN  12/13/21 7318

## 2021-12-14 NOTE — ED NOTES
Upon first contact with patient this RN receives bedside shift report from [facility]. Patient alert and oriented. Respirations easy and unlabored.       Colin Pinedo RN  12/14/21 3576

## 2021-12-14 NOTE — ED NOTES
Patient resting in bed with eyes closed. Respirations easy and unlabored. Will monitor.       Luciano Thomas RN  12/14/21 1258

## 2021-12-14 NOTE — ED TRIAGE NOTES
Pt presents to  ED with complaints of SOB. Pt has history of afib and EKG read 40. Pt denies history of of bradycardia. Pt respirations even and unlabored. Pt oxygen saturation 96%. Pt denies any COVID19 exposures.

## 2021-12-14 NOTE — ED NOTES
Pt is resting in cot with respirations even and unlabored. Pt would like more ice chips, RN gave pt some ice chips. Pt voices no other concern or need at this time. Call light is within reach. Will continue to monitor.       Virginia Shoemaker RN  12/13/21 9373

## 2021-12-14 NOTE — H&P
[address] [place] [address]                              HISTORY AND PHYSICAL    PATIENT NAME: Estephania Mcmullen                  :        1929  MED REC NO:   [id number]                           ROOM:       040  ACCOUNT NO:   [de-identified]                           ADMIT DATE: 2021  PROVIDER:     Gwyn Zuleta. Charlie Cordero M.D. REASON FOR ADMISSION:  Shortness of breath and bradycardia. HISTORY OF PRESENT ILLNESS:  The patient is a 80-year-old lady who still  lives at her own home by herself. She was brought to the emergency room  by the family members. Apparently, the patient, in the last month, she  has been getting more short of breath. She was noticed to have atrial  fibrillation and slow ventricular response. The patient admitted to the  hospital for that. Her initial troponin was negative. BNP was  negative. The patient denied chest pain per se. Most probably, her  symptoms are related to her atrial fib with very slow ventricular  response. REVIEW OF SYSTEMS:  The patient had a history of chronic atrial fib. She had a history of hypertension, history of dyslipidemia, and history  of diabetes mellitus. She has atrial fib with slow ventricular  response. She denies fever, chills, or rigors. She denied PND or pedal  edema. She has no syncopal episode. The patient Has no change in her weight. She was able to ambulate  independently at her own. She has no GI bleed or bleeding disorder. SOCIAL HISTORY:  She denies smoking or alcohol abuse. ALLERGIES:  SHE HAS ALLERGY TO THE SULFA AND CIPRO. CODE STATUS:  She is a limited code. PHYSICAL EXAMINATION:  VITAL SIGNS:  The patient's exam showed her blood pressure 150/54. She  is in atrial fib with the slow ventricular response. The patient was  afebrile. Respiratory rate was 18. She was somewhat agitated. She  could not sleep well last night.   She has no motor deficit. NECK:  She has soft carotid bruit. HEART:  There is apical pulsation laterally displaced, 2/6 systolic  murmur. LUNGS:  Showed scattered expiratory rhonchi. ABDOMEN:  Soft. GENITAL/RECTAL:  Deferred. EXTREMITIES:  Lower limbs, there is no edema. DIAGNOSTIC DATA:  The chest x-ray showed cardiomegaly with possible  atelectasis. LABORATORY DATA:  The lab work showed her BUN 25 and creatinine 0.8. Her blood sugar is 150. Hemoglobin is 13 and hematocrit is 41. IMPRESSION:  In summary,  1. The patient with atrial fib, slow ventricular response. Tenormin  will be held. The patient's heart rate will be monitored. Pending the  clinical response, she might need pacemaker if she continued to be  symptomatic with significant bradycardia. 2.  History of hypertension. 3.  History of diabetes. 4.  History of hypercholesterolemia. 5.  The patient is a limited code. PLAN:  The patient will be ambulated. We will have Social Service see  the patient, with an echo. We will hold Tenormin and monitor the heart  rate. Pending the results of the above test, further recommendation  will be made.         Vashti Moreno M.D.    D: 12/14/2021 8:58:29       T: 12/14/2021 9:46:46     AS/[email or web address]  Job#: [id number]     Doc#: [id number]    CC:

## 2021-12-14 NOTE — ED NOTES
Patient resting in bed. Respirations easy and unlabored. No distress noted. Call light within reach. Lunch ordered for patient.       Naomi Drake RN  12/14/21 8547

## 2021-12-14 NOTE — ED NOTES
Assumed care, no orders at this time,  spoke with supervision to obtain orders     Courtney Mendieta RN  12/14/21 6030

## 2021-12-14 NOTE — ED NOTES
ED to inpatient nurses report    Chief Complaint   Patient presents with    Shortness of Breath      Present to ED from home  LOC: alert and orientated to name, place, date  Vital signs   Vitals:    12/13/21 2014 12/13/21 2038 12/13/21 2058 12/13/21 2124   BP: (!) 187/45 (!) 145/78  (!) 169/68   Pulse: (!) 42 (!) 45 56 (!) 44   Resp: 16 18  16   Temp:       TempSrc:       SpO2: 98% 94%  94%   Weight:       Height:          Oxygen Baseline room air    Current needs required room air  LDAs:   Peripheral IV 12/13/21 Left Forearm (Active)   Site Assessment Clean; Dry; Intact 12/13/21 2042   Line Status Flushed 12/13/21 2042   Dressing Status Clean;  Intact; Dry 12/13/21 2042     Mobility: Independent  Pending ED orders: ED orders complete  Present condition: stable      Electronically signed by Drea Perez RN on 12/13/2021 at 9:51 PM       Drea Perez, [address] [place] [address]  12/13/21 2151

## 2021-12-14 NOTE — ED NOTES
Ambulated patient to chair for breakfast. Patient c/o sob. HR at 67 while ambulating. Pulse ox to 88% while ambulating.       Ankit Skelton RN  12/14/21 8242

## 2021-12-14 NOTE — PROGRESS NOTES
Inpatient Cardiac Rehabilitation Consult    Received consult for Phase II Cardiac Rehabilitation. We will follow patient for qualifying diagnosis.

## 2021-12-14 NOTE — ED NOTES
ED nurse-to-nurse bedside report    Chief Complaint   Patient presents with    Shortness of Breath      LOC: alert and orientated to name, place, date  Vital signs   Vitals:    12/13/21 2124 12/13/21 2211 12/13/21 2345 12/14/21 0055   BP: (!) 169/68 (!) 157/54 (!) 155/85 (!) 187/69   Pulse: (!) 44 (!) 40 (!) 43 (!) 45   Resp: 16 16  18   Temp:       TempSrc:       SpO2: 94% 94% 93% 96%   Weight:       Height:          Pain:    Pain Interventions: n/a  Pain Goal: 0  Oxygen: Yes    Current needs required room air   Telemetry: Yes  LDAs:   Peripheral IV 12/13/21 Left Forearm (Active)   Site Assessment Clean; Dry; Intact 12/14/21 0055   Line Status Flushed 12/13/21 2042   Dressing Status Clean; Dry; Intact 12/14/21 0055     Continuous Infusions:   Mobility: Independent  Keane Fall Risk Score: No flowsheet data found.   Fall Interventions: call light in reach  Report given to: Artis Aguirre RN  12/14/21 6094

## 2021-12-14 NOTE — PROGRESS NOTES
Pt admitted to  4K8 in a wheelchair and from ED. Complaints: Symptomatic Bradycardia. IV not infusing at this time. IV site free of s/s of infection or infiltration. Vital signs obtained. Assessment and data collection initiated. Two nurse skin assessment performed by Ciarra Steiner and Laureano Chowdhury RN. Oriented to room. Policies and procedures for  explained. All questions answered with no further questions at this time. Fall prevention and safety brochure discussed with patient. Bed alarm on. Call light in reach.

## 2021-12-15 LAB
ALBUMIN SERPL-MCNC: 3.9 G/DL (ref 3.5–5.1)
ALP BLD-CCNC: 64 U/L (ref 38–126)
ALT SERPL-CCNC: 33 U/L (ref 11–66)
AST SERPL-CCNC: 25 U/L (ref 5–40)
BILIRUB SERPL-MCNC: 0.8 MG/DL (ref 0.3–1.2)
BILIRUBIN DIRECT: < 0.2 MG/DL (ref 0–0.3)
CHOLESTEROL, TOTAL: 122 MG/DL (ref 100–199)
EKG Q-T INTERVAL: 494 MS
EKG QRS DURATION: 142 MS
EKG QTC CALCULATION (BAZETT): 412 MS
EKG R AXIS: -52 DEGREES
EKG T AXIS: -50 DEGREES
EKG VENTRICULAR RATE: 42 BPM
ERYTHROCYTE [DISTWIDTH] IN BLOOD BY AUTOMATED COUNT: 13.9 % (ref 11.5–14.5)
ERYTHROCYTE [DISTWIDTH] IN BLOOD BY AUTOMATED COUNT: 50.4 FL (ref 35–45)
HCT VFR BLD CALC: 42.4 % (ref 37–47)
HDLC SERPL-MCNC: 41 MG/DL
HEMOGLOBIN: 13.6 GM/DL (ref 12–16)
LDL CHOLESTEROL CALCULATED: 55 MG/DL
LV EF: 55 %
LVEF MODALITY: NORMAL
MCH RBC QN AUTO: 31.3 PG (ref 26–33)
MCHC RBC AUTO-ENTMCNC: 32.1 GM/DL (ref 32.2–35.5)
MCV RBC AUTO: 97.7 FL (ref 81–99)
PLATELET # BLD: 181 THOU/MM3 (ref 130–400)
PMV BLD AUTO: 10.2 FL (ref 9.4–12.4)
RBC # BLD: 4.34 MILL/MM3 (ref 4.2–5.4)
TOTAL PROTEIN: 6.2 G/DL (ref 6.1–8)
TRIGL SERPL-MCNC: 132 MG/DL (ref 0–199)
WBC # BLD: 7 THOU/MM3 (ref 4.8–10.8)

## 2021-12-15 PROCEDURE — 93306 TTE W/DOPPLER COMPLETE: CPT

## 2021-12-15 PROCEDURE — 85027 COMPLETE CBC AUTOMATED: CPT

## 2021-12-15 PROCEDURE — 2580000003 HC RX 258: Performed by: INTERNAL MEDICINE

## 2021-12-15 PROCEDURE — 93005 ELECTROCARDIOGRAM TRACING: CPT | Performed by: INTERNAL MEDICINE

## 2021-12-15 PROCEDURE — 80061 LIPID PANEL: CPT

## 2021-12-15 PROCEDURE — 36415 COLL VENOUS BLD VENIPUNCTURE: CPT

## 2021-12-15 PROCEDURE — 93010 ELECTROCARDIOGRAM REPORT: CPT | Performed by: INTERNAL MEDICINE

## 2021-12-15 PROCEDURE — 2060000000 HC ICU INTERMEDIATE R&B

## 2021-12-15 PROCEDURE — 6370000000 HC RX 637 (ALT 250 FOR IP): Performed by: INTERNAL MEDICINE

## 2021-12-15 PROCEDURE — 80076 HEPATIC FUNCTION PANEL: CPT

## 2021-12-15 RX ADMIN — ASPIRIN 81 MG CHEWABLE TABLET 81 MG: 81 TABLET CHEWABLE at 08:16

## 2021-12-15 RX ADMIN — Medication 1000 UNITS: at 08:17

## 2021-12-15 RX ADMIN — SODIUM CHLORIDE, PRESERVATIVE FREE 10 ML: 5 INJECTION INTRAVENOUS at 08:17

## 2021-12-15 RX ADMIN — RIVAROXABAN 20 MG: 20 TABLET, FILM COATED ORAL at 08:16

## 2021-12-15 RX ADMIN — Medication 1 CAPSULE: at 08:17

## 2021-12-15 RX ADMIN — FUROSEMIDE 40 MG: 40 TABLET ORAL at 08:17

## 2021-12-15 RX ADMIN — FUROSEMIDE 40 MG: 40 TABLET ORAL at 16:22

## 2021-12-15 RX ADMIN — ATORVASTATIN CALCIUM 80 MG: 80 TABLET, FILM COATED ORAL at 20:30

## 2021-12-15 RX ADMIN — Medication 500 MG: at 08:17

## 2021-12-15 RX ADMIN — Medication 1 TABLET: at 08:17

## 2021-12-15 RX ADMIN — SODIUM CHLORIDE, PRESERVATIVE FREE 10 ML: 5 INJECTION INTRAVENOUS at 20:30

## 2021-12-15 RX ADMIN — AMLODIPINE BESYLATE 5 MG: 5 TABLET ORAL at 16:22

## 2021-12-15 RX ADMIN — LISINOPRIL 20 MG: 20 TABLET ORAL at 08:17

## 2021-12-15 ASSESSMENT — PAIN SCALES - GENERAL: PAINLEVEL_OUTOF10: 0

## 2021-12-15 NOTE — FLOWSHEET NOTE
Pt was sitting on the chair  beside her bed. She was dealing with symptomatic bradycardia. She will be having pacemaker tomorrow, she said. She said that she has been having shortness of breath and wanted it to stop. She was in good spirits and not giving up. She wanted prayer to cope and heal and she was anointed. 12/15/21 1612   Encounter Summary   Services provided to: Patient   Referral/Consult From: Mary Ann Miranda Visiting Yes  (12/15 )   Complexity of Encounter Moderate   Length of Encounter 15 minutes   Spiritual/Methodist   Type Spiritual support   Assessment Approachable; Calm   Intervention Prayer; Nurtured hope; Active listening; Empowerment; Sustaining presence/ Ministry of presence   Outcome Connection/belonging; Expressed gratitude; Encouraged;  Hopeful; Receptive   Sacraments   Sacrament of Sick-Anointing Anointed

## 2021-12-15 NOTE — CARE COORDINATION
DISCHARGE/PLANNING EVALUATION  12/15/21, 8:53 AM EST    Reason for Referral: Discharge planning  Mental Status: Alert and Oriented  Decision Making: Self (Pascale Kumar, son, is POA)  Family/Social/Home Environment: Patient lives alone in her own home. Patient reported that she has a supportive family. Current Services including food security, transportation and housekeeping: Patient was independent prior to admission. Patient reported that she was able to do her laundry in the basement, cleaning, cooking but did not drive. She denied having any any current services in the home. Current Equipment: None  Payment Source: [employer]  Concerns or Barriers to Discharge: None  Post acute provider list with quality measures, geographic area and applicable managed care information provided. Questions regarding selection process answered: Offered    Teach Back Method used with patient regarding care plan and needs. Patient verbalize understanding of the plan of care and contribute to goal setting. Patient goals, treatment preferences and discharge plan: Patient reported that she is unsure of the discharge plan at this time. She rpeorted dthat she is hoping shse can go home with [facility] but understands she may need an ECF short term. SW provided [facility] list and ECF list to patient. SW to follow for discharge planning.     Electronically signed by HORACIO Aviles on 12/15/2021 at 8:53 AM

## 2021-12-15 NOTE — PROGRESS NOTES
Admit Date: 12/13/2021  Hospital day 2    Subjective:     Patient has sob . Medication side effects: none    Scheduled Meds:   calcium elemental  1 tablet Oral Daily    Vitamin D  1,000 Units Oral Daily    furosemide  40 mg Oral BID    lactobacillus  1 capsule Oral Daily with breakfast    lisinopril  20 mg Oral Daily    multivitamin  1 tablet Oral Daily    rivaroxaban  20 mg Oral Daily    sodium chloride flush  5-40 mL IntraVENous 2 times per day    aspirin  81 mg Oral Daily    atorvastatin  80 mg Oral Nightly    amLODIPine  5 mg Oral Daily     Continuous Infusions:   sodium chloride 20 mL/hr at 12/14/21 0305    sodium chloride       PRN Meds:cloNIDine, sodium chloride flush, sodium chloride, potassium chloride **OR** potassium alternative oral replacement **OR** potassium chloride, ondansetron **OR** ondansetron, acetaminophen **OR** acetaminophen, polyethylene glycol, nitroGLYCERIN    Review of Systems  Pertinent items are noted in HPI. Objective:     Patient Vitals for the past 8 hrs:   BP Temp Temp src Pulse Resp SpO2   12/15/21 0812 (!) 174/74 99 °F (37.2 °C) Oral (!) 40 20 93 %   12/15/21 0344 135/70 98.2 °F (36.8 °C) -- (!) 39 20 93 %     I/O last 3 completed shifts: In: 250 [P.O.:240;  I.V.:10]  Out: -     No change     ECG: atrial fib , slow vr.   Data Review:   CBC:  Lab Results   Component Value Date    WBC 7.0 12/15/2021    RBC 4.34 12/15/2021    RBC 4.27 07/18/2011    HGB 13.6 12/15/2021    HCT 42.4 12/15/2021    MCV 97.7 12/15/2021    MCH 31.3 12/15/2021    MCHC 32.1 12/15/2021    RDW 14.6 04/29/2018     12/15/2021    MPV 10.2 12/15/2021     BMP  Lab Results   Component Value Date     12/13/2021    K 4.4 12/13/2021    K 4.1 02/22/2021    CL 99 12/13/2021    CO2 26 12/13/2021    BUN 25 12/13/2021    CREATININE 0.8 12/13/2021    CALCIUM 9.8 12/13/2021      COAG PROFILE:  Lab Results   Component Value Date    APTT 66.6 12/10/2018    INR 1.45 12/02/2018       Assessment: Active Problems:    Symptomatic bradycardia  Resolved Problems:    * No resolved hospital problems.  *      Plan:   Still in atrial fib with slow VR    sob    htn    will arrange for pace maker tomorrow    hold xarelto and asa    trying to get hold of family    echo today    discussed with the patient

## 2021-12-15 NOTE — CARE COORDINATION
12/15/21, 8:26 AM EST  DISCHARGE PLANNING EVALUATION:    Mary Beth Nicole       Admitted: 12/13/2021/ [facility] day: 2   Location: [facility]08/008-A Reason for admit: Symptomatic bradycardia [R00.1]   PMH:  has a past medical history of Arthritis, Atrial fibrillation ([facility] [address] Cancer ([facility] [address] CHF (congestive heart failure) ([facility] [address] Gross hematuria, HTN (hypertension), Hyperlipidemia, Pneumonia, and Type 2 diabetes mellitus. Barriers to Discharge:  Bradycardia; Tenormin held. Oxygen 1L continued  PCP: Juwan Peak  Readmission Risk Score: 10.5 ( )%    Patient Goals/Plan/Treatment Preferences: lives alone, [address], may need SNF (precert); Cardiac Rehab, [facility], son Bernardo Balderas is POA; has cane, walker  Transportation/Food Security/Housekeeping Addressed:  No issues identified.

## 2021-12-15 NOTE — FLOWSHEET NOTE
12/15/21 1139   Encounter Summary   Services provided to: Patient   Referral/Consult From: Rounding   Continue Visiting Yes  (12/15)   Complexity of Encounter Low   Length of Encounter 15 minutes   Routine   Type Initial   Assessment Calm; Approachable   Intervention Nurtured hope   Outcome Comfort   Spiritual/Episcopalian   Type Spiritual support   Assessment: In my encounter with the 80 yr old patient, while rounding  the unit 4K,  I provided spiritual care to patient through conversation, I also came to assess the patients spiritual needs present. The pt was admitted due to symptomatic bradycardia/ heart problems. Interventions:  I provided prayer, emotional support and words of comfort.  provided a listening presence and encouraged pt to share their beliefs and how they support him during their hospitalization. Outcomes: The patient was encouraged and didnt share any further spiritual needs at this time. The pt remains optimistic and hopeful. The pt shared that they were appreciative for the support. Plan:  Chaplains will follow-up at a later time for assessment of any spiritual care needs present. The pt is supported by [address] [place] [address]

## 2021-12-16 ENCOUNTER — APPOINTMENT (OUTPATIENT)
Dept: CARDIAC CATH/INVASIVE PROCEDURES | Age: 86
DRG: 243 | End: 2021-12-16
Payer: MEDICARE

## 2021-12-16 ENCOUNTER — APPOINTMENT (OUTPATIENT)
Dept: GENERAL RADIOLOGY | Age: 86
DRG: 243 | End: 2021-12-16
Payer: MEDICARE

## 2021-12-16 LAB
ANION GAP SERPL CALCULATED.3IONS-SCNC: 12 MEQ/L (ref 8–16)
BUN BLDV-MCNC: 12 MG/DL (ref 7–22)
CALCIUM SERPL-MCNC: 9.5 MG/DL (ref 8.5–10.5)
CHLORIDE BLD-SCNC: 95 MEQ/L (ref 98–111)
CO2: 28 MEQ/L (ref 23–33)
CREAT SERPL-MCNC: 0.6 MG/DL (ref 0.4–1.2)
GFR SERPL CREATININE-BSD FRML MDRD: > 90 ML/MIN/1.73M2
GLUCOSE BLD-MCNC: 135 MG/DL (ref 70–108)
POTASSIUM SERPL-SCNC: 3.7 MEQ/L (ref 3.5–5.2)
SODIUM BLD-SCNC: 135 MEQ/L (ref 135–145)

## 2021-12-16 PROCEDURE — 6360000002 HC RX W HCPCS

## 2021-12-16 PROCEDURE — 33207 INSERT HEART PM VENTRICULAR: CPT

## 2021-12-16 PROCEDURE — 6370000000 HC RX 637 (ALT 250 FOR IP)

## 2021-12-16 PROCEDURE — 02HK3JZ INSERTION OF PACEMAKER LEAD INTO RIGHT VENTRICLE, PERCUTANEOUS APPROACH: ICD-10-PCS | Performed by: INTERNAL MEDICINE

## 2021-12-16 PROCEDURE — 36415 COLL VENOUS BLD VENIPUNCTURE: CPT

## 2021-12-16 PROCEDURE — 6360000002 HC RX W HCPCS: Performed by: INTERNAL MEDICINE

## 2021-12-16 PROCEDURE — 2500000003 HC RX 250 WO HCPCS

## 2021-12-16 PROCEDURE — C1786 PMKR, SINGLE, RATE-RESP: HCPCS

## 2021-12-16 PROCEDURE — C1892 INTRO/SHEATH,FIXED,PEEL-AWAY: HCPCS

## 2021-12-16 PROCEDURE — C1898 LEAD, PMKR, OTHER THAN TRANS: HCPCS

## 2021-12-16 PROCEDURE — 71045 X-RAY EXAM CHEST 1 VIEW: CPT

## 2021-12-16 PROCEDURE — 2580000003 HC RX 258: Performed by: INTERNAL MEDICINE

## 2021-12-16 PROCEDURE — 80048 BASIC METABOLIC PNL TOTAL CA: CPT

## 2021-12-16 PROCEDURE — 0JH604Z INSERTION OF PACEMAKER, SINGLE CHAMBER INTO CHEST SUBCUTANEOUS TISSUE AND FASCIA, OPEN APPROACH: ICD-10-PCS | Performed by: INTERNAL MEDICINE

## 2021-12-16 PROCEDURE — C1769 GUIDE WIRE: HCPCS

## 2021-12-16 PROCEDURE — 2060000000 HC ICU INTERMEDIATE R&B

## 2021-12-16 PROCEDURE — C1894 INTRO/SHEATH, NON-LASER: HCPCS

## 2021-12-16 PROCEDURE — 6370000000 HC RX 637 (ALT 250 FOR IP): Performed by: INTERNAL MEDICINE

## 2021-12-16 RX ORDER — SODIUM CHLORIDE 0.9 % (FLUSH) 0.9 %
5-40 SYRINGE (ML) INJECTION PRN
Status: DISCONTINUED | OUTPATIENT
Start: 2021-12-16 | End: 2021-12-21 | Stop reason: HOSPADM

## 2021-12-16 RX ORDER — SODIUM CHLORIDE 0.9 % (FLUSH) 0.9 %
5-40 SYRINGE (ML) INJECTION EVERY 12 HOURS SCHEDULED
Status: COMPLETED | OUTPATIENT
Start: 2021-12-16 | End: 2021-12-16

## 2021-12-16 RX ORDER — CEFAZOLIN SODIUM 2 G/100ML
2000 INJECTION, SOLUTION INTRAVENOUS ONCE
Status: COMPLETED | OUTPATIENT
Start: 2021-12-16 | End: 2021-12-16

## 2021-12-16 RX ORDER — SODIUM CHLORIDE 0.9 % (FLUSH) 0.9 %
5-40 SYRINGE (ML) INJECTION PRN
Status: ACTIVE | OUTPATIENT
Start: 2021-12-16 | End: 2021-12-16

## 2021-12-16 RX ORDER — SODIUM CHLORIDE 9 MG/ML
25 INJECTION, SOLUTION INTRAVENOUS PRN
Status: DISCONTINUED | OUTPATIENT
Start: 2021-12-16 | End: 2021-12-21 | Stop reason: HOSPADM

## 2021-12-16 RX ORDER — SODIUM CHLORIDE 0.9 % (FLUSH) 0.9 %
5-40 SYRINGE (ML) INJECTION EVERY 12 HOURS SCHEDULED
Status: DISCONTINUED | OUTPATIENT
Start: 2021-12-16 | End: 2021-12-21 | Stop reason: HOSPADM

## 2021-12-16 RX ORDER — ACETAMINOPHEN 325 MG/1
650 TABLET ORAL EVERY 4 HOURS PRN
Status: DISCONTINUED | OUTPATIENT
Start: 2021-12-16 | End: 2021-12-21 | Stop reason: HOSPADM

## 2021-12-16 RX ORDER — SODIUM CHLORIDE 9 MG/ML
INJECTION, SOLUTION INTRAVENOUS CONTINUOUS
Status: DISCONTINUED | OUTPATIENT
Start: 2021-12-16 | End: 2021-12-21 | Stop reason: HOSPADM

## 2021-12-16 RX ORDER — CEFAZOLIN SODIUM 2 G/100ML
2000 INJECTION, SOLUTION INTRAVENOUS EVERY 8 HOURS
Status: COMPLETED | OUTPATIENT
Start: 2021-12-16 | End: 2021-12-17

## 2021-12-16 RX ORDER — SODIUM CHLORIDE 9 MG/ML
25 INJECTION, SOLUTION INTRAVENOUS PRN
Status: ACTIVE | OUTPATIENT
Start: 2021-12-16 | End: 2021-12-16

## 2021-12-16 RX ADMIN — ATORVASTATIN CALCIUM 80 MG: 80 TABLET, FILM COATED ORAL at 21:34

## 2021-12-16 RX ADMIN — AMLODIPINE BESYLATE 5 MG: 5 TABLET ORAL at 17:07

## 2021-12-16 RX ADMIN — ACETAMINOPHEN 650 MG: 325 TABLET ORAL at 17:07

## 2021-12-16 RX ADMIN — Medication 500 MG: at 10:44

## 2021-12-16 RX ADMIN — SODIUM CHLORIDE, PRESERVATIVE FREE 10 ML: 5 INJECTION INTRAVENOUS at 10:44

## 2021-12-16 RX ADMIN — FUROSEMIDE 40 MG: 40 TABLET ORAL at 17:07

## 2021-12-16 RX ADMIN — Medication 1 CAPSULE: at 10:44

## 2021-12-16 RX ADMIN — Medication 1000 UNITS: at 10:44

## 2021-12-16 RX ADMIN — LISINOPRIL 20 MG: 20 TABLET ORAL at 10:44

## 2021-12-16 RX ADMIN — SODIUM CHLORIDE: 9 INJECTION, SOLUTION INTRAVENOUS at 05:09

## 2021-12-16 RX ADMIN — CEFAZOLIN SODIUM 2000 MG: 2 INJECTION, SOLUTION INTRAVENOUS at 11:34

## 2021-12-16 RX ADMIN — SODIUM CHLORIDE: 9 INJECTION, SOLUTION INTRAVENOUS at 21:36

## 2021-12-16 RX ADMIN — Medication 1 TABLET: at 10:44

## 2021-12-16 RX ADMIN — CEFAZOLIN SODIUM 2000 MG: 2 INJECTION, SOLUTION INTRAVENOUS at 21:39

## 2021-12-16 RX ADMIN — SODIUM CHLORIDE, PRESERVATIVE FREE 10 ML: 5 INJECTION INTRAVENOUS at 23:15

## 2021-12-16 RX ADMIN — SODIUM CHLORIDE, PRESERVATIVE FREE 10 ML: 5 INJECTION INTRAVENOUS at 23:16

## 2021-12-16 ASSESSMENT — PAIN DESCRIPTION - PAIN TYPE: TYPE: SURGICAL PAIN

## 2021-12-16 ASSESSMENT — PAIN SCALES - GENERAL
PAINLEVEL_OUTOF10: 0
PAINLEVEL_OUTOF10: 5

## 2021-12-16 ASSESSMENT — PAIN DESCRIPTION - LOCATION: LOCATION: CHEST

## 2021-12-16 ASSESSMENT — PAIN DESCRIPTION - DESCRIPTORS: DESCRIPTORS: ACHING

## 2021-12-16 ASSESSMENT — PAIN DESCRIPTION - ORIENTATION: ORIENTATION: LEFT

## 2021-12-16 NOTE — PROGRESS NOTES
[address] [address]   Sedation/Analgesia History and Physical    Pt Name: Yusuf Metz  MRN: [id number]  YOB: 1929  Provider Performing Procedure: Elina Hand MD, MD  Primary Care Physician: Delores La      Pre-Procedure: {CARDIAC PCJNIYYM:[id number]: symptomatic persisitent atrial fib  Consent: I have discussed with the patient and/or the patient representative the indication, alternatives, and the possible risks and/or complications of the planned procedure and the anesthesia methods. The patient and/or representative appear to understand and agree to proceed. Medical History:   has a past medical history of Arthritis, Atrial fibrillation ([facility] [address] Cancer ([facility] [address] CHF (congestive heart failure) ([facility] [address] Gross hematuria, HTN (hypertension), Hyperlipidemia, Pneumonia, and Type 2 diabetes mellitus. .    Surgical History:     has a past surgical history that includes Hysterectomy (1982); Foot surgery; knee surgery (2010); liver biopsy (08/23/2017); Colonoscopy; eye surgery; skin biopsy; and Carpal tunnel release (10/23/2018). Allergies:    Allergies as of 12/13/2021 - Fully Reviewed 12/13/2021   Allergen Reaction Noted    Bactrim [sulfamethoxazole-trimethoprim]  07/12/2014    Ciprofloxacin Diarrhea 08/26/2014    Sulfa antibiotics  07/12/2014       Medications:   Coumadin use last 5 days:  No  Antiplatelet drug therapy use last 5 days:  Yes  Other anticoagulant use last 5 days:  No   vancomycin irrigation  250 mg Irrigation Once    calcium elemental  1 tablet Oral Daily    Vitamin D  1,000 Units Oral Daily    furosemide  40 mg Oral BID    lactobacillus  1 capsule Oral Daily with breakfast    lisinopril  20 mg Oral Daily    multivitamin  1 tablet Oral Daily    rivaroxaban  20 mg Oral Daily    sodium chloride flush  5-40 mL IntraVENous 2 times per day    aspirin  81 mg Oral Daily    atorvastatin  80 mg Oral Nightly    amLODIPine  5 mg Oral Daily     Prior to Admission medications    Medication Sig Start Date End Date Taking? Authorizing Provider   Multiple Vitamin (MULTI-VITAMIN DAILY PO) Take by mouth daily   Yes Historical Provider, MD   cloNIDine (CATAPRES) 0.1 MG tablet Take 0.1 mg by mouth as needed for High Blood Pressure If over 160/100   Yes Historical Provider, MD   furosemide (LASIX) 40 MG tablet Take 40 mg by mouth 2 times daily   Yes Historical Provider, MD   lactobacillus (CULTURELLE) capsule Take 1 capsule by mouth daily (with breakfast) 12/24/18  Yes Ana Engel MD   lisinopril (PRINIVIL;ZESTRIL) 20 MG tablet Take 1 tablet by mouth daily Additional RF per her PCP 12/24/18  Yes Ana Engel MD   Glucosamine-Chondroitin (GLUCOSAMINE CHONDR COMPLEX PO) Take 2 tablets by mouth daily Move Free   Yes Historical Provider, MD   Cholecalciferol (VITAMIN D-3 PO) Take 1,000 Units by mouth daily    Yes Historical Provider, MD   Multiple Vitamins-Minerals (OCUVITE PO) Take 1 tablet by mouth daily    Yes Historical Provider, MD   atenolol (TENORMIN) 25 MG tablet Take 25 mg by mouth daily. Yes Historical Provider, MD   amLODIPine (NORVASC) 5 MG tablet Take 5 mg by mouth daily. Yes Historical Provider, MD   calcium carbonate 600 MG TABS tablet Take 1 tablet by mouth daily. Yes Historical Provider, MD   rivaroxaban (XARELTO) 20 MG TABS tablet Take 20 mg by mouth daily    Yes Historical Provider, MD   pravastatin (PRAVACHOL) 20 MG tablet Take 20 mg by mouth daily.   Patient not taking: Reported on 12/14/2021    Historical Provider, MD       Vital Signs  Vitals:    12/16/21 0914   BP: (!) 169/45   Pulse: (!) 46   Resp: 18   Temp: 97.8 °F (36.6 °C)   SpO2: 94%       Physical:  Heart:  regular rate and rhythm  Lungs:  Clear  Abdomen:  Soft  Mental Status:  Alert and Oriented    Planned Procedure:  {[facility] CATH LAB PROCEDURES:20126::permanent pace makerinsertion  Sedation/ Anesthesia Plan: Midazolam and Sublimaze    ASA Classification: Class 3 - A patient with severe systemic disease that limits activity but is not incapacitating    Mallampati Airway Classification: II (soft palate, uvula, fauces visible)    · Pre-procedure diagnostic studies complete and results available. · Previous sedation/anesthesia experiences assessed. · The patient is an appropriate candidate to undergo the planned procedure sedation and anesthesia. (Refer to nursing sedation/analgesia documentation record)  · Formulation and discussion of sedation/procedure plan, risks, and expectations with patient and/or responsible adult completed. · Patient examined immediately prior to the procedure.  (Refer to nursing sedation/analgesia documentation record)    Elina Hand MD, MD  Electronically signed 12/16/2021 at 12:15 PM  Patient Name: Yusuf Metz   Medical Record Number: [id number]  Date: 12/16/2021   Time: 12:15 PM   Room/Bed: -08/008-A

## 2021-12-16 NOTE — CARE COORDINATION
DISCHARGE PLANNING UPDATE    Barriers to Discharge: plans PPM today; Cardiology/EPS following    Discharge Plan:   lives alone, [place], may need HH versus SNF (precert); Cardiac Rehab, SW following, zoraida Haddad is POA; arielle chaudhry Last refill of Atorvastatin 20 mg 9-20-19 #90 with 3 refills  Last office visit: September 2019  Upcoming appointment: had to reschedule to December 2020  Pharmacy: [facility]

## 2021-12-16 NOTE — CARE COORDINATION
12/16/21, 9:12 AM EST  DISCHARGE PLANNING EVALUATION    SW spoke with patient about discharge planning. Patient reported that she just is not sure what she wants to do about discharge plan. She reported that she would like wait and see what happens today with her procedure. She reported that she wants to go to [facility] at discharge because a lot of her friends are there. SW explained that [facility] is not a skilled nursing facility capable of billing insurance for skilled therapy. SW reported that her insurance will not cover [facility]. Patient reported, Santos Aas do I have insurance then? \" She reported that she may want to go stay with her daughter with [facility]. SW called daughter Belinda Rios and introduced SW and discharge planning. Hussainvargas Rios reported that she would like to talk to patient and discuss discharge when they know the outcome of today's procedure. She reported that she is willing to take patient to her house while she recovers and prefers to take her home. SW explained that [facility] is not a rehab facility that can [employer]. SW explained ECF, HH and outpatient therapy. 12/16/21, 3:08 PM EST  DISCHARGE PLANNING EVALUATION    SW spoke with patient and daughter and son. They reported that they are thinking about ECF at discharge. SW explained precert process. Family reported that they would like referral to [id number] [place] [address]. SW reported that the [facility] are not accepting patients right now. MARILU called [facility] and spoke with Aud[facility]. [facility] is not in network with insurance.

## 2021-12-16 NOTE — PROCEDURES
[address] [place] [address]                            CARDIAC CATHETERIZATION    PATIENT NAME: Hilda Brunner                  :        1929  MED REC NO:   [id number]                           ROOM:       0008  ACCOUNT NO:   [de-identified]                           ADMIT DATE: 2021  PROVIDER:     JEANNIE Maguire Button:  2021    PACEMAKER PROCEDURE    INDICATION FOR PROCEDURE:  This is a 75-year-old lady admitted to the  hospital with shortness of breath, congestive heart failure. She has  been in atrial fib with very slow ventricular response. The patient  continued to have a slow ventricular response associated with  significant shortness of breath, dizziness, and weakness. The beta  blockers have been held for 48 hours, still progression of her  bradycardia was noticed. She was brought to the cath lab with a heart  rate of 29, atrial fib. PROCEDURE PERFORMED:  1.  IV conscious sedation. The patient was given IV conscious sedation  in increment dosage by the circulating cath lab RN, monitored by the  cath lab monitor tech under my supervision. Procedure started at 12:25  p.m. and finished at 01:20 p.m. No acute complication from the  procedure. Estimated blood loss about 20 mL. 2.  Placement of a pacemaker. After obtaining informed consent, the  patient was brought to the cath lab. The patient did receive IV  antibiotics prophylactically. Under local anesthesia, under strict  aseptic technique, and under IV conscious sedation, subclavian vein  cannulated with the micropuncture needle in the usual fashion. Pacemaker pocket was made over the left infraclavicular area. Subcutaneous tissue was dissected in a blunt fashion with good  hemostasis. The patient has very tortuous vein and we had to utilize a  long sheath.     A [employer] pacemaker lead was then placed against the septal wall of  the right ventricle. Multiple positions have to be identified to be  able to find a good area. The final stimulation thresholds were 1 volt  dropped to 0.8, impedance was 934, the R-wave was 10.4. Good slack  secured. The 10 volts utilized. No esophageal or diaphragmatic  stimulation. The lead was secured in place utilizing 2-0 silk. The  patient tolerated the procedure well. Pacemaker pocket was irrigated  with copious amounts of fluid with antibiotics. Good hemostasis  secured. The leads were then connected to the pacemaker generator Bertram  XT MRI compatible device. The pacemaker pocket was then programmed as a  VVIR with the lower rate of 60 and upper rate of 130. The patient  tolerated the procedure well. We utilized [employer]  product, MRI compatible device. The patient has no acute complication  from the procedure. Chest x-ray was ordered. Jamar Hale M.D.    D: 12/16/2021 13:53:36       T: 12/16/2021 14:47:19     [email or web address]  Job#: [id number]     Doc#: [id number]    CC:  Laya Aleman M.D.

## 2021-12-17 PROCEDURE — 6360000002 HC RX W HCPCS: Performed by: INTERNAL MEDICINE

## 2021-12-17 PROCEDURE — 2060000000 HC ICU INTERMEDIATE R&B

## 2021-12-17 PROCEDURE — 2580000003 HC RX 258: Performed by: INTERNAL MEDICINE

## 2021-12-17 PROCEDURE — 6370000000 HC RX 637 (ALT 250 FOR IP): Performed by: INTERNAL MEDICINE

## 2021-12-17 RX ORDER — NITROGLYCERIN 0.4 MG/1
TABLET SUBLINGUAL
Qty: 25 TABLET | Refills: 3 | Status: SHIPPED | OUTPATIENT
Start: 2021-12-17

## 2021-12-17 RX ADMIN — ATORVASTATIN CALCIUM 80 MG: 80 TABLET, FILM COATED ORAL at 21:37

## 2021-12-17 RX ADMIN — FUROSEMIDE 40 MG: 40 TABLET ORAL at 16:39

## 2021-12-17 RX ADMIN — ASPIRIN 81 MG CHEWABLE TABLET 81 MG: 81 TABLET CHEWABLE at 08:31

## 2021-12-17 RX ADMIN — SODIUM CHLORIDE, PRESERVATIVE FREE 10 ML: 5 INJECTION INTRAVENOUS at 08:31

## 2021-12-17 RX ADMIN — Medication 500 MG: at 08:31

## 2021-12-17 RX ADMIN — SODIUM CHLORIDE, PRESERVATIVE FREE 10 ML: 5 INJECTION INTRAVENOUS at 21:38

## 2021-12-17 RX ADMIN — RIVAROXABAN 20 MG: 20 TABLET, FILM COATED ORAL at 08:31

## 2021-12-17 RX ADMIN — SODIUM CHLORIDE 25 ML: 9 INJECTION, SOLUTION INTRAVENOUS at 05:42

## 2021-12-17 RX ADMIN — ACETAMINOPHEN 650 MG: 325 TABLET ORAL at 21:37

## 2021-12-17 RX ADMIN — LISINOPRIL 20 MG: 20 TABLET ORAL at 08:31

## 2021-12-17 RX ADMIN — Medication 1000 UNITS: at 08:31

## 2021-12-17 RX ADMIN — Medication 1 CAPSULE: at 08:31

## 2021-12-17 RX ADMIN — CEFAZOLIN SODIUM 2000 MG: 2 INJECTION, SOLUTION INTRAVENOUS at 04:01

## 2021-12-17 RX ADMIN — FUROSEMIDE 40 MG: 40 TABLET ORAL at 08:31

## 2021-12-17 RX ADMIN — Medication 1 TABLET: at 08:31

## 2021-12-17 RX ADMIN — AMLODIPINE BESYLATE 5 MG: 5 TABLET ORAL at 16:39

## 2021-12-17 RX ADMIN — CEFAZOLIN SODIUM 2000 MG: 2 INJECTION, SOLUTION INTRAVENOUS at 10:08

## 2021-12-17 ASSESSMENT — PAIN SCALES - GENERAL
PAINLEVEL_OUTOF10: 0
PAINLEVEL_OUTOF10: 2
PAINLEVEL_OUTOF10: 5
PAINLEVEL_OUTOF10: 4

## 2021-12-17 ASSESSMENT — PAIN DESCRIPTION - FREQUENCY
FREQUENCY: CONTINUOUS

## 2021-12-17 ASSESSMENT — PAIN DESCRIPTION - DESCRIPTORS
DESCRIPTORS: ACHING

## 2021-12-17 ASSESSMENT — PAIN - FUNCTIONAL ASSESSMENT
PAIN_FUNCTIONAL_ASSESSMENT: PREVENTS OR INTERFERES SOME ACTIVE ACTIVITIES AND ADLS

## 2021-12-17 ASSESSMENT — PAIN DESCRIPTION - ORIENTATION
ORIENTATION: LEFT

## 2021-12-17 ASSESSMENT — PAIN DESCRIPTION - ONSET
ONSET: ON-GOING

## 2021-12-17 ASSESSMENT — PAIN DESCRIPTION - PAIN TYPE
TYPE: ACUTE PAIN

## 2021-12-17 ASSESSMENT — PAIN DESCRIPTION - PROGRESSION
CLINICAL_PROGRESSION: NOT CHANGED

## 2021-12-17 ASSESSMENT — PAIN DESCRIPTION - LOCATION
LOCATION: CHEST;KNEE
LOCATION: CHEST;KNEE
LOCATION: CHEST

## 2021-12-17 NOTE — CARE COORDINATION
12/17/21, 11:04 AM EST  DISCHARGE PLANNING EVALUATION    SW discussed discharge planning extensively. Patient reported that she would like to go home. TJ, son in the room reported that they just are not sure about the plan. SW notified them of [facility] being out of network and the [facility] not accepting patients. SW explained precert process and reported that patient will be here next week if she is wanting an ECF. Son reported that he did not realize it takes that long. SW explained that patient can still be denied by insurance to go to an ECF. Son reported that his sister and her spouse are able to be with patient all next week. SW explained that patient and family need to decide if they want an ECF or to go home with [facility]. TJ reported that he will discuss it with patient and family and let SW know. 12/17/21, 12:05 PM EST  DISCHARGE PLANNING EVALUATION    MARILU received a voicemail from [facility] zoraida Houser and he reported that they would like a referral [facility]. MARILU called Mary at [facility] and they are not in network with [employer]. 12/17/21, 12:17 PM EST  DISCHARGE PLANNING EVALUATION    MARILU spoke with patient and son Alice Quiles notified them that [facility] is out of network with insurance. Patient and TJ are requested Inpatient rehab. SW explained that patient may not be meeting criteria for inpatient rehab and precert will still be needed. SW explained that insurance can approve or deny. SW encouraged them to consider other ECFs and let SW know. CM updated. 12/17/21, 1:11 PM EST  DISCHARGE PLANNING EVALUATION    MARILU spoke with inpatient rehab and they denied patient at this time. MARILU printed in network ECFs for patient and family. MARILU notified patient and family that inpatient rehab denied patient. SW provided list of ECFs that accept insurance.  Patient and family agreeable to referrals to [facility], and [facility]. SW called and left voicemail for [employer] [facility]. SW requested a call back. 12/17/21, 1:44 PM EST  DISCHARGE PLANNING EVALUATION    SW received a call from [employer] [facility]. SW made referral. She will let SW know if they can accept. 12/17/21, 2:57 PM EST  DISCHARGE PLANNING EVALUATION    [facility] reported [facility] are able to accept patient and need PT and OT notes to start precert.

## 2021-12-17 NOTE — DISCHARGE INSTR - COC
Continuity of Care Form    Patient Name: Antione Arriaga   :  1929  MRN:  [id number]    Admit date:  2021  Discharge date:  ***    Code Status Order: Full Code   Advance Directives:      Admitting Physician:  Deon Poon MD  PCP: Shayla Albrecht    Discharging Nurse: [facility] Unit/Room#: 4K-08/008-A  Discharging Unit Phone Number: Emily Luna     Emergency Contact:   Extended Emergency Contact Information  Primary Emergency Contact: Juani Ordoñez           [facility] [address] [place] [address] Phone: [phone]  Relation: Child  Secondary Emergency Contact:  Tess Rios of [address] [place] [address] Phone: [phone]  Mobile Phone: [phone]  Relation: Child    Past Surgical History:  Past Surgical History:   Procedure Laterality Date    CARPAL TUNNEL RELEASE  10/23/2018    right hand    COLONOSCOPY      EYE SURGERY      FOOT SURGERY      x2    HYSTERECTOMY  1982    KNEE SURGERY  2010    LIVER BIOPSY  2017    SKIN BIOPSY         Immunization History:   Immunization History   Administered Date(s) Administered    PPD Test 2018, 2018       Active Problems:  Patient Active Problem List   Diagnosis Code    Renal mass, right N28.89    GISELL (stress urinary incontinence, female) N39.3    Flushing R23.2    Uncontrolled hypertension I10    Chronic atrial fibrillation (HCC) I48.20    Hepatic lesion K76.9    Chest pain R07.9    Hypertensive urgency I16.0    Atypical chest pain R07.89    Pneumonia J18.9    Acute pain of left shoulder M25.512    Sepsis (HCC) A41.9    Cellulitis of right lower extremity L03.115    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    Spondylosis of cervical region without myelopathy or radiculopathy M47.812    Bilateral pleural effusion J90    Class 1 obesity due to excess calories with serious comorbidity and body mass index (BMI) of 31.0 to 31.9 in adult E66.09, Z68.31    Streptococcal bacteremia R78.81, B95.5    Hyponatremia Falls    Impairments/Disabilities:      None    Nutrition Therapy:  Current Nutrition Therapy:   - Oral Diet:  General    Routes of Feeding: Oral  Liquids: Thin Liquids  Daily Fluid Restriction: no  Last Modified Barium Swallow with Video (Video Swallowing Test): not done    Treatments at the Time of Hospital Discharge:   Respiratory Treatments: ***  Oxygen Therapy:  is not on home oxygen therapy. Ventilator:    - No ventilator support    Rehab Therapies: Physical Therapy and Orthotics/Prosthetics  Weight Bearing Status/Restrictions: No weight bearing restirctions  Other Medical Equipment (for information only, NOT a DME order):  walker  Other Treatments: ***    Patient's personal belongings (please select all that are sent with patient):  None    RN SIGNATURE:  Electronically signed by Teodoro Salmon RN on 12/18/21 at 10:43 AM EST    CASE MANAGEMENT/SOCIAL WORK SECTION    Inpatient Status Date: 12/13/2021    Readmission Risk Assessment Score:  Readmission Risk              Risk of Unplanned Readmission:  14           Discharging to Facility/ Agency   Name: [facility]  Address: [place] [address], [address] [place], [place] [address]  Phone: [phone]  Fax: [phone]    Dialysis Facility (if applicable)   Name:  Address:  Dialysis Schedule:  Phone:  Fax:    / signature: Electronically signed by HORACIO Burrell on 12/17/21 at 3:01 PM EST    PHYSICIAN SECTION    Prognosis: {Prognosis:4580707518}    Condition at Discharge: [address] Patient Condition:205625859}    Rehab Potential (if transferring to Rehab): {Prognosis:4704682065}    Recommended Labs or Other Treatments After Discharge: ***    Physician Certification: I certify the above information and transfer of Tiana West  is necessary for the continuing treatment of the diagnosis listed and that she requires {Admit to Appropriate Level of Care:77172} for {GREATER/LESS:829345182} 30 days.      Update Admission H&P: {CHP DME Changes in IUULF:143766147}    PHYSICIAN SIGNATURE:  {Esignature:531747739}

## 2021-12-17 NOTE — CARE COORDINATION
DISCHARGE PLANNING UPDATE    Barriers to Discharge: POD 1 PPM; [facility] (precert); collaborated MARILU Her    Discharge Plan: as above

## 2021-12-17 NOTE — PROGRESS NOTES
Awaiting placement in NH    will transfer care to Hospitalitis  For now    will see at the office    keep sterile dressing sterile srips till seen in the office    keep area of pace maker clean and dry for one week ,no shower to that area

## 2021-12-18 PROCEDURE — 2060000000 HC ICU INTERMEDIATE R&B

## 2021-12-18 PROCEDURE — 6370000000 HC RX 637 (ALT 250 FOR IP): Performed by: INTERNAL MEDICINE

## 2021-12-18 PROCEDURE — 2580000003 HC RX 258: Performed by: INTERNAL MEDICINE

## 2021-12-18 RX ADMIN — Medication 1 TABLET: at 09:46

## 2021-12-18 RX ADMIN — Medication 1 CAPSULE: at 09:47

## 2021-12-18 RX ADMIN — ATORVASTATIN CALCIUM 80 MG: 80 TABLET, FILM COATED ORAL at 20:33

## 2021-12-18 RX ADMIN — SODIUM CHLORIDE, PRESERVATIVE FREE 10 ML: 5 INJECTION INTRAVENOUS at 20:33

## 2021-12-18 RX ADMIN — SODIUM CHLORIDE, PRESERVATIVE FREE 10 ML: 5 INJECTION INTRAVENOUS at 08:32

## 2021-12-18 RX ADMIN — Medication 1000 UNITS: at 09:51

## 2021-12-18 RX ADMIN — ASPIRIN 81 MG CHEWABLE TABLET 81 MG: 81 TABLET CHEWABLE at 08:32

## 2021-12-18 RX ADMIN — AMLODIPINE BESYLATE 5 MG: 5 TABLET ORAL at 17:03

## 2021-12-18 RX ADMIN — RIVAROXABAN 20 MG: 20 TABLET, FILM COATED ORAL at 09:46

## 2021-12-18 RX ADMIN — LISINOPRIL 20 MG: 20 TABLET ORAL at 09:47

## 2021-12-18 RX ADMIN — Medication 500 MG: at 09:50

## 2021-12-18 RX ADMIN — FUROSEMIDE 40 MG: 40 TABLET ORAL at 08:33

## 2021-12-18 RX ADMIN — FUROSEMIDE 40 MG: 40 TABLET ORAL at 17:03

## 2021-12-18 ASSESSMENT — PAIN SCALES - GENERAL
PAINLEVEL_OUTOF10: 2
PAINLEVEL_OUTOF10: 0

## 2021-12-18 ASSESSMENT — PAIN DESCRIPTION - PROGRESSION
CLINICAL_PROGRESSION: NOT CHANGED

## 2021-12-18 ASSESSMENT — PAIN DESCRIPTION - PAIN TYPE: TYPE: CHRONIC PAIN

## 2021-12-18 ASSESSMENT — PAIN DESCRIPTION - ONSET: ONSET: ON-GOING

## 2021-12-18 ASSESSMENT — PAIN DESCRIPTION - FREQUENCY: FREQUENCY: CONTINUOUS

## 2021-12-18 ASSESSMENT — PAIN DESCRIPTION - DESCRIPTORS: DESCRIPTORS: ACHING

## 2021-12-18 ASSESSMENT — PAIN DESCRIPTION - ORIENTATION: ORIENTATION: LEFT

## 2021-12-18 ASSESSMENT — PAIN - FUNCTIONAL ASSESSMENT: PAIN_FUNCTIONAL_ASSESSMENT: ACTIVITIES ARE NOT PREVENTED

## 2021-12-18 ASSESSMENT — PAIN DESCRIPTION - LOCATION: LOCATION: KNEE

## 2021-12-18 NOTE — DISCHARGE SUMMARY
[address] [id number]                               DISCHARGE SUMMARY    PATIENT NAME: Katherin Boone                  :        1929  MED REC NO:   [id number]                           ROOM:       0008  ACCOUNT NO:   [de-identified]                           ADMIT DATE: 2021  PROVIDER:     Edda Langford. Merline Seitz, M.D.               Lynda Caballero:    DISCHARGE DATE:  21  FINAL DIAGNOSES:  1. Dizziness near syncopal episode with shortness of breath. 2.  Severe symptomatic bradycardia, persistent. 3.  Chronic atrial fib with very slow ventricular response. 4.  History of hypertension. 5.  History of hypercholesterolemia. PROCEDURES PERFORMED DURING THIS HOSPITALIZATION:  1. An echocardiogram.  2.  Placement of single-chamber pacemaker [employer]. BRIEF HISTORY:  This is a patient, a 80-year-old lady, excellent  condition for her age. She lives independently at home. This patient  with history of atrial fib, and she has been on the Xarelto. The patient came to the hospital with a heart rate around 40. For the  rest of the H and P, please refer to the H and P.    HOSPITALIZATION COURSE:  The patient was admitted to the telemetry bed. The patient has been on the Tenormin, her Tenormin has been  discontinued. The patient, however, continued to have a bradycardia. As a matter of fact, her bradycardia worsened with the heart in the 29. The patient had extremely shortness of breath. Troponin was negative,  and the patient's blood sugar was 150. Hemoglobin was 13, hematocrit  was 31. Her potassium was 4.1. It was felt that the patient will need  to have pacemaker. Discussed this issue with her and with her family  member, and the patient wants it to have it done. The patient was taken  to the cath lab on 2021 where she had a single chamber pacemaker  placed in.   She tolerated the procedure very well.    The patient's medications were started again. The patient will stay on  Xarelto. The patient was placed on Tenormin. We had social service see  the patient in an attempt  for a nursing home with placement; however,  this has been not successful as no places are available for her with the  nursing home that covered her insurance. The patient had to go home  with the home health visiting nurse. The patient will be seen in the  office in seven to ten days. The patient to seek medical attention if  she has any change in clinical condition. She did finish three doses of  antibiotics post procedure. The wound was healing well, no hematoma. The patient will be discharged home tomorrow in a stable condition.         Clara Grayson M.D.    D: 12/17/2021 17:10:55       T: 12/17/2021 22:42:42     [email or web address]  Job#: [id number]     Doc#: [id number]    CC:

## 2021-12-18 NOTE — DISCHARGE INSTR - DIET
Good nutrition is important when healing from an illness, injury, or surgery. Follow any nutrition recommendations given to you during your hospital stay. If you were given an oral nutrition supplement while in the hospital, continue to take this supplement at home. You can take it with meals, in-between meals, and/or before bedtime. These supplements can be purchased at most local grocery stores, pharmacies, and chain super-stores. If you have any questions about your diet or nutrition, call the hospital and ask for the dietitian. Healthy Eating habits help your heart health. Below are general guidlelines for heart healthy choices:    Eat fruits and vegetables. They are loaded with vitamins, minerals and fiber. Eat a variety every day. .   Eat a variety of whole grain products every day. They contain a lot of fiber and nutrients. Examples of whole grains include oats, whole wheat bread, and brown rice. Eat fish at least 2 times each week. Oily fish, which contain omega-3 fatty acids, are best for your heart. These fish include tuna, salmon, mackerel, lake trout, herring, and sardines. Limit saturated fat and cholesterol. To limit saturated fats and cholesterol, try to choose the following foods:   Lean meats and meat alternatives (chicken, fish, turkey, beans, and nuts)  Nonfat and low-fat dairy products (skim or 1% milk, low fat yogurt)  Unsaturated fats, like canola and olive oils instead of saturated fats, such as butter  Read food labels and limit the amount of trans fat you eat. Trans fat raises cholesterol. It is found in many processed foods made with shortening or with partially hydrogenated vegetable oils. These foods include cookies, crackers, chips, and many snack foods. Choose snacks with \"0\" grams of Trans fat. Choose healthy fats. Unsaturated fats, such as olive, canola and peanut oils, and nuts are part of a healthy diet. But all fats are high in calories, so limit your serving sizes. Limit salt/sodium. Choose and prepare foods with little or no salt. Use pepper or Mrs. Dash for added flavor. Limit high sodium foods like canned soups, processed meats and cheeses, and salty snack foods. Limit beverages and food with added sugars (such as regular pop, sweetened iced tea, desserts and sweets). These foods are loaded with calories but provide very little nutrients. If you drink alcohol, drink in moderation. Limit alcohol intake to 2 drinks a day for men and 1 drink a day for women.  Check with your Doctor first.

## 2021-12-19 PROCEDURE — 97161 PT EVAL LOW COMPLEX 20 MIN: CPT

## 2021-12-19 PROCEDURE — 2060000000 HC ICU INTERMEDIATE R&B

## 2021-12-19 PROCEDURE — 2580000003 HC RX 258: Performed by: INTERNAL MEDICINE

## 2021-12-19 PROCEDURE — 97166 OT EVAL MOD COMPLEX 45 MIN: CPT

## 2021-12-19 PROCEDURE — 97535 SELF CARE MNGMENT TRAINING: CPT

## 2021-12-19 PROCEDURE — 97530 THERAPEUTIC ACTIVITIES: CPT

## 2021-12-19 PROCEDURE — 6370000000 HC RX 637 (ALT 250 FOR IP): Performed by: INTERNAL MEDICINE

## 2021-12-19 RX ADMIN — RIVAROXABAN 20 MG: 20 TABLET, FILM COATED ORAL at 09:05

## 2021-12-19 RX ADMIN — ATORVASTATIN CALCIUM 80 MG: 80 TABLET, FILM COATED ORAL at 20:00

## 2021-12-19 RX ADMIN — Medication 1000 UNITS: at 09:05

## 2021-12-19 RX ADMIN — AMLODIPINE BESYLATE 5 MG: 5 TABLET ORAL at 17:18

## 2021-12-19 RX ADMIN — Medication 500 MG: at 09:04

## 2021-12-19 RX ADMIN — FUROSEMIDE 40 MG: 40 TABLET ORAL at 17:18

## 2021-12-19 RX ADMIN — SODIUM CHLORIDE, PRESERVATIVE FREE 10 ML: 5 INJECTION INTRAVENOUS at 20:00

## 2021-12-19 RX ADMIN — ASPIRIN 81 MG CHEWABLE TABLET 81 MG: 81 TABLET CHEWABLE at 09:07

## 2021-12-19 RX ADMIN — Medication 1 CAPSULE: at 09:05

## 2021-12-19 RX ADMIN — SODIUM CHLORIDE, PRESERVATIVE FREE 10 ML: 5 INJECTION INTRAVENOUS at 09:03

## 2021-12-19 RX ADMIN — Medication 1 TABLET: at 09:05

## 2021-12-19 RX ADMIN — LISINOPRIL 20 MG: 20 TABLET ORAL at 09:04

## 2021-12-19 RX ADMIN — FUROSEMIDE 40 MG: 40 TABLET ORAL at 09:05

## 2021-12-19 ASSESSMENT — PAIN SCALES - GENERAL
PAINLEVEL_OUTOF10: 0

## 2021-12-19 NOTE — CARE COORDINATION
Tracie Crawford was evaluated today and a DME order was entered for a wheeled walker because she requires this to successfully complete daily living tasks of ambulating. A wheeled walker is necessary due to the patient's unsteady gait, upper body weakness, and inability to  an ambulation device; and she can ambulate only by pushing a walker instead of a lesser assistive device such as a cane, crutch, or standard walker. The need for this equipment was discussed with the patient and she understands and is in agreement.

## 2021-12-19 NOTE — PROGRESS NOTES
[facility]  INPATIENT PHYSICAL THERAPY  EVALUATION  [facility] ICU STEPDOWN TELEMETRY 4K - 4K-08/008-A    Time In: 7615  Time Out: 1058  Timed Code Treatment Minutes: 18 Minutes  Minutes: 26          Date: 2021  Patient Name: Caryle Guys,  Gender:  female        MRN: [id number]  : 1929  (80 y.o.)      Referring Practitioner: Dr. Amilcar Roberto  Diagnosis: symptomatic bradycardia  Additional Pertinent Hx: Pt admitted for dymptomatic bradycardia. Pt underwent pacemaker insertion. 21     Restrictions/Precautions:  Restrictions/Precautions: Weight Bearing  Left Upper Extremity Weight Bearing:  (limited wt bearing on avoid >90degree elevation due to recent pacemaker.)  Implants present? : Pacemaker (21)  Position Activity Restriction  Other position/activity restrictions: pacemaker (21)    Subjective:  Chart Reviewed: Yes  Patient assessed for rehabilitation services?: Yes  Subjective: Nurse approved session. LUE sling removed this am.  Pt seated in recliner. Pleasant and cooperative. Pt requested to use restroom during session. Continent of bowel and bladder. General:  Overall Orientation Status: Within Normal Limits  Follows Commands: Within Functional Limits    Vision: Within Functional Limits    Hearing: Within functional limits         Pain: 0/10:     Vitals: Vitals not assessed per clinical judgement, see nursing flowsheet    Social/Functional History:    Lives With: Alone  Type of Home: House  Home Layout: Two level,Able to Live on Main level with bedroom/bathroom  Home Access: Stairs to enter with rails  Entrance Stairs - Number of Steps: 2  Entrance Stairs - Rails: Right (grab bar)     Bathroom Shower/Tub: Walk-in shower  Bathroom Toilet: Standard  Bathroom Accessibility: Accessible  IADL Comments: Pt stating she completed her cooking and cleaning as well as laundry in basement tasks.        ADL Assistance: Independent  Homemaking Assistance: Independent  Ambulation Assistance: Independent  Transfer Assistance: Independent          Additional Comments: Pt stating she was indep with her ADL tasks. She did not use AD    OBJECTIVE:  Range of Motion:  Right Lower Extremity: WFL  Left Lower Extremity: WFL    Strength:  Right Lower Extremity: WFL  Left Lower Extremity: WFL    Balance:  Dynamic Sitting Balance: Modified Independent  Dynamic Standing Balance: Modified Independent  With toileting pericare    Bed Mobility:  Supine to Sit: Supervision, with verbal cues , to avoid pushing with LUE  Sit to Supine: Modified Independent     Transfers:  Sit to Stand: Supervision, with verbal cues, to avoid pushing up from seated surface with LUE  Stand to Sit:Supervision    Ambulation:  Supervision  Distance: 15 ft x2, to/from restroom. 120 ft  Surface: Level Tile  Device:Rolling Walker  Gait Deviations:  Slow Evelia and minimal shortness of breath noted with longer distance      Functional Outcome Measures: Not completed  AM-PAC Inpatient Mobility Raw Score : 21  AM-PAC Inpatient T-Scale Score : 50.25    ASSESSMENT:  Activity Tolerance:  Patient tolerance of  treatment: good. Minimal shortness of breath with gait      Treatment Initiated: Treatment and education initiated within context of evaluation. Evaluation time included review of current medical information, gathering information related to past medical, social and functional history, completion of standardized testing, formal and informal observation of tasks, assessment of data and development of plan of care and goals. Treatment time included skilled education and facilitation of tasks to increase safety and independence with functional mobility for improved independence and quality of life. Assessment: Body structures, Functions, Activity limitations: Decreased endurance  Assessment: Pt demonstrates a decline in baseline function by way of gait and steps due to recent need for pacemaker insertion.   Pt will benefit from skilled PT to advance in these areas for improved function and safety. Prognosis: Excellent    REQUIRES PT FOLLOW UP: Yes    Discharge Recommendations:  Discharge Recommendations: 24 hour supervision or assist    Patient Education:  PT Education: Debby Hunt of [address] Mobility Training,Gait Training,General Safety,Weight-bearing Education    Equipment Recommendations:  Equipment Needed: Yes  Other: RW. DME completed    Plan:  Times per week: 1-2 visits for gait and steps training/GM  Current Treatment Recommendations: Stair training,Gait Training,Safety Education & Training,Patient/Caregiver Education & Training,Equipment Evaluation, Education, & procurement    Goals:  Patient goals : go home if I can  Short term goals  Time Frame for Short term goals: until discharge  Short term goal 1: Pt walk with RW >= 150 ft, Mod I for home and community mobility  Short term goal 2: Pt to ascend/descend 2 steps with simulated Rt grab bar, SBA for home entry  Long term goals  Time Frame for Long term goals : NA due to expected short LOS    Following session, patient left in safe position with all fall risk precautions in place.

## 2021-12-20 ENCOUNTER — APPOINTMENT (OUTPATIENT)
Dept: GENERAL RADIOLOGY | Age: 86
DRG: 243 | End: 2021-12-20
Payer: MEDICARE

## 2021-12-20 LAB
ALBUMIN SERPL-MCNC: 3.9 G/DL (ref 3.5–5.1)
ALP BLD-CCNC: 79 U/L (ref 38–126)
ALT SERPL-CCNC: 16 U/L (ref 11–66)
ANION GAP SERPL CALCULATED.3IONS-SCNC: 8 MEQ/L (ref 8–16)
AST SERPL-CCNC: 31 U/L (ref 5–40)
BASOPHILS # BLD: 0.2 %
BASOPHILS ABSOLUTE: 0 THOU/MM3 (ref 0–0.1)
BILIRUB SERPL-MCNC: 1.1 MG/DL (ref 0.3–1.2)
BUN BLDV-MCNC: 18 MG/DL (ref 7–22)
CALCIUM SERPL-MCNC: 9.6 MG/DL (ref 8.5–10.5)
CHLORIDE BLD-SCNC: 85 MEQ/L (ref 98–111)
CO2: 35 MEQ/L (ref 23–33)
CREAT SERPL-MCNC: 0.7 MG/DL (ref 0.4–1.2)
EOSINOPHIL # BLD: 0.7 %
EOSINOPHILS ABSOLUTE: 0.1 THOU/MM3 (ref 0–0.4)
ERYTHROCYTE [DISTWIDTH] IN BLOOD BY AUTOMATED COUNT: 13.6 % (ref 11.5–14.5)
ERYTHROCYTE [DISTWIDTH] IN BLOOD BY AUTOMATED COUNT: 46.2 FL (ref 35–45)
FLU A ANTIGEN: NEGATIVE
FLU B ANTIGEN: NEGATIVE
GFR SERPL CREATININE-BSD FRML MDRD: 78 ML/MIN/1.73M2
GLUCOSE BLD-MCNC: 168 MG/DL (ref 70–108)
HCT VFR BLD CALC: 43.5 % (ref 37–47)
HEMOGLOBIN: 14.8 GM/DL (ref 12–16)
IMMATURE GRANS (ABS): 0.03 THOU/MM3 (ref 0–0.07)
IMMATURE GRANULOCYTES: 0.3 %
LYMPHOCYTES # BLD: 9.2 %
LYMPHOCYTES ABSOLUTE: 1 THOU/MM3 (ref 1–4.8)
MCH RBC QN AUTO: 31.7 PG (ref 26–33)
MCHC RBC AUTO-ENTMCNC: 34 GM/DL (ref 32.2–35.5)
MCV RBC AUTO: 93.1 FL (ref 81–99)
MONOCYTES # BLD: 9.6 %
MONOCYTES ABSOLUTE: 1 THOU/MM3 (ref 0.4–1.3)
NUCLEATED RED BLOOD CELLS: 0 /100 WBC
PLATELET # BLD: 183 THOU/MM3 (ref 130–400)
PMV BLD AUTO: 9.4 FL (ref 9.4–12.4)
POTASSIUM SERPL-SCNC: 4.1 MEQ/L (ref 3.5–5.2)
PRO-BNP: 1292 PG/ML (ref 0–1800)
RBC # BLD: 4.67 MILL/MM3 (ref 4.2–5.4)
SARS-COV-2, NAAT: NOT  DETECTED
SEG NEUTROPHILS: 80 %
SEGMENTED NEUTROPHILS ABSOLUTE COUNT: 8.5 THOU/MM3 (ref 1.8–7.7)
SODIUM BLD-SCNC: 128 MEQ/L (ref 135–145)
TOTAL PROTEIN: 6.4 G/DL (ref 6.1–8)
WBC # BLD: 10.6 THOU/MM3 (ref 4.8–10.8)

## 2021-12-20 PROCEDURE — 6370000000 HC RX 637 (ALT 250 FOR IP): Performed by: INTERNAL MEDICINE

## 2021-12-20 PROCEDURE — 94669 MECHANICAL CHEST WALL OSCILL: CPT

## 2021-12-20 PROCEDURE — 94760 N-INVAS EAR/PLS OXIMETRY 1: CPT

## 2021-12-20 PROCEDURE — 87804 INFLUENZA ASSAY W/OPTIC: CPT

## 2021-12-20 PROCEDURE — 97535 SELF CARE MNGMENT TRAINING: CPT

## 2021-12-20 PROCEDURE — 80053 COMPREHEN METABOLIC PANEL: CPT

## 2021-12-20 PROCEDURE — 71046 X-RAY EXAM CHEST 2 VIEWS: CPT

## 2021-12-20 PROCEDURE — 87635 SARS-COV-2 COVID-19 AMP PRB: CPT

## 2021-12-20 PROCEDURE — 36415 COLL VENOUS BLD VENIPUNCTURE: CPT

## 2021-12-20 PROCEDURE — 99221 1ST HOSP IP/OBS SF/LOW 40: CPT | Performed by: NURSE PRACTITIONER

## 2021-12-20 PROCEDURE — 83880 ASSAY OF NATRIURETIC PEPTIDE: CPT

## 2021-12-20 PROCEDURE — 2580000003 HC RX 258: Performed by: INTERNAL MEDICINE

## 2021-12-20 PROCEDURE — 2060000000 HC ICU INTERMEDIATE R&B

## 2021-12-20 PROCEDURE — 6370000000 HC RX 637 (ALT 250 FOR IP): Performed by: NURSE PRACTITIONER

## 2021-12-20 PROCEDURE — 85025 COMPLETE CBC W/AUTO DIFF WBC: CPT

## 2021-12-20 PROCEDURE — 97530 THERAPEUTIC ACTIVITIES: CPT

## 2021-12-20 PROCEDURE — 94640 AIRWAY INHALATION TREATMENT: CPT

## 2021-12-20 RX ORDER — IPRATROPIUM BROMIDE AND ALBUTEROL SULFATE 2.5; .5 MG/3ML; MG/3ML
1 SOLUTION RESPIRATORY (INHALATION)
Status: DISCONTINUED | OUTPATIENT
Start: 2021-12-20 | End: 2021-12-21 | Stop reason: HOSPADM

## 2021-12-20 RX ADMIN — IPRATROPIUM BROMIDE AND ALBUTEROL SULFATE 1 AMPULE: .5; 3 SOLUTION RESPIRATORY (INHALATION) at 21:26

## 2021-12-20 RX ADMIN — AMLODIPINE BESYLATE 5 MG: 5 TABLET ORAL at 17:09

## 2021-12-20 RX ADMIN — ATORVASTATIN CALCIUM 80 MG: 80 TABLET, FILM COATED ORAL at 19:37

## 2021-12-20 RX ADMIN — IPRATROPIUM BROMIDE AND ALBUTEROL SULFATE 1 AMPULE: .5; 3 SOLUTION RESPIRATORY (INHALATION) at 15:46

## 2021-12-20 RX ADMIN — SODIUM CHLORIDE, PRESERVATIVE FREE 10 ML: 5 INJECTION INTRAVENOUS at 19:37

## 2021-12-20 RX ADMIN — FUROSEMIDE 40 MG: 40 TABLET ORAL at 17:09

## 2021-12-20 RX ADMIN — Medication 1 TABLET: at 08:40

## 2021-12-20 RX ADMIN — SODIUM CHLORIDE, PRESERVATIVE FREE 10 ML: 5 INJECTION INTRAVENOUS at 08:39

## 2021-12-20 RX ADMIN — ASPIRIN 81 MG CHEWABLE TABLET 81 MG: 81 TABLET CHEWABLE at 08:41

## 2021-12-20 RX ADMIN — FUROSEMIDE 40 MG: 40 TABLET ORAL at 08:40

## 2021-12-20 RX ADMIN — RIVAROXABAN 20 MG: 20 TABLET, FILM COATED ORAL at 08:40

## 2021-12-20 RX ADMIN — Medication 1 CAPSULE: at 08:40

## 2021-12-20 RX ADMIN — Medication 500 MG: at 08:40

## 2021-12-20 RX ADMIN — LISINOPRIL 20 MG: 20 TABLET ORAL at 08:41

## 2021-12-20 RX ADMIN — Medication 1000 UNITS: at 08:39

## 2021-12-20 ASSESSMENT — PAIN SCALES - GENERAL
PAINLEVEL_OUTOF10: 0

## 2021-12-20 NOTE — RT PROTOCOL NOTE
RT Inhaler-Nebulizer Bronchodilator Protocol Note    There is a bronchodilator order in the chart from a provider indicating to follow the RT Bronchodilator Protocol and there is an Initiate RT Inhaler-Nebulizer Bronchodilator Protocol order as well (see protocol at bottom of note). CXR Findings:  No results found. The findings from the last RT Protocol Assessment were as follows:   History Pulmonary Disease: None or smoker <15 pack years  Respiratory Pattern: Mild dyspnea at rest, irregular pattern, or RR 21-25 bpm  Breath Sounds: Intermittent or unilateral wheezes  Cough: Strong, productive  Indication for Bronchodilator Therapy:    Bronchodilator Assessment Score: 9    Aerosolized bronchodilator medication orders have been revised according to the RT Inhaler-Nebulizer Bronchodilator Protocol below. Respiratory Therapist to perform RT Therapy Protocol Assessment initially then follow the protocol. Repeat RT Therapy Protocol Assessment PRN for score 0-3 or on second treatment, BID, and PRN for scores above 3. No Indications - adjust the frequency to every 6 hours PRN wheezing or bronchospasm, if no treatments needed after 48 hours then discontinue using Per Protocol order mode. If indication present, adjust the RT bronchodilator orders based on the Bronchodilator Assessment Score as indicated below. Use Inhaler orders unless patient has one or more of the following: on home nebulizer, not able to hold breath for 10 seconds, is not alert and oriented, cannot activate and use MDI correctly, or respiratory rate 25 breaths per minute or more, then use the equivalent nebulizer order(s) with same Frequency and PRN reasons based on the score. If a patient is on this medication at home then do not decrease Frequency below that used at home.     0-3 - enter or revise RT bronchodilator order(s) to equivalent RT Bronchodilator order with Frequency of every 4 hours PRN for wheezing or increased work of breathing using Per Protocol order mode. 4-6 - enter or revise RT Bronchodilator order(s) to two equivalent RT bronchodilator orders with one order with BID Frequency and one order with Frequency of every 4 hours PRN wheezing or increased work of breathing using Per Protocol order mode. 7-10 - enter or revise RT Bronchodilator order(s) to two equivalent RT bronchodilator orders with one order with TID Frequency and one order with Frequency of every 4 hours PRN wheezing or increased work of breathing using Per Protocol order mode. 11-13 - enter or revise RT Bronchodilator order(s) to one equivalent RT bronchodilator order with QID Frequency and an Albuterol order with Frequency of every 4 hours PRN wheezing or increased work of breathing using Per Protocol order mode. Greater than 13 - enter or revise RT Bronchodilator order(s) to one equivalent RT bronchodilator order with every 4 hours Frequency and an Albuterol order with Frequency of every 2 hours PRN wheezing or increased work of breathing using Per Protocol order mode. RT to enter RT Home Evaluation for COPD & MDI Assessment order using Per Protocol order mode.     Electronically signed by Shannan Rocha RCP on 12/20/2021 at 4:03 PM

## 2021-12-20 NOTE — CONSULTS
Consult History & Physical      Patient:  Sharon Plunkett  YOB: 1929    MRN: [id number]     Acct: [de-identified]      Chief Complaint:  Cough    Date of Service: Pt seen/examined in consultation on 12/20/2021      ASSESSMENT / PLAN:    1. Upper respiratory symptoms: cough, rhinorrhea with rhonchi. Likely viral. Start Duonebs. Educate on Acapella and incentive spirometer. R/O flu and COVID 19. Obtain CXR, proBNP, CMP and CBC. 2. Severe symptomatic bradycardia. S/p PPM  3. Syncope. 4. Chronic AFIB  5. Primary hypertension  6. HLP  7. Deconditioning. Awaiting precert and ECF placement. 6. Advanced age. History Of Present Illness:      80 y.o. female who we are asked to see/evaluate by Nasrin Wright MD for medical management of cough and adventurous breath sounds. Patient admitted following syncope due to bradycardia by cardiology. Pacemaker was placed. She remained deconditioned and weak. Plans for ECF placement. She was stable for discharge 12/17, see summary by cardiology. Currently awaiting precertificuation. Hospitalist consulted today for rhonchi on exam which is new. Patient reports cough and rhinorrhea that started about 2 days ago. Past Medical History:        Diagnosis Date    Arthritis     Atrial fibrillation ([address]     Cancer ([address]     Skin Cancer    CHF (congestive heart failure) ([address]     Gross hematuria 2014    Dr Kayla Ferris    HTN (hypertension)     Hyperlipidemia     Pneumonia 12/2/2018    Type 2 diabetes mellitus 11/23/2021       Past Surgical History:        Procedure Laterality Date    CARPAL TUNNEL RELEASE  10/23/2018    right hand    COLONOSCOPY      EYE SURGERY      FOOT SURGERY      x2    HYSTERECTOMY  1982    KNEE SURGERY  2010    LIVER BIOPSY  08/23/2017    SKIN BIOPSY         Medications Prior to Admission:    Prior to Admission medications    Medication Sig Start Date End Date Taking?  Authorizing Provider   nitroGLYCERIN (NITROSTAT) 0.4 MG SL tablet up to max of 3 total doses. If no relief after 1 dose, call 911. 12/17/21  Yes Candelario Hall MD   Multiple Vitamin (MULTI-VITAMIN DAILY PO) Take by mouth daily   Yes Historical Provider, MD   cloNIDine (CATAPRES) 0.1 MG tablet Take 0.1 mg by mouth as needed for High Blood Pressure If over 160/100   Yes Historical Provider, MD   furosemide (LASIX) 40 MG tablet Take 40 mg by mouth 2 times daily   Yes Historical Provider, MD   lactobacillus (CULTURELLE) capsule Take 1 capsule by mouth daily (with breakfast) 12/24/18  Yes aMgo Cheahtam MD   lisinopril (PRINIVIL;ZESTRIL) 20 MG tablet Take 1 tablet by mouth daily Additional RF per her PCP 12/24/18  Yes Mago Cheatham MD   Glucosamine-Chondroitin (GLUCOSAMINE CHONDR COMPLEX PO) Take 2 tablets by mouth daily Move Free   Yes Historical Provider, MD   Cholecalciferol (VITAMIN D-3 PO) Take 1,000 Units by mouth daily    Yes Historical Provider, MD   Multiple Vitamins-Minerals (OCUVITE PO) Take 1 tablet by mouth daily    Yes Historical Provider, MD   atenolol (TENORMIN) 25 MG tablet Take 25 mg by mouth daily. Yes Historical Provider, MD   amLODIPine (NORVASC) 5 MG tablet Take 5 mg by mouth daily. Yes Historical Provider, MD   calcium carbonate 600 MG TABS tablet Take 1 tablet by mouth daily. Yes Historical Provider, MD   rivaroxaban (XARELTO) 20 MG TABS tablet Take 20 mg by mouth daily    Yes Historical Provider, MD       Allergies:  Bactrim [sulfamethoxazole-trimethoprim], Ciprofloxacin, and Sulfa antibiotics    Social History:      The patient currently lives at home. TOBACCO:   reports that she has never smoked. She has never used smokeless tobacco.  ETOH:   reports no history of alcohol use.       Family History:    Positive as follows:        Problem Relation Age of Onset    Cancer Mother     High Blood Pressure Father     Arthritis Father     Heart Attack Father     Heart Disease Father     Cancer Sister     Diabetes Sister     Cancer Brother     Early Death Brother     Cancer Sister     Diabetes Sister        Diet:  ADULT DIET; Regular    REVIEW OF SYSTEMS:   Pertinent positives as noted in the HPI. All other systems reviewed and negative. PHYSICAL EXAM:  BP (!) 145/97   Pulse 52   Temp 98.6 °F (37 °C) (Oral)   Resp 22   Ht 5' 4\" (1.626 m)   Wt 158 lb 12.8 oz (72 kg)   SpO2 90%   BMI 27.26 kg/m²   General appearance: No apparent distress, appears stated age and cooperative. HEENT: Normal cephalic, atraumatic without obvious deformity. Pupils equal, round, and reactive to light. Extra ocular muscles intact. Conjunctivae/corneas clear. Neck: Supple, with full range of motion. No jugular venous distention. Trachea midline. Respiratory:  Rhonchi anterior t/o  Cardiovascular: Regular rate and rhythm with normal S1/S2 without murmurs, rubs or gallops. Abdomen: Soft, non-tender, non-distended with normal bowel sounds. Musculoskeletal: No clubbing, cyanosis or edema bilaterally. Skin: Skin color, texture, turgor normal.  No rashes or lesions. Neurologic:  Neurovascularly intact without any focal sensory/motor deficits. Cranial nerves: II-XII intact, grossly non-focal.  Psychiatric: Alert and oriented, thought content appropriate, normal insight  Capillary Refill: Brisk,< 3 seconds   Peripheral Pulses: +2 palpable, equal bilaterally     Labs:     No results for input(s): WBC, HGB, HCT, PLT in the last 72 hours. No results for input(s): NA, K, CL, CO2, BUN, CREATININE, CALCIUM, PHOS in the last 72 hours. Invalid input(s): MAGNES  No results for input(s): AST, ALT, BILIDIR, BILITOT, ALKPHOS in the last 72 hours. No results for input(s): INR in the last 72 hours. No results for input(s): [place] G[place]ola in the last 72 hours.     Urinalysis:    Lab Results   Component Value Date    NITRU NEGATIVE 12/28/2020    WBCUA 25-50 05/14/2020    WBCUA 25-50 07/18/2011    BACTERIA FEW 05/14/2020    RBCUA 0-2 05/14/2020    BLOODU NEGATIVE 12/28/2020    SPECGRAV 1.012 05/14/2020    GLUCOSEU NEGATIVE 12/28/2020       Radiology: I have reviewed the radiology reports with the following interpretation:     XR CHEST PORTABLE   Final Result   1. New pacemaker in place. 2. Cardiomegaly. Increased pulmonary vascularity. 3. Increased density throughout both lung fields   4. Probable right pleural effusion. .         **This report has been created using voice recognition software. It may contain minor errors which are inherent in voice recognition technology. **      Final report electronically signed by DR Thien Bruner on 12/16/2021 3:09 PM      XR CHEST PORTABLE   Final Result   Cardiomegaly with asymmetric vascular congestion and right lower lobe atelectasis and effusion. **This report has been created using voice recognition software. It may contain minor errors which are inherent in voice recognition technology. **      Final report electronically signed by Dr. Sophie Lind on 12/13/2021 8:34 PM               Code Status: Full Code    PT/OT Eval Status: Active and ongoing    Thank you for the consultation.     Electronically signed by Maria Schilder, APRN - CNP on 12/20/2021 at 3:35 PM

## 2021-12-20 NOTE — CARE COORDINATION
12/20/21, 7:47 AM EST  DISCHARGE PLANNING EVALUATION    MARILU called Olaf and left a voicemail for [employer] asking her to start precert today. MARILU called Kathleen Guerrero and updated her on [facility] has accepted patient and will start precert today. SW explained that patient is doing well and insurance could come back and deny a rehab stay. MARILU explained that if that is the case patient will be discharged home with [facility].      12/20/21, 10:04 AM EST  DISCHARGE PLANNING EVALUATION    SW received a voicemail from [employer] at [facility] and she reported that she will start a precert today. MARILU made call to Home at [facility] and she reported that precerts for SNF/ECFs are being waived. She reported that she sees the request for the precert. She reported that she will call SW if there are any needs. 12/20/21, 1:04 PM EST  DISCHARGE PLANNING EVALUATION    MARILU spoke with Mary at [facility]. MARILU notified her that [facility] [place] precerts are being waived. Mary requested MARILU send her the information on [facility] [place] precert waiver.

## 2021-12-20 NOTE — PROGRESS NOTES
[facility] ICU STEPDOWN TELEMETRY 4K  Occupational Therapy  Daily Note  Time:   Time In: 5106  Time Out: 5587  Timed Code Treatment Minutes: 45 Minutes  Minutes: 38          Date: 2021  Patient Name: Citlali Burr,   Gender: female      Room: [facility]/008-A  MRN: [id number]  : 1929  (80 y.o.)  Referring Practitioner: Dr. Sharon Gramajo  Diagnosis: symptomataic bradycardia  Additional Pertinent Hx: 80 y.o. female who presents to the ED for evaluation of shortness of breath. Patient notes shortness of breath over the last week. She notes today symptoms increased. She denies any chest pain, denies nausea or vomiting, denies diarrhea, denies any cough fever or chills. She has a past medical history of coronary artery disease, her cardiologist is Dr. Myke Queen. She denies history of bradycardia. She does have a history of atrial fibrillation, she is on Xarelto. Pt had a pacemaker placement on 21. Restrictions/Precautions:  Restrictions/Precautions: Weight Bearing  Left Upper Extremity Weight Bearing:  (limited wt bearing on avoid >90degree elevation due to recent pacemaker.)  Implants present? : Pacemaker (placed 21)  Position Activity Restriction  Other position/activity restrictions: pacemaker (21)     SUBJECTIVE: Pt laying in bed upon arrival, pt agreeable to OT session, RN gave verbal approval for session    PAIN: 0/10:     Vitals: Oxygen: 94% on RA, however pt continues to be wheezy    COGNITION: WFL    ADL:   Grooming: Supervision. with pt standing at the sink to brush hair and teeth  Bathing: Supervision. with pt sitting at the EOB to complete sitting and standing  Upper Extremity Dressing: Supervision. for donning gownx2   Toileting: Supervision. with good safety  Toilet Transfer: Supervision. .. BALANCE:  Standing Balance: Supervision.  with a RW, with BUE release from the walker to complete grooming routine    BED MOBILITY:  Supine to Sit: Supervision with the [facility] elevated    TRANSFERS:  Sit to Stand:  Supervision. from the EOB  Stand to Sit: Supervision. to recliner    FUNCTIONAL MOBILITY:  Assistive Device: Rolling Walker  Assist Level:  Supervision. Distance: To and from bathroom      ASSESSMENT:     Activity Tolerance:  Patient tolerance of  treatment: good. Discharge Recommendations: Home with Home Health OT  Equipment Recommendations: Equipment Needed: Yes  Mobility Devices: Walker  Plan: Times per week: 5x  Current Treatment Recommendations: Strengthening,Patient/Caregiver Education & Training,Balance Training,Endurance Training,Safety Education & Training,Self-Care / ADL    Patient Education  Patient Education: ADL's    Goals  Short term goals  Time Frame for Short term goals: by discharge  Short term goal 1: Pt to complete BADL routine with SBA and  no cues for pacemaker precautions  Short term goal 2: Pt to complete transfers from various surfaces including toilet with SBA and no cues for precautions to increase indep  Short term goal 3: Pt to dmeo dynamic standing > 5 min with SBA and no UE support while reaching outside base of [facility]  having no LOB in prep for completing simple homemaking tasks  Short term goal 4: Pt to complete toileting tasks with SBA    Following session, patient left in safe position with all fall risk precautions in place.

## 2021-12-21 VITALS
TEMPERATURE: 98.3 F | SYSTOLIC BLOOD PRESSURE: 189 MMHG | WEIGHT: 159.9 LBS | OXYGEN SATURATION: 93 % | HEIGHT: 64 IN | HEART RATE: 63 BPM | RESPIRATION RATE: 20 BRPM | BODY MASS INDEX: 27.3 KG/M2 | DIASTOLIC BLOOD PRESSURE: 79 MMHG

## 2021-12-21 LAB
ANION GAP SERPL CALCULATED.3IONS-SCNC: 14 MEQ/L (ref 8–16)
BASOPHILS # BLD: 0.4 %
BASOPHILS ABSOLUTE: 0 THOU/MM3 (ref 0–0.1)
BUN BLDV-MCNC: 16 MG/DL (ref 7–22)
CALCIUM SERPL-MCNC: 9.1 MG/DL (ref 8.5–10.5)
CHLORIDE BLD-SCNC: 86 MEQ/L (ref 98–111)
CO2: 30 MEQ/L (ref 23–33)
CREAT SERPL-MCNC: 0.6 MG/DL (ref 0.4–1.2)
EOSINOPHIL # BLD: 2 %
EOSINOPHILS ABSOLUTE: 0.1 THOU/MM3 (ref 0–0.4)
ERYTHROCYTE [DISTWIDTH] IN BLOOD BY AUTOMATED COUNT: 13.7 % (ref 11.5–14.5)
ERYTHROCYTE [DISTWIDTH] IN BLOOD BY AUTOMATED COUNT: 46.6 FL (ref 35–45)
GFR SERPL CREATININE-BSD FRML MDRD: > 90 ML/MIN/1.73M2
GLUCOSE BLD-MCNC: 144 MG/DL (ref 70–108)
HCT VFR BLD CALC: 39.7 % (ref 37–47)
HEMOGLOBIN: 13.1 GM/DL (ref 12–16)
IMMATURE GRANS (ABS): 0.01 THOU/MM3 (ref 0–0.07)
IMMATURE GRANULOCYTES: 0.1 %
LYMPHOCYTES # BLD: 13.5 %
LYMPHOCYTES ABSOLUTE: 0.9 THOU/MM3 (ref 1–4.8)
MCH RBC QN AUTO: 30.8 PG (ref 26–33)
MCHC RBC AUTO-ENTMCNC: 33 GM/DL (ref 32.2–35.5)
MCV RBC AUTO: 93.2 FL (ref 81–99)
MONOCYTES # BLD: 11.7 %
MONOCYTES ABSOLUTE: 0.8 THOU/MM3 (ref 0.4–1.3)
NUCLEATED RED BLOOD CELLS: 0 /100 WBC
PLATELET # BLD: 150 THOU/MM3 (ref 130–400)
PMV BLD AUTO: 9.8 FL (ref 9.4–12.4)
POTASSIUM SERPL-SCNC: 3.5 MEQ/L (ref 3.5–5.2)
RBC # BLD: 4.26 MILL/MM3 (ref 4.2–5.4)
SEG NEUTROPHILS: 72.3 %
SEGMENTED NEUTROPHILS ABSOLUTE COUNT: 4.9 THOU/MM3 (ref 1.8–7.7)
SODIUM BLD-SCNC: 130 MEQ/L (ref 135–145)
WBC # BLD: 6.8 THOU/MM3 (ref 4.8–10.8)

## 2021-12-21 PROCEDURE — 80048 BASIC METABOLIC PNL TOTAL CA: CPT

## 2021-12-21 PROCEDURE — 94760 N-INVAS EAR/PLS OXIMETRY 1: CPT

## 2021-12-21 PROCEDURE — 94669 MECHANICAL CHEST WALL OSCILL: CPT

## 2021-12-21 PROCEDURE — 99232 SBSQ HOSP IP/OBS MODERATE 35: CPT | Performed by: NURSE PRACTITIONER

## 2021-12-21 PROCEDURE — 2580000003 HC RX 258: Performed by: INTERNAL MEDICINE

## 2021-12-21 PROCEDURE — 6370000000 HC RX 637 (ALT 250 FOR IP): Performed by: NURSE PRACTITIONER

## 2021-12-21 PROCEDURE — 36415 COLL VENOUS BLD VENIPUNCTURE: CPT

## 2021-12-21 PROCEDURE — 6370000000 HC RX 637 (ALT 250 FOR IP): Performed by: INTERNAL MEDICINE

## 2021-12-21 PROCEDURE — 85025 COMPLETE CBC W/AUTO DIFF WBC: CPT

## 2021-12-21 PROCEDURE — 94640 AIRWAY INHALATION TREATMENT: CPT

## 2021-12-21 RX ORDER — ALBUTEROL SULFATE 90 UG/1
2 AEROSOL, METERED RESPIRATORY (INHALATION) 4 TIMES DAILY PRN
Qty: 2 EACH | Refills: 0 | Status: ON HOLD | OUTPATIENT
Start: 2021-12-21 | End: 2022-05-16 | Stop reason: HOSPADM

## 2021-12-21 RX ADMIN — IPRATROPIUM BROMIDE AND ALBUTEROL SULFATE 1 AMPULE: .5; 3 SOLUTION RESPIRATORY (INHALATION) at 06:25

## 2021-12-21 RX ADMIN — LISINOPRIL 20 MG: 20 TABLET ORAL at 08:39

## 2021-12-21 RX ADMIN — SODIUM CHLORIDE, PRESERVATIVE FREE 10 ML: 5 INJECTION INTRAVENOUS at 08:38

## 2021-12-21 RX ADMIN — POTASSIUM CHLORIDE 40 MEQ: 1500 TABLET, EXTENDED RELEASE ORAL at 09:06

## 2021-12-21 RX ADMIN — Medication 1 CAPSULE: at 08:40

## 2021-12-21 RX ADMIN — RIVAROXABAN 20 MG: 20 TABLET, FILM COATED ORAL at 08:40

## 2021-12-21 RX ADMIN — IPRATROPIUM BROMIDE AND ALBUTEROL SULFATE 1 AMPULE: .5; 3 SOLUTION RESPIRATORY (INHALATION) at 11:02

## 2021-12-21 RX ADMIN — Medication 500 MG: at 08:40

## 2021-12-21 RX ADMIN — Medication 1000 UNITS: at 08:40

## 2021-12-21 RX ADMIN — Medication 1 TABLET: at 08:40

## 2021-12-21 RX ADMIN — FUROSEMIDE 40 MG: 40 TABLET ORAL at 08:39

## 2021-12-21 RX ADMIN — ASPIRIN 81 MG CHEWABLE TABLET 81 MG: 81 TABLET CHEWABLE at 08:43

## 2021-12-21 ASSESSMENT — PAIN SCALES - GENERAL
PAINLEVEL_OUTOF10: 0

## 2021-12-21 NOTE — PROGRESS NOTES
Discharge teaching and instructions for diagnosis/procedure of Symptomatic Bradycardia with pacer placement completed with patient using teachback method. AVS reviewed. Printed prescriptions given to patient. Patient voiced understanding regarding prescriptions, follow up appointments, and care of self at home. Discharged in a wheelchair to  home with support per self.

## 2021-12-21 NOTE — CARE COORDINATION
12/21/21, 9:23 AM EST  DISCHARGE PLANNING EVALUATION    SW received a call from son Danielle North and he reported that he had spoke with Dr. Annabelle Quintero and he reported that patient is able to return home. TJ reported that he would like patient to return home. He reported that he spoke with patient and his sister Nohemy Ozuna and they are all in agreement with [facility]. RN made aware. MARILU called Mary at [facility] and notified her of patient deciding to discharge today with [facility] [place].

## 2021-12-21 NOTE — CARE COORDINATION
Dillon Bennett was evaluated today and a DME order was entered for a wheeled walker because she requires this to successfully complete daily living tasks of ambulating. A wheeled walker is necessary due to the patient's unsteady gait, upper body weakness, and inability to  an ambulation device; and she can ambulate only by pushing a walker instead of a lesser assistive device such as a cane, crutch, or standard walker. The need for this equipment was discussed with the patient and she understands and is in agreement.   Family prefers [facility]ME RW; collaborated chely C[place]AM RN, David Whitehead

## 2021-12-21 NOTE — PROGRESS NOTES
Hospitalist Progress Note    Patient:  Elisabeth Pichardo      Unit/Bed:4K-08/008-A    YOB: 1929    MRN: [id number]       Acct: [de-identified]     PCP: Elnor Schwab    Date of Admission: 12/13/2021    Assessment/Plan:    Anticipated Discharge: today      1. Acute Upper respiratory symptoms: cough, rhinorrhea with rhonchi. Likely viral. All improved significantly overnight. Responded well to Duonebs, Acapella and incentive spirometer. flu and COVID 19 negative Obtain CXR with atelectasis and small bilateral pleural effusion. proBNP stable. Home with albuterol inhaler PRN every 4 and continued use of Acapella/IS. 2. Hyponatremia, mild. .  3. Severe symptomatic bradycardia. S/p PPM  4. Syncope. 5. Chronic AFIB  6. Primary hypertension  7. HLP  8. Deconditioning. Declining ECF placement. Home today with walker and HH. 5. Advanced age. Dispo: ok to discharge from medicine standpoint. Chief Complaint: Cough      Subjective:  Denies SOB. Continue with cough. States she was able to cough up \"several fur balls\". She would like to be discharged home today with [facility] and not ECF.        Medications:  Reviewed    Infusion Medications    sodium chloride Stopped (12/16/21 1123)    sodium chloride Stopped (12/17/21 0630)    sodium chloride Stopped (12/17/21 0015)    sodium chloride       Scheduled Medications    ipratropium-albuterol  1 ampule Inhalation Q4H WA    vancomycin irrigation  250 mg Irrigation Once    sodium chloride flush  5-40 mL IntraVENous 2 times per day    calcium elemental  1 tablet Oral Daily    Vitamin D  1,000 Units Oral Daily    furosemide  40 mg Oral BID    lactobacillus  1 capsule Oral Daily with breakfast    lisinopril  20 mg Oral Daily    multivitamin  1 tablet Oral Daily    rivaroxaban  20 mg Oral Daily    sodium chloride flush  5-40 mL IntraVENous 2 times per day    aspirin  81 mg Oral Daily    atorvastatin  80 mg Oral Nightly    amLODIPine  5 mg Oral Daily     PRN Meds: sodium chloride flush, sodium chloride, acetaminophen, cloNIDine, sodium chloride flush, sodium chloride, potassium chloride **OR** potassium alternative oral replacement **OR** potassium chloride, ondansetron **OR** ondansetron, acetaminophen **OR** acetaminophen, polyethylene glycol, nitroGLYCERIN      Intake/Output Summary (Last 24 hours) at 12/21/2021 1106  Last data filed at 12/21/2021 0809  Gross per 24 hour   Intake 1750 ml   Output 500 ml   Net 1250 ml       Diet:  ADULT DIET; Regular    Exam:  BP (!) 189/79   Pulse 63   Temp 98.3 °F (36.8 °C) (Oral)   Resp 20   Ht 5' 4\" (1.626 m)   Wt 159 lb 14.4 oz (72.5 kg)   SpO2 93%   BMI 27.45 kg/m²     General appearance: No apparent distress, appears stated age and cooperative. HEENT: Pupils equal, round, and reactive to light. Conjunctivae/corneas clear. Neck: Supple, with full range of motion. No jugular venous distention. Trachea midline. Respiratory:  Normal respiratory effort. diminished to auscultation, bilaterally without Rales/Wheezes/Rhonchi. Cardiovascular: Regular rate and rhythm with normal S1/S2 without murmurs, rubs or gallops. Abdomen: Soft, obese, non-tender, non-distended with normal bowel sounds. Musculoskeletal: passive and active ROM x 4 extremities. Skin: Skin color, texture, turgor normal.  No rashes or lesions. Neurologic:  Neurovascularly intact without any focal sensory/motor deficits.  Cranial nerves: II-XII intact, grossly non-focal.  Psychiatric: Alert and oriented, thought content appropriate, normal insight  Capillary Refill: Brisk,< 3 seconds   Peripheral Pulses: +2 palpable, equal bilaterally       Labs:   Recent Labs     12/20/21  1544 12/21/21  0528   WBC 10.6 6.8   HGB 14.8 13.1   HCT 43.5 39.7    150     Recent Labs     12/20/21  1544 12/21/21  0528   * 130*   K 4.1 3.5   CL 85* 86*   CO2 35* 30   BUN 18 16   CREATININE 0.7 0.6   CALCIUM 9.6 9.1     Recent Labs     12/20/21  1544   AST 31   ALT 16   BILITOT 1.1   ALKPHOS 79     No results for input(s): INR in the last 72 hours. No results for input(s): Stephanie Risk in the last 72 hours. Urinalysis:      Lab Results   Component Value Date    NITRU NEGATIVE 12/28/2020    WBCUA 25-50 05/14/2020    WBCUA 25-50 07/18/2011    BACTERIA FEW 05/14/2020    RBCUA 0-2 05/14/2020    BLOODU NEGATIVE 12/28/2020    SPECGRAV 1.012 05/14/2020    GLUCOSEU NEGATIVE 12/28/2020       Radiology:  XR CHEST (2 VW)   Final Result   1. Marked improvement in the aeration of both lungs. The upper lung zones are clear. 2. Small bilateral pleural effusions with compressive atelectasis   3. Moderate cardiomegaly. .            **This report has been created using voice recognition software. It may contain minor errors which are inherent in voice recognition technology. **      Final report electronically signed by Dr Romana Romney on 12/20/2021 4:28 PM      XR CHEST PORTABLE   Final Result   1. New pacemaker in place. 2. Cardiomegaly. Increased pulmonary vascularity. 3. Increased density throughout both lung fields   4. Probable right pleural effusion. .         **This report has been created using voice recognition software. It may contain minor errors which are inherent in voice recognition technology. **      Final report electronically signed by DR Emma Casiano on 12/16/2021 3:09 PM      XR CHEST PORTABLE   Final Result   Cardiomegaly with asymmetric vascular congestion and right lower lobe atelectasis and effusion. **This report has been created using voice recognition software. It may contain minor errors which are inherent in voice recognition technology. **      Final report electronically signed by Dr. Cony Díaz on 12/13/2021 8:34 PM          Diet: ADULT DIET;  Regular       Disposition:    [x] Home with [facility]       [] TCU       [] Rehab       [] Psych       [] SNF       [] [place]       [] Other-    Code Status: Full Code    PT/OT Eval Status: folloiwing        Electronically signed by SHARON Acuna CNP on 12/21/2021 at 11:06 AM

## 2021-12-21 NOTE — CARE COORDINATION
12/21/21, 10:06 AM EST    Patient goals/plan/ treatment preferences discussed by  and . Patient goals/plan/ treatment preferences reviewed with patient/ family. Patient/ family verbalize understanding of discharge plan and are in agreement with goal/plan/treatment preferences. Understanding was demonstrated using the teach back method. AVS provided by RN at time of discharge, which includes all necessary medical information pertaining to the patients current course of illness, treatment, post-discharge goals of care, and treatment preferences. Services After Discharge  Services At/After Discharge: Nursing Services,OT,PT [facility])   IMM Letter  IMM Letter given to Patient/Family/Significant other/Guardian/POA/by[de-identified]   IMM Letter date given[de-identified] 12/17/21  IMM Letter time given[de-identified] 1917       Patient to discharge today with [facility]. SW called and made referral to [facility]. RN made aware.

## 2021-12-21 NOTE — RT PROTOCOL NOTE
RT Inhaler-Nebulizer Bronchodilator Protocol Note    There is a bronchodilator order in the chart from a provider indicating to follow the RT Bronchodilator Protocol and there is an Initiate RT Inhaler-Nebulizer Bronchodilator Protocol order as well (see protocol at bottom of note). CXR Findings:  No results found. The findings from the last RT Protocol Assessment were as follows:   History Pulmonary Disease: None or smoker <15 pack years  Respiratory Pattern: Dyspnea on exertion or RR 21-25 bpm  Breath Sounds: Inspiratory and expiratory or bilateral wheezing and/or rhonchi  Cough: Strong, productive  Indication for Bronchodilator Therapy: Wheezing associated with pulm disorder  Bronchodilator Assessment Score: 9    Aerosolized bronchodilator medication orders have been revised according to the RT Inhaler-Nebulizer Bronchodilator Protocol below. Respiratory Therapist to perform RT Therapy Protocol Assessment initially then follow the protocol. Repeat RT Therapy Protocol Assessment PRN for score 0-3 or on second treatment, BID, and PRN for scores above 3. No Indications - adjust the frequency to every 6 hours PRN wheezing or bronchospasm, if no treatments needed after 48 hours then discontinue using Per Protocol order mode. If indication present, adjust the RT bronchodilator orders based on the Bronchodilator Assessment Score as indicated below. Use Inhaler orders unless patient has one or more of the following: on home nebulizer, not able to hold breath for 10 seconds, is not alert and oriented, cannot activate and use MDI correctly, or respiratory rate 25 breaths per minute or more, then use the equivalent nebulizer order(s) with same Frequency and PRN reasons based on the score. If a patient is on this medication at home then do not decrease Frequency below that used at home.     0-3 - enter or revise RT bronchodilator order(s) to equivalent RT Bronchodilator order with Frequency of every 4 hours PRN for wheezing or increased work of breathing using Per Protocol order mode. 4-6 - enter or revise RT Bronchodilator order(s) to two equivalent RT bronchodilator orders with one order with BID Frequency and one order with Frequency of every 4 hours PRN wheezing or increased work of breathing using Per Protocol order mode. 7-10 - enter or revise RT Bronchodilator order(s) to two equivalent RT bronchodilator orders with one order with TID Frequency and one order with Frequency of every 4 hours PRN wheezing or increased work of breathing using Per Protocol order mode. 11-13 - enter or revise RT Bronchodilator order(s) to one equivalent RT bronchodilator order with QID Frequency and an Albuterol order with Frequency of every 4 hours PRN wheezing or increased work of breathing using Per Protocol order mode. Greater than 13 - enter or revise RT Bronchodilator order(s) to one equivalent RT bronchodilator order with every 4 hours Frequency and an Albuterol order with Frequency of every 2 hours PRN wheezing or increased work of breathing using Per Protocol order mode. RT to enter RT Home Evaluation for COPD & MDI Assessment order using Per Protocol order mode.     Electronically signed by Lynda De Souza RCP on 12/21/2021 at 11:13 AM

## 2021-12-22 ENCOUNTER — CARE COORDINATION (OUTPATIENT)
Dept: CASE MANAGEMENT | Age: 86
End: 2021-12-22

## 2021-12-22 DIAGNOSIS — R00.1 SYMPTOMATIC BRADYCARDIA: Primary | ICD-10-CM

## 2021-12-22 PROCEDURE — 1111F DSCHRG MED/CURRENT MED MERGE: CPT | Performed by: FAMILY MEDICINE

## 2021-12-22 NOTE — CARE COORDINATION
Cardiologist 21 and PCP 2022. Patient has no needs or concerns for writer at this time. Instructed patient that this is the Final Transition Call and to call their Specialist/PCP for any problems or issues that may occur. Expresses understanding. Timur Persaud LPN    [phone]  Kandace Thomas / Yasmany Tamayo Coordinator         Was this an external facility discharge? No Discharge Facility: [facility]    Challenges to be reviewed by the provider   Additional needs identified to be addressed with provider No  none             Method of communication with provider : none      Advance Care Planning:   Does patient have an Advance Directive:  reviewed and current. Was this a readmission? No  Patient stated reason for admission: Symptomatic bradycardia    Patients top risk factors for readmission: lack of knowledge about disease  medical condition-Symptomatic bradycardia  medication management    Care Transition Nurse (CTN) contacted the patient by telephone to perform post hospital discharge assessment. Verified name and  with patient as identifiers. Provided introduction to self, and explanation of the CTN role. CTN reviewed discharge instructions, medical action plan and red flags with patient who verbalized understanding. Patient given an opportunity to ask questions and does not have any further questions or concerns at this time. Were discharge instructions available to patient? Yes. Reviewed appropriate site of care based on symptoms and resources available to patient including: PCP, Specialist and When to call 911. The patient agrees to contact the PCP office for questions related to their healthcare. Medication reconciliation was performed with patient, who verbalizes understanding of administration of home medications. Advised obtaining a 90-day supply of all daily and as-needed medications.      Reviewed and educated patient on any new and changed medications related to discharge diagnosis     Was patient discharged with a pulse oximeter? No     Discussed and confirmed pulse oximeter discharge instructions and when to notify provider or seek emergency care. LPN CC provided contact information. No further follow-up call identified based on severity of symptoms and risk factors.        Non-face-to-face services provided:  Obtained and reviewed discharge summary and/or continuity of care documents    Care Transitions 24 Hour Call    Schedule Follow Up Appointment with PCP: Completed  Do you have any ongoing symptoms?: Yes  Patient-reported symptoms: Cough  Do you have a copy of your discharge instructions?: Yes  Do you have all of your prescriptions and are they filled?: Yes  Have you been contacted by a [id number] Dtime Pharmacist?: No  Have you scheduled your follow up appointment?: Yes (Comment: Cardio 12/28/2021)  How are you going to get to your appointment?: Car - family or friend to transport  Were you discharged with any Home Care or Post Acute Services: No  Do you feel like you have everything you need to keep you well at home?: Yes  Care Transitions Interventions  No Identified Needs         Follow Up  Future Appointments   Date Time Provider Lizy Zayas   11/23/2022  2:30 PM Nicole Lakhani [place], [place]

## 2022-01-04 ENCOUNTER — HOSPITAL ENCOUNTER (OUTPATIENT)
Age: 87
Discharge: HOME | End: 2022-01-04
Payer: MEDICARE

## 2022-01-04 LAB
ANION GAP SERPL CALCULATED.3IONS-SCNC: 12 MEQ/L (ref 8–16)
BUN BLDV-MCNC: 24 MG/DL (ref 7–22)
CALCIUM SERPL-MCNC: 10.5 MG/DL (ref 8.5–10.5)
CHLORIDE BLD-SCNC: 94 MEQ/L (ref 98–111)
CO2: 32 MEQ/L (ref 23–33)
CREAT SERPL-MCNC: 0.8 MG/DL (ref 0.4–1.2)
ERYTHROCYTE [DISTWIDTH] IN BLOOD BY AUTOMATED COUNT: 13.9 % (ref 11.5–14.5)
ERYTHROCYTE [DISTWIDTH] IN BLOOD BY AUTOMATED COUNT: 48.9 FL (ref 35–45)
GFR SERPL CREATININE-BSD FRML MDRD: 67 ML/MIN/1.73M2
GLUCOSE BLD-MCNC: 117 MG/DL (ref 70–108)
HCT VFR BLD CALC: 43 % (ref 37–47)
HEMOGLOBIN: 13.9 GM/DL (ref 12–16)
MCH RBC QN AUTO: 31 PG (ref 26–33)
MCHC RBC AUTO-ENTMCNC: 32.3 GM/DL (ref 32.2–35.5)
MCV RBC AUTO: 96 FL (ref 81–99)
PLATELET # BLD: 216 THOU/MM3 (ref 130–400)
PMV BLD AUTO: 9 FL (ref 9.4–12.4)
POTASSIUM SERPL-SCNC: 4.2 MEQ/L (ref 3.5–5.2)
RBC # BLD: 4.48 MILL/MM3 (ref 4.2–5.4)
SODIUM BLD-SCNC: 138 MEQ/L (ref 135–145)
WBC # BLD: 8.2 THOU/MM3 (ref 4.8–10.8)

## 2022-01-04 PROCEDURE — 85027 COMPLETE CBC AUTOMATED: CPT

## 2022-01-04 PROCEDURE — 80048 BASIC METABOLIC PNL TOTAL CA: CPT

## 2022-01-04 PROCEDURE — 36415 COLL VENOUS BLD VENIPUNCTURE: CPT

## 2022-01-21 ENCOUNTER — TELEPHONE (OUTPATIENT)
Dept: UROLOGY | Age: 87
End: 2022-01-21

## 2022-01-21 NOTE — TELEPHONE ENCOUNTER
Patient scheduled for MRI ABDOMEN WO  at [facility] MR on 11/16/2022 ARRIVAL OF 1130AM FOR A 12PM SCAN. NPO 4 HOURS PRIOR. Patient advised of instructions.   Order mailed/given to patient

## 2022-04-07 ENCOUNTER — HOSPITAL ENCOUNTER (OUTPATIENT)
Dept: WOMENS IMAGING | Age: 87
Discharge: HOME | End: 2022-04-07
Payer: MEDICARE

## 2022-04-07 DIAGNOSIS — Z12.31 VISIT FOR SCREENING MAMMOGRAM: ICD-10-CM

## 2022-04-07 PROCEDURE — 77063 BREAST TOMOSYNTHESIS BI: CPT

## 2022-04-20 ENCOUNTER — HOSPITAL ENCOUNTER (EMERGENCY)
Age: 87
Discharge: HOME | End: 2022-04-21
Attending: EMERGENCY MEDICINE
Payer: MEDICARE

## 2022-04-20 ENCOUNTER — APPOINTMENT (OUTPATIENT)
Dept: GENERAL RADIOLOGY | Age: 87
End: 2022-04-20
Payer: MEDICARE

## 2022-04-20 DIAGNOSIS — I50.42 CHRONIC COMBINED SYSTOLIC AND DIASTOLIC CONGESTIVE HEART FAILURE (HCC): ICD-10-CM

## 2022-04-20 DIAGNOSIS — I10 PRIMARY HYPERTENSION: Primary | ICD-10-CM

## 2022-04-20 PROCEDURE — 93005 ELECTROCARDIOGRAM TRACING: CPT | Performed by: EMERGENCY MEDICINE

## 2022-04-20 PROCEDURE — 71045 X-RAY EXAM CHEST 1 VIEW: CPT

## 2022-04-20 PROCEDURE — 99285 EMERGENCY DEPT VISIT HI MDM: CPT

## 2022-04-20 RX ORDER — CLONIDINE HYDROCHLORIDE 0.1 MG/1
0.1 TABLET ORAL ONCE
Status: DISCONTINUED | OUTPATIENT
Start: 2022-04-21 | End: 2022-04-21 | Stop reason: HOSPADM

## 2022-04-20 ASSESSMENT — ENCOUNTER SYMPTOMS
PHOTOPHOBIA: 0
SHORTNESS OF BREATH: 1
EYE DISCHARGE: 0
EYE PAIN: 0
SORE THROAT: 0
CHOKING: 0
RHINORRHEA: 0
CONSTIPATION: 0
BLOOD IN STOOL: 0
TROUBLE SWALLOWING: 0
ABDOMINAL PAIN: 0
VOICE CHANGE: 0
DIARRHEA: 0
COUGH: 0
EYE REDNESS: 0
WHEEZING: 0
VOMITING: 0
BACK PAIN: 0
CHEST TIGHTNESS: 0
ABDOMINAL DISTENTION: 0
NAUSEA: 0
EYE ITCHING: 0
SINUS PRESSURE: 0

## 2022-04-20 ASSESSMENT — PAIN - FUNCTIONAL ASSESSMENT: PAIN_FUNCTIONAL_ASSESSMENT: NONE - DENIES PAIN

## 2022-04-21 VITALS
OXYGEN SATURATION: 93 % | DIASTOLIC BLOOD PRESSURE: 73 MMHG | HEART RATE: 65 BPM | TEMPERATURE: 98.2 F | WEIGHT: 157 LBS | BODY MASS INDEX: 26.8 KG/M2 | RESPIRATION RATE: 20 BRPM | HEIGHT: 64 IN | SYSTOLIC BLOOD PRESSURE: 139 MMHG

## 2022-04-21 LAB
ALBUMIN SERPL-MCNC: 4 G/DL (ref 3.5–5.1)
ALP BLD-CCNC: 75 U/L (ref 38–126)
ALT SERPL-CCNC: 42 U/L (ref 11–66)
ANION GAP SERPL CALCULATED.3IONS-SCNC: 10 MEQ/L (ref 8–16)
APTT: 41.8 SECONDS (ref 22–38)
AST SERPL-CCNC: 40 U/L (ref 5–40)
BASOPHILS # BLD: 0.5 %
BASOPHILS ABSOLUTE: 0 THOU/MM3 (ref 0–0.1)
BILIRUB SERPL-MCNC: 0.5 MG/DL (ref 0.3–1.2)
BILIRUBIN DIRECT: < 0.2 MG/DL (ref 0–0.3)
BUN BLDV-MCNC: 23 MG/DL (ref 7–22)
CALCIUM SERPL-MCNC: 9.2 MG/DL (ref 8.5–10.5)
CHLORIDE BLD-SCNC: 97 MEQ/L (ref 98–111)
CO2: 30 MEQ/L (ref 23–33)
CREAT SERPL-MCNC: 0.8 MG/DL (ref 0.4–1.2)
EKG ATRIAL RATE: 53 BPM
EKG Q-T INTERVAL: 444 MS
EKG QRS DURATION: 174 MS
EKG QTC CALCULATION (BAZETT): 492 MS
EKG R AXIS: 118 DEGREES
EKG T AXIS: -29 DEGREES
EKG VENTRICULAR RATE: 74 BPM
EOSINOPHIL # BLD: 0.9 %
EOSINOPHILS ABSOLUTE: 0.1 THOU/MM3 (ref 0–0.4)
ERYTHROCYTE [DISTWIDTH] IN BLOOD BY AUTOMATED COUNT: 13.9 % (ref 11.5–14.5)
ERYTHROCYTE [DISTWIDTH] IN BLOOD BY AUTOMATED COUNT: 48.9 FL (ref 35–45)
FLU A ANTIGEN: NEGATIVE
FLU B ANTIGEN: NEGATIVE
GFR SERPL CREATININE-BSD FRML MDRD: 67 ML/MIN/1.73M2
GLUCOSE BLD-MCNC: 131 MG/DL (ref 70–108)
HCT VFR BLD CALC: 40.9 % (ref 37–47)
HEMOGLOBIN: 13.3 GM/DL (ref 12–16)
IMMATURE GRANS (ABS): 0.01 THOU/MM3 (ref 0–0.07)
IMMATURE GRANULOCYTES: 0.2 %
INR BLD: 2.48 (ref 0.85–1.13)
LIPASE: 31.4 U/L (ref 5.6–51.3)
LYMPHOCYTES # BLD: 25.1 %
LYMPHOCYTES ABSOLUTE: 1.4 THOU/MM3 (ref 1–4.8)
MAGNESIUM: 2 MG/DL (ref 1.6–2.4)
MCH RBC QN AUTO: 30.8 PG (ref 26–33)
MCHC RBC AUTO-ENTMCNC: 32.5 GM/DL (ref 32.2–35.5)
MCV RBC AUTO: 94.7 FL (ref 81–99)
MONOCYTES # BLD: 8.7 %
MONOCYTES ABSOLUTE: 0.5 THOU/MM3 (ref 0.4–1.3)
NUCLEATED RED BLOOD CELLS: 0 /100 WBC
OSMOLALITY CALCULATION: 279.3 MOSMOL/KG (ref 275–300)
PLATELET # BLD: 169 THOU/MM3 (ref 130–400)
PMV BLD AUTO: 9.9 FL (ref 9.4–12.4)
POTASSIUM SERPL-SCNC: 4.1 MEQ/L (ref 3.5–5.2)
PRO-BNP: 1244 PG/ML (ref 0–1800)
RBC # BLD: 4.32 MILL/MM3 (ref 4.2–5.4)
SARS-COV-2, NAAT: NOT  DETECTED
SEG NEUTROPHILS: 64.6 %
SEGMENTED NEUTROPHILS ABSOLUTE COUNT: 3.6 THOU/MM3 (ref 1.8–7.7)
SODIUM BLD-SCNC: 137 MEQ/L (ref 135–145)
TOTAL PROTEIN: 6.9 G/DL (ref 6.1–8)
TROPONIN T: < 0.01 NG/ML
WBC # BLD: 5.6 THOU/MM3 (ref 4.8–10.8)

## 2022-04-21 PROCEDURE — 83880 ASSAY OF NATRIURETIC PEPTIDE: CPT

## 2022-04-21 PROCEDURE — 87804 INFLUENZA ASSAY W/OPTIC: CPT

## 2022-04-21 PROCEDURE — 82248 BILIRUBIN DIRECT: CPT

## 2022-04-21 PROCEDURE — 83735 ASSAY OF MAGNESIUM: CPT

## 2022-04-21 PROCEDURE — 83690 ASSAY OF LIPASE: CPT

## 2022-04-21 PROCEDURE — 85025 COMPLETE CBC W/AUTO DIFF WBC: CPT

## 2022-04-21 PROCEDURE — 80053 COMPREHEN METABOLIC PANEL: CPT

## 2022-04-21 PROCEDURE — 84484 ASSAY OF TROPONIN QUANT: CPT

## 2022-04-21 PROCEDURE — 87635 SARS-COV-2 COVID-19 AMP PRB: CPT

## 2022-04-21 PROCEDURE — 85610 PROTHROMBIN TIME: CPT

## 2022-04-21 PROCEDURE — 85730 THROMBOPLASTIN TIME PARTIAL: CPT

## 2022-04-21 NOTE — ED NOTES
Patient resting in bed with family at bedside. Respirations easy and unlabored. No distress noted. Call light within reach.        Memo Orozco RN  04/21/22 8238

## 2022-04-21 NOTE — ED TRIAGE NOTES
Pt presents to ED with chief complaint of hypertension and shortness of breath. Reports BP of 209/95 this morning at home; pt took clonidine at 2300 at home. O2 95% on room air. Reports shortness of breath mainly with exertion. Denies pain. A&O. Daughter at bedside. Call light within reach.

## 2022-04-21 NOTE — ED PROVIDER NOTES
[facility]  eMERGENCY dEPARTMENT eNCOUnter          CHIEF COMPLAINT       Chief Complaint   Patient presents with    Hypertension    Shortness of Breath       Nurses Notes reviewed and I agree except as noted in the HPI. HISTORY OF PRESENT ILLNESS    Moustapha Nino is a 80 y.o. female who presents with shortness of breath and hypertension. Apparently she woke up last night with the same feeling. She woke up tonight. Had the same feeling she took one of her old clonidine that did not seem to bring her blood pressure down so she decided come in for evaluation and treatment. Patient states that she is no longer short of breath. She states that when she woke up she was short of breath but if she took a couple deep breaths it would go away. Patient denies any pain. Currently she is feeling better. Patient is otherwise resting comfortably on the cot no apparent distress no other physical complaints at this time. REVIEW OF SYSTEMS     Review of Systems   Constitutional: Negative for activity change, appetite change, diaphoresis, fatigue and unexpected weight change. HENT: Negative for congestion, ear discharge, ear pain, hearing loss, rhinorrhea, sinus pressure, sore throat, trouble swallowing and voice change. Eyes: Negative for photophobia, pain, discharge, redness and itching. Respiratory: Positive for shortness of breath. Negative for cough, choking, chest tightness and wheezing. Cardiovascular: Negative for chest pain, palpitations and leg swelling. Gastrointestinal: Negative for abdominal distention, abdominal pain, blood in stool, constipation, diarrhea, nausea and vomiting. Endocrine: Negative for polydipsia, polyphagia and polyuria. Genitourinary: Negative for decreased urine volume, difficulty urinating, dysuria, enuresis, frequency, hematuria and urgency.    Musculoskeletal: Negative for arthralgias, back pain, gait problem, myalgias, neck pain and neck stiffness. Skin: Negative for pallor and rash. Allergic/Immunologic: Negative for immunocompromised state. Neurological: Negative for dizziness, tremors, seizures, syncope, facial asymmetry, weakness, light-headedness, numbness and headaches. Hematological: Negative for adenopathy. Does not bruise/bleed easily. Psychiatric/Behavioral: Negative for agitation, hallucinations and suicidal ideas. The patient is not nervous/anxious. PAST MEDICAL HISTORY    has a past medical history of Arthritis, Atrial fibrillation ([facility] [address] Cancer ([facility] [address] CHF (congestive heart failure) ([facility] [address] Gross hematuria, HTN (hypertension), Hyperlipidemia, Pneumonia, and Type 2 diabetes mellitus. SURGICAL HISTORY      has a past surgical history that includes Hysterectomy (1982); Foot surgery; knee surgery (2010); liver biopsy (08/23/2017); Colonoscopy; eye surgery; skin biopsy; and Carpal tunnel release (10/23/2018). CURRENT MEDICATIONS       Previous Medications    ALBUTEROL SULFATE HFA (VENTOLIN HFA) 108 (90 BASE) MCG/ACT INHALER    Inhale 2 puffs into the lungs 4 times daily as needed for Wheezing    AMLODIPINE (NORVASC) 5 MG TABLET    Take 5 mg by mouth daily. ATENOLOL (TENORMIN) 25 MG TABLET    Take 25 mg by mouth daily. CALCIUM CARBONATE 600 MG TABS TABLET    Take 1 tablet by mouth daily.     CHOLECALCIFEROL (VITAMIN D-3 PO)    Take 1,000 Units by mouth daily     CLONIDINE (CATAPRES) 0.1 MG TABLET    Take 0.1 mg by mouth as needed for High Blood Pressure If over 160/100    FUROSEMIDE (LASIX) 40 MG TABLET    Take 40 mg by mouth 2 times daily    GLUCOSAMINE-CHONDROITIN (GLUCOSAMINE CHONDR COMPLEX PO)    Take 2 tablets by mouth daily Move Free    LACTOBACILLUS (CULTURELLE) CAPSULE    Take 1 capsule by mouth daily (with breakfast)    LISINOPRIL (PRINIVIL;ZESTRIL) 20 MG TABLET    Take 1 tablet by mouth daily Additional RF per her PCP    MULTIPLE VITAMIN (MULTI-VITAMIN DAILY PO)    Take by mouth daily    MULTIPLE Rhythm: Normal rate and regular rhythm. Pulses:           Carotid pulses are 2+ on the right side and 2+ on the left side. Radial pulses are 2+ on the right side and 2+ on the left side. Femoral pulses are 2+ on the right side and 2+ on the left side. Popliteal pulses are 2+ on the right side and 2+ on the left side. Dorsalis pedis pulses are 2+ on the right side and 2+ on the left side. Posterior tibial pulses are 2+ on the right side and 2+ on the left side. Heart sounds: Normal heart sounds, S1 normal and S2 normal. No murmur heard. No friction rub. No gallop. Pulmonary:      Effort: Pulmonary effort is normal.      Breath sounds: Normal breath sounds. No stridor. No decreased breath sounds, wheezing, rhonchi or rales. Chest:      Chest wall: No tenderness. Abdominal:      General: Bowel sounds are normal. There is no distension. Palpations: Abdomen is soft. There is no mass. Tenderness: There is no abdominal tenderness. There is no guarding or rebound. Hernia: No hernia is present. Musculoskeletal:         General: No tenderness. Normal range of motion. Cervical back: Normal range of motion and neck supple. Right lower le+ Pitting Edema present. Left lower le+ Pitting Edema present. Lymphadenopathy:      Cervical: No cervical adenopathy. Skin:     General: Skin is warm and dry. Capillary Refill: Capillary refill takes less than 2 seconds. Findings: No bruising, ecchymosis, lesion or rash. Neurological:      Mental Status: She is alert and oriented to person, place, and time. Cranial Nerves: No cranial nerve deficit. Coordination: Coordination normal.      Deep Tendon Reflexes: Reflexes are normal and symmetric. Psychiatric:         Speech: Speech normal.         Behavior: Behavior normal.         Thought Content:  Thought content normal.         Judgment: Judgment normal.           DIFFERENTIAL DIAGNOSIS:   Uncontrolled hypertension medication noncompliance infection    DIAGNOSTIC RESULTS     EKG: All EKG's are interpreted by the Emergency Department Physician who either signs or Co-signs this chart in the absence of a cardiologist.  Ventricularly paced rhythm occasional PVCs, right axis deviation, ventricular rate of 74 IL interval indeterminate QRS duration 174 QT interval 442 QTC of 492. RADIOLOGY: non-plain film images(s) such as CT, Ultrasound and MRI are read by the radiologist.  XR CHEST PORTABLE   Final Result   Impression:   Findings of probable mild volume overload. This document has been electronically signed by:  Shreyas Mcclellan MD on    04/21/2022 12:11 AM        .    LABS:   Labs Reviewed   PROTIME-INR - Abnormal; Notable for the following components:       Result Value    INR 2.48 (*)     All other components within normal limits   APTT - Abnormal; Notable for the following components:    aPTT 41.8 (*)     All other components within normal limits   CBC WITH AUTO DIFFERENTIAL - Abnormal; Notable for the following components:    RDW-SD 48.9 (*)     All other components within normal limits   BASIC METABOLIC PANEL - Abnormal; Notable for the following components:    Chloride 97 (*)     Glucose 131 (*)     BUN 23 (*)     All other components within normal limits   GLOMERULAR FILTRATION RATE, ESTIMATED - Abnormal; Notable for the following components:    Est, Glom Filt Rate 67 (*)     All other components within normal limits   COVID-19, RAPID   RAPID INFLUENZA A/B ANTIGENS   BRAIN NATRIURETIC PEPTIDE   HEPATIC FUNCTION PANEL   LIPASE   TROPONIN   MAGNESIUM   ANION GAP   OSMOLALITY       EMERGENCY DEPARTMENT COURSE:   Vitals:    Vitals:    04/20/22 2343 04/21/22 0018 04/21/22 0100   BP: (!) 175/82 137/70 139/73   Pulse: 74 62 65   Resp: 20  20   Temp: 98.2 °F (36.8 °C)     TempSrc: Oral     SpO2: 95%  93%   Weight: 157 lb (71.2 kg)     Height: 5' 4\" (1.626 m)       Patient was assessed at bedside labs and imaging were ordered. Here today the patient had been given clonidine prior to arrival.  Blood pressure came down on its own. I wonder if there is a component of anxiety to this or not. In any event I reviewed the patient's labs. Heart failure markers are normal.  X-ray is read as suspected beginnings of pulmonary edema. I went back and had a talk with the patient and family. Patient is supposed to be on Lasix 40 mg twice daily. She is unsure if she is taking it twice daily or once daily. Patient does have an upcoming appointment scheduled with her cardiologist I instructed her to call them and schedule the appointment a little sooner. They are instructed to follow-up with primary care physician as well. They are instructed to take all medications as prescribed. Patient and daughter at bedside understood and agreed to the plan. Patient is subsequently discharged home in stable condition. Patient has what appears to be hypertension and congestive heart failure. Patient has filled her prescription for clonidine she is instructed to take that as needed for systolic blood pressure greater than 366 diastolic blood pressure greater than 100. Patient is on Lasix. She is unsure what her regimen is she is instructed to follow-up with her cardiologist and be placed on a regimen for the furosemide. Patient is instructed to take all medications as prescribed follow-up as directed and return to the emergency room immediately for any new or worsening complaints. CRITICAL CARE:   None    CONSULTS:  None    PROCEDURES:  None    FINAL IMPRESSION      1. Primary hypertension    2.  Chronic combined systolic and diastolic congestive heart failure [facility])          DISPOSITION/PLAN   Discharge    PATIENT REFERRED TO:  Florentino Reyes MD  [address] [facility] [address]    Call in 1 day      [facility] [address] [address]  [phone]    Call in 1 day        DISCHARGE MEDICATIONS:  New Prescriptions    No medications on file       (Please note that portions of this note were completed with a voice recognition program.  Efforts were made to edit the dictations but occasionally words are mis-transcribed.)    Sabine Fallon, [address] [place] [address],   04/21/22 0132

## 2022-04-22 ENCOUNTER — TELEPHONE (OUTPATIENT)
Dept: CARDIOLOGY CLINIC | Age: 87
End: 2022-04-22

## 2022-04-22 NOTE — TELEPHONE ENCOUNTER
DAUGHTER ALFREDO CALLED PT SEEN IN ER FOR SOB & HTN 4/20/22 B/P 139/73. ACCORDING TO ER NOTE PT NOT SURE IF SHE IS TAKING LASIX OR CLONIDINE CORRECTLY. CLONIDINE 0.1 PRN LASIX 40 MG BID PER 1/11/22 OUR NOTE. DOES SHE NEED TO BE SEEN SOONER THAN 5/18/22.  Fernanda Giron

## 2022-04-22 NOTE — TELEPHONE ENCOUNTER
Patient called states B/P is now 150/80 and pulse 80's. No other symptoms feels fine. She thinks her bp was eleveated to due some stress she has recently been under.  Wants to keep her May appt but will call if any need to be seen sooner

## 2022-05-13 ENCOUNTER — HOSPITAL ENCOUNTER (OUTPATIENT)
Age: 87
Discharge: HOME | DRG: 291 | End: 2022-05-13
Payer: MEDICARE

## 2022-05-13 LAB
ANION GAP SERPL CALCULATED.3IONS-SCNC: 13 MEQ/L (ref 8–16)
AVERAGE GLUCOSE: 126 MG/DL (ref 70–126)
BUN BLDV-MCNC: 28 MG/DL (ref 7–22)
CALCIUM SERPL-MCNC: 9.7 MG/DL (ref 8.5–10.5)
CHLORIDE BLD-SCNC: 99 MEQ/L (ref 98–111)
CO2: 28 MEQ/L (ref 23–33)
CREAT SERPL-MCNC: 0.9 MG/DL (ref 0.4–1.2)
GFR SERPL CREATININE-BSD FRML MDRD: 58 ML/MIN/1.73M2
GLUCOSE BLD-MCNC: 139 MG/DL (ref 70–108)
HBA1C MFR BLD: 6.2 % (ref 4.4–6.4)
POTASSIUM SERPL-SCNC: 4.4 MEQ/L (ref 3.5–5.2)
SODIUM BLD-SCNC: 140 MEQ/L (ref 135–145)

## 2022-05-13 PROCEDURE — 83036 HEMOGLOBIN GLYCOSYLATED A1C: CPT

## 2022-05-13 PROCEDURE — 80048 BASIC METABOLIC PNL TOTAL CA: CPT

## 2022-05-13 PROCEDURE — 36415 COLL VENOUS BLD VENIPUNCTURE: CPT

## 2022-05-15 ENCOUNTER — APPOINTMENT (OUTPATIENT)
Dept: GENERAL RADIOLOGY | Age: 87
DRG: 291 | End: 2022-05-15
Payer: MEDICARE

## 2022-05-15 ENCOUNTER — HOSPITAL ENCOUNTER (INPATIENT)
Age: 87
LOS: 1 days | Discharge: HOME | DRG: 291 | End: 2022-05-16
Attending: EMERGENCY MEDICINE | Admitting: FAMILY MEDICINE
Payer: MEDICARE

## 2022-05-15 DIAGNOSIS — I50.9 HYPERVOLEMIA DUE TO CONGESTIVE HEART FAILURE (HCC): ICD-10-CM

## 2022-05-15 DIAGNOSIS — R06.09 EXERTIONAL DYSPNEA: ICD-10-CM

## 2022-05-15 DIAGNOSIS — I50.9 ACUTE ON CHRONIC CONGESTIVE HEART FAILURE, UNSPECIFIED HEART FAILURE TYPE (HCC): Primary | ICD-10-CM

## 2022-05-15 DIAGNOSIS — E87.70 HYPERVOLEMIA DUE TO CONGESTIVE HEART FAILURE (HCC): ICD-10-CM

## 2022-05-15 PROBLEM — R06.00 DYSPNEA: Status: ACTIVE | Noted: 2022-05-15

## 2022-05-15 PROBLEM — I50.43 CHF (CONGESTIVE HEART FAILURE), NYHA CLASS I, ACUTE ON CHRONIC, COMBINED (HCC): Status: ACTIVE | Noted: 2022-05-15

## 2022-05-15 LAB
ALBUMIN SERPL-MCNC: 4.3 G/DL (ref 3.5–5.1)
ALP BLD-CCNC: 76 U/L (ref 38–126)
ALT SERPL-CCNC: 39 U/L (ref 11–66)
ANION GAP SERPL CALCULATED.3IONS-SCNC: 14 MEQ/L (ref 8–16)
AST SERPL-CCNC: 47 U/L (ref 5–40)
BASOPHILS # BLD: 0.6 %
BASOPHILS ABSOLUTE: 0 THOU/MM3 (ref 0–0.1)
BILIRUB SERPL-MCNC: 0.6 MG/DL (ref 0.3–1.2)
BILIRUBIN DIRECT: < 0.2 MG/DL (ref 0–0.3)
BUN BLDV-MCNC: 25 MG/DL (ref 7–22)
C-REACTIVE PROTEIN: < 0.3 MG/DL (ref 0–1)
CALCIUM SERPL-MCNC: 9.5 MG/DL (ref 8.5–10.5)
CHLORIDE BLD-SCNC: 99 MEQ/L (ref 98–111)
CO2: 28 MEQ/L (ref 23–33)
CREAT SERPL-MCNC: 0.8 MG/DL (ref 0.4–1.2)
EKG Q-T INTERVAL: 442 MS
EKG QRS DURATION: 176 MS
EKG QTC CALCULATION (BAZETT): 473 MS
EKG R AXIS: 126 DEGREES
EKG T AXIS: -17 DEGREES
EKG VENTRICULAR RATE: 69 BPM
EOSINOPHIL # BLD: 0.8 %
EOSINOPHILS ABSOLUTE: 0 THOU/MM3 (ref 0–0.4)
ERYTHROCYTE [DISTWIDTH] IN BLOOD BY AUTOMATED COUNT: 13.8 % (ref 11.5–14.5)
ERYTHROCYTE [DISTWIDTH] IN BLOOD BY AUTOMATED COUNT: 48.9 FL (ref 35–45)
GFR SERPL CREATININE-BSD FRML MDRD: 67 ML/MIN/1.73M2
GLUCOSE BLD-MCNC: 131 MG/DL (ref 70–108)
HCT VFR BLD CALC: 43.8 % (ref 37–47)
HEMOGLOBIN: 14.1 GM/DL (ref 12–16)
IMMATURE GRANS (ABS): 0.02 THOU/MM3 (ref 0–0.07)
IMMATURE GRANULOCYTES: 0.4 %
LYMPHOCYTES # BLD: 26.1 %
LYMPHOCYTES ABSOLUTE: 1.3 THOU/MM3 (ref 1–4.8)
MCH RBC QN AUTO: 30.8 PG (ref 26–33)
MCHC RBC AUTO-ENTMCNC: 32.2 GM/DL (ref 32.2–35.5)
MCV RBC AUTO: 95.6 FL (ref 81–99)
MONOCYTES # BLD: 8 %
MONOCYTES ABSOLUTE: 0.4 THOU/MM3 (ref 0.4–1.3)
NUCLEATED RED BLOOD CELLS: 0 /100 WBC
PLATELET # BLD: 161 THOU/MM3 (ref 130–400)
PMV BLD AUTO: 9.6 FL (ref 9.4–12.4)
POTASSIUM REFLEX MAGNESIUM: 3.9 MEQ/L (ref 3.5–5.2)
PRO-BNP: 1302 PG/ML (ref 0–1800)
PROCALCITONIN: 0.05 NG/ML (ref 0.01–0.09)
RBC # BLD: 4.58 MILL/MM3 (ref 4.2–5.4)
SEG NEUTROPHILS: 64.1 %
SEGMENTED NEUTROPHILS ABSOLUTE COUNT: 3.3 THOU/MM3 (ref 1.8–7.7)
SODIUM BLD-SCNC: 141 MEQ/L (ref 135–145)
TOTAL PROTEIN: 6.6 G/DL (ref 6.1–8)
TROPONIN T: < 0.01 NG/ML
WBC # BLD: 5.1 THOU/MM3 (ref 4.8–10.8)

## 2022-05-15 PROCEDURE — 93010 ELECTROCARDIOGRAM REPORT: CPT | Performed by: NUCLEAR MEDICINE

## 2022-05-15 PROCEDURE — 86140 C-REACTIVE PROTEIN: CPT

## 2022-05-15 PROCEDURE — 83880 ASSAY OF NATRIURETIC PEPTIDE: CPT

## 2022-05-15 PROCEDURE — 99285 EMERGENCY DEPT VISIT HI MDM: CPT

## 2022-05-15 PROCEDURE — 96374 THER/PROPH/DIAG INJ IV PUSH: CPT

## 2022-05-15 PROCEDURE — 80048 BASIC METABOLIC PNL TOTAL CA: CPT

## 2022-05-15 PROCEDURE — 85025 COMPLETE CBC W/AUTO DIFF WBC: CPT

## 2022-05-15 PROCEDURE — 99223 1ST HOSP IP/OBS HIGH 75: CPT | Performed by: FAMILY MEDICINE

## 2022-05-15 PROCEDURE — 2580000003 HC RX 258: Performed by: FAMILY MEDICINE

## 2022-05-15 PROCEDURE — G0378 HOSPITAL OBSERVATION PER HR: HCPCS

## 2022-05-15 PROCEDURE — 1200000003 HC TELEMETRY R&B

## 2022-05-15 PROCEDURE — 96376 TX/PRO/DX INJ SAME DRUG ADON: CPT

## 2022-05-15 PROCEDURE — 6360000002 HC RX W HCPCS

## 2022-05-15 PROCEDURE — 84145 PROCALCITONIN (PCT): CPT

## 2022-05-15 PROCEDURE — 71045 X-RAY EXAM CHEST 1 VIEW: CPT

## 2022-05-15 PROCEDURE — 80076 HEPATIC FUNCTION PANEL: CPT

## 2022-05-15 PROCEDURE — 84484 ASSAY OF TROPONIN QUANT: CPT

## 2022-05-15 PROCEDURE — 36415 COLL VENOUS BLD VENIPUNCTURE: CPT

## 2022-05-15 PROCEDURE — 6370000000 HC RX 637 (ALT 250 FOR IP): Performed by: FAMILY MEDICINE

## 2022-05-15 PROCEDURE — 93005 ELECTROCARDIOGRAM TRACING: CPT

## 2022-05-15 PROCEDURE — 6360000002 HC RX W HCPCS: Performed by: FAMILY MEDICINE

## 2022-05-15 RX ORDER — CALCIUM CARBONATE 200(500)MG
1 TABLET,CHEWABLE ORAL DAILY
Status: DISCONTINUED | OUTPATIENT
Start: 2022-05-15 | End: 2022-05-16 | Stop reason: HOSPADM

## 2022-05-15 RX ORDER — FUROSEMIDE 10 MG/ML
40 INJECTION INTRAMUSCULAR; INTRAVENOUS 2 TIMES DAILY
Status: DISCONTINUED | OUTPATIENT
Start: 2022-05-15 | End: 2022-05-16 | Stop reason: HOSPADM

## 2022-05-15 RX ORDER — ALBUTEROL SULFATE 90 UG/1
2 AEROSOL, METERED RESPIRATORY (INHALATION) 4 TIMES DAILY PRN
Status: DISCONTINUED | OUTPATIENT
Start: 2022-05-15 | End: 2022-05-16 | Stop reason: HOSPADM

## 2022-05-15 RX ORDER — POTASSIUM CHLORIDE 20 MEQ/1
20 TABLET, EXTENDED RELEASE ORAL DAILY
Status: DISCONTINUED | OUTPATIENT
Start: 2022-05-15 | End: 2022-05-16 | Stop reason: HOSPADM

## 2022-05-15 RX ORDER — ACETAMINOPHEN 325 MG/1
650 TABLET ORAL EVERY 6 HOURS PRN
Status: DISCONTINUED | OUTPATIENT
Start: 2022-05-15 | End: 2022-05-16 | Stop reason: HOSPADM

## 2022-05-15 RX ORDER — LOTEPREDNOL ETABONATE 5 MG/ML
1 SUSPENSION/ DROPS OPHTHALMIC EVERY OTHER DAY
Status: DISCONTINUED | OUTPATIENT
Start: 2022-05-16 | End: 2022-05-16 | Stop reason: HOSPADM

## 2022-05-15 RX ORDER — ACETAMINOPHEN 650 MG/1
650 SUPPOSITORY RECTAL EVERY 6 HOURS PRN
Status: DISCONTINUED | OUTPATIENT
Start: 2022-05-15 | End: 2022-05-16 | Stop reason: HOSPADM

## 2022-05-15 RX ORDER — FUROSEMIDE 10 MG/ML
40 INJECTION INTRAMUSCULAR; INTRAVENOUS ONCE
Status: COMPLETED | OUTPATIENT
Start: 2022-05-15 | End: 2022-05-15

## 2022-05-15 RX ORDER — LISINOPRIL 20 MG/1
20 TABLET ORAL DAILY
Status: DISCONTINUED | OUTPATIENT
Start: 2022-05-15 | End: 2022-05-16 | Stop reason: HOSPADM

## 2022-05-15 RX ORDER — ONDANSETRON 2 MG/ML
4 INJECTION INTRAMUSCULAR; INTRAVENOUS EVERY 6 HOURS PRN
Status: DISCONTINUED | OUTPATIENT
Start: 2022-05-15 | End: 2022-05-16 | Stop reason: HOSPADM

## 2022-05-15 RX ORDER — ONDANSETRON 4 MG/1
4 TABLET, ORALLY DISINTEGRATING ORAL EVERY 8 HOURS PRN
Status: DISCONTINUED | OUTPATIENT
Start: 2022-05-15 | End: 2022-05-16 | Stop reason: HOSPADM

## 2022-05-15 RX ORDER — SODIUM CHLORIDE 9 MG/ML
INJECTION, SOLUTION INTRAVENOUS PRN
Status: DISCONTINUED | OUTPATIENT
Start: 2022-05-15 | End: 2022-05-16 | Stop reason: HOSPADM

## 2022-05-15 RX ORDER — POTASSIUM CHLORIDE 20 MEQ/1
20 TABLET, EXTENDED RELEASE ORAL DAILY
COMMUNITY
End: 2022-09-12 | Stop reason: SDUPTHER

## 2022-05-15 RX ORDER — AMLODIPINE BESYLATE 5 MG/1
5 TABLET ORAL DAILY
Status: DISCONTINUED | OUTPATIENT
Start: 2022-05-15 | End: 2022-05-16 | Stop reason: HOSPADM

## 2022-05-15 RX ORDER — LOTEPREDNOL ETABONATE 5 MG/ML
1 SUSPENSION/ DROPS OPHTHALMIC EVERY OTHER DAY
COMMUNITY

## 2022-05-15 RX ORDER — POLYETHYLENE GLYCOL 3350 17 G/17G
17 POWDER, FOR SOLUTION ORAL DAILY PRN
Status: DISCONTINUED | OUTPATIENT
Start: 2022-05-15 | End: 2022-05-16 | Stop reason: HOSPADM

## 2022-05-15 RX ORDER — CLONIDINE HYDROCHLORIDE 0.1 MG/1
0.1 TABLET ORAL PRN
Status: DISCONTINUED | OUTPATIENT
Start: 2022-05-15 | End: 2022-05-16 | Stop reason: HOSPADM

## 2022-05-15 RX ORDER — SODIUM CHLORIDE 0.9 % (FLUSH) 0.9 %
5-40 SYRINGE (ML) INJECTION EVERY 12 HOURS SCHEDULED
Status: DISCONTINUED | OUTPATIENT
Start: 2022-05-15 | End: 2022-05-16 | Stop reason: HOSPADM

## 2022-05-15 RX ORDER — SODIUM CHLORIDE 0.9 % (FLUSH) 0.9 %
5-40 SYRINGE (ML) INJECTION PRN
Status: DISCONTINUED | OUTPATIENT
Start: 2022-05-15 | End: 2022-05-16 | Stop reason: HOSPADM

## 2022-05-15 RX ORDER — ATENOLOL 25 MG/1
25 TABLET ORAL DAILY
Status: DISCONTINUED | OUTPATIENT
Start: 2022-05-15 | End: 2022-05-16 | Stop reason: HOSPADM

## 2022-05-15 RX ORDER — VITAMIN B COMPLEX
1000 TABLET ORAL DAILY
Status: DISCONTINUED | OUTPATIENT
Start: 2022-05-15 | End: 2022-05-16 | Stop reason: HOSPADM

## 2022-05-15 RX ADMIN — AMLODIPINE BESYLATE 5 MG: 5 TABLET ORAL at 12:02

## 2022-05-15 RX ADMIN — SODIUM CHLORIDE, PRESERVATIVE FREE 10 ML: 5 INJECTION INTRAVENOUS at 20:14

## 2022-05-15 RX ADMIN — FUROSEMIDE 40 MG: 10 INJECTION, SOLUTION INTRAMUSCULAR; INTRAVENOUS at 12:02

## 2022-05-15 RX ADMIN — POTASSIUM CHLORIDE 20 MEQ: 1500 TABLET, EXTENDED RELEASE ORAL at 12:02

## 2022-05-15 RX ADMIN — SODIUM CHLORIDE, PRESERVATIVE FREE 10 ML: 5 INJECTION INTRAVENOUS at 20:13

## 2022-05-15 RX ADMIN — SODIUM CHLORIDE, PRESERVATIVE FREE 10 ML: 5 INJECTION INTRAVENOUS at 12:01

## 2022-05-15 RX ADMIN — ANTACID TABLETS 500 MG: 500 TABLET, CHEWABLE ORAL at 12:04

## 2022-05-15 RX ADMIN — ATENOLOL 25 MG: 25 TABLET ORAL at 12:02

## 2022-05-15 RX ADMIN — LISINOPRIL 20 MG: 20 TABLET ORAL at 12:02

## 2022-05-15 RX ADMIN — FUROSEMIDE 40 MG: 10 INJECTION, SOLUTION INTRAMUSCULAR; INTRAVENOUS at 18:02

## 2022-05-15 RX ADMIN — RIVAROXABAN 15 MG: 15 TABLET, FILM COATED ORAL at 18:02

## 2022-05-15 RX ADMIN — Medication 1000 UNITS: at 12:01

## 2022-05-15 RX ADMIN — FUROSEMIDE 40 MG: 40 INJECTION, SOLUTION INTRAMUSCULAR; INTRAVENOUS at 07:12

## 2022-05-15 ASSESSMENT — ENCOUNTER SYMPTOMS
DIARRHEA: 0
WHEEZING: 0
CONSTIPATION: 0
BACK PAIN: 0
STRIDOR: 0
RHINORRHEA: 0
VOMITING: 0
CHEST TIGHTNESS: 0
EYE PAIN: 0
NAUSEA: 0
SHORTNESS OF BREATH: 1
COUGH: 0
BLOOD IN STOOL: 0
PHOTOPHOBIA: 0

## 2022-05-15 ASSESSMENT — PAIN - FUNCTIONAL ASSESSMENT
PAIN_FUNCTIONAL_ASSESSMENT: NONE - DENIES PAIN
PAIN_FUNCTIONAL_ASSESSMENT: NONE - DENIES PAIN

## 2022-05-15 ASSESSMENT — LIFESTYLE VARIABLES: HOW OFTEN DO YOU HAVE A DRINK CONTAINING ALCOHOL: NEVER

## 2022-05-15 NOTE — PLAN OF CARE
Problem: Discharge Planning  Goal: Discharge to home or other facility with appropriate resources  Outcome: Progressing  Flowsheets (Taken 5/15/2022 1005)  Discharge to home or other facility with appropriate resources: Identify barriers to discharge with patient and caregiver     Problem: Safety - Adult  Goal: Free from fall injury  Outcome: Progressing     Problem: ABCDS Injury Assessment  Goal: Absence of physical injury  Outcome: Progressing

## 2022-05-15 NOTE — ED NOTES
pacemaker interrogation completed at this time,      Royce Mujica [facility]  05/15/22 8452

## 2022-05-15 NOTE — ED NOTES
ED to inpatient nurses report    Chief Complaint   Patient presents with    Shortness of Breath      Present to ED from home  LOC: alert and orientated to name, place, date  Vital signs   Vitals:    05/15/22 0605 05/15/22 0716   BP: (!) 156/74 (!) 114/94   Pulse: 63 64   Resp: 20 17   Temp: 97.9 °F (36.6 °C)    TempSrc: Oral    SpO2: 93% 98%   Weight: 157 lb (71.2 kg)    Height: 5' 4\" (1.626 m)       Oxygen Baseline 94%    Current needs required 1L NC Bipap/Cpap No  LDAs:   Peripheral IV 05/15/22 Left Antecubital (Active)   Site Assessment Clean, dry & intact 05/15/22 9879     Mobility: Independent  Pending ED orders: NA  Present condition: stable    Electronically signed by Adalberto Flaherty RN on 5/15/2022 at 9:00 AM       Adalberto Flaherty RN  05/15/22 0900

## 2022-05-15 NOTE — ED NOTES
ED nurse-to-nurse bedside report    Chief Complaint   Patient presents with    Shortness of Breath      LOC: alert and orientated to name, place, date  Vital signs   Vitals:    05/15/22 0605   BP: (!) 156/74   Pulse: 63   Resp: 20   Temp: 97.9 °F (36.6 °C)   TempSrc: Oral   SpO2: 93%   Weight: 157 lb (71.2 kg)   Height: 5' 4\" (1.626 m)      Pain:    Pain Interventions: none  Pain Goal: 4  Oxygen: Yes    Current needs required: 1 lpm nasal cannula    Telemetry: Yes  LDAs:   Peripheral IV 05/15/22 Left Antecubital (Active)   Site Assessment Clean, dry & intact 05/15/22 0619     Continuous Infusions:   Mobility: Independent  Keane Fall Risk Score: No flowsheet data found.   Fall Interventions: Side Rails Raised  Report given to: [address], RN  05/15/22 7379

## 2022-05-15 NOTE — ED PROVIDER NOTES
[place] ENCOUNTER          Pt Name: Lei Lobo  MRN: [id number]  [place] 4/22/1929  Date of evaluation: 5/15/2022  Treating Resident Physician: Leopoldo Fuller MD  Supervising Physician: Dr. Ismael Christopher       Chief Complaint   Patient presents with    Shortness of Breath     History obtained from the patient. HISTORY OF PRESENT ILLNESS    HPI  Lei Lobo is a 80 y.o. female who presents to the emergency department for evaluation of shortness of breath    Patient states that she noticed for the last 3 weeks she has been having worsening shortness of breath as she is unable to walk as far as she usually is without taking a break and she also becomes winded when talking more frequently. Patient states that she was having worsening shortness of breath this morning and was unable to catch her breath. So she was talking with her daughter who recommended she come in to be evaluated this morning. Patient did note that her blood pressure this morning was 803 systolic so she did take an extra clonidine dose. Patient denies waking up struggling for air this morning. Patient does note that she does have troubles laying flat at baseline. Patient with known congestive heart failure status post pacemaker with a history of A. fib as well. Patient denies any recent fevers, chills, cough, dysuria, change in bladder or bowel function, numbness, weakness. Patient has not had any recent operations and denies any history of cancer or chemotherapy. Patient denies any tobacco alcohol recreational drug use. Patient does endorse missing doses of her Lasix because of her busy lifestyle. The patient has no other acute complaints at this time. REVIEW OF SYSTEMS   Review of Systems   Constitutional: Negative for chills, fatigue, fever and unexpected weight change.    HENT: Negative for congestion, postnasal drip, rhinorrhea and sneezing. Eyes: Negative for photophobia, pain and visual disturbance. Respiratory: Positive for shortness of breath. Negative for cough, chest tightness, wheezing and stridor. Cardiovascular: Positive for leg swelling. Negative for chest pain and palpitations. Gastrointestinal: Negative for blood in stool, constipation, diarrhea, nausea and vomiting. Endocrine: Negative for cold intolerance and heat intolerance. Genitourinary: Negative for difficulty urinating, dysuria, hematuria and vaginal bleeding. Musculoskeletal: Negative for arthralgias and back pain. Neurological: Negative for dizziness, light-headedness, numbness and headaches. Psychiatric/Behavioral: Negative for agitation, confusion and sleep disturbance.          PAST MEDICAL AND SURGICAL HISTORY     Past Medical History:   Diagnosis Date    Arthritis     Atrial fibrillation ([facility]     Cancer ([facility]     Skin Cancer    CHF (congestive heart failure) ([facility]     Gross hematuria 2014    Dr Aarti Dorsey HTN (hypertension)     Hyperlipidemia     Pneumonia 12/2/2018    Type 2 diabetes mellitus 11/23/2021     Past Surgical History:   Procedure Laterality Date    CARPAL TUNNEL RELEASE  10/23/2018    right hand    COLONOSCOPY      EYE SURGERY      FOOT SURGERY      x2    HYSTERECTOMY  1982    KNEE SURGERY  2010    LIVER BIOPSY  08/23/2017    SKIN BIOPSY           MEDICATIONS     Current Facility-Administered Medications:     sodium chloride flush 0.9 % injection 5-40 mL, 5-40 mL, IntraVENous, 2 times per day, Jose Carlos Velázquez MD, 10 mL at 05/15/22 1201    sodium chloride flush 0.9 % injection 5-40 mL, 5-40 mL, IntraVENous, PRN, Jose Carlos Velázquez MD    0.9 % sodium chloride infusion, , IntraVENous, PRN, Jose Carlos Velázquez MD    ondansetron (ZOFRAN-ODT) disintegrating tablet 4 mg, 4 mg, Oral, Q8H PRN **OR** ondansetron (ZOFRAN) injection 4 mg, 4 mg, IntraVENous, Q6H PRN, Jose Carlos Velázquez MD    polyethylene glycol [facility]) packet 17 g, 17 g, Oral, Daily PRN, Jessica Hansen MD    acetaminophen (TYLENOL) tablet 650 mg, 650 mg, Oral, Q6H PRN **OR** acetaminophen (TYLENOL) suppository 650 mg, 650 mg, Rectal, Q6H PRN, Jessica Hansen MD    albuterol sulfate  (90 Base) MCG/ACT inhaler 2 puff, 2 puff, Inhalation, 4x Daily PRN, Jessica Hansen MD    amLODIPine (NORVASC) tablet 5 mg, 5 mg, Oral, Daily, Jessica Hansen MD, 5 mg at 05/15/22 1202    atenolol (TENORMIN) tablet 25 mg, 25 mg, Oral, Daily, Jessica Hansen MD, 25 mg at 05/15/22 1202    calcium carbonate (TUMS) chewable tablet 500 mg, 1 tablet, Oral, Daily, Jessica Hansen MD, 500 mg at 05/15/22 1204    Vitamin D (CHOLECALCIFEROL) tablet 1,000 Units, 1,000 Units, Oral, Daily, Jessica Hansen MD, 1,000 Units at 05/15/22 1201    cloNIDine (CATAPRES) tablet 0.1 mg, 0.1 mg, Oral, PRN, Jessica Hansen MD    lisinopril (PRINIVIL;ZESTRIL) tablet 20 mg, 20 mg, Oral, Daily, Jessica Hansen MD, 20 mg at 05/15/22 1202    potassium chloride (KLOR-CON M) extended release tablet 20 mEq, 20 mEq, Oral, Daily, Jessica Hansen MD, 20 mEq at 05/15/22 1202    rivaroxaban (XARELTO) tablet 15 mg, 15 mg, Oral, Dinner, Jessica Hansen MD    furosemide (LASIX) injection 40 mg, 40 mg, IntraVENous, BID, Jessica Hansen MD, 40 mg at 05/15/22 1202      SOCIAL HISTORY     Social History     Social History Narrative    - Never     Social History     Tobacco Use    Smoking status: Never Smoker    Smokeless tobacco: Never Used   Vaping Use    Vaping Use: Never used   Substance Use Topics    Alcohol use: No    Drug use: No         ALLERGIES     Allergies   Allergen Reactions    Bactrim [Sulfamethoxazole-Trimethoprim]     Ciprofloxacin Diarrhea    Sulfa Antibiotics          FAMILY HISTORY     Family History   Problem Relation Age of Onset    Cancer Mother     High Blood Pressure Father     Arthritis Father     Heart Attack Father     Heart Disease Father    [facility] Cancer Sister     Diabetes Sister     Cancer Brother     Early Death Brother     Cancer Sister     Diabetes Sister          PREVIOUS RECORDS   Previous records reviewed: Patient previously Seen in the Emergency Room on 2022 for hypertension. PHYSICAL EXAM     ED Triage Vitals   BP Temp Temp src Pulse Resp SpO2 Height Weight   -- -- -- -- -- -- -- --     Initial vital signs and nursing assessment reviewed and normal. Body mass index is 25.51 kg/m². Pulsoximetry is normal per my interpretation. Additional Vital Signs:  Vitals:    05/15/22 0952   BP: 122/86   Pulse: 67   Resp: 17   Temp: 97.6 °F (36.4 °C)   SpO2: 96%       Physical Exam  Vitals and nursing note reviewed. Constitutional:       Appearance: She is normal weight. She is not ill-appearing, toxic-appearing or diaphoretic. HENT:      Head: Normocephalic and atraumatic. Mouth/Throat:      Mouth: Mucous membranes are moist.      Pharynx: Oropharynx is clear. Eyes:      Extraocular Movements: Extraocular movements intact. Pupils: Pupils are equal, round, and reactive to light. Cardiovascular:      Rate and Rhythm: Normal rate and regular rhythm. Pulses: Normal pulses. Heart sounds: Normal heart sounds, S1 normal and S2 normal.   Pulmonary:      Effort: Pulmonary effort is normal. Tachypnea present. Breath sounds: Normal breath sounds. No wheezing, rhonchi or rales. Abdominal:      General: Bowel sounds are normal.      Palpations: Abdomen is soft. Musculoskeletal:      Right lower le+ Pitting Edema present. Left lower le+ Pitting Edema present. Feet:      Comments: Significant varicose veins bilateral lower extremities with trace pitting edema. Skin:     General: Skin is warm and dry. Capillary Refill: Capillary refill takes less than 2 seconds. Neurological:      General: No focal deficit present. Mental Status: She is alert and oriented to person, place, and time. MEDICAL DECISION MAKING   Initial Assessment:   1. Patient is a 77-year-old female presenting with worsening shortness of breath over the last 3 weeks. Patient denying any URI symptoms at this time no fevers no chills. Patient does endorse missing doses of her Lasix periodically. Physical exam positive for trace pitting edema in lower extremities but clear lungs. And of care ultrasound performed demonstrates B-lines in the lower lung field. Concern is for CHF exacerbation. Other items on the differential include ACS, pneumonia, PE. Patient's Wells score is a 0 so PE is unlikely so D-dimer is not ordered. Plan:    CBC, BMP, hepatic function panel, BNP, troponin   EKG, x-ray   Lasix. ED RESULTS   Laboratory results:  Labs Reviewed   CBC WITH AUTO DIFFERENTIAL - Abnormal; Notable for the following components:       Result Value    RDW-SD 48.9 (*)     All other components within normal limits   BASIC METABOLIC PANEL W/ REFLEX TO MG FOR LOW K - Abnormal; Notable for the following components:    Glucose 131 (*)     BUN 25 (*)     All other components within normal limits   HEPATIC FUNCTION PANEL - Abnormal; Notable for the following components:    AST 47 (*)     All other components within normal limits   GLOMERULAR FILTRATION RATE, ESTIMATED - Abnormal; Notable for the following components:    Est, Glom Filt Rate 67 (*)     All other components within normal limits   TROPONIN   BRAIN NATRIURETIC PEPTIDE   ANION GAP   C-REACTIVE PROTEIN   PROCALCITONIN       Radiologic studies results:  XR CHEST PORTABLE   Final Result   1. Engorged pulmonary vessels. 2. Increased markings in the right lung base. This could be related to an infiltrate. Early pulmonary edema is also a consideration. **This report has been created using voice recognition software. It may contain minor errors which are inherent in voice recognition technology. **      Final report electronically signed by  John Silence on 5/15/2022 7:13 AM          ED Medications administered this visit:   Medications   sodium chloride flush 0.9 % injection 5-40 mL (10 mLs IntraVENous Given 5/15/22 1201)   sodium chloride flush 0.9 % injection 5-40 mL (has no administration in time range)   0.9 % sodium chloride infusion (has no administration in time range)   ondansetron (ZOFRAN-ODT) disintegrating tablet 4 mg (has no administration in time range)     Or   ondansetron (ZOFRAN) injection 4 mg (has no administration in time range)   polyethylene glycol (GLYCOLAX) packet 17 g (has no administration in time range)   acetaminophen (TYLENOL) tablet 650 mg (has no administration in time range)     Or   acetaminophen (TYLENOL) suppository 650 mg (has no administration in time range)   albuterol sulfate  (90 Base) MCG/ACT inhaler 2 puff (has no administration in time range)   amLODIPine (NORVASC) tablet 5 mg (5 mg Oral Given 5/15/22 1202)   atenolol (TENORMIN) tablet 25 mg (25 mg Oral Given 5/15/22 1202)   calcium carbonate (TUMS) chewable tablet 500 mg (500 mg Oral Given 5/15/22 1204)   Vitamin D (CHOLECALCIFEROL) tablet 1,000 Units (1,000 Units Oral Given 5/15/22 1201)   cloNIDine (CATAPRES) tablet 0.1 mg (has no administration in time range)   lisinopril (PRINIVIL;ZESTRIL) tablet 20 mg (20 mg Oral Given 5/15/22 1202)   potassium chloride (KLOR-CON M) extended release tablet 20 mEq (20 mEq Oral Given 5/15/22 1202)   rivaroxaban (XARELTO) tablet 15 mg (has no administration in time range)   furosemide (LASIX) injection 40 mg (40 mg IntraVENous Given 5/15/22 1202)   furosemide (LASIX) injection 40 mg (40 mg IntraVENous Given 5/15/22 4281)         ED COURSE     ED Course as of 05/15/22 1548   Sun May 15, 2022   0710 Device interpretation does not demonstrate any abnormal arrhythmias since the device was recently cleared February 10, 2022. Per the  device appears to be functioning appropriately.  [CARIE]   0723 CBC with Auto Differential(!):    WBC 5.1   RBC 4.58   Hemoglobin Quant 14.1   Hematocrit 43.8   MCV 95.6   MCH 30.8   MCHC 32.2   RDW-CV 13.8   RDW-SD 48.9(!)   Platelet Count 097   MPV 9.6   Seg Neutrophils 64.1   Lymphocytes 26.1   Monocytes 8.0   Eosinophils 0.8   Basophils 0.6   Immature Granulocytes 0.4   Segs Absolute 3.3   Lymphocytes Absolute 1.3   Monocytes Absolute 0.4   Eosinophils Absolute 0.0   Basophils Absolute 0.0   Immature Grans (Abs) 0.02   Nucleated Red Blood Cells 0  No evidence of light count elevation or anemia [CARIE]   0697 Basic Metabolic Panel w/ Reflex to MG(!):    Sodium 141   Potassium 3.9   Chloride 99   CO2 28   GLUCOSE, FASTING,(!)   BUN,BUNPL 25(!)   Creatinine 0.8   CALCIUM, SERUM, 660844 9.5  No electrolyte abnormalities [CARIE]   0724 Troponin:    Troponin T < 0.010  Troponin negative [CARIE]   1546 I suspect the patient's worsening shortness of breath and dyspnea on exertion is secondary to acute CHF exacerbation. Patient given IV Lasix here in the emergency department. Though patient's BNP is not elevated she does have signs and symptoms consistent with volume overload including pitting edema. B-lines on ultrasound. Hospitalist was contacted and overall accepted admission. [CARIE]      ED Course User Index  [CARIE] Cora Neves MD       Strict return precautions and follow up instructions were discussed with the patient prior to discharge, with which the patient agrees. MEDICATION CHANGES     Current Discharge Medication List            FINAL DISPOSITION     Final diagnoses:   Acute on chronic congestive heart failure, unspecified heart failure type ([address]   Hypervolemia due to congestive heart failure (HCC)   Exertional dyspnea     Condition: condition: stable  Dispo: Admit to telemetry      This transcription was electronically signed.  Parts of this transcriptions may have been dictated by use of voice recognition software and electronically transcribed, and parts may have been transcribed with the assistance of an ED scribe. The transcription may contain errors not detected in proofreading. Please refer to my supervising physician's documentation if my documentation differs.     Electronically Signed: Kevin Harris MD, 05/15/22, 3:48 PM         Moreno Hardy MD  Resident  05/15/22 8023

## 2022-05-15 NOTE — ED NOTES
Bedside report received from [place], [facility]. Pt appears to be resting on cot. Family at bedside. No distress noted. Call light in reach.  Pt denies any needs at this time     Julisa Webber RN  05/15/22 7661

## 2022-05-15 NOTE — ED NOTES
[facility] notified of pt transport to 6K-06. Pt transported in stable condition.      Robert Pereyra  05/15/22 7842

## 2022-05-15 NOTE — H&P
Hospitalist History and Physical          Patient: Derick Barlow  : 1929  MRN: [id number]     Acct: [de-identified]    PCP: Shelby Mosher  Date of Admission: 5/15/2022  Date of Service: Pt seen/examined on 05/15/22  and Admitted to Inpatient with expected LOS greater than two midnights due to medical therapy. Hospital Problems           Last Modified POA    * (Principal) Dyspnea 5/15/2022 Yes    CHF (congestive heart failure), NYHA class I, acute on chronic, combined ([address] 5/15/2022 Yes          Assessment and Plan:    Dyspnea likely Acute on chronic Diastolic CHF exacerbation  BNP 1302 but CXR shows pulmonary edema, with bipedal pitting edema on physical exam  Start Lasix 40 mg IV BID, KCl daily  Monitor I&Os and daily weight  Limit fluid and salt intake  Last Echo in  showed EF 55%  Obtain Limited Echo    Chronic Atrial Fibrillation  Symptomatic Bradycardia s/p pacemaker insertion  Continue Xarelto daily    Essential Hypertension  Continue atenolol, lisinopril, clonidine prn and amlodipine      =======================================================================      Chief Complaint:    Progressive dyspnea    History Of Present Illness:  Derick Barlow is a 80 y.o. female with PMHx of atrial fibrillation, symptomatic bradycardia s/p pacemaker insertion, CHF and HTN who presents to [facility] with progressive dyspnea that she noticed for a while now, but has worsened in the past few days, that it has been difficult for her to ambulate. Her daughter notices that she seems to be short of breath even on normal conversations over the phone. She is supposed to be on Lasix 40 mg PO BID, but sometimes do not take medications when she has to be out of the house. Last Friday, she did not take any diuretics at all as she was visiting someone at a nursing home. She expressed concerns about having to wear depends. She also noticed that her legs are becoming more swollen.  She denies any fever, chills, cough/colds. No chest pain or palpitations. Patient admitted for further management. Past Medical History:        Diagnosis Date    Arthritis     [facility] ([facility]     Cancer ([facility]     Skin Cancer    CHF (congestive heart failure) ([facility]     Gross hematuria 2014    Dr Gordy Martinez    HTN (hypertension)     Hyperlipidemia     Pneumonia 12/2/2018    Type 2 diabetes mellitus 11/23/2021       Past Surgical History:        Procedure Laterality Date    CARPAL TUNNEL RELEASE  10/23/2018    right hand    COLONOSCOPY      EYE SURGERY      FOOT SURGERY      x2    HYSTERECTOMY  1982    KNEE SURGERY  2010    LIVER BIOPSY  08/23/2017    SKIN BIOPSY         Medications Prior to Admission:   Prior to Admission medications    Medication Sig Start Date End Date Taking? Authorizing Provider   potassium chloride (KLOR-CON M) 20 MEQ extended release tablet Take 20 mEq by mouth daily   Yes Historical Provider, MD   albuterol sulfate HFA (VENTOLIN HFA) 108 (90 Base) MCG/ACT inhaler Inhale 2 puffs into the lungs 4 times daily as needed for Wheezing  Patient not taking: Reported on 5/15/2022 12/21/21   Sheila Sen, APRN - CNP   nitroGLYCERIN (NITROSTAT) 0.4 MG SL tablet up to max of 3 total doses.  If no relief after 1 dose, call 911. 12/17/21   Magui Handy MD   Multiple Vitamin (MULTI-VITAMIN DAILY PO) Take by mouth daily    Historical Provider, MD   cloNIDine (CATAPRES) 0.1 MG tablet Take 0.1 mg by mouth as needed for High Blood Pressure If over 160/100    Historical Provider, MD   furosemide (LASIX) 40 MG tablet Take 40 mg by mouth 2 times daily    Historical Provider, MD   lactobacillus (CULTURELLE) capsule Take 1 capsule by mouth daily (with breakfast)  Patient not taking: Reported on 5/15/2022 12/24/18   Morteza Garza MD   lisinopril (PRINIVIL;ZESTRIL) 20 MG tablet Take 1 tablet by mouth daily Additional RF per her PCP 12/24/18   Morteza Garza MD Glucosamine-Chondroitin (GLUCOSAMINE CHONDR COMPLEX PO) Take 2 tablets by mouth daily Move Free    Historical Provider, MD   Cholecalciferol (VITAMIN D-3 PO) Take 1,000 Units by mouth daily     Historical Provider, MD   Multiple Vitamins-Minerals (OCUVITE PO) Take 1 tablet by mouth daily     Historical Provider, MD   atenolol (TENORMIN) 25 MG tablet Take 25 mg by mouth daily. Historical Provider, MD   amLODIPine (NORVASC) 5 MG tablet Take 5 mg by mouth daily. Historical Provider, MD   calcium carbonate 600 MG TABS tablet Take 1 tablet by mouth daily. Historical Provider, MD   rivaroxaban (XARELTO) 20 MG TABS tablet Take 15 mg by mouth daily     Historical Provider, MD       Allergies:  Bactrim [sulfamethoxazole-trimethoprim], Ciprofloxacin, and Sulfa antibiotics    Social History:    The patient currently lives at home  Tobacco use:   reports that she has never smoked. She has never used smokeless tobacco.  Alcohol use:   reports no history of alcohol use. Drug use:  reports no history of drug use. Family History:   as follows:      Problem Relation Age of Onset    Cancer Mother     High Blood Pressure Father     Arthritis Father     Heart Attack Father     Heart Disease Father     Cancer Sister     Diabetes Sister     Cancer Brother     Early Death Brother     Cancer Sister     Diabetes Sister        Review of Systems:   Pertinent positives and negatives as noted in the HPI. All other systems reviewed and negative. Physical Exam:    BP (!) 118/58   Pulse 64   Temp 97.9 °F (36.6 °C) (Oral)   Resp 16   Ht 5' 4\" (1.626 m)   Wt 148 lb 9.4 oz (67.4 kg)   SpO2 98%   BMI 25.51 kg/m²       General appearance: No apparent distress, well developed, appears stated age. Eyes:  Pupils equal, round, and reactive to light. Conjunctivae/corneas clear. HENT: Head normal in appearance. External nares normal.  Oral mucosa moist without lesions. Hearing grossly intact.    Neck: Supple, with full Eval Status: will be assessed  Diet: ADULT DIET; Regular; Low Fat/Low Chol/High Fiber/2 gm Na; 1800 ml  DVT prophylaxis: on xarelto  Code Status: DNR-CCA  Disposition: admit to inpatient  Thank you Corine Bueno for the opportunity to be involved in this patient's care.     Electronically signed by Stiven Simon MD on 5/15/2022 at 6:06 PM.

## 2022-05-15 NOTE — ED TRIAGE NOTES
Pt presents to the ED c/o sob and hypertension. Pt states that she ws having a hard time breathing when she was lying in bed this morning. Pt states that she took her BP and it was 856Y systolic. Pt reports that she took [employer] extra clonidine this morning due to her BP.  Pt hs a hx of afib and chf. Pt is alert and oriented, respirations are equal and unlabored

## 2022-05-16 VITALS
OXYGEN SATURATION: 95 % | SYSTOLIC BLOOD PRESSURE: 114 MMHG | WEIGHT: 142.2 LBS | DIASTOLIC BLOOD PRESSURE: 56 MMHG | BODY MASS INDEX: 24.28 KG/M2 | HEART RATE: 63 BPM | HEIGHT: 64 IN | RESPIRATION RATE: 17 BRPM | TEMPERATURE: 98.2 F

## 2022-05-16 PROBLEM — I50.33 ACUTE ON CHRONIC DIASTOLIC CONGESTIVE HEART FAILURE (HCC): Status: ACTIVE | Noted: 2022-05-15

## 2022-05-16 LAB
ANION GAP SERPL CALCULATED.3IONS-SCNC: 10 MEQ/L (ref 8–16)
BASOPHILS # BLD: 0.5 %
BASOPHILS ABSOLUTE: 0 THOU/MM3 (ref 0–0.1)
BUN BLDV-MCNC: 19 MG/DL (ref 7–22)
CALCIUM SERPL-MCNC: 9.2 MG/DL (ref 8.5–10.5)
CHLORIDE BLD-SCNC: 97 MEQ/L (ref 98–111)
CO2: 33 MEQ/L (ref 23–33)
CREAT SERPL-MCNC: 0.9 MG/DL (ref 0.4–1.2)
EOSINOPHIL # BLD: 1.4 %
EOSINOPHILS ABSOLUTE: 0.1 THOU/MM3 (ref 0–0.4)
ERYTHROCYTE [DISTWIDTH] IN BLOOD BY AUTOMATED COUNT: 13.6 % (ref 11.5–14.5)
ERYTHROCYTE [DISTWIDTH] IN BLOOD BY AUTOMATED COUNT: 48.3 FL (ref 35–45)
GFR SERPL CREATININE-BSD FRML MDRD: 58 ML/MIN/1.73M2
GLUCOSE BLD-MCNC: 104 MG/DL (ref 70–108)
HCT VFR BLD CALC: 41.5 % (ref 37–47)
HEMOGLOBIN: 13.4 GM/DL (ref 12–16)
IMMATURE GRANS (ABS): 0.01 THOU/MM3 (ref 0–0.07)
IMMATURE GRANULOCYTES: 0.2 %
LYMPHOCYTES # BLD: 22.4 %
LYMPHOCYTES ABSOLUTE: 1.3 THOU/MM3 (ref 1–4.8)
MAGNESIUM: 2.1 MG/DL (ref 1.6–2.4)
MCH RBC QN AUTO: 30.9 PG (ref 26–33)
MCHC RBC AUTO-ENTMCNC: 32.3 GM/DL (ref 32.2–35.5)
MCV RBC AUTO: 95.8 FL (ref 81–99)
MONOCYTES # BLD: 9.5 %
MONOCYTES ABSOLUTE: 0.5 THOU/MM3 (ref 0.4–1.3)
NUCLEATED RED BLOOD CELLS: 0 /100 WBC
PLATELET # BLD: 146 THOU/MM3 (ref 130–400)
PMV BLD AUTO: 9.4 FL (ref 9.4–12.4)
POTASSIUM REFLEX MAGNESIUM: 3.7 MEQ/L (ref 3.5–5.2)
RBC # BLD: 4.33 MILL/MM3 (ref 4.2–5.4)
SEG NEUTROPHILS: 66 %
SEGMENTED NEUTROPHILS ABSOLUTE COUNT: 3.7 THOU/MM3 (ref 1.8–7.7)
SODIUM BLD-SCNC: 140 MEQ/L (ref 135–145)
WBC # BLD: 5.6 THOU/MM3 (ref 4.8–10.8)

## 2022-05-16 PROCEDURE — 93307 TTE W/O DOPPLER COMPLETE: CPT

## 2022-05-16 PROCEDURE — 85025 COMPLETE CBC W/AUTO DIFF WBC: CPT

## 2022-05-16 PROCEDURE — 96376 TX/PRO/DX INJ SAME DRUG ADON: CPT

## 2022-05-16 PROCEDURE — 80048 BASIC METABOLIC PNL TOTAL CA: CPT

## 2022-05-16 PROCEDURE — 36415 COLL VENOUS BLD VENIPUNCTURE: CPT

## 2022-05-16 PROCEDURE — 99238 HOSP IP/OBS DSCHRG MGMT 30/<: CPT | Performed by: FAMILY MEDICINE

## 2022-05-16 PROCEDURE — 6370000000 HC RX 637 (ALT 250 FOR IP): Performed by: FAMILY MEDICINE

## 2022-05-16 PROCEDURE — 6360000002 HC RX W HCPCS: Performed by: FAMILY MEDICINE

## 2022-05-16 PROCEDURE — 83735 ASSAY OF MAGNESIUM: CPT

## 2022-05-16 PROCEDURE — 2580000003 HC RX 258: Performed by: FAMILY MEDICINE

## 2022-05-16 RX ORDER — CLONIDINE HYDROCHLORIDE 0.1 MG/1
0.1 TABLET ORAL 2 TIMES DAILY PRN
Qty: 60 TABLET | Refills: 3 | Status: SHIPPED
Start: 2022-05-16 | End: 2022-09-12 | Stop reason: SDUPTHER

## 2022-05-16 RX ADMIN — AMLODIPINE BESYLATE 5 MG: 5 TABLET ORAL at 08:25

## 2022-05-16 RX ADMIN — POTASSIUM CHLORIDE 20 MEQ: 1500 TABLET, EXTENDED RELEASE ORAL at 08:24

## 2022-05-16 RX ADMIN — LOTEPREDNOL ETABONATE 1 DROP: 5 SUSPENSION/ DROPS OPHTHALMIC at 08:25

## 2022-05-16 RX ADMIN — Medication 1000 UNITS: at 08:24

## 2022-05-16 RX ADMIN — ANTACID TABLETS 500 MG: 500 TABLET, CHEWABLE ORAL at 08:24

## 2022-05-16 RX ADMIN — ATENOLOL 25 MG: 25 TABLET ORAL at 08:25

## 2022-05-16 RX ADMIN — SODIUM CHLORIDE, PRESERVATIVE FREE 10 ML: 5 INJECTION INTRAVENOUS at 08:24

## 2022-05-16 RX ADMIN — FUROSEMIDE 40 MG: 10 INJECTION, SOLUTION INTRAMUSCULAR; INTRAVENOUS at 17:25

## 2022-05-16 RX ADMIN — LISINOPRIL 20 MG: 20 TABLET ORAL at 08:25

## 2022-05-16 RX ADMIN — RIVAROXABAN 15 MG: 15 TABLET, FILM COATED ORAL at 17:25

## 2022-05-16 RX ADMIN — FUROSEMIDE 40 MG: 10 INJECTION, SOLUTION INTRAMUSCULAR; INTRAVENOUS at 08:24

## 2022-05-16 ASSESSMENT — PAIN SCALES - GENERAL
PAINLEVEL_OUTOF10: 0
PAINLEVEL_OUTOF10: 0

## 2022-05-16 NOTE — PLAN OF CARE
Problem: Discharge Planning  Goal: Discharge to home or other facility with appropriate resources  Outcome: Adequate for Discharge     Problem: Safety - Adult  Goal: Free from fall injury  Outcome: Adequate for Discharge     Problem: ABCDS Injury Assessment  Goal: Absence of physical injury  Outcome: Adequate for Discharge     Problem: Chronic Conditions and Co-morbidities  Goal: Patient's chronic conditions and co-morbidity symptoms are monitored and maintained or improved  Outcome: Adequate for Discharge     Problem: Pain  Goal: Verbalizes/displays adequate comfort level or baseline comfort level  Outcome: Adequate for Discharge

## 2022-05-16 NOTE — CARE COORDINATION
5/16/22, 7:40 AM EDT  DISCHARGE PLANNING EVALUATION:    Cody Arenas       Admitted: 5/15/2022/ [place] 9 day: 1   Location: [facility]-A Reason for admit: Dyspnea [R06.00]  CHF (congestive heart failure), NYHA class I, acute on chronic, combined ([facility] [I50.43]   PMH:  has a past medical history of Arthritis, Atrial fibrillation ([facility] Cancer ([facility] [facility] CHF (congestive heart failure) ([facility] Gross hematuria, HTN (hypertension), Hyperlipidemia, Pneumonia, and Type 2 diabetes mellitus. Procedure: none  Barriers to Discharge:  92-94% RA. Labs WNL. IV lasix bid. Echo pending. PCP: Crow Ayala  Readmission Risk Score: 11.7 ( )%  Patient's Healthcare Decision Maker: Named in [address] [place] [address]    Patient Goals/Plan/Treatment Preferences: Met with Pheobe Serum, she is from home alone. She is very independent, denies needs for [facility] or [facility]. She no longer drives but her son and daughter assist with transporting. She has PCP and insurance. Transportation/Food Security/Housekeeping Addressed:  No issues identified.

## 2022-05-16 NOTE — PLAN OF CARE
Problem: Discharge Planning  Goal: Discharge to home or other facility with appropriate resources  5/16/2022 0004 by Alber Winston RN  Outcome: Progressing  Flowsheets  Taken 5/16/2022 0004 by Alber Winston RN  Discharge to home or other facility with appropriate resources:   Identify barriers to discharge with patient and caregiver   Arrange for needed discharge resources and transportation as appropriate  Taken 5/15/2022 1525 by Dede Abel RN  Discharge to home or other facility with appropriate resources: Identify barriers to discharge with patient and caregiver  5/15/2022 1500 by Dede Abel RN  Outcome: Progressing  Flowsheets (Taken 5/15/2022 1005)  Discharge to home or other facility with appropriate resources: Identify barriers to discharge with patient and caregiver     Problem: Safety - Adult  Goal: Free from fall injury  5/16/2022 0004 by Alber Winston RN  Outcome: Progressing  Flowsheets (Taken 5/16/2022 0002)  Free From Fall Injury: Instruct family/caregiver on patient safety  Note: No falls noted this shift. Continue falling star program. Bed alarm on, bed in low position. Call light and personal belongings in reach. Patient uses call light appropriately.   5/15/2022 1500 by Dede Abel RN  Outcome: Progressing     Problem: ABCDS Injury Assessment  Goal: Absence of physical injury  5/16/2022 0004 by Alber Winston RN  Outcome: Progressing  Flowsheets  Taken 5/16/2022 0004  Absence of Physical Injury: Implement safety measures based on patient assessment  Taken 5/16/2022 0002  Absence of Physical Injury: Implement safety measures based on patient assessment  5/15/2022 1500 by Dede Abel RN  Outcome: Progressing

## 2022-05-16 NOTE — DISCHARGE SUMMARY
Hospital Medicine Discharge Summary      Patient Identification:   Derick Barlow   : 1929  MRN: [id number]   Account: [de-identified]      Patient's PCP: Shelby Mosher    Admit Date: 5/15/2022     Discharge Date:   2022    Admitting Physician: Lacie Oppenheim, MD     Discharge Physician: Lacie Oppenheim, MD     Discharge Diagnoses: Active Hospital Problems    Diagnosis Date Noted    Dyspnea [R06.00] 05/15/2022     Priority: Medium    CHF (congestive heart failure), NYHA class I, acute on chronic, combined ([facility] [address] [I50.43] 05/15/2022     Priority: Medium       The patient was seen and examined on day of discharge and this discharge summary is in conjunction with any daily progress note from day of discharge. Hospital Course:   Derick Barlow is a 80 y.o. female with PMHx of atrial fibrillation, symptomatic bradycardia s/p pacemaker insertion, CHF and HTN who presents to [facility] with progressive dyspnea that she noticed for a while now, but has worsened in the past few days, that it has been difficult for her to ambulate. Her daughter notices that she seems to be short of breath even on normal conversations over the phone. She is supposed to be on Lasix 40 mg PO BID, but sometimes do not take medications when she has to be out of the house. Last Friday, she did not take any diuretics at all as she was visiting someone at a nursing home. She expressed concerns about having to wear depends. She also noticed that her legs are becoming more swollen. She denies any fever, chills, cough/colds. No chest pain or palpitations.      Patient admitted under the hospitalist service for further management. She was kept overnight for IV diuresis. Patient lost 6 lbs in 24 hours. She felt better, was talking without interruptions and was ambulating better. Swelling on both legs improved. Limited Echo was done, although has not resulted yet.  Patient wanted to go home and follow up with Dr. Khang Champion on Wednesday. Assessment and Plan:    Dyspnea likely Acute on chronic Diastolic CHF exacerbation  BNP 1302 but CXR shows pulmonary edema, with bipedal pitting edema on physical exam  Start Lasix 40 mg IV BID, KCl daily  Will resume Lasix 40mg PO BID on discharge, same dose, she just needs to be more compliant with her medications  Limit fluid and salt intake  Last Echo in 2021 showed EF 55%,   Rpt Limited Echo pending, to be discussed with Dr. Haile Hurley     Chronic Atrial Fibrillation  Symptomatic Bradycardia s/p pacemaker insertion  Continue Xarelto daily     Essential Hypertension  Continue atenolol, lisinopril, clonidine prn and amlodipine    Disposition  to be discharged today  Patient denies [facility] needs      Exam:     Vitals:  Vitals:    05/15/22 2341 05/16/22 0333 05/16/22 0741 05/16/22 1355   BP: (!) 163/82 (!) 153/67 (!) 139/55 (!) 114/56   Pulse: 66 57 61 63   Resp: 16 16 17 17   Temp: 97.3 °F (36.3 °C) 97.5 °F (36.4 °C) 98 °F (36.7 °C) 98.2 °F (36.8 °C)   TempSrc: Oral Oral Oral Oral   SpO2: 93% 94% 92% 95%   Weight:  142 lb 3.2 oz (64.5 kg)     Height:         Weight: Weight: 142 lb 3.2 oz (64.5 kg)     24 hour intake/output:No intake or output data in the 24 hours ending 05/16/22 1639      General appearance:  No apparent distress, appears stated age and cooperative. HEENT:  Normal cephalic, atraumatic without obvious deformity. Pupils equal, round, and reactive to light. Extra ocular muscles intact. Conjunctivae/corneas clear. Neck: Supple, with full range of motion. No jugular venous distention. Trachea midline. Respiratory:  Normal respiratory effort. Clear to auscultation, bilaterally without Rales/Wheezes/Rhonchi. Cardiovascular:  Regular rate and rhythm with normal S1/S2 without murmurs, rubs or gallops. Abdomen: Soft, non-tender, non-distended with normal bowel sounds. Musculoskeletal:  No clubbing, cyanosis or edema bilaterally. Full range of motion without deformity.  Trace bipedal edema  Skin: Skin color, texture, turgor normal.  No rashes or lesions. Neurologic:  Neurovascularly intact without any focal sensory/motor deficits. Cranial nerves: II-XII intact, grossly non-focal.  Psychiatric:  Alert and oriented, thought content appropriate, normal insight  Capillary Refill: Brisk,< 3 seconds   Peripheral Pulses: +2 palpable, equal bilaterally       Labs: For convenience and continuity at follow-up the following most recent labs are provided:      CBC:    Lab Results   Component Value Date    WBC 5.6 2022    HGB 13.4 2022    HCT 41.5 2022     2022       Renal:    Lab Results   Component Value Date     2022    K 3.7 2022    CL 97 2022    CO2 33 2022    BUN 19 2022    CREATININE 0.9 2022    CALCIUM 9.2 2022    PHOS 4.2 2018         Significant Diagnostic Studies    Radiology:   XR CHEST PORTABLE   Final Result   1. Engorged pulmonary vessels. 2. Increased markings in the right lung base. This could be related to an infiltrate. Early pulmonary edema is also a consideration. **This report has been created using voice recognition software. It may contain minor errors which are inherent in voice recognition technology. **      Final report electronically signed by Dr. Jessee Holman on 5/15/2022 7:13 AM             Consults:     None    Disposition:    [x] Home       [] TCU       [] Rehab       [] Psych       [] SNF       [] [facility]       [] Other-    Condition at Discharge: Stable    Code Status:  DNR-CCA     Patient Instructions:    Discharge lab work: Activity: activity as tolerated  Diet: ADULT DIET; Regular; Low Fat/Low Chol/High Fiber/2 gm Na; 1800 ml      Follow-up visits:   No follow-up provider specified.        Discharge Medications:        Medication List      CHANGE how you take these medications    cloNIDine 0.1 MG tablet  Commonly known as: CATAPRES  Take 1 tablet by mouth 2 times daily as needed for High Blood Pressure (for SBP >160) If over 160/100  What changed:   · when to take this  · reasons to take this        CONTINUE taking these medications    amLODIPine 5 MG tablet  Commonly known as: NORVASC     atenolol 25 MG tablet  Commonly known as: TENORMIN     calcium carbonate 600 MG Tabs tablet     furosemide 40 MG tablet  Commonly known as: LASIX     GLUCOSAMINE CHONDR COMPLEX PO     lisinopril 20 MG tablet  Commonly known as: PRINIVIL;ZESTRIL  Take 1 tablet by mouth daily Additional RF per her PCP     loteprednol 0.5 % ophthalmic suspension  Commonly known as: LOTEMAX     MULTI-VITAMIN DAILY PO     nitroGLYCERIN 0.4 MG SL tablet  Commonly known as: NITROSTAT  up to max of 3 total doses. If no relief after 1 dose, call 911. OCUVITE PO     potassium chloride 20 MEQ extended release tablet  Commonly known as: KLOR-CON M     rivaroxaban 20 MG Tabs tablet  Commonly known as: XARELTO     VITAMIN D-3 PO        STOP taking these medications    albuterol sulfate  (90 Base) MCG/ACT inhaler  Commonly known as: Ventolin HFA     lactobacillus capsule           Where to Get Your Medications      Information about where to get these medications is not yet available    Ask your nurse or doctor about these medications  · cloNIDine 0.1 MG tablet         Time Spent on discharge is more than 30 minutes in the examination, evaluation, counseling and review of medications and discharge plan. Signed: Thank you Ha Cloud for the opportunity to be involved in this patient's care.     Electronically signed by Zoe Gilmore MD on 5/16/2022 at 4:39 PM

## 2022-05-16 NOTE — PROGRESS NOTES
Subjective:   Patient seen and examined, appears comfortable in bed, without any signs of cardiorespiratory distress. VS stable, afebrile, saturating well on room air. No new complaints. Medications:  Reviewed    Infusion Medications    sodium chloride       Scheduled Medications    sodium chloride flush  5-40 mL IntraVENous 2 times per day    amLODIPine  5 mg Oral Daily    atenolol  25 mg Oral Daily    calcium carbonate  1 tablet Oral Daily    Vitamin D  1,000 Units Oral Daily    lisinopril  20 mg Oral Daily    potassium chloride  20 mEq Oral Daily    rivaroxaban  15 mg Oral Dinner    furosemide  40 mg IntraVENous BID    loteprednol  1 drop Right Eye Every Other Day     PRN Meds: sodium chloride flush, sodium chloride, ondansetron **OR** ondansetron, polyethylene glycol, acetaminophen **OR** acetaminophen, albuterol sulfate HFA, cloNIDine      Intake/Output Summary (Last 24 hours) at 5/16/2022 0700  Last data filed at 5/15/2022 1201  Gross per 24 hour   Intake 5 ml   Output    Net 5 ml       Diet:  ADULT DIET; Regular; Low Fat/Low Chol/High Fiber/2 gm Na; 1800 ml    Exam:  BP (!) 153/67   Pulse 57   Temp 97.5 °F (36.4 °C) (Oral)   Resp 16   Ht 5' 4\" (1.626 m)   Wt 142 lb 3.2 oz (64.5 kg)   SpO2 94%   BMI 24.41 kg/m²     General appearance: No apparent distress, appears stated age and cooperative. HEENT: Pupils equal, round, and reactive to light. Conjunctivae/corneas clear. Neck: Supple, with full range of motion. No jugular venous distention. Trachea midline. Respiratory:  Normal respiratory effort. Clear to auscultation, bilaterally without Rales/Wheezes/Rhonchi. Cardiovascular: Regular rate and rhythm with normal S1/S2 without murmurs, rubs or gallops. Abdomen: Soft, non-tender, non-distended with normal bowel sounds. Musculoskeletal: passive and active ROM x 4 extremities. bipedal pitting edema  Skin: Skin color, texture, turgor normal.  No rashes or lesions.   Neurologic: Neurovascularly intact without any focal sensory/motor deficits. Cranial nerves: II-XII intact, grossly non-focal.  Psychiatric: Alert and oriented, thought content appropriate, normal insight  Capillary Refill: Brisk,< 3 seconds   Peripheral Pulses: +2 palpable, equal bilaterally       Labs:   Recent Labs     05/15/22  0620 05/16/22  0637   WBC 5.1 5.6   HGB 14.1 13.4   HCT 43.8 41.5    146     Recent Labs     05/13/22  1502 05/15/22  0620    141   K 4.4 3.9   CL 99 99   CO2 28 28   BUN 28* 25*   CREATININE 0.9 0.8   CALCIUM 9.7 9.5     Recent Labs     05/15/22  0620   AST 47*   ALT 39   BILIDIR <0.2   BILITOT 0.6   ALKPHOS 76     No results for input(s): INR in the last 72 hours. No results for input(s): Connee Matar in the last 72 hours. Urinalysis:      Lab Results   Component Value Date    NITRU NEGATIVE 12/28/2020    WBCUA 25-50 05/14/2020    WBCUA 25-50 07/18/2011    BACTERIA FEW 05/14/2020    RBCUA 0-2 05/14/2020    BLOODU NEGATIVE 12/28/2020    SPECGRAV 1.012 05/14/2020    GLUCOSEU NEGATIVE 12/28/2020       Radiology:  XR CHEST PORTABLE   Final Result   1. Engorged pulmonary vessels. 2. Increased markings in the right lung base. This could be related to an infiltrate. Early pulmonary edema is also a consideration. **This report has been created using voice recognition software. It may contain minor errors which are inherent in voice recognition technology. **      Final report electronically signed by Dr. Chon Gimenez on 5/15/2022 7:13 AM          Diet: ADULT DIET;  Regular; Low Fat/Low Chol/High Fiber/2 gm Na; 1800 ml    DVT prophylaxis: [] Lovenox                                 [] SCDs                                 [] SQ Heparin                                 [] Encourage ambulation           [] Already on Anticoagulation     Disposition:    [] Home       [] TCU       [] Rehab       [] Psych       [] SNF       [] [facility]       [] Other-    Code Status: DNR-CCA    PT/OT Eval Status:         Electronically signed by Emily Coleman MD on 5/16/2022 at 7:00 AM

## 2022-05-18 ENCOUNTER — OFFICE VISIT (OUTPATIENT)
Dept: CARDIOLOGY CLINIC | Age: 87
End: 2022-05-18
Payer: MEDICARE

## 2022-05-18 VITALS
BODY MASS INDEX: 27.23 KG/M2 | RESPIRATION RATE: 18 BRPM | SYSTOLIC BLOOD PRESSURE: 130 MMHG | DIASTOLIC BLOOD PRESSURE: 60 MMHG | HEIGHT: 62 IN | HEART RATE: 70 BPM | WEIGHT: 148 LBS

## 2022-05-18 DIAGNOSIS — D69.6 THROMBOCYTOPENIA, UNSPECIFIED (HCC): ICD-10-CM

## 2022-05-18 DIAGNOSIS — I10 ESSENTIAL HYPERTENSION: Primary | ICD-10-CM

## 2022-05-18 DIAGNOSIS — I48.19 PERSISTENT ATRIAL FIBRILLATION (HCC): ICD-10-CM

## 2022-05-18 PROBLEM — N18.30 CHRONIC RENAL DISEASE, STAGE III (HCC): Status: ACTIVE | Noted: 2022-05-18

## 2022-05-18 PROCEDURE — 93000 ELECTROCARDIOGRAM COMPLETE: CPT | Performed by: INTERNAL MEDICINE

## 2022-05-18 PROCEDURE — 99213 OFFICE O/P EST LOW 20 MIN: CPT | Performed by: INTERNAL MEDICINE

## 2022-05-18 RX ORDER — AMLODIPINE BESYLATE 5 MG/1
5 TABLET ORAL DAILY
Qty: 90 TABLET | Refills: 3 | Status: SHIPPED | OUTPATIENT
Start: 2022-05-18 | End: 2022-09-12 | Stop reason: SDUPTHER

## 2022-05-18 RX ORDER — ATENOLOL 25 MG/1
25 TABLET ORAL DAILY
Qty: 90 TABLET | Refills: 3 | Status: SHIPPED | OUTPATIENT
Start: 2022-05-18 | End: 2022-09-12 | Stop reason: SDUPTHER

## 2022-05-18 NOTE — PROGRESS NOTES
[address] [facility]  [address] [facility],[address]  Dept: [address]  Loc: [phone]     5/18/2022       Moni Mcdaniel is here today for   Chief Complaint   Patient presents with    Follow-up           Referring Physician:  No ref.  provider found     Patient Active Problem List   Diagnosis    Renal mass, right    GISELL (stress urinary incontinence, female)    Flushing    Essential hypertension    Chronic atrial fibrillation ([address]    Hepatic lesion    Chest pain    Hypertensive urgency    Atypical chest pain    Pneumonia    Acute pain of left shoulder    Sepsis ([address]    Cellulitis of right lower extremity    Primary osteoarthritis of left shoulder    Spondylosis of cervical region without myelopathy or radiculopathy    Bilateral pleural effusion    Class 1 obesity due to excess calories with serious comorbidity and body mass index (BMI) of 31.0 to 31.9 in adult    Streptococcal bacteremia    Hyponatremia    Type 2 diabetes mellitus    Symptomatic bradycardia    Dyspnea    Acute on chronic diastolic congestive heart failure (HCC)    Acute pulmonary edema (HCC)    Chronic renal disease, stage III (HCC) [626382]    Thrombocytopenia, unspecified       Review of Systems     Past Medical History:   Diagnosis Date    Arthritis     Atrial fibrillation ([address]     Cancer ([address]     Skin Cancer    CHF [facility]congestive heart failure) ([facility] [address]     Chronic renal disease, stage III ([facility] [address] [214096] 5/18/2022    Gross hematuria 2014    Dr Main Pace    HTN (hypertension)     Hyperlipidemia     Pneumonia 12/2/2018    Type 2 diabetes mellitus 11/23/2021       Allergies   Allergen Reactions    Bactrim [Sulfamethoxazole-Trimethoprim]     Ciprofloxacin Diarrhea    Sulfa Antibiotics        Current Outpatient Medications   Medication Sig Dispense Refill    rivaroxaban (XARELTO) 15 MG TABS tablet Take 1 tablet by mouth daily 90 tablet 3    atenolol (TENORMIN) 25 MG tablet Take 1 tablet by mouth daily 90 tablet 3    amLODIPine (NORVASC) 5 MG tablet Take 1 tablet by mouth daily 90 tablet 3    cloNIDine (CATAPRES) 0.1 MG tablet Take 1 tablet by mouth 2 times daily as needed for High Blood Pressure (for SBP >160) If over 160/100 60 tablet 3    potassium chloride (KLOR-CON M) 20 MEQ extended release tablet Take 20 mEq by mouth daily      loteprednol (LOTEMAX) 0.5 % ophthalmic suspension Place 1 drop into the right eye every other day      nitroGLYCERIN (NITROSTAT) 0.4 MG SL tablet up to max of 3 total doses. If no relief after 1 dose, call 911. 25 tablet 3    Multiple Vitamin (MULTI-VITAMIN DAILY PO) Take by mouth daily      furosemide (LASIX) 40 MG tablet Take 40 mg by mouth 2 times daily      lisinopril (PRINIVIL;ZESTRIL) 20 MG tablet Take 1 tablet by mouth daily Additional RF per her PCP 30 tablet 0    Glucosamine-Chondroitin (GLUCOSAMINE CHONDR COMPLEX PO) Take 2 tablets by mouth daily Move Free      Cholecalciferol (VITAMIN D-3 PO) Take 1,000 Units by mouth daily       Multiple Vitamins-Minerals (OCUVITE PO) Take 1 tablet by mouth daily       calcium carbonate 600 MG TABS tablet Take 1 tablet by mouth daily. No current facility-administered medications for this visit. Social History     Socioeconomic History    Marital status:       Spouse name: None    Number of children: 3    Years of education: None    Highest education level: None   Occupational History    Occupation: Retired   Tobacco Use    Smoking status: Never Smoker    Smokeless tobacco: Never Used   Vaping Use    Vaping Use: Never used   Substance and Sexual Activity    Alcohol use: No    Drug use: No    Sexual activity: Not Currently   Other Topics Concern    None   Social History Narrative    - Never     Social Determinants of Health     Financial Resource Strain:     Difficulty of Paying Living Expenses: Not on file   Food Insecurity:     Worried About Running Out of Food in the Last Year: Not on file    Abhay of Food in the Last Year: Not on file   Transportation Needs:     Lack of Transportation (Medical): Not on file    Lack of Transportation (Non-Medical): Not on file   Physical Activity:     Days of Exercise per Week: Not on file    Minutes of Exercise per Session: Not on file   Stress:     Feeling of Stress : Not on file   Social Connections:     Frequency of Communication with Friends and Family: Not on file    Frequency of Social Gatherings with Friends and Family: Not on file    Attends Quaker Services: Not on file    Active Member of Clubs or Organizations: Not on file    Attends Club or Organization Meetings: Not on file    Marital Status: Not on file   Intimate Partner Violence:     Fear of Current or Ex-Partner: Not on file    Emotionally Abused: Not on file    Physically Abused: Not on file    Sexually Abused: Not on file   Housing Stability:     Unable to Pay for Housing in the Last Year: Not on file    Number of [place] in the Last Year: Not on file    Unstable Housing in the Last Year: Not on file       Family History   Problem Relation Age of Onset    Cancer Mother     High Blood Pressure Father     Arthritis Father     Heart Attack Father     Heart Disease Father     Cancer Sister     Diabetes Sister     Cancer Brother     Early Death Brother     Cancer Sister     Diabetes Sister        Blood pressure 130/60, pulse 70, resp. rate 18, height 5' 2\" (1.575 m), weight 148 lb (67.1 kg).     Physical Exam:    General Appearance: alert and oriented to person, place and time, in no acute distress  Cardiovascular: normal rate, regular rhythm, normal S1 and S2, no murmurs, rubs, clicks, or gallops, distal pulses intact, no carotid bruits, no JVD  Pulmonary/Chest: clear to auscultation bilaterally- no wheezes, rales or rhonchi, normal air movement, no respiratory distress  Abdomen: soft, non-tender, non-distended, normal bowel sounds, no masses   Extremities: no cyanosis, clubbing or edema, pulse   Skin: warm and dry, no rash or erythema  Head: normocephalic and atraumatic  Eyes: pupils equal, round, and reactive to light  Neck: supple and non-tender without mass, no thyromegaly   Musculoskeletal: normal range of motion, no joint swelling, deformity or tenderness  Neurological: alert, oriented, normal speech, no focal findings or movement disorder noted    Lab Data:    Cardiac Enzymes:  No results for input(s): CKTOTAL, CKMB, CKMBINDEX, TROPONINI in the last 72 hours. CBC:   Lab Results   Component Value Date    WBC 5.6 05/16/2022    RBC 4.33 05/16/2022    RBC 4.27 07/18/2011    HGB 13.4 05/16/2022    HCT 41.5 05/16/2022     05/16/2022       CMP:    Lab Results   Component Value Date     05/16/2022    K 3.7 05/16/2022    CL 97 05/16/2022    CO2 33 05/16/2022    BUN 19 05/16/2022    CREATININE 0.9 05/16/2022    LABGLOM 58 05/16/2022    GLUCOSE 104 05/16/2022    GLUCOSE 111 05/15/2012    CALCIUM 9.2 05/16/2022       Hepatic Function Panel:    Lab Results   Component Value Date    ALKPHOS 76 05/15/2022    ALT 39 05/15/2022    AST 47 05/15/2022    PROT 6.6 05/15/2022    BILITOT 0.6 05/15/2022    BILIDIR <0.2 05/15/2022    LABALBU 4.3 05/15/2022    LABALBU 4.3 05/15/2012       Magnesium:    Lab Results   Component Value Date    MG 2.1 05/16/2022       PT/INR:    Lab Results   Component Value Date    PROTIME 2.09 12/20/2011    INR 2.48 04/21/2022       HgBA1c:    Lab Results   Component Value Date    LABA1C 6.2 05/13/2022       FLP:    Lab Results   Component Value Date    TRIG 132 12/15/2021    HDL 41 12/15/2021    LDLCALC 55 12/15/2021    LDLDIRECT 80.55 02/21/2017       TSH:    Lab Results   Component Value Date    TSH 2.410 06/21/2021        Diagnosis Orders   1.  Essential hypertension  04354 - [place] ELECTROCARDIOGRAM, COMPLETE    CBC    Basic Metabolic Panel    Lipid Panel    Hepatic Function Panel   2. Persistent atrial fibrillation (HCC)  CBC    Basic Metabolic Panel    Lipid Panel    Hepatic Function Panel   3. Thrombocytopenia, unspecified          Assessment/Plan    Kinza Sandhu is a 80years old lady history of atrial for pacemaker implantation she was in the hospital recently with possible exacerbation of her congestive heart failure she had mixed up on her medication she is here for a follow-up. She indicates she is feeling better now she denied chest pain she has been taking her medication she was advised to utilize of the [facility] she is advised to consider a visit a home visiting nurse. From the cardiac standpoint she is stable we will continue with the current medication and she will be seen periodically. Patient to follow-up with family physician. Orders Placed This Encounter   Procedures    CBC     Standing Status:   Future     Standing Expiration Date:   5/18/2023    Basic Metabolic Panel     Standing Status:   Future     Standing Expiration Date:   5/18/2023    Lipid Panel     Standing Status:   Future     Standing Expiration Date:   5/18/2023     Order Specific Question:   Is Patient Fasting?/# of Hours     Answer:   12 hours    Hepatic Function Panel     Standing Status:   Future     Standing Expiration Date:   5/18/2023    47267 - [place] ELECTROCARDIOGRAM, COMPLETE       Return in about 6 months (around 11/18/2022) for cad.      Nicole Thornton MD

## 2022-05-23 ENCOUNTER — APPOINTMENT (OUTPATIENT)
Dept: GENERAL RADIOLOGY | Age: 87
End: 2022-05-23
Payer: MEDICARE

## 2022-05-23 ENCOUNTER — HOSPITAL ENCOUNTER (OUTPATIENT)
Age: 87
Setting detail: OBSERVATION
Discharge: HOME | End: 2022-05-24
Admitting: INTERNAL MEDICINE
Payer: MEDICARE

## 2022-05-23 DIAGNOSIS — R06.09 DYSPNEA ON EXERTION: ICD-10-CM

## 2022-05-23 DIAGNOSIS — I50.9 ACUTE ON CHRONIC CONGESTIVE HEART FAILURE, UNSPECIFIED HEART FAILURE TYPE (HCC): Primary | ICD-10-CM

## 2022-05-23 PROBLEM — R06.02 SHORTNESS OF BREATH: Status: ACTIVE | Noted: 2022-05-23

## 2022-05-23 LAB
ALBUMIN SERPL-MCNC: 4.2 G/DL (ref 3.5–5.1)
ALP BLD-CCNC: 73 U/L (ref 38–126)
ALT SERPL-CCNC: 26 U/L (ref 11–66)
ANION GAP SERPL CALCULATED.3IONS-SCNC: 11 MEQ/L (ref 8–16)
AST SERPL-CCNC: 33 U/L (ref 5–40)
BACTERIA: ABNORMAL /HPF
BASOPHILS # BLD: 0.2 %
BASOPHILS ABSOLUTE: 0 THOU/MM3 (ref 0–0.1)
BILIRUB SERPL-MCNC: 0.5 MG/DL (ref 0.3–1.2)
BILIRUBIN URINE: NEGATIVE
BLOOD, URINE: NEGATIVE
BUN BLDV-MCNC: 29 MG/DL (ref 7–22)
CALCIUM SERPL-MCNC: 9.6 MG/DL (ref 8.5–10.5)
CASTS 2: ABNORMAL /LPF
CASTS UA: ABNORMAL /LPF
CHARACTER, URINE: CLEAR
CHLORIDE BLD-SCNC: 99 MEQ/L (ref 98–111)
CO2: 30 MEQ/L (ref 23–33)
COLOR: YELLOW
CREAT SERPL-MCNC: 0.8 MG/DL (ref 0.4–1.2)
CRYSTALS, UA: ABNORMAL
EKG ATRIAL RATE: 288 BPM
EKG ATRIAL RATE: 68 BPM
EKG Q-T INTERVAL: 416 MS
EKG Q-T INTERVAL: 450 MS
EKG QRS DURATION: 138 MS
EKG QRS DURATION: 178 MS
EKG QTC CALCULATION (BAZETT): 422 MS
EKG QTC CALCULATION (BAZETT): 478 MS
EKG R AXIS: 111 DEGREES
EKG R AXIS: 117 DEGREES
EKG T AXIS: -43 DEGREES
EKG T AXIS: -47 DEGREES
EKG VENTRICULAR RATE: 62 BPM
EKG VENTRICULAR RATE: 68 BPM
EOSINOPHIL # BLD: 1.2 %
EOSINOPHILS ABSOLUTE: 0.1 THOU/MM3 (ref 0–0.4)
EPITHELIAL CELLS, UA: ABNORMAL /HPF
ERYTHROCYTE [DISTWIDTH] IN BLOOD BY AUTOMATED COUNT: 13.6 % (ref 11.5–14.5)
ERYTHROCYTE [DISTWIDTH] IN BLOOD BY AUTOMATED COUNT: 46.9 FL (ref 35–45)
GFR SERPL CREATININE-BSD FRML MDRD: 67 ML/MIN/1.73M2
GLUCOSE BLD-MCNC: 111 MG/DL (ref 70–108)
GLUCOSE BLD-MCNC: 128 MG/DL (ref 70–108)
GLUCOSE URINE: NEGATIVE MG/DL
HCT VFR BLD CALC: 43.5 % (ref 37–47)
HEMOGLOBIN: 14.1 GM/DL (ref 12–16)
IMMATURE GRANS (ABS): 0.01 THOU/MM3 (ref 0–0.07)
IMMATURE GRANULOCYTES: 0.2 %
KETONES, URINE: NEGATIVE
LEUKOCYTE ESTERASE, URINE: ABNORMAL
LYMPHOCYTES # BLD: 26 %
LYMPHOCYTES ABSOLUTE: 1.2 THOU/MM3 (ref 1–4.8)
MCH RBC QN AUTO: 30.7 PG (ref 26–33)
MCHC RBC AUTO-ENTMCNC: 32.4 GM/DL (ref 32.2–35.5)
MCV RBC AUTO: 94.8 FL (ref 81–99)
MISCELLANEOUS 2: ABNORMAL
MONOCYTES # BLD: 6.4 %
MONOCYTES ABSOLUTE: 0.3 THOU/MM3 (ref 0.4–1.3)
NITRITE, URINE: NEGATIVE
NUCLEATED RED BLOOD CELLS: 0 /100 WBC
OSMOLALITY CALCULATION: 286.9 MOSMOL/KG (ref 275–300)
PH UA: 7 (ref 5–9)
PLATELET # BLD: 169 THOU/MM3 (ref 130–400)
PMV BLD AUTO: 9.5 FL (ref 9.4–12.4)
POTASSIUM REFLEX MAGNESIUM: 3.9 MEQ/L (ref 3.5–5.2)
PRO-BNP: 1203 PG/ML (ref 0–1800)
PROTEIN UA: NEGATIVE
RBC # BLD: 4.59 MILL/MM3 (ref 4.2–5.4)
RBC URINE: ABNORMAL /HPF
RENAL EPITHELIAL, UA: ABNORMAL
SEG NEUTROPHILS: 66 %
SEGMENTED NEUTROPHILS ABSOLUTE COUNT: 3.2 THOU/MM3 (ref 1.8–7.7)
SODIUM BLD-SCNC: 140 MEQ/L (ref 135–145)
SPECIFIC GRAVITY, URINE: 1.01 (ref 1–1.03)
TOTAL PROTEIN: 6.5 G/DL (ref 6.1–8)
TROPONIN T: < 0.01 NG/ML
UROBILINOGEN, URINE: 0.2 EU/DL (ref 0–1)
WBC # BLD: 4.8 THOU/MM3 (ref 4.8–10.8)
WBC UA: ABNORMAL /HPF
YEAST: ABNORMAL

## 2022-05-23 PROCEDURE — 82948 REAGENT STRIP/BLOOD GLUCOSE: CPT

## 2022-05-23 PROCEDURE — 93005 ELECTROCARDIOGRAM TRACING: CPT | Performed by: PHYSICIAN ASSISTANT

## 2022-05-23 PROCEDURE — 96374 THER/PROPH/DIAG INJ IV PUSH: CPT

## 2022-05-23 PROCEDURE — 83880 ASSAY OF NATRIURETIC PEPTIDE: CPT

## 2022-05-23 PROCEDURE — 93010 ELECTROCARDIOGRAM REPORT: CPT | Performed by: INTERNAL MEDICINE

## 2022-05-23 PROCEDURE — 71045 X-RAY EXAM CHEST 1 VIEW: CPT

## 2022-05-23 PROCEDURE — 99285 EMERGENCY DEPT VISIT HI MDM: CPT

## 2022-05-23 PROCEDURE — 85025 COMPLETE CBC W/AUTO DIFF WBC: CPT

## 2022-05-23 PROCEDURE — 6370000000 HC RX 637 (ALT 250 FOR IP)

## 2022-05-23 PROCEDURE — 93005 ELECTROCARDIOGRAM TRACING: CPT

## 2022-05-23 PROCEDURE — 81001 URINALYSIS AUTO W/SCOPE: CPT

## 2022-05-23 PROCEDURE — 84484 ASSAY OF TROPONIN QUANT: CPT

## 2022-05-23 PROCEDURE — 2580000003 HC RX 258

## 2022-05-23 PROCEDURE — 99220 PR INITIAL OBSERVATION CARE/DAY 70 MINUTES: CPT

## 2022-05-23 PROCEDURE — 96375 TX/PRO/DX INJ NEW DRUG ADDON: CPT

## 2022-05-23 PROCEDURE — 2500000003 HC RX 250 WO HCPCS

## 2022-05-23 PROCEDURE — G0378 HOSPITAL OBSERVATION PER HR: HCPCS

## 2022-05-23 PROCEDURE — 80053 COMPREHEN METABOLIC PANEL: CPT

## 2022-05-23 PROCEDURE — 6360000002 HC RX W HCPCS

## 2022-05-23 RX ORDER — BUMETANIDE 0.25 MG/ML
2 INJECTION, SOLUTION INTRAMUSCULAR; INTRAVENOUS 2 TIMES DAILY
Status: DISCONTINUED | OUTPATIENT
Start: 2022-05-23 | End: 2022-05-23

## 2022-05-23 RX ORDER — DEXTROSE MONOHYDRATE 50 MG/ML
100 INJECTION, SOLUTION INTRAVENOUS PRN
Status: DISCONTINUED | OUTPATIENT
Start: 2022-05-23 | End: 2022-05-24 | Stop reason: HOSPADM

## 2022-05-23 RX ORDER — INSULIN LISPRO 100 [IU]/ML
0-3 INJECTION, SOLUTION INTRAVENOUS; SUBCUTANEOUS NIGHTLY
Status: DISCONTINUED | OUTPATIENT
Start: 2022-05-23 | End: 2022-05-23

## 2022-05-23 RX ORDER — SODIUM CHLORIDE 9 MG/ML
INJECTION, SOLUTION INTRAVENOUS PRN
Status: DISCONTINUED | OUTPATIENT
Start: 2022-05-23 | End: 2022-05-24 | Stop reason: HOSPADM

## 2022-05-23 RX ORDER — ATENOLOL 25 MG/1
25 TABLET ORAL DAILY
Status: DISCONTINUED | OUTPATIENT
Start: 2022-05-23 | End: 2022-05-24 | Stop reason: HOSPADM

## 2022-05-23 RX ORDER — CLONIDINE HYDROCHLORIDE 0.1 MG/1
0.1 TABLET ORAL 2 TIMES DAILY PRN
Status: DISCONTINUED | OUTPATIENT
Start: 2022-05-23 | End: 2022-05-23

## 2022-05-23 RX ORDER — ACETAMINOPHEN 325 MG/1
650 TABLET ORAL EVERY 6 HOURS PRN
Status: DISCONTINUED | OUTPATIENT
Start: 2022-05-23 | End: 2022-05-24 | Stop reason: HOSPADM

## 2022-05-23 RX ORDER — POTASSIUM CHLORIDE 7.45 MG/ML
10 INJECTION INTRAVENOUS PRN
Status: DISCONTINUED | OUTPATIENT
Start: 2022-05-23 | End: 2022-05-24 | Stop reason: HOSPADM

## 2022-05-23 RX ORDER — POLYETHYLENE GLYCOL 3350 17 G/17G
17 POWDER, FOR SOLUTION ORAL DAILY PRN
Status: DISCONTINUED | OUTPATIENT
Start: 2022-05-23 | End: 2022-05-24 | Stop reason: HOSPADM

## 2022-05-23 RX ORDER — INSULIN LISPRO 100 [IU]/ML
0-6 INJECTION, SOLUTION INTRAVENOUS; SUBCUTANEOUS
Status: DISCONTINUED | OUTPATIENT
Start: 2022-05-23 | End: 2022-05-23

## 2022-05-23 RX ORDER — SODIUM CHLORIDE 0.9 % (FLUSH) 0.9 %
10 SYRINGE (ML) INJECTION EVERY 12 HOURS SCHEDULED
Status: DISCONTINUED | OUTPATIENT
Start: 2022-05-23 | End: 2022-05-24 | Stop reason: HOSPADM

## 2022-05-23 RX ORDER — POTASSIUM CHLORIDE 20 MEQ/1
40 TABLET, EXTENDED RELEASE ORAL PRN
Status: DISCONTINUED | OUTPATIENT
Start: 2022-05-23 | End: 2022-05-24 | Stop reason: HOSPADM

## 2022-05-23 RX ORDER — MULTIVITAMIN WITH IRON
1 TABLET ORAL DAILY
Status: DISCONTINUED | OUTPATIENT
Start: 2022-05-23 | End: 2022-05-24 | Stop reason: HOSPADM

## 2022-05-23 RX ORDER — ONDANSETRON 4 MG/1
4 TABLET, ORALLY DISINTEGRATING ORAL EVERY 8 HOURS PRN
Status: DISCONTINUED | OUTPATIENT
Start: 2022-05-23 | End: 2022-05-24 | Stop reason: HOSPADM

## 2022-05-23 RX ORDER — ACETAMINOPHEN 650 MG/1
650 SUPPOSITORY RECTAL EVERY 6 HOURS PRN
Status: DISCONTINUED | OUTPATIENT
Start: 2022-05-23 | End: 2022-05-24 | Stop reason: HOSPADM

## 2022-05-23 RX ORDER — ONDANSETRON 2 MG/ML
4 INJECTION INTRAMUSCULAR; INTRAVENOUS EVERY 6 HOURS PRN
Status: DISCONTINUED | OUTPATIENT
Start: 2022-05-23 | End: 2022-05-24 | Stop reason: HOSPADM

## 2022-05-23 RX ORDER — BUMETANIDE 0.25 MG/ML
2 INJECTION, SOLUTION INTRAMUSCULAR; INTRAVENOUS
Status: DISCONTINUED | OUTPATIENT
Start: 2022-05-23 | End: 2022-05-24

## 2022-05-23 RX ORDER — VITAMIN B COMPLEX
1000 TABLET ORAL DAILY
Status: DISCONTINUED | OUTPATIENT
Start: 2022-05-23 | End: 2022-05-24 | Stop reason: HOSPADM

## 2022-05-23 RX ORDER — MAGNESIUM SULFATE IN WATER 40 MG/ML
2000 INJECTION, SOLUTION INTRAVENOUS PRN
Status: DISCONTINUED | OUTPATIENT
Start: 2022-05-23 | End: 2022-05-24 | Stop reason: HOSPADM

## 2022-05-23 RX ORDER — SODIUM CHLORIDE 0.9 % (FLUSH) 0.9 %
10 SYRINGE (ML) INJECTION PRN
Status: DISCONTINUED | OUTPATIENT
Start: 2022-05-23 | End: 2022-05-24 | Stop reason: HOSPADM

## 2022-05-23 RX ORDER — BUMETANIDE 0.25 MG/ML
1 INJECTION, SOLUTION INTRAMUSCULAR; INTRAVENOUS 2 TIMES DAILY
Status: DISCONTINUED | OUTPATIENT
Start: 2022-05-23 | End: 2022-05-23

## 2022-05-23 RX ORDER — AMLODIPINE BESYLATE 5 MG/1
5 TABLET ORAL DAILY
Status: DISCONTINUED | OUTPATIENT
Start: 2022-05-23 | End: 2022-05-24

## 2022-05-23 RX ADMIN — SODIUM CHLORIDE, PRESERVATIVE FREE 10 ML: 5 INJECTION INTRAVENOUS at 15:26

## 2022-05-23 RX ADMIN — BUMETANIDE 2 MG: 0.25 INJECTION INTRAMUSCULAR; INTRAVENOUS at 14:33

## 2022-05-23 RX ADMIN — BUMETANIDE 1 MG: 0.25 INJECTION, SOLUTION INTRAMUSCULAR; INTRAVENOUS at 14:34

## 2022-05-23 RX ADMIN — CEFTRIAXONE SODIUM 1000 MG: 10 INJECTION, POWDER, FOR SOLUTION INTRAVENOUS at 14:33

## 2022-05-23 RX ADMIN — RIVAROXABAN 15 MG: 15 TABLET, FILM COATED ORAL at 17:03

## 2022-05-23 RX ADMIN — SODIUM CHLORIDE, PRESERVATIVE FREE 10 ML: 5 INJECTION INTRAVENOUS at 20:22

## 2022-05-23 RX ADMIN — ATENOLOL 25 MG: 25 TABLET ORAL at 14:10

## 2022-05-23 RX ADMIN — AMLODIPINE BESYLATE 5 MG: 5 TABLET ORAL at 17:02

## 2022-05-23 ASSESSMENT — ENCOUNTER SYMPTOMS
ABDOMINAL DISTENTION: 0
VOMITING: 0
COUGH: 0
RHINORRHEA: 0
ABDOMINAL PAIN: 0
SORE THROAT: 0
BACK PAIN: 0
CHOKING: 0
CHEST TIGHTNESS: 0
DIARRHEA: 0
CONSTIPATION: 0
EYES NEGATIVE: 1
SHORTNESS OF BREATH: 1
NAUSEA: 0

## 2022-05-23 ASSESSMENT — SOCIAL DETERMINANTS OF HEALTH (SDOH): HOW HARD IS IT FOR YOU TO PAY FOR THE VERY BASICS LIKE FOOD, HOUSING, MEDICAL CARE, AND HEATING?: NOT HARD AT ALL

## 2022-05-23 ASSESSMENT — PAIN SCALES - GENERAL: PAINLEVEL_OUTOF10: 0

## 2022-05-23 ASSESSMENT — LIFESTYLE VARIABLES: HOW OFTEN DO YOU HAVE A DRINK CONTAINING ALCOHOL: NEVER

## 2022-05-23 ASSESSMENT — PAIN - FUNCTIONAL ASSESSMENT: PAIN_FUNCTIONAL_ASSESSMENT: NONE - DENIES PAIN

## 2022-05-23 NOTE — ED NOTES
Patient up to bathroom to provide urine sample. Patient ambulates with little assist and no difficulty. Patient back to bed without incident. Patient is resting in bed with easy and unlabored respirations. Call light in reach. Side rails up x2. Patient denies further complaints or concerns. Will monitor.         Libra Meyer RN  05/23/22 0686

## 2022-05-23 NOTE — PROCEDURES
EKG completed and put with patient chart. Unable to find nurse and no phone numbers were listed at the nurses station.

## 2022-05-23 NOTE — H&P
Hospitalist - History & Physical      Patient: Moon Diggs    Unit/Bed:21/021A  YOB: 1929  MRN: [id number]   Acct: [de-identified]   PCP: Stacy Camilo    Date of Service: Pt seen/examined on 05/23/22  and Admitted to Observation with expected LOS less than two midnights due to medical therapy. Chief Complaint: Shortness of breath, high blood pressure    Assessment and Plan:  1. Dyspnea, suspect secondary to acute on chronic HFpEF exacerbation:   Patient endorses sudden shortness of breath early this morning, after noting an elevated BP of 190s SBP at home. Last echo 5/15/2022 reveals EF 50 to 55%. Follows with Dr. Liborio Bartholomew. Patient appears afebrile, nontoxic-appearing, without an acute leukocytosis. Labs today largely unremarkable. Chest x-ray today reveals moderate cardiomegaly with pulmonary vascular congestion. Bilateral rales noted on middle and lower lung fields on exam. Give Bumex 2mg BID today. Strict I&O, Daily weights, 2L fluid restriction. Cardiology consulted, as this is patient's third hospital visit in 1 month. 2. Suspected UTI:   Patient endorses urinary frequency and burning sensation with voiding. UA today reveals WBC 5-9, leukocyte esterase. Urine culture pending. Prior urine cultures reveal mixed growth, however no concern for UTI in recent past. Start Rocephin today x 3 days, de-escalate Abx pending results of urine cultures. 3. Essential hypertension:   BP has been well controlled since arrival, however patient endorses elevated blood pressures early in the AM. Continue home amlodipine and atenolol. Hold home clonidine for now. Possible that patient is experiencing rebound HTN. Continue to monitor. 4. Chronic Atrial fibrillation, with pacemaker: Follows with Dr. Liborio Bartholomew with cardiology. Pt on Xarelto. 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 Liborio Bartholomew. Continue to monitor with telemetry.       5. Hx of T2DM:   Glucose 128 today. Per chart review, pt not on diabetic medications. Low dose SSI added, hypoglycemia protocol inplace. Carb-limited diet. Continue to monitor with POCT glucose checks and daily BMP. Update: patient and patient family request no POCT glucose checks as she has never been diagnosed with T2DM. Continue to monitor with Daily BMP. 6. CKD, stage III   Cr. 0.08, eGFR 67. Pt appears at baseline renal function. Continue to monitor with daily I&Os, daily BMP. History Of Present Illness:    Marilia Crystal is a 26-year-old  female with a past medical history of A. fib, CHF, HTN, T2DM who presents to [facility] ED today for evaluation of sudden or high blood pressure and shortness of breath. Patient reports that she woke up this morning because of dry mouth, and found herself to be more short of breath than normal.  Patient reports that she checked her blood pressure at this time, and had a systolic blood pressure in the 190s. Patient reports she got up to use the bathroom, took 1 dose of clonidine, and went back to bed. Patient states that her shortness of breath was not noticeably changed when she was up walking versus when she was in bed. Patient reports that she had associated diaphoresis, but not denies chest pain, lightheadedness, dizziness, numbness or tingling in her hands or feet, nausea or vomiting. Patient states that she called her daughter due to her symptoms, and her daughter at bedside remarks that she appeared more flush than normal.  Patient's daughter states the patient was complaining her shortness of breath was worse than it has ever been. Patient also states upon further questioning that she has been experiencing burning sensation with urination, and frequency. She denies fever, chills, abdominal pain at this time. Patient was recently hospitalized from 5/15/2022-5/16/2020 2022 for dyspnea, likely secondary to acute CHF exacerbation.   Patient's home dose of Lasix was increased, and has been taking her medications as prescribed. Past Medical History:        Diagnosis Date    Arthritis     Atrial fibrillation ([facility] [facility]     Cancer ([facility] [address]     Skin Cancer    CHF (congestive heart failure) (HCC)     Chronic renal disease, stage III [facility]) [[id number]] 5/18/2022    Gross hematuria 2014    Dr Berta Flaherty    HTN (hypertension)     Hyperlipidemia     Pneumonia 12/2/2018    Type 2 diabetes mellitus 11/23/2021       Past Surgical History:        Procedure Laterality Date    CARPAL TUNNEL RELEASE  10/23/2018    right hand    COLONOSCOPY      EYE SURGERY      FOOT SURGERY      x2    HYSTERECTOMY  1982    KNEE SURGERY  2010    LIVER BIOPSY  08/23/2017    SKIN BIOPSY         Home Medications:   No current facility-administered medications on file prior to encounter. Current Outpatient Medications on File Prior to Encounter   Medication Sig Dispense Refill    rivaroxaban (XARELTO) 15 MG TABS tablet Take 1 tablet by mouth daily 90 tablet 3    atenolol (TENORMIN) 25 MG tablet Take 1 tablet by mouth daily 90 tablet 3    amLODIPine (NORVASC) 5 MG tablet Take 1 tablet by mouth daily 90 tablet 3    cloNIDine (CATAPRES) 0.1 MG tablet Take 1 tablet by mouth 2 times daily as needed for High Blood Pressure (for SBP >160) If over 160/100 60 tablet 3    potassium chloride (KLOR-CON M) 20 MEQ extended release tablet Take 20 mEq by mouth daily      loteprednol (LOTEMAX) 0.5 % ophthalmic suspension Place 1 drop into the right eye every other day      nitroGLYCERIN (NITROSTAT) 0.4 MG SL tablet up to max of 3 total doses.  If no relief after 1 dose, call 911. 25 tablet 3    Multiple Vitamin (MULTI-VITAMIN DAILY PO) Take by mouth daily      furosemide (LASIX) 40 MG tablet Take 40 mg by mouth 2 times daily      lisinopril (PRINIVIL;ZESTRIL) 20 MG tablet Take 1 tablet by mouth daily Additional RF per her PCP 30 tablet 0    Glucosamine-Chondroitin (GLUCOSAMINE CHONDR COMPLEX PO) Take 2 tablets by mouth daily Move Free      Cholecalciferol (VITAMIN D-3 PO) Take 1,000 Units by mouth daily       Multiple Vitamins-Minerals (OCUVITE PO) Take 1 tablet by mouth daily       calcium carbonate 600 MG TABS tablet Take 1 tablet by mouth daily. Allergies:    Bactrim [sulfamethoxazole-trimethoprim], Ciprofloxacin, and Sulfa antibiotics    Social History:    reports that she has never smoked. She has never used smokeless tobacco. She reports that she does not drink alcohol and does not use drugs. Family History:       Problem Relation Age of Onset    Cancer Mother     High Blood Pressure Father     Arthritis Father     Heart Attack Father     Heart Disease Father     Cancer Sister     Diabetes Sister     Cancer Brother     Early Death Brother     Cancer Sister     Diabetes Sister        Diet:  No diet orders on file    Review of systems:     Review of Systems   Constitutional: Positive for activity change. Negative for appetite change, chills, diaphoresis and fatigue. HENT: Negative for congestion, rhinorrhea and sore throat. Eyes: Negative for visual disturbance. Respiratory: Positive for shortness of breath. Negative for cough and chest tightness. Cardiovascular: Negative for chest pain, palpitations and leg swelling. Gastrointestinal: Negative for abdominal distention, abdominal pain, diarrhea, nausea and vomiting. Genitourinary: Positive for dysuria and frequency. Negative for difficulty urinating and urgency. Musculoskeletal: Negative for arthralgias, back pain and gait problem. Neurological: Negative for dizziness, weakness, light-headedness, numbness and headaches. Psychiatric/Behavioral: Negative for confusion. The patient is nervous/anxious. PHYSICAL EXAM:  /65   Pulse 62   Temp 97.5 °F (36.4 °C) (Oral)   Resp 14   Ht 5' 4\" (1.626 m)   Wt 145 lb (65.8 kg)   SpO2 95%   BMI 24.89 kg/m²   General appearance: No apparent distress.  Appears stated age and cooperative. Skin: Skin color, texture, turgor normal.  No rashes or lesions. HEENT: Normal cephalic, atraumatic without obvious deformity. Pupils equal, round, and reactive to light. Extra-ocular muscles intact. Conjunctivae/corneas clear. Neck: Trachea midline. Supple, with full range of motion. No jugular venous distention. Cardiovascular: Regular rate and rhythm with normal S1/S2. No murmurs, rubs or gallops. Respiratory:  Normal respiratory effort. Clear to auscultation, bilaterally without rales, wheezes, or rhonchi. Abdomen: Soft, non-tender, non-distended. Normal bowel sounds. Musculoskeletal:  No weakness or instability noted. No edema, erythema, or gross deformity noted. Vascular: Pulses +2 palpable, equal bilaterally. Neurologic:  Neurovascularly intact without any focal sensory/motor deficits. Cranial nerves: II-XII grossly intact. Psychiatric: Alert and oriented, thought content appropriate, normal insight      Labs:   Recent Labs     05/23/22  0700   WBC 4.8   HGB 14.1   HCT 43.5        Recent Labs     05/23/22  0700      K 3.9   CL 99   CO2 30   BUN 29*   CREATININE 0.8   CALCIUM 9.6     Recent Labs     05/23/22  0700   AST 33   ALT 26   BILITOT 0.5   ALKPHOS 73     No results for input(s): INR in the last 72 hours. No results for input(s): [place] in the last 72 hours. Urinalysis:    Lab Results   Component Value Date    NITRU NEGATIVE 05/23/2022    WBCUA 5-9 05/23/2022    WBCUA 25-50 07/18/2011    BACTERIA NONE SEEN 05/23/2022    RBCUA NONE SEEN 05/23/2022    BLOODU NEGATIVE 05/23/2022    SPECGRAV 1.012 05/14/2020    GLUCOSEU NEGATIVE 05/23/2022       Radiology:   XR CHEST PORTABLE   Final Result   1. Moderate cardiomegaly with pulmonary vascular congestion suspicious for congestive heart failure. 2. Prominent pulmonary interstitium suspicious for pulmonary edema. **This report has been created using voice recognition software.  It may contain minor errors which are inherent in voice recognition technology. **      Final report electronically signed by Dr. Justin Marte on 5/23/2022 7:22 AM        XR CHEST PORTABLE    Result Date: 5/23/2022  PROCEDURE: XR CHEST PORTABLE CLINICAL INFORMATION: Shortness of breath TECHNIQUE: Mobile AP chest radiograph. COMPARISON: Mobile AP chest radiograph 5/15/2022 FINDINGS: A left-sided cardiac device is in stable position. There is moderate stable enlargement of the cardiac silhouette. Pulmonary vessels are congested. Pulmonary interstitium is prominent. Calcified granulomas are stable. Degenerative changes in the thoracic spine are poorly visualized. 1. Moderate cardiomegaly with pulmonary vascular congestion suspicious for congestive heart failure. 2. Prominent pulmonary interstitium suspicious for pulmonary edema. **This report has been created using voice recognition software. It may contain minor errors which are inherent in voice recognition technology. ** Final report electronically signed by Dr. Justin Marte on 5/23/2022 7:22 AM        EKG: Ventricular paced rhythm    Electronically signed by Neva Rosado PA-C on 5/23/2022 at 8:52 AM

## 2022-05-23 NOTE — FLOWSHEET NOTE
Spoke with Daniela's nurse about the code status. Since that is the issue and not the AD, the nurse will put in a consult for Palliative care. 05/23/22 1605   Encounter Summary   Service Provided For: Patient and family together   Referral/Consult From: Nurse   Support System Children   Complexity of Encounter Low   Begin Time 1600   End Time  1606   Total Time Calculated 6 min   Encounter    Type Follow up   Spiritual/Emotional needs   Type Spiritual Support   Care Plan:  Continue spiritual and emotional care for patient and family. Including prayers.

## 2022-05-23 NOTE — ED TRIAGE NOTES
Patient presents to the ED with complaints of shortness of breath that began this morning. Patient reports yesterday she had hypertension when she woke up and took a clonidine. Patient states that she was still having hypertension last night and took another one as well. Patient states that when she woke up she was having a lot of shortness of breath and has had this in the past. Patient reports history of a. Fib, pacemaker, CHF, and is currently on xarelto. EKG completed at bedside. Patient 93% on room air. Family at bedside.

## 2022-05-23 NOTE — ED PROVIDER NOTES
[facility]    EMERGENCY MEDICINE     Pt Name: Niranjan Ford  MRN: [id number]  [place] 4/22/1929  Date of evaluation: 5/23/2022  Provider: Jennifer Kent PA-C    CHIEF COMPLAINT       Chief Complaint   Patient presents with    Shortness of Breath       HISTORY OF PRESENT ILLNESS    Niranjan Ford is a pleasant 80 y.o. female who presents to the emergency department for shortness of breath. PMHx includes A. fib, symptomatic bradycardia with pacemaker insertion, CHF, hypertension. She is accompanied by her daughter at bedside who helps provide history, but patient does live on her own. Trini March states she was recently hospitalized for a CHF exacerbation and was discharged on 5/16. Patient states after discharge he was doing well until yesterday started having slight worsening shortness of breath which became even worse this morning. She called her daughter at around 36 this morning and they decided to come to the ED. Her blood pressure at that time was 191/69 and pulse was 91. She states her shortness of breath feels slightly better since she has gotten to the ED, and denies taking any medication this morning except for 1 clonidine for HTN before coming. She states she has been taking her Lasix and she last took it at 1 PM yesterday. She denies chest pain, abdominal pain, increased edema, cough, fever, chills, nausea, vomiting, headache, numbness/tingling. She denies any other complaints at this time. Triage notes and Nursing notes were reviewed by myself. Any discrepancies are addressed above.     PAST MEDICAL HISTORY     Past Medical History:   Diagnosis Date    Arthritis     Atrial fibrillation ([address]     Cancer ([address]     Skin Cancer    CHF (congestive heart failure) ([address]     Chronic renal disease, stage III [facility]) [078473] 5/18/2022    Gross hematuria 2014    Dr Braden Cummins    HTN (hypertension)     Hyperlipidemia     Pneumonia 12/2/2018    Type 2 diabetes mellitus 11/23/2021       SURGICAL HISTORY       Past Surgical History:   Procedure Laterality Date    CARPAL TUNNEL RELEASE  10/23/2018    right hand    COLONOSCOPY      EYE SURGERY      FOOT SURGERY      x2    HYSTERECTOMY  1982    KNEE SURGERY  2010    LIVER BIOPSY  08/23/2017    SKIN BIOPSY         CURRENT MEDICATIONS       Current Discharge Medication List      CONTINUE these medications which have NOT CHANGED    Details   rivaroxaban (XARELTO) 15 MG TABS tablet Take 1 tablet by mouth daily  Qty: 90 tablet, Refills: 3      atenolol (TENORMIN) 25 MG tablet Take 1 tablet by mouth daily  Qty: 90 tablet, Refills: 3      amLODIPine (NORVASC) 5 MG tablet Take 1 tablet by mouth daily  Qty: 90 tablet, Refills: 3      cloNIDine (CATAPRES) 0.1 MG tablet Take 1 tablet by mouth 2 times daily as needed for High Blood Pressure (for SBP >160) If over 160/100  Qty: 60 tablet, Refills: 3      potassium chloride (KLOR-CON M) 20 MEQ extended release tablet Take 20 mEq by mouth daily      loteprednol (LOTEMAX) 0.5 % ophthalmic suspension Place 1 drop into the right eye every other day      nitroGLYCERIN (NITROSTAT) 0.4 MG SL tablet up to max of 3 total doses. If no relief after 1 dose, call 911. Qty: 25 tablet, Refills: 3      Multiple Vitamin (MULTI-VITAMIN DAILY PO) Take by mouth daily      furosemide (LASIX) 40 MG tablet Take 40 mg by mouth 2 times daily      lisinopril (PRINIVIL;ZESTRIL) 20 MG tablet Take 1 tablet by mouth daily Additional RF per her PCP  Qty: 30 tablet, Refills: 0      Glucosamine-Chondroitin (GLUCOSAMINE CHONDR COMPLEX PO) Take 2 tablets by mouth daily Move Free      Cholecalciferol (VITAMIN D-3 PO) Take 1,000 Units by mouth daily       Multiple Vitamins-Minerals (OCUVITE PO) Take 1 tablet by mouth daily       calcium carbonate 600 MG TABS tablet Take 1 tablet by mouth daily.              ALLERGIES     Bactrim [sulfamethoxazole-trimethoprim], Ciprofloxacin, and Sulfa antibiotics    FAMILY HISTORY       Family History   Problem Relation Age of Onset    Cancer Mother     High Blood Pressure Father     Arthritis Father     Heart Attack Father     Heart Disease Father     Cancer Sister     Diabetes Sister     Cancer Brother     Early Death Brother     Cancer Sister     Diabetes Sister         SOCIAL HISTORY       Social History     Socioeconomic History    Marital status:      Spouse name: None    Number of children: 3    Years of education: None    Highest education level: None   Occupational History    Occupation: Retired   Tobacco Use    Smoking status: Never Smoker    Smokeless tobacco: Never Used   Vaping Use    Vaping Use: Never used   Substance and Sexual Activity    Alcohol use: No    Drug use: No    Sexual activity: Not Currently   Other Topics Concern    None   Social History Narrative    - Never     Social Determinants of Health     Financial Resource Strain: Low Risk     Difficulty of Paying Living Expenses: Not hard at all   Food Insecurity:     Worried About [address] in the Last Year: Not on file    Abhay of Food in the Last Year: Not on file   Transportation Needs:     Lack of Transportation (Medical): Not on file    Lack of Transportation (Non-Medical):  Not on file   Physical Activity:     Days of Exercise per Week: Not on file    Minutes of Exercise per Session: Not on file   Stress:     Feeling of Stress : Not on file   Social Connections:     Frequency of Communication with Friends and Family: Not on file    Frequency of Social Gatherings with Friends and Family: Not on file    Attends Yazdanism Services: Not on file    Active Member of Clubs or Organizations: Not on file    Attends Club or Organization Meetings: Not on file    Marital Status: Not on file   Intimate Partner Violence:     Fear of Current or Ex-Partner: Not on file    Emotionally Abused: Not on file    Physically Abused: Not on file    Sexually Abused: Not on file   Housing Stability:     Unable to Pay for Housing in the Last Year: Not on file    Number of Places Lived in the Last Year: Not on file    Unstable Housing in the Last Year: Not on file       REVIEW OF SYSTEMS     Review of Systems   Constitutional: Negative for chills and fever. HENT: Negative for congestion, ear pain and sore throat. Eyes: Negative. Respiratory: Positive for shortness of breath. Negative for cough, choking and chest tightness. Cardiovascular: Negative for chest pain, palpitations and leg swelling. Gastrointestinal: Negative for abdominal pain, constipation, diarrhea, nausea and vomiting. Endocrine: Negative. Genitourinary: Negative for dysuria, frequency and hematuria. Musculoskeletal: Negative for myalgias and neck pain. Skin: Negative for rash. Neurological: Negative for dizziness, light-headedness and headaches. Hematological: Negative. Psychiatric/Behavioral: Negative. Negative for confusion. The patient is not nervous/anxious. All other systems reviewed and are negative. Except as noted above the remainder of the review of systems was reviewed and is. SCREENINGS        [place] Coma Scale  Eye Opening: Spontaneous  Best Verbal Response: Oriented  Best Motor Response: Obeys commands  [place] Coma Scale Score: 15                 PHYSICAL EXAM     INITIAL VITALS:  height is 5' 4\" (1.626 m) and weight is 147 lb 4.3 oz (66.8 kg). Her oral temperature is 97.7 °F (36.5 °C). Her blood pressure is 148/63 (abnormal) and her pulse is 64. Her respiration is 18 and oxygen saturation is 95%. Physical Exam  Vitals and nursing note reviewed. Exam conducted with a chaperone present. Constitutional:       General: She is not in acute distress. Appearance: Normal appearance. She is normal weight. She is not ill-appearing, toxic-appearing or diaphoretic.       Comments: Patient examined sitting up in exam bed, pleasant and cooperative, 96% O2 on room air, mild conversational dyspnea, NAD. HENT:      Head: Normocephalic and atraumatic. Right Ear: External ear normal.      Left Ear: External ear normal.      Nose: Nose normal.      Mouth/Throat:      Mouth: Mucous membranes are moist.   Eyes:      Extraocular Movements: Extraocular movements intact. Pupils: Pupils are equal, round, and reactive to light. Cardiovascular:      Rate and Rhythm: Normal rate and regular rhythm. Pulses: Normal pulses. Dorsalis pedis pulses are 2+ on the right side and 2+ on the left side. Posterior tibial pulses are 2+ on the right side and 2+ on the left side. Heart sounds: Normal heart sounds. No murmur heard. Pulmonary:      Effort: Pulmonary effort is normal. No respiratory distress. Breath sounds: Normal breath sounds. No decreased breath sounds, wheezing, rhonchi or rales. Chest:      Comments: Pacemaker present under skin in left upper chest  Abdominal:      General: Bowel sounds are normal. There is no distension. Palpations: Abdomen is soft. Tenderness: There is no abdominal tenderness. Musculoskeletal:         General: Normal range of motion. Cervical back: Normal range of motion and neck supple. Right lower leg: No edema. Left lower leg: No edema. Skin:     General: Skin is warm and dry. Capillary Refill: Capillary refill takes less than 2 seconds. Comments: Varicose veins present on BLE   Neurological:      Mental Status: She is alert and oriented to person, place, and time. GCS: GCS eye subscore is 4. GCS verbal subscore is 5. GCS motor subscore is 6. Sensory: No sensory deficit. Psychiatric:         Mood and Affect: Mood normal.         Behavior: Behavior normal.         Thought Content:  Thought content normal.         DIFFERENTIAL DIAGNOSIS:   Differential diagnoses are discussed    DIAGNOSTIC RESULTS     EKG:(none if blank)  All EKGs are interpreted by the Emergency Department Physician who either signs or Co-signs this chart in the absence of a cardiologist.    Ventricular rate 68 BPM  QRS duration 178 ms  QT/QTc-Baz 450/478 ms  T axis -43  R axis 117    RADIOLOGY: (none if blank)   I directly visualized the following images and reviewed the radiologist interpretations. Interpretation per the Radiologist below, if available at the time of this note:  XR CHEST PORTABLE   Final Result   1. Moderate cardiomegaly with pulmonary vascular congestion suspicious for congestive heart failure. 2. Prominent pulmonary interstitium suspicious for pulmonary edema. **This report has been created using voice recognition software. It may contain minor errors which are inherent in voice recognition technology. **      Final report electronically signed by Dr. Anuradha Conte on 5/23/2022 7:22 AM          LABS:   Labs Reviewed   CBC WITH AUTO DIFFERENTIAL - Abnormal; Notable for the following components:       Result Value    RDW-SD 46.9 (*)     Monocytes Absolute 0.3 (*)     All other components within normal limits   COMPREHENSIVE METABOLIC PANEL W/ REFLEX TO MG FOR LOW K - Abnormal; Notable for the following components:    Glucose 128 (*)     BUN 29 (*)     All other components within normal limits   GLOMERULAR FILTRATION RATE, ESTIMATED - Abnormal; Notable for the following components:    Est, Glom Filt Rate 67 (*)     All other components within normal limits   URINE WITH REFLEXED MICRO - Abnormal; Notable for the following components:    Leukocyte Esterase, Urine SMALL (*)     All other components within normal limits   BASIC METABOLIC PANEL W/ REFLEX TO MG FOR LOW K - Abnormal; Notable for the following components:    Chloride 96 (*)     BUN 26 (*)     All other components within normal limits   CBC WITH AUTO DIFFERENTIAL - Abnormal; Notable for the following components:    MCHC 32.1 (*)     RDW-SD 47.8 (*)     All other components within normal limits   GLOMERULAR FILTRATION RATE, ESTIMATED - Abnormal; Notable for the following components:    Est, Glom Filt Rate 58 (*)     All other components within normal limits   POCT GLUCOSE - Abnormal; Notable for the following components:    POC Glucose 111 (*)     All other components within normal limits   TROPONIN   BRAIN NATRIURETIC PEPTIDE   ANION GAP   OSMOLALITY   ANION GAP       All other labs were within normal range or not returned as of this dictation. Please note, any cultures that may have been sent were not resulted at the time of this patient visit. EMERGENCY DEPARTMENT COURSE:   Vitals:    Vitals:    05/23/22 1651 05/23/22 2019 05/23/22 2350 05/24/22 0316   BP: 130/64 139/63 (!) 156/69 (!) 148/63   Pulse: 60 61 59 64   Resp: 17 16 18 18   Temp: 97.8 °F (36.6 °C) 97.5 °F (36.4 °C) 97.5 °F (36.4 °C) 97.7 °F (36.5 °C)   TempSrc: Oral Oral Oral Oral   SpO2: 96% 97% 98% 95%   Weight:    147 lb 4.3 oz (66.8 kg)   Height:         7:33 AM EDT: The patient was seen and evaluated.     PROCEDURES: (None if blank)  Procedures         ED Medications administered this visit:    Medications   Vitamin D (CHOLECALCIFEROL) tablet 1,000 Units (1,000 Units Oral Not Given 5/23/22 1525)   multivitamin 1 tablet (1 tablet Oral Not Given 5/23/22 1525)   rivaroxaban (XARELTO) tablet 15 mg (15 mg Oral Given 5/23/22 1703)   sodium chloride flush 0.9 % injection 10 mL (10 mLs IntraVENous Given 5/23/22 2022)   sodium chloride flush 0.9 % injection 10 mL (has no administration in time range)   0.9 % sodium chloride infusion (has no administration in time range)   ondansetron (ZOFRAN-ODT) disintegrating tablet 4 mg (has no administration in time range)     Or   ondansetron (ZOFRAN) injection 4 mg (has no administration in time range)   polyethylene glycol (GLYCOLAX) packet 17 g (has no administration in time range)   acetaminophen (TYLENOL) tablet 650 mg (has no administration in time range)     Or   acetaminophen (TYLENOL) suppository 650 mg (has no administration in time range)   potassium chloride (KLOR-CON M) extended release tablet 40 mEq (has no administration in time range)     Or   potassium bicarb-citric acid (EFFER-K) effervescent tablet 40 mEq (has no administration in time range)     Or   potassium chloride 10 mEq/100 mL IVPB (Peripheral Line) (has no administration in time range)   magnesium sulfate 2000 mg in 50 mL IVPB premix (has no administration in time range)   atenolol (TENORMIN) tablet 25 mg (25 mg Oral Given 5/23/22 1410)   glucose chewable tablet 16 g (has no administration in time range)   dextrose bolus 10% 125 mL (has no administration in time range)     Or   dextrose bolus 10% 250 mL (has no administration in time range)   glucagon (rDNA) injection 1 mg (has no administration in time range)   dextrose 5 % solution (has no administration in time range)   cefTRIAXone (ROCEPHIN) 1,000 mg in sterile water 10 mL IV syringe (1,000 mg IntraVENous Given 5/23/22 1433)   bumetanide (BUMEX) injection 2 mg (2 mg IntraVENous Given 5/24/22 0615)   amLODIPine (NORVASC) tablet 5 mg (5 mg Oral Given 5/23/22 1702)       MDM:  Patient is Larissa Harper, 79 YO F, who came to the ED to be evaluated for shortness of breath. She denies chest pain, cough, abdominal pain, increased edema, fever, chills, numbness/tingling. She does have mild conversational dyspnea throughout exam, no edema present in bilateral lower extremities, lung sounds unremarkable, no signs of fluid overload. She states she does take her Lasix as prescribed and last took her Lasix yesterday at 1300 yesterday. Recent IP stay for CHF exacerbation, d/c on 5/16. Appropriate testing/imaging of EKG, UA, BNP, Trop, CMP, CBC, and CXR was done based on the patient's initial complaints, history, and physical exam.   CMP and CBC demonstrated no emergent findings. Trop < 0.010. BNP 1203. CXR showed \"1. Moderate cardiomegaly with pulmonary vascular congestion suspicious for congestive heart failure.  2.

## 2022-05-24 VITALS
BODY MASS INDEX: 25.14 KG/M2 | HEART RATE: 67 BPM | RESPIRATION RATE: 18 BRPM | SYSTOLIC BLOOD PRESSURE: 126 MMHG | HEIGHT: 64 IN | OXYGEN SATURATION: 92 % | WEIGHT: 147.27 LBS | DIASTOLIC BLOOD PRESSURE: 59 MMHG | TEMPERATURE: 98.1 F

## 2022-05-24 LAB
ANION GAP SERPL CALCULATED.3IONS-SCNC: 9 MEQ/L (ref 8–16)
BASOPHILS # BLD: 0.6 %
BASOPHILS ABSOLUTE: 0 THOU/MM3 (ref 0–0.1)
BUN BLDV-MCNC: 26 MG/DL (ref 7–22)
CALCIUM SERPL-MCNC: 9.2 MG/DL (ref 8.5–10.5)
CHLORIDE BLD-SCNC: 96 MEQ/L (ref 98–111)
CO2: 32 MEQ/L (ref 23–33)
CREAT SERPL-MCNC: 0.9 MG/DL (ref 0.4–1.2)
EOSINOPHIL # BLD: 1.2 %
EOSINOPHILS ABSOLUTE: 0.1 THOU/MM3 (ref 0–0.4)
ERYTHROCYTE [DISTWIDTH] IN BLOOD BY AUTOMATED COUNT: 13.7 % (ref 11.5–14.5)
ERYTHROCYTE [DISTWIDTH] IN BLOOD BY AUTOMATED COUNT: 47.8 FL (ref 35–45)
GFR SERPL CREATININE-BSD FRML MDRD: 58 ML/MIN/1.73M2
GLUCOSE BLD-MCNC: 101 MG/DL (ref 70–108)
HCT VFR BLD CALC: 42.1 % (ref 37–47)
HEMOGLOBIN: 13.5 GM/DL (ref 12–16)
IMMATURE GRANS (ABS): 0.04 THOU/MM3 (ref 0–0.07)
IMMATURE GRANULOCYTES: 0.8 %
LYMPHOCYTES # BLD: 21.2 %
LYMPHOCYTES ABSOLUTE: 1 THOU/MM3 (ref 1–4.8)
MCH RBC QN AUTO: 30.5 PG (ref 26–33)
MCHC RBC AUTO-ENTMCNC: 32.1 GM/DL (ref 32.2–35.5)
MCV RBC AUTO: 95.2 FL (ref 81–99)
MONOCYTES # BLD: 8.7 %
MONOCYTES ABSOLUTE: 0.4 THOU/MM3 (ref 0.4–1.3)
NUCLEATED RED BLOOD CELLS: 0 /100 WBC
PLATELET # BLD: 154 THOU/MM3 (ref 130–400)
PMV BLD AUTO: 9.6 FL (ref 9.4–12.4)
POTASSIUM REFLEX MAGNESIUM: 3.9 MEQ/L (ref 3.5–5.2)
RBC # BLD: 4.42 MILL/MM3 (ref 4.2–5.4)
SEG NEUTROPHILS: 67.5 %
SEGMENTED NEUTROPHILS ABSOLUTE COUNT: 3.2 THOU/MM3 (ref 1.8–7.7)
SODIUM BLD-SCNC: 137 MEQ/L (ref 135–145)
WBC # BLD: 4.8 THOU/MM3 (ref 4.8–10.8)

## 2022-05-24 PROCEDURE — 2500000003 HC RX 250 WO HCPCS

## 2022-05-24 PROCEDURE — 80048 BASIC METABOLIC PNL TOTAL CA: CPT

## 2022-05-24 PROCEDURE — 36415 COLL VENOUS BLD VENIPUNCTURE: CPT

## 2022-05-24 PROCEDURE — 6360000002 HC RX W HCPCS

## 2022-05-24 PROCEDURE — 2580000003 HC RX 258

## 2022-05-24 PROCEDURE — 99217 PR OBSERVATION CARE DISCHARGE MANAGEMENT: CPT | Performed by: PHYSICIAN ASSISTANT

## 2022-05-24 PROCEDURE — 85025 COMPLETE CBC W/AUTO DIFF WBC: CPT

## 2022-05-24 PROCEDURE — G0378 HOSPITAL OBSERVATION PER HR: HCPCS

## 2022-05-24 PROCEDURE — 99214 OFFICE O/P EST MOD 30 MIN: CPT | Performed by: INTERNAL MEDICINE

## 2022-05-24 PROCEDURE — 6370000000 HC RX 637 (ALT 250 FOR IP)

## 2022-05-24 PROCEDURE — 96376 TX/PRO/DX INJ SAME DRUG ADON: CPT

## 2022-05-24 PROCEDURE — 6370000000 HC RX 637 (ALT 250 FOR IP): Performed by: INTERNAL MEDICINE

## 2022-05-24 RX ORDER — CEFDINIR 300 MG/1
300 CAPSULE ORAL 2 TIMES DAILY
Qty: 8 CAPSULE | Refills: 0 | Status: SHIPPED | OUTPATIENT
Start: 2022-05-25 | End: 2022-05-29

## 2022-05-24 RX ORDER — POTASSIUM CHLORIDE 20 MEQ/1
10 TABLET, EXTENDED RELEASE ORAL DAILY
Status: DISCONTINUED | OUTPATIENT
Start: 2022-05-24 | End: 2022-05-24 | Stop reason: HOSPADM

## 2022-05-24 RX ORDER — BUMETANIDE 1 MG/1
2 TABLET ORAL DAILY
Status: DISCONTINUED | OUTPATIENT
Start: 2022-05-24 | End: 2022-05-24 | Stop reason: HOSPADM

## 2022-05-24 RX ORDER — ISOSORBIDE MONONITRATE 30 MG/1
30 TABLET, EXTENDED RELEASE ORAL DAILY
Status: DISCONTINUED | OUTPATIENT
Start: 2022-05-24 | End: 2022-05-24 | Stop reason: HOSPADM

## 2022-05-24 RX ORDER — BUMETANIDE 2 MG/1
1 TABLET ORAL 2 TIMES DAILY
Qty: 30 TABLET | Refills: 1 | Status: SHIPPED | OUTPATIENT
Start: 2022-05-24 | End: 2022-09-12

## 2022-05-24 RX ORDER — BUMETANIDE 2 MG/1
2 TABLET ORAL DAILY
Qty: 30 TABLET | Refills: 3 | Status: CANCELLED | OUTPATIENT
Start: 2022-05-25

## 2022-05-24 RX ORDER — ISOSORBIDE MONONITRATE 30 MG/1
30 TABLET, EXTENDED RELEASE ORAL DAILY
Qty: 30 TABLET | Refills: 3 | Status: SHIPPED | OUTPATIENT
Start: 2022-05-25 | End: 2022-09-12 | Stop reason: SDUPTHER

## 2022-05-24 RX ORDER — BUMETANIDE 2 MG/1
1 TABLET ORAL DAILY
Qty: 30 TABLET | Refills: 1 | Status: SHIPPED | OUTPATIENT
Start: 2022-05-25 | End: 2022-05-24

## 2022-05-24 RX ADMIN — POTASSIUM CHLORIDE 10 MEQ: 1500 TABLET, EXTENDED RELEASE ORAL at 12:16

## 2022-05-24 RX ADMIN — SODIUM CHLORIDE, PRESERVATIVE FREE 10 ML: 5 INJECTION INTRAVENOUS at 09:19

## 2022-05-24 RX ADMIN — ISOSORBIDE MONONITRATE 30 MG: 30 TABLET, EXTENDED RELEASE ORAL at 12:16

## 2022-05-24 RX ADMIN — BUMETANIDE 2 MG: 0.25 INJECTION INTRAMUSCULAR; INTRAVENOUS at 09:17

## 2022-05-24 RX ADMIN — SACUBITRIL AND VALSARTAN 1 TABLET: 24; 26 TABLET, FILM COATED ORAL at 12:16

## 2022-05-24 RX ADMIN — BUMETANIDE 2 MG: 0.25 INJECTION INTRAMUSCULAR; INTRAVENOUS at 06:15

## 2022-05-24 RX ADMIN — BUMETANIDE 2 MG: 1 TABLET ORAL at 09:18

## 2022-05-24 RX ADMIN — RIVAROXABAN 15 MG: 15 TABLET, FILM COATED ORAL at 18:07

## 2022-05-24 RX ADMIN — CEFTRIAXONE SODIUM 1000 MG: 10 INJECTION, POWDER, FOR SOLUTION INTRAVENOUS at 12:18

## 2022-05-24 RX ADMIN — ATENOLOL 25 MG: 25 TABLET ORAL at 09:18

## 2022-05-24 NOTE — CARE COORDINATION
05/24/22 1139   Readmission Assessment   Number of Days since last admission? 8-30 days   Previous Disposition Home Alone   Who is being Interviewed Patient   What was the patient's/caregiver's perception as to why they think they needed to return back to the hospital? Other (Comment)  (Symtoms returned)   Did you visit your Primary Care Physician after you left the hospital, before you returned this time? Yes   Did you see a specialist, such as Cardiac, Pulmonary, Orthopedic Physician, etc. after you left the hospital? Yes   Who advised the patient to return to the hospital? Self-referral   Does the patient report anything that got in the way of taking their medications? Yes   What reasons did they give? Didn't want to take them (Comment)  (Was traveling and didn't want to take Lasix)   In our efforts to provide the best possible care to you and others like you, can you think of anything that we could have done to help you after you left the hospital the first time, so that you might not have needed to return so soon?  Other (Comment)  (Nothing)

## 2022-05-24 NOTE — PROGRESS NOTES
Type and Reason for Visit:    Initial,Positive Nutrition Screen,Patient Education (unplanned weight loss-pt reports not unplanned, was fluid weight loss)     Nutrition Recommendations/Plan:   Continue current diet. Nutrition Education:  · Educated on Low sodium diet and fluid restriction   · Learners: Patient  · Readiness: Acceptance  · Method: Explanation, Handout and Teachback  · Response: Verbalizes Understanding  · Contact name and number provided. Nutrition Assessment:      Pt. at low nutrition risk per RD evaluation. Patient reports good appetite prior to admit and now. Denied any unintentional weight loss in the last year, states she has lost~ 30#'s gradually over the years \" I got smarter and ate more sensible\". States recent weight changes d/t fluid/edema. Rx; bumex, rocephin, MVI, vitamin D. Malnutrition Assessment:  No Malnutrition      Wounds:    None     Current Nutrition Therapies:    ADULT DIET; Regular; 3 carb choices (45 gm/meal); 2000 ml     Anthropometric Measures:  · Height:    5' 4\" (162.6 cm)  · Current Body Weight:   147 lb 4.3 oz (66.8 kg) (5/24/22 no edema)  · Admission Body Weight:    145 lb (65.8 kg) (5/23/22 stated)  · Usual Body Weight:      (145-150# per pt. Per EMR: 12/14/2021 :161#4.8oz, 5/15/22: 148#9. 4oz)  · Ideal Body Weight:     120 lbs   · BMI:   25.3  · BMI Categories:    Overweight (BMI 25.0-29. 9)     Nutrition Diagnosis:   No nutrition diagnosis at this time      Nutrition Interventions:   Food and/or Nutrient Delivery:    Continue Current Diet  Nutrition Education/Counseling:    Education completed (Low sodium diet and fluid restriction reinforced, handout provided. Encouraged continued good oral intake.)  Coordination of Nutrition Care:   Continue to monitor while inpatient     Nutrition Monitoring and Evaluation:   Will monitor nutritional needs during LOS.        Discharge Planning:    RD following    Godfrey Alas RD, LD:    Contact Number: [phone]

## 2022-05-24 NOTE — PLAN OF CARE
Problem: Safety - Adult  Goal: Free from fall injury  Outcome: Progressing     Problem: ABCDS Injury Assessment  Goal: Absence of physical injury  Outcome: Progressing  Flowsheets  Taken 0/93/2097 4179 by Willy Hart RN  Absence of Physical Injury: Implement safety measures based on patient assessment  Taken 5/23/2022 1359 by Pablo Cordero RN  Absence of Physical Injury: Implement safety measures based on patient assessment     Problem: Discharge Planning  Goal: Discharge to home or other facility with appropriate resources  Recent Flowsheet Documentation  Taken 0/36/7060 7622 by Willy Hart RN  Discharge to home or other facility with appropriate resources:   Identify barriers to discharge with patient and caregiver   Identify discharge learning needs (meds, wound care, etc)

## 2022-05-24 NOTE — CARE COORDINATION
5/24/22, 7:44 AM EDT  DISCHARGE PLANNING EVALUATION:    Favian Patton       Admitted: 5/23/2022/ [address] day: 0   Location: [facility]25/025-A Reason for admit: Shortness of breath [R06.02]   PMH:  has a past medical history of Arthritis, Atrial fibrillation ([address] Cancer ([facility] CHF (congestive heart failure) ([facility] Chronic renal disease, stage III ([facility] [649020], Gross hematuria, HTN (hypertension), Hyperlipidemia, Pneumonia, and Type 2 diabetes mellitus. Procedure:   5/23 CXR: Moderate cardiomegaly with pulmonary vascular congestion suspicious for congestive heart failure. Prominent pulmonary interstitium suspicious for pulmonary edema. Barriers to Discharge:  Admitted as observation through ED with HTN and SOB. Recently here for CHF. BNP 1203.0. Troponin negative. Bumex iv bid, Rocephin iv daily. Consulting Cardiology. Palliative Care. PCP: Ruthy Waddell     Patient's Healthcare Decision Maker: Named in [address] [place] [address]    Patient Goals/Plan/Treatment Preferences: Spoke with Ebony Anderson. She lives at home alone. She is independent and is able to meet her ADL's at home. Her kids take her to all her appts. She has a cane and walker at home. Denies need for [facility]. States that her son sets up her meds. Transportation/Food Security/Housekeeping Addressed:  No issues identified. Anticipate discharge home. 5/24/22, 12:02 PM EDT    Patient goals/plan/ treatment preferences discussed by  and . Patient goals/plan/ treatment preferences reviewed with patient/ family. Patient/ family verbalize understanding of discharge plan and are in agreement with goal/plan/treatment preferences. Understanding was demonstrated using the teach back method. AVS provided by RN at time of discharge, which includes all necessary medical information pertaining to the patients current course of illness, treatment, post-discharge goals of care, and treatment preferences.      Services At/After Discharge: None       IMM Letter  Observation Status Letter date given[de-identified] 05/23/22  Observation Status Letter time given[de-identified] 1331  Observation Status Letter given to Patient/Family/Significant other/Guardian/POA/by[de-identified] Pt Access

## 2022-05-24 NOTE — PROGRESS NOTES
Initial Evaluation          Patient:   Piotr Mora  YOB: 1929  Age:  80 y.o. Room:  [address] [place] [address]  MRN:  [id number]   Acct: [de-identified]    Date of Admission:  5/23/2022  6:44 AM  Date of Service:  5/24/2022  Completed By:  Leesa Charles RN                 Reason for Palliative Care Evaluation:-             [x] Code Status Discussion              [] Goals of Care              [] Pain/Symptom Management               [] Emotional Support              [] Other:                   Current Issues:-denies at present  []  Pain  []  Fatigue  []  Nausea  []  Anxiety  []  Depression  []  Shortness of Breath  []  Constipation  []  Appetite  []  Other:             Advance Directives:-2 previous dnr cca forms on file with a prescription to indicate do not intubate  [x] [place] DNR Form  [] Living Will  [] Medical POA             Current Code Status:-changed to limited no x 4  [x] Full Resuscitation  [] DNR-Comfort Care-Arrest  [] DNR-Comfort Care       [] Limited Resuscitation             [] No CPR            [] No shock            [] No ET intubation/reintubation            [] No resuscitative medications            [] Other limitation:              Palliative Performance Status:          [x] 60%  Ambulation reduced; Significant disease;Can't do hobbies/housework; intake normal or reduced; occasional assist; LOC full/confusion        [] 50%  Mainly sit/lie; Extensive disease; Can't do any work; Considerable assist; intake normal or reduced; LOC full/confusion        [] 40%  Mainly in bed; Extensive disease; Mainly assist; intake normal or reduced; LOC full/confusion         [] 30%  Bed Bound; Extensive disease; Total care; intake reduced; LOC full/confusion        [] 20%  Bed Bound; Extensive disease; Total care; intake minimal; Drowsy/coma        [] 10%  Bed Bound; Extensive disease;  Total care; Mouth care only; Drowsy/coma        [] 0  Death        Goals of care evaluation:-        The patient goals of care are to provide comfort care/supportive services/palliation & relieve suffering:  Goals of care discussed with:  [x] Patient independently  [] Patient and Family  [] Family or Healthcare DPOA independently  [] Unable to discuss with patient, family/DPOA not present         Family/Patient Discussion:  Patient resting in bed. Patient is alert and oriented to all spheres. Palliative care introduced. Patient states that she may have a medical POA at home but she believes it has her  listed (whom is ). Patient has 3 children. Discussed with patient that advance directives could be completed here but patient declines at the present. Discussion about code status levels and what each level entails. Discussed complications of rib fractures, brain and organ damage associated with resuscitative measures. Patient indicates that she would not want any resuscitative measures if her heart/breathing were to stop. Did discuss intubation with respiratory failure and patient would NOT want this, even if she may die. Encouraged patient to discuss with her children and she indicates that she tells them her wishes frequently. Discussed [place] Dnr form and its usage. Encouraged patient to inform staff with each admission if she does not desire full code status. Emotional support provided. Plan/Follow-Up:  Updated primary RN Ofe. [facility] order received to change the code status to reflect the patient's wishes. [place] DNR form and prescription to indicate do not intubate completed and placed in [facility] for MD signature. Please call palliative care if further needs arise.         Electronically signed by Edenilson Toussaint RN on 2022 at 12:38 PM           Palliative Care Office: [phone]

## 2022-05-24 NOTE — PLAN OF CARE
Problem: Discharge Planning  Goal: Discharge to home or other facility with appropriate resources  Outcome: Progressing  Note: Discharged with son     Problem: Safety - Adult  Goal: Free from fall injury  Outcome: Progressing     Problem: ABCDS Injury Assessment  Goal: Absence of physical injury  Outcome: Progressing     Problem: Chronic Conditions and Co-morbidities  Goal: Patient's chronic conditions and co-morbidity symptoms are monitored and maintained or improved  Outcome: Progressing

## 2022-05-24 NOTE — CONSULTS
[address] [place] [address]                                  CONSULTATION    PATIENT NAME: Sandy Beckham                  :        1929  MED REC NO:   [id number]                           ROOM:       0025  ACCOUNT NO:   [de-identified]                           ADMIT DATE: 2022  PROVIDER:     Mert Dee. Tanya James M.D.    Harper Denise:  2022    HISTORY OF PRESENT ILLNESS:  The patient is a 22-year-old lady who was  admitted to the hospital through the emergency room. The patient came  to emergency room because of the shortness of breath. The patient  indicates she woke up and she felt short of breath. Her blood pressure  was elevated. She took some clonidine. Early in the morning, she was  short of breath, she could not talk to her daughter, and she was brought  to the emergency room. She later had a chest x-ray, which showed  cardiomegaly and congestive heart failure. The patient was in the  hospital not before long and at that time, she had an echo, which showed  an ejection fraction about 40% to 45% with a septal wall hypokinesia,  probably had some ischemic cardiomyopathy. The patient had a history of  pacemaker implantation. She had a history of bradycardia-tachycardia  syndrome, history of atrial fibrillation in the past.  The patient did  receive diuretics IV and she had improved. REVIEW OF SYSTEMS:  This patient had a history of paroxysmal atrial  fibrillation in the past.  She had history of congestive heart failure  and she had a history of hypertension and hyperlipidemia. She had a  history of diabetes mellitus. She had history of prior pneumonia. She  possibly has UTI. The patient had a history of arthritis and history of  knee surgery. The patient denied fever, chills, or rigors. She has no  coughing. SOCIAL HISTORY:  She denied smoking or alcohol abuse.   She lives at her  own home independently. ALLERGIES:  THE PATIENT IS ALLERGIC TO THE SULFA. PHYSICAL EXAMINATION:  VITAL SIGNS:  Exam showed her blood pressure was 140/60. She was in a  pacer rhythm. She weighed 147 pounds. No significant change in her  weight. Respiratory rate was 16. The patient's heart rate with pacer  rhythm is 64. NEUROLOGICAL:  She had no focal neurological deficit. NECK:  There is _____ jugular pulsation at about 3 cm above the mandible  angle in 45 degrees. LUNGS:  The patient has few bilateral rales and scattered expiratory  rhonchi. HEART:  Apical pulsation is laterally displaced. She had an S3. She  had 2/6 systolic murmur. ABDOMEN:  Soft. EXTREMITIES:  Had ha-ankle edema and stasis dermatitis. LABORATORY DATA:  The lab work showed her BUN was 29, potassium 3.9, and  her sodium is 136. The chest x-ray shows cardiomegaly and pulmonary congestion. IMPRESSION:  In summary,  1. The patient was admitted with an acute exacerbation of chronic  systolic congestive heart failure, received IV diuretics. 2.  Possible angina pectoris. 3.  Nonischemic cardiomyopathy. 4.  History of hypertension. 5.  Anxiety. 6.  History of dyslipidemia. 7.  History of pacemaker. 8.  History of paroxysmal atrial fibrillation. PLAN:  1. The patient will be placed on the Imdur. The patient will be placed  on the diuretics _____ place her on the [facility]. 2.  We will continue the conservative treatment. 3.  The patient probably would get confused about her medication. She  might benefit from home health. The patient's other medical problems  being monitored and managed by the admitting service. We thank you very much for allowing us to share in the management of  this patient.         Michael Ruby M.D.    D: 05/24/2022 9:03:03       T: 05/24/2022 10:13:21     [email or web address]  Job#: [id number]     Doc#: [id number]    CC:

## 2022-05-24 NOTE — DISCHARGE SUMMARY
Hospitalist Discharge Summary        Patient: Bismark Zarate  YOB: 1929  MRN: [id number]   Acct: [de-identified]    Primary Care Physician: Letty Lucero date  5/23/2022    Discharge date: No discharge date for patient encounter. Chief Complaint on presentation :-   Shortness of breath, high blood pressure       Discharge Assessment and Plan:-   1. Acute on chronic HFpEF exacerbation:              Patient endorses sudden shortness of breath early this morning, after noting an elevated BP of 190s SBP at home. Last echo 5/15/2022 reveals EF 50 to 55%. Follows with Dr. Suraj Martin. Patient appears afebrile, nontoxic-appearing, without an acute leukocytosis. Labs today largely unremarkable. Chest x-ray today reveals moderate cardiomegaly with pulmonary vascular congestion. Bilateral rales noted on middle and lower lung fields on exam. Give Bumex 2mg BID today. Strict I&O, Daily weights, 2L fluid restriction. Cardiology consulted, as this is patient's third hospital visit in 1 month.    5/24: Patient continued on Bumex 1 mg twice daily per cardiology recommendations. Continue on entresto, stop Lisinopril. Started on Imdur. F/u with Cardiology.      2. Suspected UTI:              Patient endorses burning sensation with voiding. UA today reveals WBC 5-9, leukocyte esterase. Urine culture not obtained. Prior urine cultures reveal mixed growth, however no concern for UTI in recent past. Start Rocephin today x 5 days total treatment, switch to omnicef at discharge.      3. Essential hypertension, improved:              BP has been well controlled since arrival, however patient endorses elevated blood pressures early in the AM. Continue home amlodipine and atenolol. Hold home clonidine for now. Possible that patient is experiencing rebound HTN. Continue to monitor.      4. Chronic Atrial fibrillation, with pacemaker: Follows with Dr. Suraj Martin with cardiology. Pt on Xarelto. 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 Bryan Maria. Continue to monitor with telemetry. 5. Hx of T2DM:              Glucose 128 today. Per chart review, pt not on diabetic medications. Low dose SSI added, hypoglycemia protocol inplace. Carb-limited diet. Continue to monitor with POCT glucose checks and daily BMP. Update: patient and patient family request no POCT glucose checks as she has never been diagnosed with T2DM. Continue to monitor with Daily BMP.      6. CKD, stage III              Cr. 0.08, eGFR 67. Pt appears at baseline renal function. Continue to monitor with daily I&Os, daily BMP.        Initial H and P and Hospital course:-  \"Daniela is a 68-year-old  female with a past medical history of A. fib, CHF, HTN, T2DM who presents to [address] [place] [address] ED today for evaluation of sudden or high blood pressure and shortness of breath. Patient reports that she woke up this morning because of dry mouth, and found herself to be more short of breath than normal.  Patient reports that she checked her blood pressure at this time, and had a systolic blood pressure in the 190s. Patient reports she got up to use the bathroom, took 1 dose of clonidine, and went back to bed. Patient states that her shortness of breath was not noticeably changed when she was up walking versus when she was in bed. Patient reports that she had associated diaphoresis, but not denies chest pain, lightheadedness, dizziness, numbness or tingling in her hands or feet, nausea or vomiting. Patient states that she called her daughter due to her symptoms, and her daughter at bedside remarks that she appeared more flush than normal.  Patient's daughter states the patient was complaining her shortness of breath was worse than it has ever been. Patient also states upon further questioning that she has been experiencing burning sensation with urination, and frequency.   She denies fever, chills, abdominal pain at this time.     Patient was recently hospitalized from 5/15/2022-5/16/2020 2022 for dyspnea, likely secondary to acute CHF exacerbation. Patient's home dose of Lasix was increased, and has been taking her medications as prescribed. \"    Please see above for full details. Patient was admitted secondary to high blood pressure and shortness of breath. Cardiology was consulted to help assist medication adjustments and patient was started on Entresto in addition to Imdur. Lisinopril was stopped. Patient's BP improved with these adjustments and she was also instructed to continue with Bumex 1 mg twice daily. Lasix was stopped. Patient follows with Dr. Naheed Lozada, recommend following up with him after discharge. Patient declined home health stating that her son adjusts her medications for her. All VSS and suitable for discharge. Physical Exam:-  Vitals:   Patient Vitals for the past 24 hrs:   BP Temp Temp src Pulse Resp SpO2 Weight   05/24/22 1528 (!) 126/59 98.1 °F (36.7 °C) Oral 67 18 92 % --   05/24/22 1130 135/63 97.6 °F (36.4 °C) Oral 60 18 90 % --   05/24/22 0900 103/66 97.2 °F (36.2 °C) Oral 62 18 99 % --   05/24/22 0316 (!) 148/63 97.7 °F (36.5 °C) Oral 64 18 95 % 147 lb 4.3 oz (66.8 kg)   05/23/22 2350 (!) 156/69 97.5 °F (36.4 °C) Oral 59 18 98 % --   05/23/22 2019 139/63 97.5 °F (36.4 °C) Oral 61 16 97 % --     Weight:   Weight: 147 lb 4.3 oz (66.8 kg)   24 hour intake/output:     Intake/Output Summary (Last 24 hours) at 5/24/2022 1757  Last data filed at 5/24/2022 1528  Gross per 24 hour   Intake 905 ml   Output 1550 ml   Net -645 ml       1. General appearance: elderly, no apparent distress, appears stated age and cooperative. 2. HEENT: Normal cephalic, atraumatic without obvious deformity. Pupils equal, round, and reactive to light. Extra ocular muscles intact. Conjunctivae/corneas clear. 3. Neck: Supple, with full range of motion. No jugular venous distention.  Trachea midline. 4. Respiratory:  Normal respiratory effort. Clear to auscultation, bilaterally without Rales/Wheezes/Rhonchi. 5. Cardiovascular: Regular rate and rhythm with normal S1/S2 without murmurs, rubs or gallops. 6. Abdomen: Soft, non-tender, non-distended with normal bowel sounds. 7. Musculoskeletal:  No clubbing, cyanosis or edema bilaterally. 8. Skin: Skin color, texture, turgor normal.  No rashes or lesions. 9. Neurologic:  Neurovascularly intact without any focal sensory/motor deficits. Cranial nerves: II-XII intact, grossly non-focal.  10. Psychiatric: Alert and oriented x4, thought content appropriate, normal insight  11. Capillary Refill: Brisk,< 3 seconds   12. Peripheral Pulses: +2 palpable, equal bilaterally       Discharge Medications:-      Medication List      START taking these medications    bumetanide 2 MG tablet  Commonly known as: BUMEX  Take 0.5 tablets by mouth daily  Start taking on: May 25, 2022     isosorbide mononitrate 30 MG extended release tablet  Commonly known as: IMDUR  Take 1 tablet by mouth daily  Start taking on: May 25, 2022     sacubitril-valsartan 24-26 MG per tablet  Commonly known as: ENTRESTO  Take 1 tablet by mouth 2 times daily        CONTINUE taking these medications    amLODIPine 5 MG tablet  Commonly known as: NORVASC  Take 1 tablet by mouth daily     atenolol 25 MG tablet  Commonly known as: TENORMIN  Take 1 tablet by mouth daily     calcium carbonate 600 MG Tabs tablet     cloNIDine 0.1 MG tablet  Commonly known as: CATAPRES  Take 1 tablet by mouth 2 times daily as needed for High Blood Pressure (for SBP >160) If over 160/100     GLUCOSAMINE CHONDR COMPLEX PO     loteprednol 0.5 % ophthalmic suspension  Commonly known as: LOTEMAX     MULTI-VITAMIN DAILY PO     nitroGLYCERIN 0.4 MG SL tablet  Commonly known as: NITROSTAT  up to max of 3 total doses. If no relief after 1 dose, call 911.      OCUVITE PO     potassium chloride 20 MEQ extended release tablet  Commonly known as: KLOR-CON M     rivaroxaban 15 MG Tabs tablet  Commonly known as: XARELTO  Take 1 tablet by mouth daily     VITAMIN D-3 PO        STOP taking these medications    furosemide 40 MG tablet  Commonly known as: LASIX     lisinopril 20 MG tablet  Commonly known as: PRINIVIL;ZESTRIL           Where to Get Your Medications      These medications were sent to [facility] DR NATASHA BLEDSOE [facility] Sindy MUNOZ [facility] [phone] [address], [facility] [address]    Phone: [phone]   · bumetanide 2 MG tablet  · isosorbide mononitrate 30 MG extended release tablet  · sacubitril-valsartan 24-26 MG per tablet          Labs :-  Recent Results (from the past 72 hour(s))   EKG 12 Lead    Collection Time: 05/23/22  6:44 AM   Result Value Ref Range    Ventricular Rate 68 BPM    Atrial Rate 68 BPM    QRS Duration 178 ms    Q-T Interval 450 ms    QTc Calculation (Bazett) 478 ms    R Axis 117 degrees    T Axis -43 degrees   CBC with Auto Differential    Collection Time: 05/23/22  7:00 AM   Result Value Ref Range    WBC 4.8 4.8 - 10.8 thou/mm3    RBC 4.59 4.20 - 5.40 mill/mm3    Hemoglobin 14.1 12.0 - 16.0 gm/dl    Hematocrit 43.5 37.0 - 47.0 %    MCV 94.8 81.0 - 99.0 fL    MCH 30.7 26.0 - 33.0 pg    MCHC 32.4 32.2 - 35.5 gm/dl    RDW-CV 13.6 11.5 - 14.5 %    RDW-SD 46.9 (H) 35.0 - 45.0 fL    Platelets 471 384 - 342 thou/mm3    MPV 9.5 9.4 - 12.4 fL    Seg Neutrophils 66.0 %    Lymphocytes 26.0 %    Monocytes 6.4 %    Eosinophils 1.2 %    Basophils 0.2 %    Immature Granulocytes 0.2 %    Segs Absolute 3.2 1.8 - 7.7 thou/mm3    Lymphocytes Absolute 1.2 1.0 - 4.8 thou/mm3    Monocytes Absolute 0.3 (L) 0.4 - 1.3 thou/mm3    Eosinophils Absolute 0.1 0.0 - 0.4 thou/mm3    Basophils Absolute 0.0 0.0 - 0.1 thou/mm3    Immature Grans (Abs) 0.01 0.00 - 0.07 thou/mm3    nRBC 0 /100 wbc   Comprehensive Metabolic Panel w/ Reflex to MG    Collection Time: 05/23/22  7:00 AM   Result Value Ref Range    Glucose 128 (H) 70 - 108 mg/dL    CREATININE 0.8 0.4 - 1.2 mg/dL    BUN 29 (H) 7 - 22 mg/dL    Sodium 140 135 - 145 meq/L    Potassium reflex Magnesium 3.9 3.5 - 5.2 meq/L    Chloride 99 98 - 111 meq/L    CO2 30 23 - 33 meq/L    Calcium 9.6 8.5 - 10.5 mg/dL    AST 33 5 - 40 U/L    Alkaline Phosphatase 73 38 - 126 U/L    Total Protein 6.5 6.1 - 8.0 g/dL    Albumin 4.2 3.5 - 5.1 g/dL    Total Bilirubin 0.5 0.3 - 1.2 mg/dL    ALT 26 11 - 66 U/L   Troponin    Collection Time: 05/23/22  7:00 AM   Result Value Ref Range    Troponin T < 0.010 ng/ml   Brain Natriuretic Peptide    Collection Time: 05/23/22  7:00 AM   Result Value Ref Range    Pro-BNP 1203.0 0.0 - 1800.0 pg/mL   Anion Gap    Collection Time: 05/23/22  7:00 AM   Result Value Ref Range    Anion Gap 11.0 8.0 - 16.0 meq/L   Osmolality    Collection Time: 05/23/22  7:00 AM   Result Value Ref Range    Osmolality Calc 286.9 275.0 - 300.0 mOsmol/kg   Glomerular Filtration Rate, Estimated    Collection Time: 05/23/22  7:00 AM   Result Value Ref Range    Est, Glom Filt Rate 67 (A) ml/min/1.73m2   Urine with Reflexed Micro    Collection Time: 05/23/22  8:20 AM   Result Value Ref Range    Glucose, Ur NEGATIVE NEGATIVE mg/dl    Bilirubin Urine NEGATIVE NEGATIVE    Ketones, Urine NEGATIVE NEGATIVE    Specific Gravity, Urine 1.009 1.002 - 1.030    Blood, Urine NEGATIVE NEGATIVE    pH, UA 7.0 5.0 - 9.0    Protein, UA NEGATIVE NEGATIVE    Urobilinogen, Urine 0.2 0.0 - 1.0 eu/dl    Nitrite, Urine NEGATIVE NEGATIVE    Leukocyte Esterase, Urine SMALL (A) NEGATIVE    Color, UA YELLOW STRAW-YELLOW    Character, Urine CLEAR CLEAR-SL CLOUD    RBC, UA NONE SEEN 0-2/hpf /hpf    WBC, UA 5-9 0-4/hpf /hpf    Epithelial Cells, UA 0-2 3-5/hpf /hpf    Bacteria, UA NONE SEEN FEW/NONE SEEN /hpf    Casts UA NONE SEEN NONE SEEN /lpf    Crystals, UA NONE SEEN NONE SEEN    Renal Epithelial, UA NONE SEEN NONE SEEN    Yeast, UA NONE SEEN NONE SEEN    CASTS 2 NONE SEEN NONE SEEN /lpf    MISCELLANEOUS 2 NONE SEEN    POCT glucose    Collection Time: 05/23/22 11:48 AM   Result Value Ref Range    POC Glucose 111 (H) 70 - 108 mg/dl   EKG 12 Lead    Collection Time: 05/23/22  1:39 PM   Result Value Ref Range    Ventricular Rate 62 BPM    Atrial Rate 288 BPM    QRS Duration 138 ms    Q-T Interval 416 ms    QTc Calculation (Bazett) 422 ms    R Axis 111 degrees    T Axis -47 degrees   Basic Metabolic Panel w/ Reflex to MG    Collection Time: 05/24/22  5:30 AM   Result Value Ref Range    Sodium 137 135 - 145 meq/L    Potassium reflex Magnesium 3.9 3.5 - 5.2 meq/L    Chloride 96 (L) 98 - 111 meq/L    CO2 32 23 - 33 meq/L    Glucose 101 70 - 108 mg/dL    BUN 26 (H) 7 - 22 mg/dL    CREATININE 0.9 0.4 - 1.2 mg/dL    Calcium 9.2 8.5 - 10.5 mg/dL   CBC with Auto Differential    Collection Time: 05/24/22  5:30 AM   Result Value Ref Range    WBC 4.8 4.8 - 10.8 thou/mm3    RBC 4.42 4.20 - 5.40 mill/mm3    Hemoglobin 13.5 12.0 - 16.0 gm/dl    Hematocrit 42.1 37.0 - 47.0 %    MCV 95.2 81.0 - 99.0 fL    MCH 30.5 26.0 - 33.0 pg    MCHC 32.1 (L) 32.2 - 35.5 gm/dl    RDW-CV 13.7 11.5 - 14.5 %    RDW-SD 47.8 (H) 35.0 - 45.0 fL    Platelets 361 361 - 997 thou/mm3    MPV 9.6 9.4 - 12.4 fL    Seg Neutrophils 67.5 %    Lymphocytes 21.2 %    Monocytes 8.7 %    Eosinophils 1.2 %    Basophils 0.6 %    Immature Granulocytes 0.8 %    Segs Absolute 3.2 1.8 - 7.7 thou/mm3    Lymphocytes Absolute 1.0 1.0 - 4.8 thou/mm3    Monocytes Absolute 0.4 0.4 - 1.3 thou/mm3    Eosinophils Absolute 0.1 0.0 - 0.4 thou/mm3    Basophils Absolute 0.0 0.0 - 0.1 thou/mm3    Immature Grans (Abs) 0.04 0.00 - 0.07 thou/mm3    nRBC 0 /100 wbc   Anion Gap    Collection Time: 05/24/22  5:30 AM   Result Value Ref Range    Anion Gap 9.0 8.0 - 16.0 meq/L   Glomerular Filtration Rate, Estimated    Collection Time: 05/24/22  5:30 AM   Result Value Ref Range    Est, Glom Filt Rate 58 (A) ml/min/1.73m2        Microbiology:    Blood culture #1:   Lab Results   Component Value Date    BC No growth-preliminary  No growth   12/08/2018       Blood culture #2:No results found for: Leanne Nguyen    Organism:  No results found for: LABGRAM    MRSA culture only:No results found for: [facility]    Urine culture: No results found for: LABURIN  Lab Results   Component Value Date    ORG Streptococcus equisimilis (Group C ) 12/02/2018        Respiratory culture: No results found for: CULTRESP    Aerobic and Anaerobic :  No results found for: LABAERO  No results found for: LABANAE    Urinalysis:      Lab Results   Component Value Date    NITRU NEGATIVE 05/23/2022    WBCUA 5-9 05/23/2022    WBCUA 25-50 07/18/2011    BACTERIA NONE SEEN 05/23/2022    RBCUA NONE SEEN 05/23/2022    BLOODU NEGATIVE 05/23/2022    SPECGRAV 1.012 05/14/2020    GLUCOSEU NEGATIVE 05/23/2022       Radiology:-  XR CHEST PORTABLE    Result Date: 5/23/2022  PROCEDURE: XR CHEST PORTABLE CLINICAL INFORMATION: Shortness of breath TECHNIQUE: Mobile AP chest radiograph. COMPARISON: Mobile AP chest radiograph 5/15/2022 FINDINGS: A left-sided cardiac device is in stable position. There is moderate stable enlargement of the cardiac silhouette. Pulmonary vessels are congested. Pulmonary interstitium is prominent. Calcified granulomas are stable. Degenerative changes in the thoracic spine are poorly visualized. 1. Moderate cardiomegaly with pulmonary vascular congestion suspicious for congestive heart failure. 2. Prominent pulmonary interstitium suspicious for pulmonary edema. **This report has been created using voice recognition software. It may contain minor errors which are inherent in voice recognition technology. ** Final report electronically signed by Dr. Gris Handy on 5/23/2022 7:22 AM       Follow-up scheduled after discharge :-    In 1-3 days with Isaac Garcia  in the next few weeks with Cardiology     Consultations during this hospital stay:-  [] NONE [x] Cardiology  [] Nephrology  [] Hemo onco   [] GI   [] ID  [] Endocrine  [] Pulm    [] Neuro    [] Psych   [] Urology  [] ENT   [] G SURGERY   []Ortho    []CV surg    [x] Palliative  [] Hospice [] Pain management   []    []TCU   [] PT/OT  OTHERS:-    Disposition: home  Condition at Discharge: Stable    Time Spent:- 35 minutes    Electronically signed by Elías Villeda PA-C on 5/24/22 at 5:57 PM EDT   Discharging Hospitalist

## 2022-06-02 ENCOUNTER — PROCEDURE VISIT (OUTPATIENT)
Dept: CARDIOLOGY CLINIC | Age: 87
End: 2022-06-02
Payer: MEDICARE

## 2022-06-02 DIAGNOSIS — Z95.0 PACEMAKER: Primary | ICD-10-CM

## 2022-06-02 NOTE — PROGRESS NOTES
.. [employer] SING[employer] PAC[employer]ER    PRESENTING RHYTHM       BATTERY   12.7 YRS    RV IMPEDANCE   456    RV THRESHOLD   [email or web address]    RV SENSING   1.20 MV                                   MEASURED R WAVE   13.0 MV    MODE  VVVIR    RV PACING   95.8%    NO OBSERVATIONS MADE

## 2022-06-03 PROCEDURE — 93296 REM INTERROG EVL PM/IDS: CPT | Performed by: INTERNAL MEDICINE

## 2022-06-03 PROCEDURE — 93294 REM INTERROG EVL PM/LDLS PM: CPT | Performed by: INTERNAL MEDICINE

## 2022-06-20 ENCOUNTER — TELEPHONE (OUTPATIENT)
Dept: CARDIOLOGY CLINIC | Age: 87
End: 2022-06-20

## 2022-06-28 NOTE — TELEPHONE ENCOUNTER
CALLED  [place],9D. D/C ENTRESTO MAKE SURE SHE STILL TAKING LISINOPRIL.  CALLED SCRIPT TO [facility] [address] LISINOPRIL 20 MG QD.

## 2022-07-01 ENCOUNTER — PROCEDURE VISIT (OUTPATIENT)
Dept: CARDIOLOGY CLINIC | Age: 87
End: 2022-07-01
Payer: MEDICARE

## 2022-07-01 DIAGNOSIS — Z95.0 PACEMAKER: Primary | ICD-10-CM

## 2022-07-01 NOTE — PROGRESS NOTES
.. [employer]  SINGLE CHAMBER PACEMAKER    PRESENTING RHYTHM       BATTERY   12.4 YRS/31.2V    RV IMPEDANCE   418    RV THRESHOLD   0.75 @0.4    RV SENSING   1.2                                       R WAVE   12.8    MODE   VVIR    RV PACING   96%

## 2022-07-05 PROCEDURE — 93279 PRGRMG DEV EVAL PM/LDLS PM: CPT | Performed by: INTERNAL MEDICINE

## 2022-08-02 ENCOUNTER — HOSPITAL ENCOUNTER (EMERGENCY)
Age: 87
Discharge: HOME | End: 2022-08-02
Attending: FAMILY MEDICINE
Payer: MEDICARE

## 2022-08-02 ENCOUNTER — APPOINTMENT (OUTPATIENT)
Dept: GENERAL RADIOLOGY | Age: 87
End: 2022-08-02
Payer: MEDICARE

## 2022-08-02 VITALS
TEMPERATURE: 98.4 F | HEART RATE: 59 BPM | OXYGEN SATURATION: 97 % | RESPIRATION RATE: 23 BRPM | DIASTOLIC BLOOD PRESSURE: 60 MMHG | SYSTOLIC BLOOD PRESSURE: 113 MMHG

## 2022-08-02 DIAGNOSIS — I50.23 ACUTE ON CHRONIC SYSTOLIC CONGESTIVE HEART FAILURE (HCC): ICD-10-CM

## 2022-08-02 DIAGNOSIS — R06.00 DYSPNEA AND RESPIRATORY ABNORMALITIES: Primary | ICD-10-CM

## 2022-08-02 DIAGNOSIS — R06.89 DYSPNEA AND RESPIRATORY ABNORMALITIES: Primary | ICD-10-CM

## 2022-08-02 LAB
ALBUMIN SERPL-MCNC: 4.6 G/DL (ref 3.5–5.1)
ALP BLD-CCNC: 86 U/L (ref 38–126)
ALT SERPL-CCNC: 33 U/L (ref 11–66)
ANION GAP SERPL CALCULATED.3IONS-SCNC: 11 MEQ/L (ref 8–16)
AST SERPL-CCNC: 38 U/L (ref 5–40)
BASOPHILS # BLD: 0.5 %
BASOPHILS ABSOLUTE: 0 THOU/MM3 (ref 0–0.1)
BILIRUB SERPL-MCNC: 0.6 MG/DL (ref 0.3–1.2)
BILIRUBIN DIRECT: < 0.2 MG/DL (ref 0–0.3)
BILIRUBIN URINE: NEGATIVE
BLOOD, URINE: NEGATIVE
BUN BLDV-MCNC: 28 MG/DL (ref 7–22)
CALCIUM SERPL-MCNC: 10 MG/DL (ref 8.5–10.5)
CHARACTER, URINE: CLEAR
CHLORIDE BLD-SCNC: 96 MEQ/L (ref 98–111)
CO2: 31 MEQ/L (ref 23–33)
COLOR: YELLOW
CREAT SERPL-MCNC: 1 MG/DL (ref 0.4–1.2)
EOSINOPHIL # BLD: 0.8 %
EOSINOPHILS ABSOLUTE: 0.1 THOU/MM3 (ref 0–0.4)
ERYTHROCYTE [DISTWIDTH] IN BLOOD BY AUTOMATED COUNT: 14.6 % (ref 11.5–14.5)
ERYTHROCYTE [DISTWIDTH] IN BLOOD BY AUTOMATED COUNT: 50.9 FL (ref 35–45)
GFR SERPL CREATININE-BSD FRML MDRD: 52 ML/MIN/1.73M2
GLUCOSE BLD-MCNC: 116 MG/DL (ref 70–108)
GLUCOSE URINE: NEGATIVE MG/DL
HCT VFR BLD CALC: 42.9 % (ref 37–47)
HEMOGLOBIN: 14 GM/DL (ref 12–16)
IMMATURE GRANS (ABS): 0.01 THOU/MM3 (ref 0–0.07)
IMMATURE GRANULOCYTES: 0.2 %
KETONES, URINE: NEGATIVE
LACTIC ACID, SEPSIS: 1 MMOL/L (ref 0.5–1.9)
LEUKOCYTE ESTERASE, URINE: NEGATIVE
LYMPHOCYTES # BLD: 24.9 %
LYMPHOCYTES ABSOLUTE: 1.6 THOU/MM3 (ref 1–4.8)
MCH RBC QN AUTO: 31.3 PG (ref 26–33)
MCHC RBC AUTO-ENTMCNC: 32.6 GM/DL (ref 32.2–35.5)
MCV RBC AUTO: 96 FL (ref 81–99)
MONOCYTES # BLD: 8.1 %
MONOCYTES ABSOLUTE: 0.5 THOU/MM3 (ref 0.4–1.3)
NITRITE, URINE: NEGATIVE
NUCLEATED RED BLOOD CELLS: 0 /100 WBC
OSMOLALITY CALCULATION: 282.1 MOSMOL/KG (ref 275–300)
PH UA: 7.5 (ref 5–9)
PLATELET # BLD: 166 THOU/MM3 (ref 130–400)
PMV BLD AUTO: 9.6 FL (ref 9.4–12.4)
POTASSIUM REFLEX MAGNESIUM: 4.5 MEQ/L (ref 3.5–5.2)
PRO-BNP: 1660 PG/ML (ref 0–1800)
PROTEIN UA: NEGATIVE
RBC # BLD: 4.47 MILL/MM3 (ref 4.2–5.4)
SARS-COV-2, NAAT: NOT DETECTED
SEG NEUTROPHILS: 65.5 %
SEGMENTED NEUTROPHILS ABSOLUTE COUNT: 4.1 THOU/MM3 (ref 1.8–7.7)
SODIUM BLD-SCNC: 138 MEQ/L (ref 135–145)
SPECIFIC GRAVITY, URINE: 1.01 (ref 1–1.03)
TOTAL PROTEIN: 7.3 G/DL (ref 6.1–8)
TROPONIN T: < 0.01 NG/ML
UROBILINOGEN, URINE: 0.2 EU/DL (ref 0–1)
WBC # BLD: 6.3 THOU/MM3 (ref 4.8–10.8)

## 2022-08-02 PROCEDURE — 81003 URINALYSIS AUTO W/O SCOPE: CPT

## 2022-08-02 PROCEDURE — 36415 COLL VENOUS BLD VENIPUNCTURE: CPT

## 2022-08-02 PROCEDURE — 84484 ASSAY OF TROPONIN QUANT: CPT

## 2022-08-02 PROCEDURE — 85025 COMPLETE CBC W/AUTO DIFF WBC: CPT

## 2022-08-02 PROCEDURE — 83880 ASSAY OF NATRIURETIC PEPTIDE: CPT

## 2022-08-02 PROCEDURE — 87635 SARS-COV-2 COVID-19 AMP PRB: CPT

## 2022-08-02 PROCEDURE — 93005 ELECTROCARDIOGRAM TRACING: CPT | Performed by: FAMILY MEDICINE

## 2022-08-02 PROCEDURE — 99285 EMERGENCY DEPT VISIT HI MDM: CPT

## 2022-08-02 PROCEDURE — 83605 ASSAY OF LACTIC ACID: CPT

## 2022-08-02 PROCEDURE — 80048 BASIC METABOLIC PNL TOTAL CA: CPT

## 2022-08-02 PROCEDURE — 80076 HEPATIC FUNCTION PANEL: CPT

## 2022-08-02 PROCEDURE — 71045 X-RAY EXAM CHEST 1 VIEW: CPT

## 2022-08-02 ASSESSMENT — PAIN - FUNCTIONAL ASSESSMENT: PAIN_FUNCTIONAL_ASSESSMENT: NONE - DENIES PAIN

## 2022-08-02 NOTE — ED PROVIDER NOTES
[address] COMPLAINT   Chief Complaint   Patient presents with    Shortness of Breath        HPI   Beronica West is a 80 y.o. female with an extensive hx of CHF, A-fib on Xarelto, who  lives alone at home, who presents with shortness of breath and general unwellnessness, onset was this morning. The duration has been constant. Apparently her blood pressure was severely elevated at home, so she called her daughter Ml Burkett who drove her to the ED. Pt has no recent illness, cough, fever or congestion. She felt fine last night. She has not had any head injuries or falls. She did have her medications reconfigured 2-3 months ago. She has had her COVID vaccines and boosted x1. She has some sore throat. REVIEW OF SYSTEMS   Cardiac: +dyspnea on exertion; neg Chest Pain, Denies syncope   Respiratory: +sob and dyspnea; Denies cough or hemoptysis   GI: Denies Vomiting or Diarrhea   General: +general unwellness; Denies Fever   All other review of systems are reviewed and are otherwise negative.      PAST MEDICAL & SURGICAL HISTORY   Past Medical History:   Diagnosis Date    Arthritis     Atrial fibrillation ([facility] [facility]     Cancer ([facility]     Skin Cancer    CHF (congestive heart failure) ([facility] [facility]     Chronic renal disease, stage III [facility]) [771936] 5/18/2022    Gross hematuria 2014    Dr Vanna Oro    HTN (hypertension)     Hyperlipidemia     Pneumonia 12/2/2018    Type 2 diabetes mellitus 11/23/2021      Past Surgical History:   Procedure Laterality Date    CARPAL TUNNEL RELEASE  10/23/2018    right hand    COLONOSCOPY      EYE SURGERY      FOOT SURGERY      x2    HYSTERECTOMY  1982    KNEE SURGERY  2010    LIVER BIOPSY  08/23/2017    SKIN BIOPSY          CURRENT MEDICATIONS   Current Outpatient Rx   Medication Sig Dispense Refill    isosorbide mononitrate (IMDUR) 30 MG extended release tablet Take 1 tablet by mouth daily 30 tablet 3    sacubitril-valsartan (ENTRESTO) 24-26 MG per tablet Take 1 tablet by mouth 2 times daily 60 tablet 0    bumetanide (BUMEX) 2 MG tablet Take 0.5 tablets by mouth 2 times daily 30 tablet 1    rivaroxaban (XARELTO) 15 MG TABS tablet Take 1 tablet by mouth daily 90 tablet 3    atenolol (TENORMIN) 25 MG tablet Take 1 tablet by mouth daily 90 tablet 3    amLODIPine (NORVASC) 5 MG tablet Take 1 tablet by mouth daily 90 tablet 3    cloNIDine (CATAPRES) 0.1 MG tablet Take 1 tablet by mouth 2 times daily as needed for High Blood Pressure (for SBP >160) If over 160/100 60 tablet 3    potassium chloride (KLOR-CON M) 20 MEQ extended release tablet Take 20 mEq by mouth daily      loteprednol (LOTEMAX) 0.5 % ophthalmic suspension Place 1 drop into the right eye every other day      nitroGLYCERIN (NITROSTAT) 0.4 MG SL tablet up to max of 3 total doses. If no relief after 1 dose, call 911. 25 tablet 3    Multiple Vitamin (MULTI-VITAMIN DAILY PO) Take by mouth daily      Glucosamine-Chondroitin (GLUCOSAMINE CHONDR COMPLEX PO) Take 2 tablets by mouth daily Move Free      Cholecalciferol (VITAMIN D-3 PO) Take 1,000 Units by mouth daily       Multiple Vitamins-Minerals (OCUVITE PO) Take 1 tablet by mouth daily       calcium carbonate 600 MG TABS tablet Take 1 tablet by mouth daily. ALLERGIES   Allergies   Allergen Reactions    Bactrim [Sulfamethoxazole-Trimethoprim]     Ciprofloxacin Diarrhea    Sulfa Antibiotics         SOCIAL & FAMILY HISTORY   Social History     Socioeconomic History    Marital status:      Number of children: 3   Occupational History    Occupation: Retired   Tobacco Use    Smoking status: Never    Smokeless tobacco: Never   Vaping Use    Vaping Use: Never used   Substance and Sexual Activity    Alcohol use: No    Drug use: No    Sexual activity: Not Currently   Social History Narrative    - Never     Social Determinants of Health     Financial Resource Strain: Low Risk     Difficulty of Paying Living Expenses: Not hard at all      Family History   Problem Relation Age of Onset    Cancer Mother     High Blood Pressure Father     Arthritis Father     Heart Attack Father     Heart Disease Father     Cancer Sister     Diabetes Sister     Cancer Brother     Early Death Brother     Cancer Sister     Diabetes Sister         PHYSICAL EXAM   VITAL SIGNS: /60   Pulse 59   Temp 98.4 °F (36.9 °C) (Oral)   Resp 23   SpO2 97%    Constitutional: Well developed, well nourished, mild anxiety and acute distress   HENT: Atraumatic, moist mucus membranes   Neck: supple, no JVD   Respiratory: Lungs Clear to ascultation bilaterally, no retractions   Cardiovascular: Reg rate, normal rhythm, no murmurs   Vascular: Radial pulses 2+ equal bilaterally   GI: Soft, nontender, normal bowel sounds   Musculoskeletal: No edema in LE without any signs of erythema; negative Ana sign in both calves, no deformities   Integument: Skin warm and dry, no petechiae   Neurologic: Alert & oriented, normal speech   Psych: Pleasant affect, no hallucinations     EKG (interpreted by me) 8/2/22 11:06  Rhythm: Ventricular-paced rhythm   Rate: 82 BPM   Axis: normal   Conduction: normal   ST/T Segments: nonacute     RADIOLOGY/PROCEDURES   XR CHEST PORTABLE   Final Result   1. No acute cardiopulmonary finding. **This report has been created using voice recognition software. It may contain minor errors which are inherent in voice recognition technology. **      Final report electronically signed by Dr Princess Cox on 8/2/2022 12:56 PM           LABS   Labs Reviewed   CBC WITH AUTO DIFFERENTIAL - Abnormal; Notable for the following components:       Result Value    RDW-CV 14.6 (*)     RDW-SD 50.9 (*)     All other components within normal limits   BASIC METABOLIC PANEL W/ REFLEX TO MG FOR LOW K - Abnormal; Notable for the following components:    Chloride 96 (*)     Glucose 116 (*)     BUN 28 (*)     All other components within normal limits   GLOMERULAR FILTRATION RATE, ESTIMATED - Abnormal; Notable for the following components:    Est, Glom Filt Rate 52 (*)     All other components within normal limits   COVID-19, RAPID   URINALYSIS WITH REFLEX TO CULTURE   LACTATE, SEPSIS   HEPATIC FUNCTION PANEL   TROPONIN   BRAIN NATRIURETIC PEPTIDE   ANION GAP   OSMOLALITY        ED COURSE & MEDICAL DECISION MAKING   Pertinent Labs & Imaging studies reviewed and interpreted. (See chart for details)   See chart for details of medications given during the ED stay. Vitals:    08/02/22 1220   BP: 113/60   Pulse: 59   Resp: 23   Temp:    SpO2: 97%          Differential Diagnosis: COVID, URI, Acute Coronary Syndrome, Congestive Heart Failure, Myocardial Infarction, occult sepsis, Pneumonia, Pneumothorax, other. FINAL IMPRESSION   1. Dyspnea and respiratory abnormalities    2. Acute on chronic systolic congestive heart failure ([address]         Plan:   MDM - pt has fairly stable vitals without any focal neuro symptoms. Pt has mildly elevated blood pressures but apparently had elevated BP at home in 180's and HR in 100-110 bpm range. She is on xarelto for paroxysmal A-fib but currently has ventricular paced rhythm. Will check her for COVID, CXR and cardiac workup. She is not currently requiring oxygenation. She has no calf tenderness and is compliant with her Xarelto - also without chest pain, so PE and dissection unlikely at this time. Pt's workup was rather unremarkable. I did not or could not identify the cause of her shortness of breath other than perhaps some slight fluid shift causing her stable/mild CHF. She did have a bout of urinary incontinence while in the ED. I reviewed the results with pt and her daughter Meena Ladd, regarding next steps and what to watch for prior to returning to the ED. All agreed for discharge at this time.   Electronically signed by: Ama Jimenez MD, 8/2/2022 1:41 PM   (This note was completed with a voice recognition program)         Andrew Langford MD  08/02/22 3941

## 2022-08-02 NOTE — ED TRIAGE NOTES
Patient presents to ED with c/o feeling short of breath that started this morning. Patient states that she feels like she is panting and can't catch her breath. Denies any chest pain but states that her L leg feels more swollen. Patient states that her cardiologist recently changed her medications. Call light in reach. Niacinamide Counseling: I recommended taking niacin or niacinamide, also know as vitamin B3, twice daily. Recent evidence suggests that taking vitamin B3 (500 mg twice daily) can reduce the risk of actinic keratoses and non-melanoma skin cancers. Side effects of vitamin B3 include flushing and headache.

## 2022-08-03 LAB
EKG ATRIAL RATE: 75 BPM
EKG Q-T INTERVAL: 408 MS
EKG QRS DURATION: 174 MS
EKG QTC CALCULATION (BAZETT): 476 MS
EKG R AXIS: 111 DEGREES
EKG T AXIS: -49 DEGREES
EKG VENTRICULAR RATE: 82 BPM

## 2022-08-03 PROCEDURE — 93010 ELECTROCARDIOGRAM REPORT: CPT | Performed by: INTERNAL MEDICINE

## 2022-08-09 ENCOUNTER — HOSPITAL ENCOUNTER (EMERGENCY)
Age: 87
Discharge: HOME | End: 2022-08-10
Attending: EMERGENCY MEDICINE
Payer: MEDICARE

## 2022-08-09 DIAGNOSIS — N39.0 URINARY TRACT INFECTION WITH HEMATURIA, SITE UNSPECIFIED: Primary | ICD-10-CM

## 2022-08-09 DIAGNOSIS — R31.9 URINARY TRACT INFECTION WITH HEMATURIA, SITE UNSPECIFIED: Primary | ICD-10-CM

## 2022-08-09 PROCEDURE — 99284 EMERGENCY DEPT VISIT MOD MDM: CPT

## 2022-08-10 ENCOUNTER — APPOINTMENT (OUTPATIENT)
Dept: GENERAL RADIOLOGY | Age: 87
End: 2022-08-10
Payer: MEDICARE

## 2022-08-10 VITALS
RESPIRATION RATE: 19 BRPM | OXYGEN SATURATION: 97 % | TEMPERATURE: 97.8 F | SYSTOLIC BLOOD PRESSURE: 118 MMHG | BODY MASS INDEX: 24.75 KG/M2 | HEART RATE: 60 BPM | DIASTOLIC BLOOD PRESSURE: 57 MMHG | HEIGHT: 64 IN | WEIGHT: 145 LBS

## 2022-08-10 LAB
ALBUMIN SERPL-MCNC: 3.7 G/DL (ref 3.5–5.1)
ALP BLD-CCNC: 73 U/L (ref 38–126)
ALT SERPL-CCNC: 24 U/L (ref 11–66)
ANION GAP SERPL CALCULATED.3IONS-SCNC: 11 MEQ/L (ref 8–16)
AST SERPL-CCNC: 27 U/L (ref 5–40)
BACTERIA: ABNORMAL /HPF
BASOPHILS # BLD: 0.4 %
BASOPHILS ABSOLUTE: 0 THOU/MM3 (ref 0–0.1)
BILIRUB SERPL-MCNC: 0.4 MG/DL (ref 0.3–1.2)
BILIRUBIN URINE: ABNORMAL
BLOOD, URINE: ABNORMAL
BUN BLDV-MCNC: 29 MG/DL (ref 7–22)
CALCIUM SERPL-MCNC: 9.6 MG/DL (ref 8.5–10.5)
CASTS 2: ABNORMAL /LPF
CASTS UA: ABNORMAL /LPF
CHARACTER, URINE: ABNORMAL
CHLORIDE BLD-SCNC: 97 MEQ/L (ref 98–111)
CO2: 28 MEQ/L (ref 23–33)
COLOR: ABNORMAL
CREAT SERPL-MCNC: 1 MG/DL (ref 0.4–1.2)
CRYSTALS, UA: ABNORMAL
EOSINOPHIL # BLD: 0.7 %
EOSINOPHILS ABSOLUTE: 0.1 THOU/MM3 (ref 0–0.4)
EPITHELIAL CELLS, UA: ABNORMAL /HPF
ERYTHROCYTE [DISTWIDTH] IN BLOOD BY AUTOMATED COUNT: 14.5 % (ref 11.5–14.5)
ERYTHROCYTE [DISTWIDTH] IN BLOOD BY AUTOMATED COUNT: 50.5 FL (ref 35–45)
GFR SERPL CREATININE-BSD FRML MDRD: 52 ML/MIN/1.73M2
GLUCOSE BLD-MCNC: 127 MG/DL (ref 70–108)
GLUCOSE URINE: NEGATIVE MG/DL
HCT VFR BLD CALC: 41.8 % (ref 37–47)
HEMOGLOBIN: 13.7 GM/DL (ref 12–16)
ICTOTEST: NEGATIVE
IMMATURE GRANS (ABS): 0.04 THOU/MM3 (ref 0–0.07)
IMMATURE GRANULOCYTES: 0.4 %
KETONES, URINE: NEGATIVE
LEUKOCYTE ESTERASE, URINE: ABNORMAL
LYMPHOCYTES # BLD: 20.7 %
LYMPHOCYTES ABSOLUTE: 2.2 THOU/MM3 (ref 1–4.8)
MAGNESIUM: 2.3 MG/DL (ref 1.6–2.4)
MCH RBC QN AUTO: 31.1 PG (ref 26–33)
MCHC RBC AUTO-ENTMCNC: 32.8 GM/DL (ref 32.2–35.5)
MCV RBC AUTO: 95 FL (ref 81–99)
MISCELLANEOUS 2: ABNORMAL
MONOCYTES # BLD: 8.5 %
MONOCYTES ABSOLUTE: 0.9 THOU/MM3 (ref 0.4–1.3)
NITRITE, URINE: NEGATIVE
NUCLEATED RED BLOOD CELLS: 0 /100 WBC
OSMOLALITY CALCULATION: 279.4 MOSMOL/KG (ref 275–300)
PH UA: 6.5 (ref 5–9)
PLATELET # BLD: 180 THOU/MM3 (ref 130–400)
PMV BLD AUTO: 9.6 FL (ref 9.4–12.4)
POTASSIUM SERPL-SCNC: 4.1 MEQ/L (ref 3.5–5.2)
PROTEIN UA: >= 300
RBC # BLD: 4.4 MILL/MM3 (ref 4.2–5.4)
RBC URINE: > 200 /HPF
REASON FOR REJECTION: NORMAL
REJECTED TEST: NORMAL
RENAL EPITHELIAL, UA: ABNORMAL
SARS-COV-2, NAAT: NOT  DETECTED
SEG NEUTROPHILS: 69.3 %
SEGMENTED NEUTROPHILS ABSOLUTE COUNT: 7.3 THOU/MM3 (ref 1.8–7.7)
SODIUM BLD-SCNC: 136 MEQ/L (ref 135–145)
SPECIFIC GRAVITY, URINE: 1.01 (ref 1–1.03)
TOTAL PROTEIN: 6.2 G/DL (ref 6.1–8)
TROPONIN T: < 0.01 NG/ML
TSH SERPL DL<=0.05 MIU/L-ACNC: 4.19 UIU/ML (ref 0.4–4.2)
UROBILINOGEN, URINE: 0.2 EU/DL (ref 0–1)
WBC # BLD: 10.6 THOU/MM3 (ref 4.8–10.8)
WBC UA: > 200 /HPF
YEAST: ABNORMAL

## 2022-08-10 PROCEDURE — 87077 CULTURE AEROBIC IDENTIFY: CPT

## 2022-08-10 PROCEDURE — 71045 X-RAY EXAM CHEST 1 VIEW: CPT

## 2022-08-10 PROCEDURE — 81001 URINALYSIS AUTO W/SCOPE: CPT

## 2022-08-10 PROCEDURE — 36415 COLL VENOUS BLD VENIPUNCTURE: CPT

## 2022-08-10 PROCEDURE — 6370000000 HC RX 637 (ALT 250 FOR IP): Performed by: EMERGENCY MEDICINE

## 2022-08-10 PROCEDURE — 84484 ASSAY OF TROPONIN QUANT: CPT

## 2022-08-10 PROCEDURE — 83735 ASSAY OF MAGNESIUM: CPT

## 2022-08-10 PROCEDURE — 84443 ASSAY THYROID STIM HORMONE: CPT

## 2022-08-10 PROCEDURE — 87635 SARS-COV-2 COVID-19 AMP PRB: CPT

## 2022-08-10 PROCEDURE — 87086 URINE CULTURE/COLONY COUNT: CPT

## 2022-08-10 PROCEDURE — 80053 COMPREHEN METABOLIC PANEL: CPT

## 2022-08-10 PROCEDURE — 87186 SC STD MICRODIL/AGAR DIL: CPT

## 2022-08-10 PROCEDURE — 85025 COMPLETE CBC W/AUTO DIFF WBC: CPT

## 2022-08-10 RX ORDER — GRANULES FOR ORAL 3 G/1
3 POWDER ORAL
Qty: 2 EACH | Refills: 0 | Status: SHIPPED | OUTPATIENT
Start: 2022-08-10

## 2022-08-10 RX ORDER — GRANULES FOR ORAL 3 G/1
3 POWDER ORAL ONCE
Status: COMPLETED | OUTPATIENT
Start: 2022-08-10 | End: 2022-08-10

## 2022-08-10 RX ADMIN — GRANULES FOR ORAL SOLUTION 1 PACKET: 3 POWDER ORAL at 02:20

## 2022-08-10 ASSESSMENT — PAIN DESCRIPTION - LOCATION: LOCATION: ABDOMEN

## 2022-08-10 ASSESSMENT — ENCOUNTER SYMPTOMS
NAUSEA: 0
PHOTOPHOBIA: 0
WHEEZING: 0
ABDOMINAL PAIN: 0
CHEST TIGHTNESS: 0
CONSTIPATION: 0
DIARRHEA: 0
ABDOMINAL DISTENTION: 0
EYE REDNESS: 0
EYE DISCHARGE: 0
RHINORRHEA: 0
VOMITING: 0
VOICE CHANGE: 0
SINUS PRESSURE: 0
TROUBLE SWALLOWING: 0
BACK PAIN: 0
BLOOD IN STOOL: 0
EYE ITCHING: 0
CHOKING: 0
EYE PAIN: 0
SORE THROAT: 0
COUGH: 0
SHORTNESS OF BREATH: 0

## 2022-08-10 ASSESSMENT — PAIN - FUNCTIONAL ASSESSMENT: PAIN_FUNCTIONAL_ASSESSMENT: 0-10

## 2022-08-10 ASSESSMENT — PAIN DESCRIPTION - ORIENTATION: ORIENTATION: LOWER

## 2022-08-10 ASSESSMENT — PAIN DESCRIPTION - PAIN TYPE: TYPE: ACUTE PAIN

## 2022-08-10 ASSESSMENT — PAIN SCALES - GENERAL: PAINLEVEL_OUTOF10: 5

## 2022-08-10 NOTE — ED TRIAGE NOTES
Pt presents to the ED from home with complaints of lower abdominal pain along with some blood in her urine. Pt states she feels a lot of pressure and pain in her lower abdomen and that it hurts when she pees. Pt is concerned she might have a UTI. Pt is alert and oriented x4 with respirations even and unlabored.

## 2022-08-10 NOTE — ED PROVIDER NOTES
[facility]  eMERGENCY dEPARTMENT eNCOUnter          CHIEF COMPLAINT       Chief Complaint   Patient presents with    Abdominal Pain    Hematuria       Nurses Notes reviewed and I agree except as noted in the HPI. HISTORY OF PRESENT ILLNESS    Ariel Rebollar is a 80 y.o. female who presents complaints of lower abdominal pain blood in the urine. Patient states she is got a lot of pressure, she feels like she is incompletely voids, it does hurt when she urinates. Patient states the pain stays right in her suprapubic area without radiation referral.  Patient denies any fevers chills sweats. She has not had any change in her bowel habits. No blood in the stool. Patient has no chest pain or shortness of breath. No lightheadedness dizziness or feelings that she is going to pass out. Patient is otherwise resting comfortably on the cot no apparent distress. REVIEW OF SYSTEMS     Review of Systems   Constitutional:  Negative for activity change, appetite change, diaphoresis, fatigue and unexpected weight change. HENT:  Negative for congestion, ear discharge, ear pain, hearing loss, rhinorrhea, sinus pressure, sore throat, trouble swallowing and voice change. Eyes:  Negative for photophobia, pain, discharge, redness and itching. Respiratory:  Negative for cough, choking, chest tightness, shortness of breath and wheezing. Cardiovascular:  Negative for chest pain, palpitations and leg swelling. Gastrointestinal:  Negative for abdominal distention, abdominal pain, blood in stool, constipation, diarrhea, nausea and vomiting. Endocrine: Negative for polydipsia, polyphagia and polyuria. Genitourinary:  Positive for difficulty urinating, dysuria, frequency and hematuria. Negative for decreased urine volume, enuresis, pelvic pain, urgency, vaginal bleeding and vaginal discharge.    Musculoskeletal:  Negative for arthralgias, back pain, gait problem, myalgias, neck pain and neck stiffness. Skin:  Negative for pallor and rash. Allergic/Immunologic: Negative for immunocompromised state. Neurological:  Negative for dizziness, tremors, seizures, syncope, facial asymmetry, weakness, light-headedness, numbness and headaches. Hematological:  Negative for adenopathy. Does not bruise/bleed easily. Psychiatric/Behavioral:  Negative for agitation, hallucinations and suicidal ideas. The patient is not nervous/anxious. PAST MEDICAL HISTORY    has a past medical history of Arthritis, Atrial fibrillation ([facility] [address] Cancer ([facility] [address] CHF (congestive heart failure) ([facility] [address] Chronic renal disease, stage III ([facility] [address] [486708], Gross hematuria, HTN (hypertension), Hyperlipidemia, Pneumonia, and Type 2 diabetes mellitus. SURGICAL HISTORY      has a past surgical history that includes Hysterectomy (1982); Foot surgery; knee surgery (2010); liver biopsy (08/23/2017); Colonoscopy; eye surgery; skin biopsy; and Carpal tunnel release (10/23/2018).     CURRENT MEDICATIONS       Discharge Medication List as of 8/10/2022  2:25 AM        CONTINUE these medications which have NOT CHANGED    Details   isosorbide mononitrate (IMDUR) 30 MG extended release tablet Take 1 tablet by mouth daily, Disp-30 tablet, R-3Normal      sacubitril-valsartan (ENTRESTO) 24-26 MG per tablet Take 1 tablet by mouth 2 times daily, Disp-60 tablet, R-0Normal      bumetanide (BUMEX) 2 MG tablet Take 0.5 tablets by mouth 2 times daily, Disp-30 tablet, R-1Normal      rivaroxaban (XARELTO) 15 MG TABS tablet Take 1 tablet by mouth daily, Disp-90 tablet, R-3Normal      atenolol (TENORMIN) 25 MG tablet Take 1 tablet by mouth daily, Disp-90 tablet, R-3Normal      amLODIPine (NORVASC) 5 MG tablet Take 1 tablet by mouth daily, Disp-90 tablet, R-3Normal      cloNIDine (CATAPRES) 0.1 MG tablet Take 1 tablet by mouth 2 times daily as needed for High Blood Pressure (for SBP >160) If over 160/100, Disp-60 tablet, R-3Adjust Sig      potassium chloride (KLOR-CON M) 20 MEQ extended release tablet Take 20 mEq by mouth dailyHistorical Med      loteprednol (LOTEMAX) 0.5 % ophthalmic suspension Place 1 drop into the right eye every other dayHistorical Med      nitroGLYCERIN (NITROSTAT) 0.4 MG SL tablet up to max of 3 total doses. If no relief after 1 dose, call 911., Disp-25 tablet, R-3Print      Multiple Vitamin (MULTI-VITAMIN DAILY PO) Take by mouth dailyHistorical Med      Glucosamine-Chondroitin (GLUCOSAMINE CHONDR COMPLEX PO) Take 2 tablets by mouth daily Move FreeHistorical Med      Cholecalciferol (VITAMIN D-3 PO) Take 1,000 Units by mouth daily Historical Med      Multiple Vitamins-Minerals (OCUVITE PO) Take 1 tablet by mouth daily Historical Med      calcium carbonate 600 MG TABS tablet Take 1 tablet by mouth daily. ALLERGIES     is allergic to bactrim [sulfamethoxazole-trimethoprim], ciprofloxacin, and sulfa antibiotics. FAMILY HISTORY     She indicated that the status of her mother is unknown. She indicated that the status of her father is unknown. She indicated that the status of her brother is unknown.   family history includes Arthritis in her father; Cancer in her brother, mother, sister, and sister; Diabetes in her sister and sister; Early Death in her brother; Heart Attack in her father; Heart Disease in her father; High Blood Pressure in her father. SOCIAL HISTORY      reports that she has never smoked. She has never used smokeless tobacco. She reports that she does not drink alcohol and does not use drugs. PHYSICAL EXAM     INITIAL VITALS:  height is 5' 4\" (1.626 m) and weight is 145 lb (65.8 kg). Her oral temperature is 97.8 °F (36.6 °C). Her blood pressure is 118/57 (abnormal) and her pulse is 60. Her respiration is 19 and oxygen saturation is 97%. Physical Exam  Vitals and nursing note reviewed. Constitutional:       General: She is not in acute distress. Appearance: She is well-developed. She is not diaphoretic. Gait normal.      Deep Tendon Reflexes: Reflexes are normal and symmetric. Reflexes normal.   Psychiatric:         Speech: Speech normal.         Behavior: Behavior normal.         Thought Content: Thought content normal.         Judgment: Judgment normal.         DIFFERENTIAL DIAGNOSIS:   Urinary tract infection cystitis pyelonephritis    DIAGNOSTIC RESULTS     EKG: All EKG's are interpreted by the Emergency Department Physician who either signs or Co-signs this chart in the absence of a cardiologist.  None    RADIOLOGY: non-plain film images(s) such as CT, Ultrasound and MRI are read by the radiologist.  XR CHEST PORTABLE   Final Result   Impression:      No acute processes.       This document has been electronically signed by: Alex Thacker MD on    08/10/2022 12:30 AM        .    LABS:   Labs Reviewed   CBC WITH AUTO DIFFERENTIAL - Abnormal; Notable for the following components:       Result Value    RDW-SD 50.5 (*)     All other components within normal limits   COMPREHENSIVE METABOLIC PANEL - Abnormal; Notable for the following components:    Glucose 127 (*)     BUN 29 (*)     Chloride 97 (*)     All other components within normal limits   URINE WITH REFLEXED MICRO - Abnormal; Notable for the following components:    Bilirubin Urine SMALL (*)     Blood, Urine LARGE (*)     Protein, UA >= 300 (*)     Leukocyte Esterase, Urine LARGE (*)     Color, UA RED (*)     All other components within normal limits   GLOMERULAR FILTRATION RATE, ESTIMATED - Abnormal; Notable for the following components:    Est, Glom Filt Rate 52 (*)     All other components within normal limits   COVID-19, RAPID   CULTURE, REFLEXED, URINE   SPECIMEN REJECTION   TROPONIN   MAGNESIUM   TSH   BILE ACIDS, TOTAL   ANION GAP   OSMOLALITY       EMERGENCY DEPARTMENT COURSE:   Vitals:    Vitals:    08/10/22 0006 08/10/22 0047 08/10/22 0135   BP: (!) 148/88 130/60 (!) 118/57   Pulse: 97 64 60   Resp: 20 22 19   Temp: 97.8 °F (36.6 °C) TempSrc: Oral     SpO2: 93% 95% 97%   Weight: 145 lb (65.8 kg)     Height: 5' 4\" (1.626 m)       Patient was assessed at bedside labs were ordered. Today I find the patient does have a urinary tract infection with hematuria. Patient was given Monurol here. She was given subsequent Monurol for at home. She is instructed to follow-up with a primary care physician and do so within the next 1 to 2 days. She is instructed to return to the nearest emergency room immediately for any new or worsening complaints. Patient understood and agreed with plan. Patient is subsequently discharged home in stable condition. Patient has what appears to be urinary tract infection. Patient has been given 1 dose of fosfomycin here she has been given a prescription for 2 more doses which are to be taken on days 3 and day 5. She is instructed to use Tylenol and Motrin for any fevers. She is instructed to follow-up with primary care physician and do so within the next 1 to 2 days. She is instructed return to the nearest emergency room immediately for any new or worsening complaints. CRITICAL CARE:   None    CONSULTS:  None    PROCEDURES:  None    FINAL IMPRESSION      1.  Urinary tract infection with hematuria, site unspecified          DISPOSITION/PLAN   Discharge    PATIENT REFERRED TO:  [address]  [address]  [phone]    Call in 1 day      DISCHARGE MEDICATIONS:  Discharge Medication List as of 8/10/2022  2:25 AM        START taking these medications    Details   fosfomycin tromethamine (MONUROL) 3 g PACK Take 1 packet by mouth every 3 days Take 1 pack on day 3 and 1 pack on day 5, Disp-2 each, R-0Print             (Please note that portions of this note were completed with a voice recognition program.  Efforts were made to edit the dictations but occasionally words are mis-transcribed.)    DO Erika Pemberton DO  08/10/22 1808

## 2022-08-11 LAB
ORGANISM: ABNORMAL
URINE CULTURE REFLEX: ABNORMAL

## 2022-09-08 ENCOUNTER — HOSPITAL ENCOUNTER (EMERGENCY)
Age: 87
Discharge: HOME | End: 2022-09-08
Attending: EMERGENCY MEDICINE
Payer: MEDICARE

## 2022-09-08 ENCOUNTER — PROCEDURE VISIT (OUTPATIENT)
Dept: CARDIOLOGY CLINIC | Age: 87
End: 2022-09-08
Payer: MEDICARE

## 2022-09-08 ENCOUNTER — APPOINTMENT (OUTPATIENT)
Dept: GENERAL RADIOLOGY | Age: 87
End: 2022-09-08
Payer: MEDICARE

## 2022-09-08 VITALS
SYSTOLIC BLOOD PRESSURE: 154 MMHG | HEIGHT: 64 IN | WEIGHT: 145 LBS | OXYGEN SATURATION: 98 % | HEART RATE: 66 BPM | BODY MASS INDEX: 24.75 KG/M2 | RESPIRATION RATE: 17 BRPM | DIASTOLIC BLOOD PRESSURE: 65 MMHG | TEMPERATURE: 98.1 F

## 2022-09-08 DIAGNOSIS — I10 UNCONTROLLED HYPERTENSION: ICD-10-CM

## 2022-09-08 DIAGNOSIS — I10 PRIMARY HYPERTENSION: Primary | ICD-10-CM

## 2022-09-08 DIAGNOSIS — Z95.0 PACEMAKER: Primary | ICD-10-CM

## 2022-09-08 LAB
ANION GAP SERPL CALCULATED.3IONS-SCNC: 13 MEQ/L (ref 8–16)
BACTERIA: ABNORMAL
BASOPHILS # BLD: 0.4 %
BASOPHILS ABSOLUTE: 0 THOU/MM3 (ref 0–0.1)
BILIRUBIN URINE: NEGATIVE
BLOOD, URINE: NEGATIVE
BUN BLDV-MCNC: 26 MG/DL (ref 7–22)
CALCIUM SERPL-MCNC: 10.2 MG/DL (ref 8.5–10.5)
CASTS: ABNORMAL /LPF
CASTS: ABNORMAL /LPF
CHARACTER, URINE: CLEAR
CHLORIDE BLD-SCNC: 93 MEQ/L (ref 98–111)
CO2: 32 MEQ/L (ref 23–33)
COLOR: YELLOW
CREAT SERPL-MCNC: 0.9 MG/DL (ref 0.4–1.2)
CRYSTALS: ABNORMAL
EKG ATRIAL RATE: 357 BPM
EKG Q-T INTERVAL: 440 MS
EKG QRS DURATION: 176 MS
EKG QTC CALCULATION (BAZETT): 481 MS
EKG R AXIS: 117 DEGREES
EKG T AXIS: -28 DEGREES
EKG VENTRICULAR RATE: 72 BPM
EOSINOPHIL # BLD: 0.4 %
EOSINOPHILS ABSOLUTE: 0 THOU/MM3 (ref 0–0.4)
EPITHELIAL CELLS, UA: ABNORMAL /HPF
ERYTHROCYTE [DISTWIDTH] IN BLOOD BY AUTOMATED COUNT: 14.2 % (ref 11.5–14.5)
ERYTHROCYTE [DISTWIDTH] IN BLOOD BY AUTOMATED COUNT: 49.5 FL (ref 35–45)
GFR SERPL CREATININE-BSD FRML MDRD: 58 ML/MIN/1.73M2
GLUCOSE BLD-MCNC: 159 MG/DL (ref 70–108)
GLUCOSE, URINE: NEGATIVE MG/DL
HCT VFR BLD CALC: 42.9 % (ref 37–47)
HEMOGLOBIN: 14.3 GM/DL (ref 12–16)
IMMATURE GRANS (ABS): 0.01 THOU/MM3 (ref 0–0.07)
IMMATURE GRANULOCYTES: 0.1 %
KETONES, URINE: NEGATIVE
LEUKOCYTE ESTERASE, URINE: ABNORMAL
LYMPHOCYTES # BLD: 24 %
LYMPHOCYTES ABSOLUTE: 2 THOU/MM3 (ref 1–4.8)
MCH RBC QN AUTO: 31.8 PG (ref 26–33)
MCHC RBC AUTO-ENTMCNC: 33.3 GM/DL (ref 32.2–35.5)
MCV RBC AUTO: 95.5 FL (ref 81–99)
MISCELLANEOUS LAB TEST RESULT: ABNORMAL
MONOCYTES # BLD: 6.8 %
MONOCYTES ABSOLUTE: 0.6 THOU/MM3 (ref 0.4–1.3)
NITRITE, URINE: NEGATIVE
NUCLEATED RED BLOOD CELLS: 0 /100 WBC
OSMOLALITY CALCULATION: 283.8 MOSMOL/KG (ref 275–300)
PH UA: 7.5 (ref 5–9)
PLATELET # BLD: 198 THOU/MM3 (ref 130–400)
PMV BLD AUTO: 9.9 FL (ref 9.4–12.4)
POTASSIUM SERPL-SCNC: 4.3 MEQ/L (ref 3.5–5.2)
PRO-BNP: 1651 PG/ML (ref 0–1800)
PROTEIN UA: NEGATIVE MG/DL
RBC # BLD: 4.49 MILL/MM3 (ref 4.2–5.4)
RBC URINE: ABNORMAL /HPF
RENAL EPITHELIAL, UA: ABNORMAL
SEG NEUTROPHILS: 68.3 %
SEGMENTED NEUTROPHILS ABSOLUTE COUNT: 5.8 THOU/MM3 (ref 1.8–7.7)
SODIUM BLD-SCNC: 138 MEQ/L (ref 135–145)
SPECIFIC GRAVITY UA: < 1.005 (ref 1–1.03)
TROPONIN T: < 0.01 NG/ML
TROPONIN T: < 0.01 NG/ML
UROBILINOGEN, URINE: 0.2 EU/DL (ref 0–1)
WBC # BLD: 8.5 THOU/MM3 (ref 4.8–10.8)
WBC UA: ABNORMAL /HPF
YEAST: ABNORMAL

## 2022-09-08 PROCEDURE — 83880 ASSAY OF NATRIURETIC PEPTIDE: CPT

## 2022-09-08 PROCEDURE — 93296 REM INTERROG EVL PM/IDS: CPT | Performed by: INTERNAL MEDICINE

## 2022-09-08 PROCEDURE — 81001 URINALYSIS AUTO W/SCOPE: CPT

## 2022-09-08 PROCEDURE — 93005 ELECTROCARDIOGRAM TRACING: CPT | Performed by: EMERGENCY MEDICINE

## 2022-09-08 PROCEDURE — 80048 BASIC METABOLIC PNL TOTAL CA: CPT

## 2022-09-08 PROCEDURE — 99285 EMERGENCY DEPT VISIT HI MDM: CPT

## 2022-09-08 PROCEDURE — 93294 REM INTERROG EVL PM/LDLS PM: CPT | Performed by: INTERNAL MEDICINE

## 2022-09-08 PROCEDURE — 85025 COMPLETE CBC W/AUTO DIFF WBC: CPT

## 2022-09-08 PROCEDURE — 71045 X-RAY EXAM CHEST 1 VIEW: CPT

## 2022-09-08 PROCEDURE — 93010 ELECTROCARDIOGRAM REPORT: CPT | Performed by: INTERNAL MEDICINE

## 2022-09-08 PROCEDURE — 36415 COLL VENOUS BLD VENIPUNCTURE: CPT

## 2022-09-08 PROCEDURE — 84484 ASSAY OF TROPONIN QUANT: CPT

## 2022-09-08 ASSESSMENT — ENCOUNTER SYMPTOMS
NAUSEA: 0
ABDOMINAL PAIN: 0
COLOR CHANGE: 0
BACK PAIN: 0
STRIDOR: 0
SORE THROAT: 0
SHORTNESS OF BREATH: 0
PHOTOPHOBIA: 0
WHEEZING: 0
TROUBLE SWALLOWING: 0
VOICE CHANGE: 0
VOMITING: 0
CHEST TIGHTNESS: 0
COUGH: 0
DIARRHEA: 0

## 2022-09-08 ASSESSMENT — PAIN - FUNCTIONAL ASSESSMENT: PAIN_FUNCTIONAL_ASSESSMENT: NONE - DENIES PAIN

## 2022-09-08 NOTE — ED NOTES
Box lunch and gingerale provided to pt per request. Pt to and from bathroom. Gait steady.       Keegan 139, RN  09/08/22 0878

## 2022-09-08 NOTE — ED NOTES
Pt to ED via EMS w/rprts of elevated BP this morning. States \"a feeling came over me\". States taking 2 nitro prior to ambulance arrival. States feeling back to normal now. Denies all pains. Vitals updated. Pt placed on cardiac monitor and in gown. EKG obtained and protocol orders initiated.       Sanju Cleveland RN  09/08/22 7853

## 2022-09-08 NOTE — DISCHARGE INSTRUCTIONS
Come back to the emergency department if severe chest pain, severe shortness of breath, persistence of fever, intractable vomiting, severe dizziness or lightheadedness, fainting. An appointment with core cardiology/chest pain clinic was scheduled for next Monday at 9:40 AM.  Please go. Please schedule an appointment with your regular cardiologist.  Schedule an appointment with your primary care physician as recommended previously. Read the documents attached.

## 2022-09-08 NOTE — ED PROVIDER NOTES
urine volume, dysuria, frequency, hematuria and urgency. Musculoskeletal:  Negative for arthralgias, back pain, neck pain and neck stiffness. Skin:  Negative for color change, pallor, rash and wound. Neurological:  Negative for dizziness, tremors, seizures, syncope, facial asymmetry, speech difficulty, weakness, light-headedness, numbness and headaches. Psychiatric/Behavioral:  Negative for agitation, confusion and suicidal ideas. PAST MEDICAL AND SURGICAL HISTORY     Past Medical History:   Diagnosis Date    Arthritis     Atrial fibrillation ([facility] [address]     Cancer ([facility]     Skin Cancer    CHF (congestive heart failure) ([facility]     Chronic renal disease, stage III [facility]) [109596] 5/18/2022    Gross hematuria 2014    Dr Jaime Whatley    HTN (hypertension)     Hyperlipidemia     Pneumonia 12/2/2018    Type 2 diabetes mellitus 11/23/2021     Past Surgical History:   Procedure Laterality Date    CARPAL TUNNEL RELEASE  10/23/2018    right hand    COLONOSCOPY      EYE SURGERY      FOOT SURGERY      x2    HYSTERECTOMY (CERVIX STATUS UNKNOWN)  1982    KNEE SURGERY  2010    LIVER BIOPSY  08/23/2017    SKIN BIOPSY           MEDICATIONS   No current facility-administered medications for this encounter.     Current Outpatient Medications:     fosfomycin tromethamine (MONUROL) 3 g PACK, Take 1 packet by mouth every 3 days Take 1 pack on day 3 and 1 pack on day 5, Disp: 2 each, Rfl: 0    isosorbide mononitrate (IMDUR) 30 MG extended release tablet, Take 1 tablet by mouth daily, Disp: 30 tablet, Rfl: 3    sacubitril-valsartan (ENTRESTO) 24-26 MG per tablet, Take 1 tablet by mouth 2 times daily, Disp: 60 tablet, Rfl: 0    bumetanide (BUMEX) 2 MG tablet, Take 0.5 tablets by mouth 2 times daily, Disp: 30 tablet, Rfl: 1    rivaroxaban (XARELTO) 15 MG TABS tablet, Take 1 tablet by mouth daily, Disp: 90 tablet, Rfl: 3    atenolol (TENORMIN) 25 MG tablet, Take 1 tablet by mouth daily, Disp: 90 tablet, Rfl: 3    amLODIPine (NORVASC) 5 MG tablet, Take 1 tablet by mouth daily, Disp: 90 tablet, Rfl: 3    cloNIDine (CATAPRES) 0.1 MG tablet, Take 1 tablet by mouth 2 times daily as needed for High Blood Pressure (for SBP >160) If over 160/100, Disp: 60 tablet, Rfl: 3    potassium chloride (KLOR-CON M) 20 MEQ extended release tablet, Take 20 mEq by mouth daily, Disp: , Rfl:     loteprednol (LOTEMAX) 0.5 % ophthalmic suspension, Place 1 drop into the right eye every other day, Disp: , Rfl:     nitroGLYCERIN (NITROSTAT) 0.4 MG SL tablet, up to max of 3 total doses. If no relief after 1 dose, call 911., Disp: 25 tablet, Rfl: 3    Multiple Vitamin (MULTI-VITAMIN DAILY PO), Take by mouth daily, Disp: , Rfl:     Glucosamine-Chondroitin (GLUCOSAMINE CHONDR COMPLEX PO), Take 2 tablets by mouth daily Move Free, Disp: , Rfl:     Cholecalciferol (VITAMIN D-3 PO), Take 1,000 Units by mouth daily , Disp: , Rfl:     Multiple Vitamins-Minerals (OCUVITE PO), Take 1 tablet by mouth daily , Disp: , Rfl:     calcium carbonate 600 MG TABS tablet, Take 1 tablet by mouth daily. , Disp: , Rfl:       SOCIAL HISTORY     Social History     Social History Narrative    - Never     Social History     Tobacco Use    Smoking status: Never    Smokeless tobacco: Never   Vaping Use    Vaping Use: Never used   Substance Use Topics    Alcohol use: No    Drug use: No         ALLERGIES     Allergies   Allergen Reactions    Bactrim [Sulfamethoxazole-Trimethoprim]     Ciprofloxacin Diarrhea    Sulfa Antibiotics          FAMILY HISTORY     Family History   Problem Relation Age of Onset    Cancer Mother     High Blood Pressure Father     Arthritis Father     Heart Attack Father     Heart Disease Father     Cancer Sister     Diabetes Sister     Cancer Brother     Early Death Brother     Cancer Sister     Diabetes Sister          PREVIOUS RECORDS   Previous records reviewed:  Previous medical history of A. fib, DM, CHF, HTN, pacemaker 8 months ago, current medications include Xarelto, Bumex, clonidine, amlodipine, Nitrostat, atenolol . PHYSICAL EXAM     ED Triage Vitals [09/08/22 1337]   BP Temp Temp src Heart Rate Resp SpO2 Height Weight   (!) 142/82 -- -- 75 18 98 % 5' 4\" (1.626 m) 145 lb (65.8 kg)     Initial vital signs and nursing assessment reviewed and normal. Body mass index is 24.89 kg/m². Pulsoximetry is normal per my interpretation. Additional Vital Signs:  Vitals:    09/08/22 1640   BP: (!) 154/65   Pulse: 66   Resp: 17   Temp:    SpO2:        Physical Exam  Constitutional:       General: She is not in acute distress. Appearance: She is not ill-appearing, toxic-appearing or diaphoretic. HENT:      Head: Normocephalic and atraumatic. Right Ear: Tympanic membrane, ear canal and external ear normal.      Left Ear: Tympanic membrane, ear canal and external ear normal.      Nose: No congestion. Mouth/Throat:      Mouth: Mucous membranes are moist.      Pharynx: No oropharyngeal exudate or posterior oropharyngeal erythema. Eyes:      Extraocular Movements: Extraocular movements intact. Pupils: Pupils are equal, round, and reactive to light. Cardiovascular:      Rate and Rhythm: Normal rate and regular rhythm. Pulses: Normal pulses. Heart sounds: No murmur heard. No friction rub. No gallop. Pulmonary:      Effort: Pulmonary effort is normal. No respiratory distress. Breath sounds: Normal breath sounds. No stridor. No wheezing, rhonchi or rales. Chest:      Chest wall: No tenderness. Abdominal:      General: Abdomen is flat. There is no distension. Palpations: Abdomen is soft. Tenderness: There is no abdominal tenderness. There is no guarding or rebound. Musculoskeletal:         General: No swelling, tenderness, deformity or signs of injury. Normal range of motion. Cervical back: No rigidity or tenderness. Skin:     General: Skin is warm. Capillary Refill: Capillary refill takes less than 2 seconds.       Findings: No lesion or rash. Neurological:      General: No focal deficit present. Mental Status: She is alert and oriented to person, place, and time. Sensory: No sensory deficit. Motor: No weakness. Coordination: Coordination normal.   Psychiatric:         Mood and Affect: Mood normal.         Behavior: Behavior normal.           MEDICAL DECISION MAKING   Initial Assessment:   80year-old patient complaining of high systolic blood pressure into episodes associated with hot flashes. Physical examination is unremarkable. Previous medical history of hypertension, CHF, A. fib, diabetes, with multiple antihypertensive medications. Our differentials include but are not exclusive for acute MI, hypertensive crisis, electrolyte imbalance, urinary tract infection. Plan:   EKG. Chest x-ray. Lab work including urinalysis. Reassessment. ED RESULTS   Laboratory results:  Labs Reviewed   CBC WITH AUTO DIFFERENTIAL - Abnormal; Notable for the following components:       Result Value    RDW-SD 49.5 (*)     All other components within normal limits   BASIC METABOLIC PANEL - Abnormal; Notable for the following components:    Chloride 93 (*)     Glucose 159 (*)     BUN 26 (*)     All other components within normal limits   GLOMERULAR FILTRATION RATE, ESTIMATED - Abnormal; Notable for the following components:    Est, Glom Filt Rate 58 (*)     All other components within normal limits   URINALYSIS WITH MICROSCOPIC - Abnormal; Notable for the following components:    Leukocyte Esterase, Urine TRACE (*)     All other components within normal limits   TROPONIN   BRAIN NATRIURETIC PEPTIDE   ANION GAP   OSMOLALITY   TROPONIN       Radiologic studies results:  XR CHEST PORTABLE   Final Result   Cardiomegaly. **This report has been created using voice recognition software. It may contain minor errors which are inherent in voice recognition technology. **      Final report electronically signed by Dr. Antione Layne Carmina Mcneil on 9/8/2022 2:29 PM          ED Medications administered this visit: Medications - No data to display      ED COURSE     ED Course as of 09/08/22 2203   Thu Sep 08, 2022   1504 Second troponin added   [JR]      ED Course User Index  [JR] Richy Price MD     Initial lab work is unremarkable including negative cardiac work-up, EKG with normal sinus rhythm , normal axis, normal [place] segment, narrow QRS, no ST elevation, no ST depressions, normal T waves. Chest x-ray also normal, given episode of symptomatic hypotension less than 3 hours ago a second troponin will be carried out. Second troponin came back negative, patient remains asymptomatic, blood pressure remains below 405 systolic 90 diastolic, consider patient not having hypertensive emergency, however given risk factors associated patient is a scheduled for an appointment with chest pain/cardiology clinic for next Monday; patient will be discharged with alarm signs, recommendations, patient and family present in the room understand and agree. I have attempted to contact patient's cardiologist via [employer]/phone call in order to get a close follow-up; unsuccessfully, reason why patient was scheduled for clinic chest pain appointment. Strict return precautions and follow up instructions were discussed with the patient prior to discharge, with which the patient agrees. MEDICATION CHANGES     Discharge Medication List as of 9/8/2022  7:27 PM            FINAL DISPOSITION     Final diagnoses:   Primary hypertension   Uncontrolled hypertension     Condition: condition: good  Dispo: Discharge to home      This transcription was electronically signed. Parts of this transcriptions may have been dictated by use of voice recognition software and electronically transcribed, and parts may have been transcribed with the assistance of an ED scribe. The transcription may contain errors not detected in proofreading.   Please refer to my supervising physician's documentation if my documentation differs.     Electronically Signed: Kait Zelaya MD, 09/08/22, 10:03 PM        Kait Zelaya MD  Resident  09/08/22 640 Sole Hamlin DO  09/09/22 1053

## 2022-09-12 ENCOUNTER — OFFICE VISIT (OUTPATIENT)
Dept: CARDIOLOGY CLINIC | Age: 87
End: 2022-09-12
Payer: MEDICARE

## 2022-09-12 VITALS
HEART RATE: 80 BPM | RESPIRATION RATE: 18 BRPM | DIASTOLIC BLOOD PRESSURE: 74 MMHG | WEIGHT: 149 LBS | SYSTOLIC BLOOD PRESSURE: 130 MMHG | BODY MASS INDEX: 25.44 KG/M2 | HEIGHT: 64 IN

## 2022-09-12 DIAGNOSIS — Z95.0 PACEMAKER: Primary | ICD-10-CM

## 2022-09-12 DIAGNOSIS — I10 HTN (HYPERTENSION), BENIGN: ICD-10-CM

## 2022-09-12 PROCEDURE — 93000 ELECTROCARDIOGRAM COMPLETE: CPT | Performed by: INTERNAL MEDICINE

## 2022-09-12 PROCEDURE — 99213 OFFICE O/P EST LOW 20 MIN: CPT | Performed by: INTERNAL MEDICINE

## 2022-09-12 PROCEDURE — 1123F ACP DISCUSS/DSCN MKR DOCD: CPT | Performed by: INTERNAL MEDICINE

## 2022-09-12 RX ORDER — ISOSORBIDE MONONITRATE 30 MG/1
30 TABLET, EXTENDED RELEASE ORAL DAILY
Qty: 90 TABLET | Refills: 3 | Status: SHIPPED | OUTPATIENT
Start: 2022-09-12

## 2022-09-12 RX ORDER — BUMETANIDE 1 MG/1
1 TABLET ORAL 2 TIMES DAILY
Qty: 180 TABLET | Refills: 3 | Status: SHIPPED | OUTPATIENT
Start: 2022-09-12

## 2022-09-12 RX ORDER — POTASSIUM CHLORIDE 20 MEQ/1
20 TABLET, EXTENDED RELEASE ORAL DAILY
Qty: 90 TABLET | Refills: 3 | Status: SHIPPED | OUTPATIENT
Start: 2022-09-12

## 2022-09-12 RX ORDER — LISINOPRIL 20 MG/1
TABLET ORAL
COMMUNITY
Start: 2022-08-29 | End: 2022-09-12 | Stop reason: SDUPTHER

## 2022-09-12 RX ORDER — CLONIDINE HYDROCHLORIDE 0.1 MG/1
0.1 TABLET ORAL 2 TIMES DAILY PRN
Qty: 90 TABLET | Refills: 3 | Status: SHIPPED | OUTPATIENT
Start: 2022-09-12

## 2022-09-12 RX ORDER — BUMETANIDE 1 MG/1
TABLET ORAL
COMMUNITY
Start: 2022-06-21 | End: 2022-09-12 | Stop reason: SDUPTHER

## 2022-09-12 RX ORDER — LISINOPRIL 20 MG/1
20 TABLET ORAL DAILY
Qty: 90 TABLET | Refills: 3 | Status: SHIPPED | OUTPATIENT
Start: 2022-09-12

## 2022-09-12 RX ORDER — AMLODIPINE BESYLATE 5 MG/1
5 TABLET ORAL DAILY
Qty: 90 TABLET | Refills: 3 | Status: SHIPPED | OUTPATIENT
Start: 2022-09-12

## 2022-09-12 RX ORDER — ATENOLOL 25 MG/1
25 TABLET ORAL DAILY
Qty: 90 TABLET | Refills: 3 | Status: SHIPPED | OUTPATIENT
Start: 2022-09-12

## 2022-09-12 ASSESSMENT — ENCOUNTER SYMPTOMS
SHORTNESS OF BREATH: 0
STRIDOR: 0
CHOKING: 0
VOICE CHANGE: 0
TROUBLE SWALLOWING: 0
ABDOMINAL DISTENTION: 0
COUGH: 0
VOMITING: 0
APNEA: 0
CHEST TIGHTNESS: 0
ANAL BLEEDING: 0
NAUSEA: 0
BLOOD IN STOOL: 0
ABDOMINAL PAIN: 0
WHEEZING: 0
COLOR CHANGE: 0

## 2022-09-12 NOTE — PROGRESS NOTES
[address] [address]  Dept: [phone]  Loc: [phone]     9/12/2022       Hazel Bai is here today for   Chief Complaint   Patient presents with    Follow-up           Referring Physician:  No ref. provider found     Patient Active Problem List   Diagnosis    Renal mass, right    GISELL (stress urinary incontinence, female)    Flushing    Essential hypertension    Chronic atrial fibrillation (HCC)    Hepatic lesion    Chest pain    Hypertensive urgency    Atypical chest pain    Pneumonia    Acute pain of left shoulder    Sepsis ([address]    Cellulitis of right lower extremity    Primary osteoarthritis of left shoulder    Spondylosis of cervical region without myelopathy or radiculopathy    Bilateral pleural effusion    Class 1 obesity due to excess calories with serious comorbidity and body mass index (BMI) of 31.0 to 31.9 in adult    Streptococcal bacteremia    Hyponatremia    Type 2 diabetes mellitus    Symptomatic bradycardia    Dyspnea    Acute on chronic diastolic congestive heart failure (HCC)    Acute pulmonary edema (HCC)    Chronic renal disease, stage III (HCC) [541953]    Thrombocytopenia, unspecified    Shortness of breath    Acute on chronic congestive heart failure (HCC)       Review of Systems   Constitutional:  Negative for activity change, appetite change, fatigue, fever and unexpected weight change. HENT:  Negative for congestion, trouble swallowing and voice change. Eyes:  Negative for visual disturbance. Respiratory:  Negative for apnea, cough, choking, chest tightness, shortness of breath, wheezing and stridor. Cardiovascular:  Negative for chest pain, palpitations and leg swelling. Gastrointestinal:  Negative for abdominal distention, abdominal pain, anal bleeding, blood in stool, nausea and vomiting. Endocrine: Negative for cold intolerance and heat intolerance. Genitourinary:  Negative for hematuria. Musculoskeletal:  Negative for arthralgias, gait problem, joint swelling and myalgias. Skin:  Negative for color change and rash. Allergic/Immunologic: Negative for environmental allergies and food allergies. Neurological:  Negative for dizziness, tremors, syncope, facial asymmetry, weakness, light-headedness, numbness and headaches. Hematological:  Does not bruise/bleed easily. Psychiatric/Behavioral:  Negative for agitation, behavioral problems and sleep disturbance.        Past Medical History:   Diagnosis Date    Arthritis     Atrial fibrillation ([facility] [address]     Cancer ([facility] [address]     Skin Cancer    CHF (congestive heart failure) ([facility]     Chronic renal disease, stage III [facility]) [876815] 5/18/2022    Gross hematuria 2014    Dr Dillon Shoulder    HTN (hypertension)     Hyperlipidemia     Pneumonia 12/2/2018    Type 2 diabetes mellitus 11/23/2021       Allergies   Allergen Reactions    Bactrim [Sulfamethoxazole-Trimethoprim]     Ciprofloxacin Diarrhea    Sulfa Antibiotics        Current Outpatient Medications   Medication Sig Dispense Refill    bumetanide (BUMEX) 1 MG tablet Take 1 tablet by mouth 2 times daily 180 tablet 3    lisinopril (PRINIVIL;ZESTRIL) 20 MG tablet Take 1 tablet by mouth daily 90 tablet 3    isosorbide mononitrate (IMDUR) 30 MG extended release tablet Take 1 tablet by mouth daily 90 tablet 3    rivaroxaban (XARELTO) 15 MG TABS tablet Take 1 tablet by mouth daily 90 tablet 3    atenolol (TENORMIN) 25 MG tablet Take 1 tablet by mouth daily 90 tablet 3    amLODIPine (NORVASC) 5 MG tablet Take 1 tablet by mouth daily 90 tablet 3    cloNIDine (CATAPRES) 0.1 MG tablet Take 1 tablet by mouth 2 times daily as needed for High Blood Pressure (for SBP >160) If over 160/100 90 tablet 3    potassium chloride (KLOR-CON M) 20 MEQ extended release tablet Take 1 tablet by mouth daily 90 tablet 3    fosfomycin tromethamine (MONUROL) 3 g PACK Take 1 packet by mouth every 3 days distal pulses intact, no carotid bruits, no JVD  Pulmonary/Chest: clear to auscultation bilaterally- no wheezes, rales or rhonchi, normal air movement, no respiratory distress  Abdomen: soft, non-tender, non-distended, normal bowel sounds, no masses   Extremities: no cyanosis, clubbing or edema, pulse   Skin: warm and dry, no rash or erythema  Head: normocephalic and atraumatic  Eyes: pupils equal, round, and reactive to light  Neck: supple and non-tender without mass, no thyromegaly   Musculoskeletal: normal range of motion, no joint swelling, deformity or tenderness  Neurological: alert, oriented, normal speech, no focal findings or movement disorder noted    Lab Data:    Cardiac Enzymes:  No results for input(s): CKTOTAL, CKMB, CKMBINDEX, TROPONINI in the last 72 hours.     CBC:   Lab Results   Component Value Date/Time    WBC 8.5 09/08/2022 01:45 PM    RBC 4.49 09/08/2022 01:45 PM    RBC 4.27 07/18/2011 09:30 PM    HGB 14.3 09/08/2022 01:45 PM    HCT 42.9 09/08/2022 01:45 PM     09/08/2022 01:45 PM       CMP:    Lab Results   Component Value Date/Time     09/08/2022 01:45 PM    K 4.3 09/08/2022 01:45 PM    K 4.5 08/02/2022 11:20 AM    CL 93 09/08/2022 01:45 PM    CO2 32 09/08/2022 01:45 PM    BUN 26 09/08/2022 01:45 PM    CREATININE 0.9 09/08/2022 01:45 PM    LABGLOM 58 09/08/2022 01:45 PM    GLUCOSE 159 09/08/2022 01:45 PM    GLUCOSE 111 05/15/2012 09:25 AM    CALCIUM 10.2 09/08/2022 01:45 PM       Hepatic Function Panel:    Lab Results   Component Value Date/Time    ALKPHOS 73 08/10/2022 12:50 AM    ALT 24 08/10/2022 12:50 AM    AST 27 08/10/2022 12:50 AM    PROT 6.2 08/10/2022 12:50 AM    BILITOT 0.4 08/10/2022 12:50 AM    BILIDIR <0.2 08/02/2022 11:20 AM    LABALBU 3.7 08/10/2022 12:50 AM    LABALBU 4.3 05/15/2012 09:25 AM       Magnesium:    Lab Results   Component Value Date/Time    MG 2.3 08/10/2022 12:50 AM       PT/INR:    Lab Results   Component Value Date/Time    PROTIME 2.09 12/20/2011 10:21 AM    INR 2.48 04/21/2022 12:05 AM       HgBA1c:    Lab Results   Component Value Date/Time    LABA1C 6.2 05/13/2022 03:02 PM       FLP:    Lab Results   Component Value Date/Time    TRIG 132 12/15/2021 04:57 AM    HDL 41 12/15/2021 04:57 AM    LDLCALC 55 12/15/2021 04:57 AM    LDLDIRECT 80.55 02/21/2017 10:37 AM       TSH:    Lab Results   Component Value Date/Time    TSH 4.190 08/10/2022 12:50 AM        Diagnosis Orders   1. Pacemaker  00924 - [place] ELECTROCARDIOGRAM, COMPLETE    Basic Metabolic Panel      2. HTN (hypertension), benign  Basic Metabolic Panel           Assessment/Plan    80years old lady who has been in the emergency room recently with chest pain and hypotension. The patient indicated evident on that her blood pressure will rise and she will get the headache and the flushing. I did ask her to take an extra dose of the clonidine when her blood pressure above 160 and she can repeated an hour if her blood pressure was not to drop. I also changed her Norvasc from the morning to the evening dose to have better control of her blood pressure in the early a.m. hours. The patient has a history of pacemaker and she has history of atrial fibs she is on Xarelto. Patient has been physically active she still lives independently. The patient denied chest pain patient has been on the diuretics and potassium supplement overall she indicates she is feeling okay we even discussed her situation with her herself and with her daughter and the family to check on in the accuracy of taking medication. She was advised about salt restriction her medications reconciled and were reordered for her.   The patient to seek medical attention if she had any change in clinical condition she will be seen in 6 months with the lab work and she is to follow-up with family physician long-term care with her age we will continue with the conservative treatment thank you    Orders Placed This Encounter   Procedures    Basic Metabolic Panel     Standing Status:   Future     Standing Expiration Date:   9/12/2023    45294 - [place] ELECTROCARDIOGRAM, COMPLETE       Return in about 6 months (around 3/12/2023) for htn.      Nasrin Wright MD

## 2022-11-01 ENCOUNTER — HOSPITAL ENCOUNTER (OUTPATIENT)
Dept: MRI IMAGING | Age: 87
Discharge: HOME | End: 2022-11-01
Payer: MEDICARE

## 2022-11-01 DIAGNOSIS — N28.9 RENAL LESION: ICD-10-CM

## 2022-11-01 PROCEDURE — 74181 MRI ABDOMEN W/O CONTRAST: CPT

## 2022-11-17 ENCOUNTER — PROCEDURE VISIT (OUTPATIENT)
Dept: CARDIOLOGY CLINIC | Age: 87
End: 2022-11-17

## 2022-11-17 DIAGNOSIS — Z95.0 PACEMAKER: Primary | ICD-10-CM

## 2022-11-17 NOTE — PROGRESS NOTES
[employer] Med[employer]onic Dual Pacemaker   Patient of Lex    Battery 12 years    Presenting rhythm     A Impedance -  RV Impedance 418    P wave sensing -  R wave sensing 12.5    A Threshold - @ -  A Amplitude - @ -  RV Thresholds 0.75 @ 0.40  RV Amplitude 2.0 @ 0.40      A Paced -%  V Paced 99.8%    Programmed Mode VVIR       Afib [place] -%    Episodes   none

## 2022-11-22 ENCOUNTER — OFFICE VISIT (OUTPATIENT)
Dept: UROLOGY | Age: 87
End: 2022-11-22
Payer: MEDICARE

## 2022-11-22 VITALS
HEIGHT: 63 IN | SYSTOLIC BLOOD PRESSURE: 124 MMHG | WEIGHT: 145 LBS | DIASTOLIC BLOOD PRESSURE: 82 MMHG | BODY MASS INDEX: 25.69 KG/M2

## 2022-11-22 DIAGNOSIS — N28.9 RENAL LESION: Primary | ICD-10-CM

## 2022-11-22 PROCEDURE — 1123F ACP DISCUSS/DSCN MKR DOCD: CPT | Performed by: NURSE PRACTITIONER

## 2022-11-22 PROCEDURE — 99213 OFFICE O/P EST LOW 20 MIN: CPT | Performed by: NURSE PRACTITIONER

## 2022-11-22 ASSESSMENT — ENCOUNTER SYMPTOMS
VOMITING: 0
NAUSEA: 0
BACK PAIN: 0
ABDOMINAL PAIN: 0

## 2022-12-02 ENCOUNTER — APPOINTMENT (OUTPATIENT)
Dept: GENERAL RADIOLOGY | Age: 87
End: 2022-12-02
Payer: MEDICARE

## 2022-12-02 ENCOUNTER — HOSPITAL ENCOUNTER (EMERGENCY)
Age: 87
Discharge: HOME | End: 2022-12-02
Attending: EMERGENCY MEDICINE
Payer: MEDICARE

## 2022-12-02 VITALS
WEIGHT: 145 LBS | BODY MASS INDEX: 25.69 KG/M2 | SYSTOLIC BLOOD PRESSURE: 143 MMHG | DIASTOLIC BLOOD PRESSURE: 62 MMHG | HEART RATE: 69 BPM | RESPIRATION RATE: 18 BRPM | TEMPERATURE: 98 F | OXYGEN SATURATION: 96 %

## 2022-12-02 DIAGNOSIS — I50.9 CHRONIC CONGESTIVE HEART FAILURE, UNSPECIFIED HEART FAILURE TYPE (HCC): Primary | ICD-10-CM

## 2022-12-02 LAB
ANION GAP SERPL CALCULATED.3IONS-SCNC: 11 MEQ/L (ref 8–16)
BASOPHILS # BLD: 0.3 %
BASOPHILS ABSOLUTE: 0 THOU/MM3 (ref 0–0.1)
BUN BLDV-MCNC: 25 MG/DL (ref 7–22)
CALCIUM SERPL-MCNC: 10 MG/DL (ref 8.5–10.5)
CHLORIDE BLD-SCNC: 94 MEQ/L (ref 98–111)
CO2: 31 MEQ/L (ref 23–33)
CREAT SERPL-MCNC: 0.9 MG/DL (ref 0.4–1.2)
EKG ATRIAL RATE: 288 BPM
EKG Q-T INTERVAL: 440 MS
EKG QRS DURATION: 172 MS
EKG QTC CALCULATION (BAZETT): 453 MS
EKG R AXIS: 116 DEGREES
EKG T AXIS: -27 DEGREES
EKG VENTRICULAR RATE: 64 BPM
EOSINOPHIL # BLD: 0.8 %
EOSINOPHILS ABSOLUTE: 0.1 THOU/MM3 (ref 0–0.4)
ERYTHROCYTE [DISTWIDTH] IN BLOOD BY AUTOMATED COUNT: 13.9 % (ref 11.5–14.5)
ERYTHROCYTE [DISTWIDTH] IN BLOOD BY AUTOMATED COUNT: 48.1 FL (ref 35–45)
GFR SERPL CREATININE-BSD FRML MDRD: 59 ML/MIN/1.73M2
GLUCOSE BLD-MCNC: 135 MG/DL (ref 70–108)
HCT VFR BLD CALC: 40.7 % (ref 37–47)
HEMOGLOBIN: 13.5 GM/DL (ref 12–16)
IMMATURE GRANS (ABS): 0.02 THOU/MM3 (ref 0–0.07)
IMMATURE GRANULOCYTES: 0.3 %
INFLUENZA A: NOT DETECTED
INFLUENZA B: NOT DETECTED
LYMPHOCYTES # BLD: 17.8 %
LYMPHOCYTES ABSOLUTE: 1.2 THOU/MM3 (ref 1–4.8)
MAGNESIUM: 2.3 MG/DL (ref 1.6–2.4)
MCH RBC QN AUTO: 31.3 PG (ref 26–33)
MCHC RBC AUTO-ENTMCNC: 33.2 GM/DL (ref 32.2–35.5)
MCV RBC AUTO: 94.4 FL (ref 81–99)
MONOCYTES # BLD: 7.4 %
MONOCYTES ABSOLUTE: 0.5 THOU/MM3 (ref 0.4–1.3)
NUCLEATED RED BLOOD CELLS: 0 /100 WBC
PLATELET # BLD: 195 THOU/MM3 (ref 130–400)
PMV BLD AUTO: 9.1 FL (ref 9.4–12.4)
POTASSIUM REFLEX MAGNESIUM: 4.5 MEQ/L (ref 3.5–5.2)
PRO-BNP: 1618 PG/ML (ref 0–1800)
RBC # BLD: 4.31 MILL/MM3 (ref 4.2–5.4)
SARS-COV-2 RNA, RT PCR: NOT DETECTED
SEG NEUTROPHILS: 73.4 %
SEGMENTED NEUTROPHILS ABSOLUTE COUNT: 4.8 THOU/MM3 (ref 1.8–7.7)
SODIUM BLD-SCNC: 136 MEQ/L (ref 135–145)
TROPONIN T: < 0.01 NG/ML
TSH SERPL DL<=0.05 MIU/L-ACNC: 2.67 UIU/ML (ref 0.4–4.2)
WBC # BLD: 6.5 THOU/MM3 (ref 4.8–10.8)

## 2022-12-02 PROCEDURE — 71045 X-RAY EXAM CHEST 1 VIEW: CPT

## 2022-12-02 PROCEDURE — 85025 COMPLETE CBC W/AUTO DIFF WBC: CPT

## 2022-12-02 PROCEDURE — 99285 EMERGENCY DEPT VISIT HI MDM: CPT | Performed by: EMERGENCY MEDICINE

## 2022-12-02 PROCEDURE — 84443 ASSAY THYROID STIM HORMONE: CPT

## 2022-12-02 PROCEDURE — 87636 SARSCOV2 & INF A&B AMP PRB: CPT

## 2022-12-02 PROCEDURE — 36415 COLL VENOUS BLD VENIPUNCTURE: CPT

## 2022-12-02 PROCEDURE — 93010 ELECTROCARDIOGRAM REPORT: CPT | Performed by: NUCLEAR MEDICINE

## 2022-12-02 PROCEDURE — 83735 ASSAY OF MAGNESIUM: CPT

## 2022-12-02 PROCEDURE — 93005 ELECTROCARDIOGRAM TRACING: CPT | Performed by: STUDENT IN AN ORGANIZED HEALTH CARE EDUCATION/TRAINING PROGRAM

## 2022-12-02 PROCEDURE — 80048 BASIC METABOLIC PNL TOTAL CA: CPT

## 2022-12-02 PROCEDURE — 84484 ASSAY OF TROPONIN QUANT: CPT

## 2022-12-02 PROCEDURE — 6370000000 HC RX 637 (ALT 250 FOR IP): Performed by: STUDENT IN AN ORGANIZED HEALTH CARE EDUCATION/TRAINING PROGRAM

## 2022-12-02 PROCEDURE — 83880 ASSAY OF NATRIURETIC PEPTIDE: CPT

## 2022-12-02 RX ORDER — BUMETANIDE 0.25 MG/ML
1 INJECTION, SOLUTION INTRAMUSCULAR; INTRAVENOUS ONCE
Status: DISCONTINUED | OUTPATIENT
Start: 2022-12-02 | End: 2022-12-02

## 2022-12-02 RX ORDER — BUMETANIDE 1 MG/1
1 TABLET ORAL ONCE
Status: COMPLETED | OUTPATIENT
Start: 2022-12-02 | End: 2022-12-02

## 2022-12-02 RX ADMIN — BUMETANIDE 1 MG: 1 TABLET ORAL at 12:34

## 2022-12-02 ASSESSMENT — ENCOUNTER SYMPTOMS
CONSTIPATION: 0
RHINORRHEA: 0
SORE THROAT: 0
WHEEZING: 0
DIARRHEA: 0
BACK PAIN: 0
SHORTNESS OF BREATH: 1
ABDOMINAL DISTENTION: 0
VOMITING: 0
CHEST TIGHTNESS: 0
COLOR CHANGE: 0
NAUSEA: 0
COUGH: 0

## 2022-12-02 NOTE — ED TRIAGE NOTES
Patient to room 2 from triage for complaint of ongoing shortness of breath. Patient is also concerned about her leg swelling. Dr. Morgan Breed at bedside for assessment. Patient becomes tearful and states that she is tired of coming here. She states that this week has been \"really bad\" for her. Patient states that if it is her time she is ready to go, and she has no fear of death. EKG completed at bedside.

## 2022-12-02 NOTE — ED PROVIDER NOTES
[facility] ENCOUNTER          Pt Name: Misty Jefferson  MRN: [id number]  [place] 4/22/1929  Date of Evaluation: 12/2/2022  Treating Resident Physician: Fred Dominguez MD  Supervising Physician: Maricarmen Nichols MD    [address] [place] [address]       Chief Complaint   Patient presents with    Shortness of Breath     History obtained from chart review and the patient. HISTORY OF PRESENT ILLNESS    HPI    Misty Jefferson is a 80 y.o. female with a past medical history significant for hypertension, atrial fibrillation CHF, hyperlipidemia, diabetes mellitus , presents to the emergency department for evaluation of shortness of breath, hypertension. For the past 3 days the patient has had high blood pressure when she wakes up in the morning, for the last few days she is also had shortness of breath. Her symptoms seem to improve when she takes a clonidine. Her blood pressures at home were in the 438T and 536R systolically. She has been to the hospital for similar symptoms over the past few months. She has not seen her primary care physician in a number of months. However is scheduled to see them in 1 week's time. She has been followed in the cardiology clinic by both her primary cardiologist Olegario Issa and the NP team.  She also notes multiple times during my initial exam that she is prepared to die. The patient has no other acute complaints at this time. REVIEW OF SYSTEMS   Review of Systems   Constitutional:  Negative for activity change, appetite change, chills, diaphoresis, fatigue, fever and unexpected weight change. HENT:  Negative for congestion, nosebleeds, rhinorrhea, sneezing and sore throat. Respiratory:  Positive for shortness of breath. Negative for cough, chest tightness and wheezing. Cardiovascular:  Positive for palpitations and leg swelling. Negative for chest pain.    Gastrointestinal:  Negative for abdominal distention, constipation, diarrhea, nausea and vomiting. Genitourinary:  Negative for difficulty urinating, dysuria, frequency, hematuria, urgency, vaginal bleeding and vaginal discharge. Musculoskeletal:  Negative for back pain and neck pain. Skin:  Negative for color change, pallor, rash and wound. Allergic/Immunologic: Negative for environmental allergies and food allergies. Neurological:  Negative for dizziness, syncope, weakness, numbness and headaches. Hematological:  Negative for adenopathy. Does not bruise/bleed easily. Psychiatric/Behavioral:  Negative for agitation and confusion. All other systems reviewed and are negative. PAST MEDICAL AND SURGICAL HISTORY     Past Medical History:   Diagnosis Date    Arthritis     Atrial fibrillation ([facility]     Cancer ([facility] [address]     Skin Cancer    CHF (congestive heart failure) ([facility] [address]     Chronic renal disease, stage III [facility]) [089158] 5/18/2022    Gross hematuria 2014    Dr Jocelyn Bolaños    HTN (hypertension)     Hyperlipidemia     Pneumonia 12/2/2018    Type 2 diabetes mellitus 11/23/2021     Past Surgical History:   Procedure Laterality Date    CARPAL TUNNEL RELEASE  10/23/2018    right hand    COLONOSCOPY      EYE SURGERY      FOOT SURGERY      x2    HYSTERECTOMY (CERVIX STATUS UNKNOWN)  1982    KNEE SURGERY  2010    LIVER BIOPSY  08/23/2017    SKIN BIOPSY           MEDICATIONS   No current facility-administered medications for this encounter.     Current Outpatient Medications:     bumetanide (BUMEX) 1 MG tablet, Take 1 tablet by mouth 2 times daily, Disp: 180 tablet, Rfl: 3    lisinopril (PRINIVIL;ZESTRIL) 20 MG tablet, Take 1 tablet by mouth daily, Disp: 90 tablet, Rfl: 3    isosorbide mononitrate (IMDUR) 30 MG extended release tablet, Take 1 tablet by mouth daily, Disp: 90 tablet, Rfl: 3    rivaroxaban (XARELTO) 15 MG TABS tablet, Take 1 tablet by mouth daily, Disp: 90 tablet, Rfl: 3    atenolol (TENORMIN) 25 MG tablet, Take 1 tablet by mouth daily, Disp: 90 tablet, Rfl: 3    amLODIPine (NORVASC) 5 MG tablet, Take 1 tablet by mouth daily, Disp: 90 tablet, Rfl: 3    cloNIDine (CATAPRES) 0.1 MG tablet, Take 1 tablet by mouth 2 times daily as needed for High Blood Pressure (for SBP >160) If over 160/100, Disp: 90 tablet, Rfl: 3    potassium chloride (KLOR-CON M) 20 MEQ extended release tablet, Take 1 tablet by mouth daily, Disp: 90 tablet, Rfl: 3    fosfomycin tromethamine (MONUROL) 3 g PACK, Take 1 packet by mouth every 3 days Take 1 pack on day 3 and 1 pack on day 5, Disp: 2 each, Rfl: 0    loteprednol (LOTEMAX) 0.5 % ophthalmic suspension, Place 1 drop into the right eye every other day, Disp: , Rfl:     nitroGLYCERIN (NITROSTAT) 0.4 MG SL tablet, up to max of 3 total doses. If no relief after 1 dose, call 911., Disp: 25 tablet, Rfl: 3    Multiple Vitamin (MULTI-VITAMIN DAILY PO), Take by mouth daily, Disp: , Rfl:     Glucosamine-Chondroitin (GLUCOSAMINE CHONDR COMPLEX PO), Take 2 tablets by mouth daily Move Free, Disp: , Rfl:     Cholecalciferol (VITAMIN D-3 PO), Take 1,000 Units by mouth daily , Disp: , Rfl:     Multiple Vitamins-Minerals (OCUVITE PO), Take 1 tablet by mouth daily , Disp: , Rfl:     calcium carbonate 600 MG TABS tablet, Take 1 tablet by mouth daily. , Disp: , Rfl:       SOCIAL HISTORY     Social History     Social History Narrative    - Never     Social History     Tobacco Use    Smoking status: Never    Smokeless tobacco: Never   Vaping Use    Vaping Use: Never used   Substance Use Topics    Alcohol use: No    Drug use: No         ALLERGIES     Allergies   Allergen Reactions    Bactrim [Sulfamethoxazole-Trimethoprim]     Ciprofloxacin Diarrhea    Sulfa Antibiotics          FAMILY HISTORY     Family History   Problem Relation Age of Onset    Cancer Mother     High Blood Pressure Father     Arthritis Father     Heart Attack Father     Heart Disease Father     Cancer Sister     Diabetes Sister     Cancer Brother     Early Death Brother     Cancer Sister Diabetes Sister          PREVIOUS RECORDS   Previous records reviewed:  Prior ED visits and cardiology notes reviewed from the past few months. .        PHYSICAL EXAM     ED Triage Vitals [22 1031]   BP Temp Temp src Heart Rate Resp SpO2 Height Weight   (!) 172/73 98 °F (36.7 °C) -- 69 18 95 % -- 145 lb (65.8 kg)     Initial vital signs and nursing assessment reviewed and normal. Body mass index is 25.69 kg/m². Pulsoximetry is normal per my interpretation. Additional Vital Signs:  Vitals:    22 1233   BP: (!) 143/62   Pulse: 69   Resp: 18   Temp:    SpO2: 96%       Physical Exam  Vitals and nursing note reviewed. Constitutional:       General: She is not in acute distress. Appearance: Normal appearance. She is normal weight. She is not ill-appearing, toxic-appearing or diaphoretic. HENT:      Head: Normocephalic and atraumatic. Nose: Nose normal.      Mouth/Throat:      Mouth: Mucous membranes are moist.      Pharynx: Oropharynx is clear. Eyes:      Conjunctiva/sclera: Conjunctivae normal.   Cardiovascular:      Rate and Rhythm: Normal rate and regular rhythm. Pulses: Normal pulses. Heart sounds: Normal heart sounds. Pulmonary:      Effort: Pulmonary effort is normal.      Breath sounds: Normal breath sounds. No decreased breath sounds, wheezing, rhonchi or rales. Abdominal:      General: Abdomen is flat. Bowel sounds are normal.      Palpations: Abdomen is soft. Tenderness: There is no abdominal tenderness. Musculoskeletal:      Cervical back: Normal range of motion. Right lower le+ Pitting Edema present. Left lower le+ Pitting Edema present. Skin:     General: Skin is warm and dry. Capillary Refill: Capillary refill takes less than 2 seconds. Neurological:      Mental Status: She is alert and oriented to person, place, and time.            MEDICAL DECISION MAKING   Brief Overview: 80-year-old female with numerous ED visits for symptoms related to hypertension in the morning. Also has a number of anxiety and depression-like features. Initial Differential: Includes but is not limited to acute coronary syndrome, congestive heart failure, chronic hypertension, anxiety, depression, COVID-19, influenza. Initial Testing: ECG, monitor, IV, chest x-ray, CBC, BMP, LFT, troponin, BNP, interrogation of pacemaker, swab for COVID-19 and influenza. Initial Treatment: none    ____________    Final Therapy: Bumetanide 1 mg IV or p.o, incentive spirometry    Final Testing: No additional testing, lab work reviewed and unremarkable, chest x-ray consistent with congestive heart failure with vascular congestion    Discrepancies: none    Final Differential: Congestive heart failure    Aftercare: Patient will be discharged home after dose of bumetanide here in the emergency department. Her symptoms seem stable and she does not require admission for further treatment of her heart failure at this time. We have directed her to follow-up with her cardiologist as well as her primary care provider. Patient also provided with an incentive spirometer and was instructed on its use prior to discharge.     ED RESULTS   Laboratory results:  Labs Reviewed   CBC WITH AUTO DIFFERENTIAL - Abnormal; Notable for the following components:       Result Value    RDW-SD 48.1 (*)     MPV 9.1 (*)     All other components within normal limits   BASIC METABOLIC PANEL W/ REFLEX TO MG FOR LOW K - Abnormal; Notable for the following components:    Chloride 94 (*)     Glucose 135 (*)     BUN 25 (*)     All other components within normal limits   GLOMERULAR FILTRATION RATE, ESTIMATED - Abnormal; Notable for the following components:    Est, Glom Filt Rate 59 (*)     All other components within normal limits   COVID-19 & INFLUENZA COMBO   MAGNESIUM   TROPONIN   TSH   BRAIN NATRIURETIC PEPTIDE   ANION GAP       Radiologic studies results:  XR CHEST PORTABLE   Final Result   Marked cardiomegaly and mild vascular congestion. Subsegmental atelectasis left base. **This report has been created using voice recognition software. It may contain minor errors which are inherent in voice recognition technology. **      Final report electronically signed by Dr. Pito David on 12/2/2022 11:19 AM          ED Medications administered this visit:   Medications   bumetanide (BUMEX) tablet 1 mg (1 mg Oral Given 12/2/22 1234)         ED COURSE         Strict return precautions and follow up instructions were discussed with the patient prior to discharge, with which the patient agrees. PROCEDURES   Procedures      MEDICATION CHANGES     New Prescriptions    No medications on file         FINAL DISPOSITION     Final diagnoses:   Chronic congestive heart failure, unspecified heart failure type (HCC)       Condition: condition: fair  Dispo: Discharge to home      This transcription was electronically signed. Parts of this transcriptions may have been dictated by use of voice recognition software and electronically transcribed, and parts may have been transcribed with the assistance of an ED scribe. The transcription may contain errors not detected in proofreading. Please refer to my supervising physician's documentation if my documentation differs.     Electronically Signed: Milton Edwards MD, 12/02/22, 1:16 PM         Paolo Kelsey MD  Resident  12/02/22 Kamar Dumont MD  Resident  12/02/22 1436

## 2022-12-02 NOTE — DISCHARGE INSTRUCTIONS
You are seen today for shortness of breath and high blood pressure. This is likely related to your heart failure. Take all of your medications as prescribed. Your lab work today was reassuring. Follow-up with your cardiologist Dr. Luke Vazquez follow-up with your primary care physician. If your symptoms are worse or other new concerns these come back to the emergency department for reevaluation.

## 2022-12-02 NOTE — CARE COORDINATION
Spoke with patient and daughter bedside who denied the need for emotional support or counseling. Denied any thoughts of harm to self, others or anxiety. This information was presented to Dr. Emily Tamayo.

## 2022-12-02 NOTE — ED NOTES
In to complete orders, pacer interrogated and COVID/Flu swab obtained. Patient denies needs at this time.       Monika Mosqueda RN  12/02/22 [id number]

## 2023-02-16 ENCOUNTER — PROCEDURE VISIT (OUTPATIENT)
Dept: CARDIOLOGY CLINIC | Age: 88
End: 2023-02-16
Payer: MEDICARE

## 2023-02-16 DIAGNOSIS — Z95.0 PACEMAKER: ICD-10-CM

## 2023-02-16 PROCEDURE — 93279 PRGRMG DEV EVAL PM/LDLS PM: CPT | Performed by: INTERNAL MEDICINE

## 2023-03-07 ENCOUNTER — HOSPITAL ENCOUNTER (OUTPATIENT)
Age: 88
Discharge: HOME | End: 2023-03-07
Payer: MEDICARE

## 2023-03-07 DIAGNOSIS — I10 HTN (HYPERTENSION), BENIGN: ICD-10-CM

## 2023-03-07 DIAGNOSIS — Z95.0 PACEMAKER: ICD-10-CM

## 2023-03-07 LAB
ANION GAP SERPL CALC-SCNC: 11 MEQ/L (ref 8–16)
BUN SERPL-MCNC: 21 MG/DL (ref 7–22)
CALCIUM SERPL-MCNC: 9.7 MG/DL (ref 8.5–10.5)
CHLORIDE SERPL-SCNC: 92 MEQ/L (ref 98–111)
CO2 SERPL-SCNC: 32 MEQ/L (ref 23–33)
CREAT SERPL-MCNC: 0.9 MG/DL (ref 0.4–1.2)
GFR SERPL CREATININE-BSD FRML MDRD: 59 ML/MIN/1.73M2
GLUCOSE SERPL-MCNC: 133 MG/DL (ref 70–108)
POTASSIUM SERPL-SCNC: 4.3 MEQ/L (ref 3.5–5.2)
SODIUM SERPL-SCNC: 135 MEQ/L (ref 135–145)

## 2023-03-07 PROCEDURE — 80048 BASIC METABOLIC PNL TOTAL CA: CPT

## 2023-03-07 PROCEDURE — 36415 COLL VENOUS BLD VENIPUNCTURE: CPT

## 2023-03-07 NOTE — PROGRESS NOTES
[employer] single pacemaker     11.6 years on device   RV imped 399  Rv threshold 0.625 @ 0.4  R waves 13.1  99.7% paced

## 2023-03-09 ENCOUNTER — HOSPITAL ENCOUNTER (EMERGENCY)
Age: 88
Discharge: HOME | End: 2023-03-09
Attending: EMERGENCY MEDICINE
Payer: MEDICARE

## 2023-03-09 ENCOUNTER — APPOINTMENT (OUTPATIENT)
Dept: GENERAL RADIOLOGY | Age: 88
End: 2023-03-09
Payer: MEDICARE

## 2023-03-09 VITALS
RESPIRATION RATE: 18 BRPM | BODY MASS INDEX: 25.34 KG/M2 | HEART RATE: 63 BPM | SYSTOLIC BLOOD PRESSURE: 135 MMHG | HEIGHT: 63 IN | WEIGHT: 143 LBS | TEMPERATURE: 97.8 F | DIASTOLIC BLOOD PRESSURE: 63 MMHG | OXYGEN SATURATION: 94 %

## 2023-03-09 DIAGNOSIS — J18.9 PNEUMONIA DUE TO INFECTIOUS ORGANISM, UNSPECIFIED LATERALITY, UNSPECIFIED PART OF LUNG: Primary | ICD-10-CM

## 2023-03-09 DIAGNOSIS — J40 BRONCHITIS: ICD-10-CM

## 2023-03-09 LAB
ALBUMIN SERPL BCG-MCNC: 3.9 G/DL (ref 3.5–5.1)
ALP SERPL-CCNC: 82 U/L (ref 38–126)
ALT SERPL W/O P-5'-P-CCNC: 33 U/L (ref 11–66)
ANION GAP SERPL CALC-SCNC: 13 MEQ/L (ref 8–16)
AST SERPL-CCNC: 36 U/L (ref 5–40)
BASOPHILS ABSOLUTE: 0 THOU/MM3 (ref 0–0.1)
BASOPHILS NFR BLD AUTO: 0.4 %
BILIRUB CONJ SERPL-MCNC: < 0.2 MG/DL (ref 0–0.3)
BILIRUB SERPL-MCNC: 0.5 MG/DL (ref 0.3–1.2)
BUN SERPL-MCNC: 28 MG/DL (ref 7–22)
CALCIUM SERPL-MCNC: 9.6 MG/DL (ref 8.5–10.5)
CHLORIDE SERPL-SCNC: 94 MEQ/L (ref 98–111)
CO2 SERPL-SCNC: 31 MEQ/L (ref 23–33)
CREAT SERPL-MCNC: 0.9 MG/DL (ref 0.4–1.2)
DEPRECATED RDW RBC AUTO: 48.4 FL (ref 35–45)
EOSINOPHIL NFR BLD AUTO: 1.1 %
EOSINOPHILS ABSOLUTE: 0.1 THOU/MM3 (ref 0–0.4)
ERYTHROCYTE [DISTWIDTH] IN BLOOD BY AUTOMATED COUNT: 14.2 % (ref 11.5–14.5)
FLUAV RNA RESP QL NAA+PROBE: NOT DETECTED
FLUBV RNA RESP QL NAA+PROBE: NOT DETECTED
GFR SERPL CREATININE-BSD FRML MDRD: 59 ML/MIN/1.73M2
GLUCOSE SERPL-MCNC: 119 MG/DL (ref 70–108)
HCT VFR BLD AUTO: 41.9 % (ref 37–47)
HGB BLD-MCNC: 13.3 GM/DL (ref 12–16)
IMM GRANULOCYTES # BLD AUTO: 0.02 THOU/MM3 (ref 0–0.07)
IMM GRANULOCYTES NFR BLD AUTO: 0.4 %
LYMPHOCYTES ABSOLUTE: 1.1 THOU/MM3 (ref 1–4.8)
LYMPHOCYTES NFR BLD AUTO: 19.9 %
MCH RBC QN AUTO: 29.8 PG (ref 26–33)
MCHC RBC AUTO-ENTMCNC: 31.7 GM/DL (ref 32.2–35.5)
MCV RBC AUTO: 93.9 FL (ref 81–99)
MONOCYTES ABSOLUTE: 0.4 THOU/MM3 (ref 0.4–1.3)
MONOCYTES NFR BLD AUTO: 7.8 %
NEUTROPHILS NFR BLD AUTO: 70.4 %
NRBC BLD AUTO-RTO: 0 /100 WBC
NT-PROBNP SERPL IA-MCNC: 1210 PG/ML (ref 0–449)
OSMOLALITY SERPL CALC.SUM OF ELEC: 282.3 MOSMOL/KG (ref 275–300)
PLATELET # BLD AUTO: 194 THOU/MM3 (ref 130–400)
PMV BLD AUTO: 9.3 FL (ref 9.4–12.4)
POTASSIUM SERPL-SCNC: 4.6 MEQ/L (ref 3.5–5.2)
PROT SERPL-MCNC: 6.8 G/DL (ref 6.1–8)
RBC # BLD AUTO: 4.46 MILL/MM3 (ref 4.2–5.4)
SARS-COV-2 RNA RESP QL NAA+PROBE: NOT DETECTED
SEGMENTED NEUTROPHILS ABSOLUTE COUNT: 3.7 THOU/MM3 (ref 1.8–7.7)
SODIUM SERPL-SCNC: 138 MEQ/L (ref 135–145)
TROPONIN T: < 0.01 NG/ML
WBC # BLD AUTO: 5.3 THOU/MM3 (ref 4.8–10.8)

## 2023-03-09 PROCEDURE — 80076 HEPATIC FUNCTION PANEL: CPT

## 2023-03-09 PROCEDURE — 71045 X-RAY EXAM CHEST 1 VIEW: CPT

## 2023-03-09 PROCEDURE — 87636 SARSCOV2 & INF A&B AMP PRB: CPT

## 2023-03-09 PROCEDURE — 85025 COMPLETE CBC W/AUTO DIFF WBC: CPT

## 2023-03-09 PROCEDURE — 80048 BASIC METABOLIC PNL TOTAL CA: CPT

## 2023-03-09 PROCEDURE — 93010 ELECTROCARDIOGRAM REPORT: CPT | Performed by: INTERNAL MEDICINE

## 2023-03-09 PROCEDURE — 99285 EMERGENCY DEPT VISIT HI MDM: CPT | Performed by: EMERGENCY MEDICINE

## 2023-03-09 PROCEDURE — 6370000000 HC RX 637 (ALT 250 FOR IP): Performed by: STUDENT IN AN ORGANIZED HEALTH CARE EDUCATION/TRAINING PROGRAM

## 2023-03-09 PROCEDURE — 83880 ASSAY OF NATRIURETIC PEPTIDE: CPT

## 2023-03-09 PROCEDURE — 36415 COLL VENOUS BLD VENIPUNCTURE: CPT

## 2023-03-09 PROCEDURE — 84484 ASSAY OF TROPONIN QUANT: CPT

## 2023-03-09 PROCEDURE — 94640 AIRWAY INHALATION TREATMENT: CPT

## 2023-03-09 PROCEDURE — 93005 ELECTROCARDIOGRAM TRACING: CPT | Performed by: STUDENT IN AN ORGANIZED HEALTH CARE EDUCATION/TRAINING PROGRAM

## 2023-03-09 PROCEDURE — 6360000002 HC RX W HCPCS: Performed by: STUDENT IN AN ORGANIZED HEALTH CARE EDUCATION/TRAINING PROGRAM

## 2023-03-09 RX ORDER — AZITHROMYCIN 250 MG/1
250 TABLET, FILM COATED ORAL ONCE
Status: COMPLETED | OUTPATIENT
Start: 2023-03-09 | End: 2023-03-09

## 2023-03-09 RX ORDER — AZITHROMYCIN 250 MG/1
250 TABLET, FILM COATED ORAL SEE ADMIN INSTRUCTIONS
Qty: 6 TABLET | Refills: 0 | Status: SHIPPED | OUTPATIENT
Start: 2023-03-09 | End: 2023-03-14

## 2023-03-09 RX ORDER — ALBUTEROL SULFATE 90 UG/1
2 AEROSOL, METERED RESPIRATORY (INHALATION) 4 TIMES DAILY PRN
Qty: 18 G | Refills: 0 | Status: SHIPPED | OUTPATIENT
Start: 2023-03-09

## 2023-03-09 RX ORDER — ALBUTEROL SULFATE 90 UG/1
2 AEROSOL, METERED RESPIRATORY (INHALATION) ONCE
Status: COMPLETED | OUTPATIENT
Start: 2023-03-09 | End: 2023-03-09

## 2023-03-09 RX ORDER — GUAIFENESIN 600 MG/1
600 TABLET, EXTENDED RELEASE ORAL 2 TIMES DAILY
Qty: 30 TABLET | Refills: 0 | Status: SHIPPED | OUTPATIENT
Start: 2023-03-09 | End: 2023-03-24

## 2023-03-09 RX ADMIN — ALBUTEROL SULFATE 5 MG: 2.5 SOLUTION RESPIRATORY (INHALATION) at 19:47

## 2023-03-09 RX ADMIN — AZITHROMYCIN MONOHYDRATE 250 MG: 250 TABLET ORAL at 21:00

## 2023-03-09 RX ADMIN — ALBUTEROL SULFATE 2 PUFF: 90 AEROSOL, METERED RESPIRATORY (INHALATION) at 21:00

## 2023-03-09 ASSESSMENT — PAIN - FUNCTIONAL ASSESSMENT: PAIN_FUNCTIONAL_ASSESSMENT: NONE - DENIES PAIN

## 2023-03-09 ASSESSMENT — PAIN SCALES - GENERAL: PAINLEVEL_OUTOF10: 0

## 2023-03-09 NOTE — ED TRIAGE NOTES
Pt presents to the ED through triage with c/o cough and dry mouth. Pt states symptoms started last Thursday. Pt states she has a history of CHF and fluid overload. Pt states she takes a water pill twice daily. Pt currently denies chest pain. Pt unable to take a deep breath without coughing.  Pt states her daughter was recently transferred to the [facility] yesterday due to newly diagnosed cancer- pt is tearful upon arrival. EKG complete

## 2023-03-09 NOTE — ED PROVIDER NOTES
[facility] [address] [address] EMERGENCY DEPT    EMERGENCY MEDICINE     Pt Name: Tiana West  MRN: [id number]  [place] 4/22/1929  Date of evaluation: 3/9/2023  Treating Resident Physician: Catheryn Collet, MD  Supervising Physician: Sanjuana Aleman, [address] [place] [address]       Chief Complaint   Patient presents with    Cough     History obtained from chart review and the patient. HISTORY OF PRESENT ILLNESS    HPI    Tiana West is a 80 y.o. female with a past medical history significant for atrial fibrillation, CHF, CKD, hypertension, hyperlipidemia, diabetes mellitus , presents to the emergency department for evaluation of cough, dry mouth. Patient reports she is been sick for about a week, continues to take her water pill, denies any chest pain, unable to take a deep breath without coughing. She reports she is usually quite active, her symptoms are slightly worse over the weekend when she did some raking outside. No fever, no chills, no nausea or vomiting, no chest pain. Some shortness of breath when when raking leaves a few days ago. No recent sick contacts. Did note some elevation her blood pressure when she checked at home this morning for she took clonidine that she is prescribed. The patient has no other acute complaints at this time.            REVIEW OF SYSTEMS   Review of Systems  Negative unless documented in HPI    PAST MEDICAL AND SURGICAL HISTORY     Past Medical History:   Diagnosis Date    Arthritis     Atrial fibrillation ([facility]     Cancer ([facility]     Skin Cancer    CHF (congestive heart failure) ([facility]     Chronic renal disease, stage III [facility]) [702054] 5/18/2022    Gross hematuria 2014    Dr Kasey Lai    HTN (hypertension)     Hyperlipidemia     [employer] single pacemaker  3/7/2023    Pneumonia 12/2/2018    Type 2 diabetes mellitus 11/23/2021       Past Surgical History:   Procedure Laterality Date    CARPAL TUNNEL RELEASE  10/23/2018    right hand    COLONOSCOPY      EYE SURGERY      FOOT SURGERY      x2 HYSTERECTOMY (CERVIX STATUS UNKNOWN)  1982    KNEE SURGERY  2010    LIVER BIOPSY  08/23/2017    SKIN BIOPSY         CURRENT MEDICATIONS     Previous Medications    AMLODIPINE (NORVASC) 5 MG TABLET    Take 1 tablet by mouth daily    ATENOLOL (TENORMIN) 25 MG TABLET    Take 1 tablet by mouth daily    BUMETANIDE (BUMEX) 1 MG TABLET    Take 1 tablet by mouth 2 times daily    CALCIUM CARBONATE 600 MG TABS TABLET    Take 1 tablet by mouth daily. CHOLECALCIFEROL (VITAMIN D-3 PO)    Take 1,000 Units by mouth daily     CLONIDINE (CATAPRES) 0.1 MG TABLET    Take 1 tablet by mouth 2 times daily as needed for High Blood Pressure (for SBP >160) If over 160/100    FOSFOMYCIN TROMETHAMINE (MONUROL) 3 G PACK    Take 1 packet by mouth every 3 days Take 1 pack on day 3 and 1 pack on day 5    GLUCOSAMINE-CHONDROITIN (GLUCOSAMINE CHONDR COMPLEX PO)    Take 2 tablets by mouth daily Move Free    ISOSORBIDE MONONITRATE (IMDUR) 30 MG EXTENDED RELEASE TABLET    Take 1 tablet by mouth daily    LISINOPRIL (PRINIVIL;ZESTRIL) 20 MG TABLET    Take 1 tablet by mouth daily    LOTEPREDNOL (LOTEMAX) 0.5 % OPHTHALMIC SUSPENSION    Place 1 drop into the right eye every other day    MULTIPLE VITAMIN (MULTI-VITAMIN DAILY PO)    Take by mouth daily    MULTIPLE VITAMINS-MINERALS (OCUVITE PO)    Take 1 tablet by mouth daily     NITROGLYCERIN (NITROSTAT) 0.4 MG SL TABLET    up to max of 3 total doses. If no relief after 1 dose, call 911.     POTASSIUM CHLORIDE (KLOR-CON M) 20 MEQ EXTENDED RELEASE TABLET    Take 1 tablet by mouth daily    RIVAROXABAN (XARELTO) 15 MG TABS TABLET    Take 1 tablet by mouth daily       ALLERGIES     Allergies   Allergen Reactions    Bactrim [Sulfamethoxazole-Trimethoprim]     Ciprofloxacin Diarrhea    Sulfa Antibiotics        FAMILY HISTORY     Family History   Problem Relation Age of Onset    Cancer Mother     High Blood Pressure Father     Arthritis Father     Heart Attack Father     Heart Disease Father     Cancer Sister Diabetes Sister     Cancer Brother     Early Death Brother     Cancer Sister     Diabetes Sister        SOCIAL HISTORY     Social History     Tobacco Use    Smoking status: Never    Smokeless tobacco: Never   Vaping Use    Vaping Use: Never used   Substance Use Topics    Alcohol use: No    Drug use: No       PHYSICAL EXAM     ED Triage Vitals   BP Temp Temp src Pulse Resp SpO2 Height Weight   -- -- -- -- -- -- -- --       Vitals Reviewed:    Vitals:    23 1759 23 1922 23 1950 23   BP: (!) 164/76 (!) 151/76     Pulse: 63 91 87 88   Resp: 17 20 18 17   Temp: 97.8 °F (36.6 °C)      TempSrc: Oral      SpO2: 94% 93%  94%   Weight: 143 lb (64.9 kg)      Height: 5' 3\" (1.6 m)          Initial vital signs and nursing assessment reviewed and normal. Body mass index is 25.33 kg/m². Pulsoximetry is normal per my interpretation. Physical Exam  Constitutional:       General: She is not in acute distress. Appearance: Normal appearance. She is normal weight. She is not ill-appearing, toxic-appearing or diaphoretic. HENT:      Head: Normocephalic and atraumatic. Nose: Nose normal.      Mouth/Throat:      Mouth: Mucous membranes are moist.      Pharynx: Oropharynx is clear. Eyes:      Conjunctiva/sclera: Conjunctivae normal.   Cardiovascular:      Rate and Rhythm: Normal rate and regular rhythm. Pulses: Normal pulses. Heart sounds: Normal heart sounds. Pulmonary:      Effort: Pulmonary effort is normal.      Breath sounds: Wheezing present. No decreased breath sounds, rhonchi or rales. Abdominal:      General: Abdomen is flat. Bowel sounds are normal.      Palpations: Abdomen is soft. Tenderness: There is no abdominal tenderness. Musculoskeletal:      Cervical back: Normal range of motion. Right lower le+ Pitting Edema present. Left lower le+ Pitting Edema present. Skin:     General: Skin is warm and dry.       Capillary Refill: Capillary refill takes less than 2 seconds. Neurological:      Mental Status: She is alert and oriented to person, place, and time. FORMAL DIAGNOSTIC RESULTS     RADIOLOGY: Interpretation per the Radiologist below, if available at the time of this note (none if blank):    XR CHEST PORTABLE   Final Result   1. No acute intrathoracic process. 2. Stable cardiomegaly. **This report has been created using voice recognition software. It may contain minor errors which are inherent in voice recognition technology. **      Final report electronically signed by Dr. Amanda Yancey on 3/9/2023 6:41 PM          LABS: (none if blank)  Labs Reviewed   BASIC METABOLIC PANEL W/ REFLEX TO MG FOR LOW K - Abnormal; Notable for the following components:       Result Value    Chloride 94 (*)     Glucose 119 (*)     BUN 28 (*)     All other components within normal limits   BRAIN NATRIURETIC PEPTIDE - Abnormal; Notable for the following components:    Pro-BNP 1210.0 (*)     All other components within normal limits   CBC WITH AUTO DIFFERENTIAL - Abnormal; Notable for the following components:    MCHC 31.7 (*)     RDW-SD 48.4 (*)     MPV 9.3 (*)     All other components within normal limits   GLOMERULAR FILTRATION RATE, ESTIMATED - Abnormal; Notable for the following components:    Est, Glom Filt Rate 59 (*)     All other components within normal limits   COVID-19 & INFLUENZA COMBO   TROPONIN   HEPATIC FUNCTION PANEL   ANION GAP   OSMOLALITY       (Any cultures that may have been sent were not resulted at the time of this patient visit)    [facility]     ED COURSE:  ED Course as of 03/09/23 2056 Thu Mar 09, 2023   1930 Call to Respiratory, Cristobal Ladd will be down shortly.  [SC]      ED Course User Index  [SC] Ashley Acevedo MD         ED MEDICATIONS ADMINISTERED:  (None if blank)  Medications   albuterol sulfate HFA (PROVENTIL;VENTOLIN;PROAIR) 108 (90 Base) MCG/ACT inhaler 2 puff (has no administration in time range) azithromycin (ZITHROMAX) tablet 250 mg (has no administration in time range)   albuterol (PROVENTIL) nebulizer solution 5 mg (5 mg Nebulization Given 3/9/23 1947)         PROCEDURES: (None if blank)  Procedures:       CONSULTANTS:  None    CRITICAL CARE: (None if blank)      DISCHARGE PRESCRIPTIONS: (None if blank)  New Prescriptions    ALBUTEROL SULFATE HFA (VENTOLIN HFA) 108 (90 BASE) MCG/ACT INHALER    Inhale 2 puffs into the lungs 4 times daily as needed for Wheezing or Shortness of Breath    AZITHROMYCIN (ZITHROMAX) 250 MG TABLET    Take 1 tablet by mouth See Admin Instructions for 5 days 500mg on day 1 followed by 250mg on days 2 - 5    GUAIFENESIN (MUCINEX) 600 MG EXTENDED RELEASE TABLET    Take 1 tablet by mouth 2 times daily for 15 days       MDM:   Medical Decision Making  Brief Overview: 1year-old, history of CHF CKD, presenting with productive cough over the past week, worse in the last few days. Some shortness of breath when raking leaves over the weekend. No fevers, appetite okay. Wheezing on auscultation,    Initial Differential: Includes but is not limited to pneumonia, CHF exacerbation, electrolyte abnormality, bronchitis    Initial Testing: ECG, monitor, chest x-ray, swab for COVID-19 and influenza, CBC, BMP, LFT, troponin, BNP    Initial Treatment: Albuterol nebulizer    ____________    Final Therapy: Albuterol inhaler, azithromycin    Final Testing: Work-up unremarkable, chest x-ray does not show any acute consolidation with this does not rule out pneumonia. Swabs negative, BNP is low as it has been in a while. Discrepancies: None    Final Differential: Pneumonia, bronchitis    Aftercare: Patient's breathing was significant improved after albuterol treatment, auscultation also improved. She will be given an albuterol inhaler, prescription sent for same. Also will start her on azithromycin to cover for community-acquired pneumonia, is also given prescription for guaifenesin.     Strict return precautions and follow up instructions were discussed with the patient prior to discharge, with which the patient agrees. Problems Addressed:  Bronchitis: undiagnosed new problem with uncertain prognosis  Pneumonia due to infectious organism, unspecified laterality, unspecified part of lung: undiagnosed new problem with uncertain prognosis    Amount and/or Complexity of Data Reviewed  Independent Historian: caregiver  External Data Reviewed: notes. Labs: ordered. Decision-making details documented in ED Course. Radiology: ordered and independent interpretation performed. ECG/medicine tests: ordered and independent interpretation performed. Risk  OTC drugs. Prescription drug management. FINAL IMPRESSION     Final diagnoses:   Pneumonia due to infectious organism, unspecified laterality, unspecified part of lung   Bronchitis       DISPOSITION / PLAN   DISPOSITION Decision To Discharge 03/09/2023 08:35:47 PM      OUTPATIENT FOLLOW UP THE PATIENT:  Kerstin Goldmann [address]  [place] [address] [facility] [address]  [phone]    Schedule an appointment as soon as possible for a visit in 1 week      [facility] [address] [address] EMERGENCY DEPT  [address]  Go to   If symptoms worsen      This transcription was electronically signed. Parts of this transcriptions may have been dictated by use of voice recognition software and electronically transcribed, and parts may have been transcribed with the assistance of an ED scribe. The transcription may contain errors not detected in proofreading. Please refer to my supervising physician's documentation if my documentation differs.     Electronically Signed: Annie Pearson MD, 03/09/23, 8:56 PM         Siomara Sanchez MD  Resident  03/09/23 3534

## 2023-03-10 NOTE — ED PROVIDER NOTES
I performed a history and physical examination of the patient and discussed management with the resident. I reviewed the residents note and agree with the documented findings and plan of care. Any areas of disagreement are noted on the chart. I was personally present for the key portions of any procedures. I have documented in the chart those procedures where I was not present during the key portions. I have reviewed the emergency nurses triage note. I agree with the chief complaint, past medical history, past surgical history, allergies, medications, social and family history as documented unless otherwise noted below. Documentation of the HPI, Physical Exam and Medical Decision Making performed by medical students or scribes is based on my personal performance of the HPI, PE and MDM. For Phys Assistant/ Nurse Practitioner cases/documentation I have personally evaluated this patient and have completed at least one if not all key elements of the E/M (history, physical exam, and MDM). My findings are as noted below. In other words, I personally saw and examined the patient I have reviewed and agreed with the resident findings including all diagnostic interpretations and treatment plans as written. I was present for the key portion of any procedures performed and the inclusive time noted in any critical care statement. Patient presents with cough and congestion. This is a 80-year-old female who states that she went outside to do work outside several days ago, was beautiful day so she was not covered up she did wear my a light jacket. Shortly after that she started having congestion and cough runny nose. She states he has not been around very many people. She has been going to [employer]. Patient states that she got short of breath today so she decided come in for evaluation and treatment. Patient denies any overt chest pain. She has had no syncopal or presyncopal episodes.   She is complaining of stuffy head, minor cough. It is productive of sputum. There is clear to yellow in color. Physical examination shows bilateral mild rhonchi. No wheezes or rales. Heart rate and rhythm are regular S1 and S2, no real rubs murmurs or gallops. Abdomen soft nontender normal bowel sounds. Lower extremities are not edematous. Patient states she is actively weighing herself daily and home adjusting her diuretics as previously described by cardiology. She states this has been working very well for her. Patient is otherwise resting comfortably on the cot no apparent distress no other physical complaints at this time. EKG reveals normal sinus rhythm, right axis deviation, left bundle branch block, ventricular rate of 91 MA interval 184 QRS duration 164 QT interval 398 QTc 489. When compared with EKG dated December 2, 2022 sinus rhythm has replaced electronically ventricularly paced rhythm. XR CHEST PORTABLE   Final Result   1. No acute intrathoracic process. 2. Stable cardiomegaly. **This report has been created using voice recognition software. It may contain minor errors which are inherent in voice recognition technology. **      Final report electronically signed by Dr. Samanta Jones on 3/9/2023 6:41 PM        Labs Reviewed   BASIC METABOLIC PANEL W/ REFLEX TO MG FOR LOW K - Abnormal; Notable for the following components:       Result Value    Chloride 94 (*)     Glucose 119 (*)     BUN 28 (*)     All other components within normal limits   BRAIN NATRIURETIC PEPTIDE - Abnormal; Notable for the following components:    Pro-BNP 1210.0 (*)     All other components within normal limits   CBC WITH AUTO DIFFERENTIAL - Abnormal; Notable for the following components:    MCHC 31.7 (*)     RDW-SD 48.4 (*)     MPV 9.3 (*)     All other components within normal limits   GLOMERULAR FILTRATION RATE, ESTIMATED - Abnormal; Notable for the following components:    Est, Glom Filt Rate 59 (*)     All other components within normal limits   COVID-19 & INFLUENZA COMBO   TROPONIN   HEPATIC FUNCTION PANEL   ANION GAP   OSMOLALITY         Final diagnoses:   Pneumonia due to infectious organism, unspecified laterality, unspecified part of lung   Bronchitis   .  Seen this patient with the resident Dr. Navarro and agree with his assessment and plan.     Eloy Linn,   03/09/23 2053

## 2023-03-10 NOTE — ED NOTES
Pt and vs reassessed. Pt resting in bed alert and denies any needs.  Pt stable at this time     Sammy Lemus, [facility]  03/09/23 1925

## 2023-03-10 NOTE — ED NOTES
Pt and vs reassessed. RR easy and unlabored. Pt resting in bed alert and updated on POC.  Pt stable at this time     Mary Tsang [facility]  03/09/23 2038

## 2023-03-10 NOTE — DISCHARGE INSTRUCTIONS
You are seen today for cough, this is likely due to pneumonia and bronchitis. Use the albuterol as needed. Take antibiotics as prescribed. Try mucinex    Follow-up with your primary care physician.     If your symptoms are worse including fever, worsening shortness of breath, chest pain, other new concerns, please come back to emergency room for reevaluation

## 2023-03-12 LAB
EKG ATRIAL RATE: 91 BPM
EKG P AXIS: 46 DEGREES
EKG P-R INTERVAL: 184 MS
EKG Q-T INTERVAL: 398 MS
EKG QRS DURATION: 164 MS
EKG QTC CALCULATION (BAZETT): 489 MS
EKG R AXIS: 136 DEGREES
EKG T AXIS: -18 DEGREES
EKG VENTRICULAR RATE: 91 BPM

## 2023-03-13 ENCOUNTER — OFFICE VISIT (OUTPATIENT)
Dept: CARDIOLOGY CLINIC | Age: 88
End: 2023-03-13
Payer: MEDICARE

## 2023-03-13 VITALS
SYSTOLIC BLOOD PRESSURE: 139 MMHG | WEIGHT: 142 LBS | HEIGHT: 63 IN | BODY MASS INDEX: 25.16 KG/M2 | DIASTOLIC BLOOD PRESSURE: 70 MMHG | HEART RATE: 90 BPM | RESPIRATION RATE: 18 BRPM

## 2023-03-13 DIAGNOSIS — E78.5 DYSLIPIDEMIA (HIGH LDL; LOW HDL): ICD-10-CM

## 2023-03-13 DIAGNOSIS — I10 HTN (HYPERTENSION), BENIGN: Primary | ICD-10-CM

## 2023-03-13 DIAGNOSIS — I48.19 PERSISTENT ATRIAL FIBRILLATION (HCC): ICD-10-CM

## 2023-03-13 DIAGNOSIS — I50.23 ACUTE ON CHRONIC SYSTOLIC CONGESTIVE HEART FAILURE (HCC): ICD-10-CM

## 2023-03-13 PROCEDURE — 99214 OFFICE O/P EST MOD 30 MIN: CPT | Performed by: INTERNAL MEDICINE

## 2023-03-13 PROCEDURE — 93000 ELECTROCARDIOGRAM COMPLETE: CPT | Performed by: INTERNAL MEDICINE

## 2023-03-13 PROCEDURE — 1123F ACP DISCUSS/DSCN MKR DOCD: CPT | Performed by: INTERNAL MEDICINE

## 2023-03-13 RX ORDER — LISINOPRIL 20 MG/1
20 TABLET ORAL DAILY
Qty: 90 TABLET | Refills: 3 | Status: SHIPPED | OUTPATIENT
Start: 2023-03-13

## 2023-03-13 RX ORDER — ISOSORBIDE MONONITRATE 30 MG/1
30 TABLET, EXTENDED RELEASE ORAL DAILY
Qty: 90 TABLET | Refills: 3 | Status: SHIPPED | OUTPATIENT
Start: 2023-03-13

## 2023-03-13 RX ORDER — POTASSIUM CHLORIDE 20 MEQ/1
20 TABLET, EXTENDED RELEASE ORAL DAILY
Qty: 90 TABLET | Refills: 3 | Status: SHIPPED | OUTPATIENT
Start: 2023-03-13

## 2023-03-13 RX ORDER — AMLODIPINE BESYLATE 5 MG/1
5 TABLET ORAL DAILY
Qty: 90 TABLET | Refills: 3 | Status: SHIPPED | OUTPATIENT
Start: 2023-03-13

## 2023-03-13 RX ORDER — CLONIDINE HYDROCHLORIDE 0.1 MG/1
0.1 TABLET ORAL 2 TIMES DAILY PRN
Qty: 90 TABLET | Refills: 3 | Status: SHIPPED | OUTPATIENT
Start: 2023-03-13

## 2023-03-13 RX ORDER — BUMETANIDE 1 MG/1
1 TABLET ORAL 2 TIMES DAILY
Qty: 180 TABLET | Refills: 3 | Status: SHIPPED | OUTPATIENT
Start: 2023-03-13

## 2023-03-13 RX ORDER — ATENOLOL 25 MG/1
25 TABLET ORAL DAILY
Qty: 90 TABLET | Refills: 3 | Status: SHIPPED | OUTPATIENT
Start: 2023-03-13

## 2023-03-13 ASSESSMENT — ENCOUNTER SYMPTOMS
ABDOMINAL DISTENTION: 0
WHEEZING: 0
CHOKING: 0
COUGH: 0
ANAL BLEEDING: 0
TROUBLE SWALLOWING: 0
CHEST TIGHTNESS: 0
APNEA: 0
ABDOMINAL PAIN: 0
NAUSEA: 0
SHORTNESS OF BREATH: 1
VOMITING: 0
VOICE CHANGE: 0
STRIDOR: 0
BLOOD IN STOOL: 0
COLOR CHANGE: 0

## 2023-03-13 NOTE — PROGRESS NOTES
[address] [facility]  [address] [facility],4Th Floor  Dept: [address]  Loc: [phone]     3/13/2023       Misty Model is here today for   Chief Complaint   Patient presents with    Follow-up           Referring Physician:  No ref. provider found     Patient Active Problem List   Diagnosis    Renal mass, right    GISELL (stress urinary incontinence, female)    Flushing    Essential hypertension    Chronic atrial fibrillation (HCC)    Hepatic lesion    Chest pain    Hypertensive urgency    Atypical chest pain    Pneumonia    Acute pain of left shoulder    Sepsis ([address]    Cellulitis of right lower extremity    Primary osteoarthritis of left shoulder    Spondylosis of cervical region without myelopathy or radiculopathy    Bilateral pleural effusion    Class 1 obesity due to excess calories with serious comorbidity and body mass index (BMI) of 31.0 to 31.9 in adult    Streptococcal bacteremia    Hyponatremia    Type 2 diabetes mellitus    Symptomatic bradycardia    Dyspnea    Acute on chronic diastolic congestive heart failure (HCC)    Acute pulmonary edema (HCC)    Chronic renal disease, stage III (HCC) [453287]    Thrombocytopenia, unspecified    Shortness of breath    Acute on chronic congestive heart failure (HCC)    Medtronic single pacemaker        Review of Systems   Constitutional:  Negative for activity change, appetite change, fatigue, fever and unexpected weight change. HENT:  Negative for congestion, trouble swallowing and voice change. Eyes:  Negative for visual disturbance. Respiratory:  Positive for shortness of breath. Negative for apnea, cough, choking, chest tightness, wheezing and stridor. Cardiovascular:  Positive for leg swelling. Negative for chest pain and palpitations. Gastrointestinal:  Negative for abdominal distention, abdominal pain, anal bleeding, blood in stool, nausea and vomiting. Endocrine: Negative for cold intolerance and heat intolerance. Genitourinary:  Negative for hematuria. Musculoskeletal:  Negative for arthralgias, gait problem, joint swelling and myalgias. Skin:  Negative for color change and rash. Allergic/Immunologic: Negative for environmental allergies and food allergies. Neurological:  Negative for dizziness, tremors, syncope, facial asymmetry, weakness, light-headedness, numbness and headaches. Hematological:  Does not bruise/bleed easily. Psychiatric/Behavioral:  Negative for agitation, behavioral problems and sleep disturbance.        Past Medical History:   Diagnosis Date    Arthritis     Atrial fibrillation ([facility] [address] [address]     Cancer ([facility] [address]     Skin Cancer    CHF (congestive heart failure) ([facility]     Chronic renal disease, stage III [facility]) [683359] 5/18/2022    Gross hematuria 2014    Dr Estefani Roman    HTN (hypertension)     Hyperlipidemia     Medtronic single pacemaker  3/7/2023    Pneumonia 12/2/2018    Type 2 diabetes mellitus 11/23/2021       Allergies   Allergen Reactions    Bactrim [Sulfamethoxazole-Trimethoprim]     Ciprofloxacin Diarrhea    Sulfa Antibiotics        Current Outpatient Medications   Medication Sig Dispense Refill    bumetanide (BUMEX) 1 MG tablet Take 1 tablet by mouth 2 times daily 180 tablet 3    lisinopril (PRINIVIL;ZESTRIL) 20 MG tablet Take 1 tablet by mouth daily 90 tablet 3    isosorbide mononitrate (IMDUR) 30 MG extended release tablet Take 1 tablet by mouth daily 90 tablet 3    rivaroxaban (XARELTO) 15 MG TABS tablet Take 1 tablet by mouth daily 90 tablet 3    atenolol (TENORMIN) 25 MG tablet Take 1 tablet by mouth daily 90 tablet 3    amLODIPine (NORVASC) 5 MG tablet Take 1 tablet by mouth daily 90 tablet 3    cloNIDine (CATAPRES) 0.1 MG tablet Take 1 tablet by mouth 2 times daily as needed for High Blood Pressure (for SBP >160) If over 160/100 90 tablet 3    potassium chloride (KLOR-CON M) 20 MEQ extended release tablet Take 1 tablet by mouth daily 90 tablet 3    albuterol sulfate HFA (VENTOLIN HFA) 108 (90 Base) MCG/ACT inhaler Inhale 2 puffs into the lungs 4 times daily as needed for Wheezing or Shortness of Breath 18 g 0    azithromycin (ZITHROMAX) 250 MG tablet Take 1 tablet by mouth See Admin Instructions for 5 days 500mg on day 1 followed by 250mg on days 2 - 5 6 tablet 0    guaiFENesin (MUCINEX) 600 MG extended release tablet Take 1 tablet by mouth 2 times daily for 15 days 30 tablet 0    fosfomycin tromethamine (MONUROL) 3 g PACK Take 1 packet by mouth every 3 days Take 1 pack on day 3 and 1 pack on day 5 2 each 0    loteprednol (LOTEMAX) 0.5 % ophthalmic suspension Place 1 drop into the right eye every other day      nitroGLYCERIN (NITROSTAT) 0.4 MG SL tablet up to max of 3 total doses. If no relief after 1 dose, call 911. 25 tablet 3    Multiple Vitamin (MULTI-VITAMIN DAILY PO) Take by mouth daily      Glucosamine-Chondroitin (GLUCOSAMINE CHONDR COMPLEX PO) Take 2 tablets by mouth daily Move Free      Cholecalciferol (VITAMIN D-3 PO) Take 1,000 Units by mouth daily       Multiple Vitamins-Minerals (OCUVITE PO) Take 1 tablet by mouth daily       calcium carbonate 600 MG TABS tablet Take 1 tablet by mouth daily. No current facility-administered medications for this visit. Social History     Socioeconomic History    Marital status:       Spouse name: None    Number of children: 3    Years of education: None    Highest education level: None   Occupational History    Occupation: Retired   Tobacco Use    Smoking status: Never    Smokeless tobacco: Never   Vaping Use    Vaping Use: Never used   Substance and Sexual Activity    Alcohol use: No    Drug use: No    Sexual activity: Not Currently   Social History Narrative    - Never     Social Determinants of Health     Financial Resource Strain: Low Risk     Difficulty of Paying Living Expenses: Not hard at all       Family History   Problem Relation Age of Onset    Cancer Mother     High Blood Pressure Father     Arthritis Father     Heart Attack Father     Heart Disease Father     Cancer Sister     Diabetes Sister     Cancer Brother     Early Death Brother     Cancer Sister     Diabetes Sister        Blood pressure 139/70, pulse 90, resp. rate 18, height 5' 3\" (1.6 m), weight 142 lb (64.4 kg). Physical Exam:    General Appearance: alert and oriented to person, place and time, in no acute distress  Cardiovascular: normal rate, regular rhythm, normal S1 and S2, no murmurs, rubs, clicks, or gallops, distal pulses intact, no carotid bruits, no JVD  Pulmonary/Chest: clear to auscultation bilaterally- no wheezes, rales or rhonchi, normal air movement, no respiratory distress  Abdomen: soft, non-tender, non-distended, normal bowel sounds, no masses   Extremities: no cyanosis, clubbing or edema, pulse   Skin: warm and dry, no rash or erythema  Head: normocephalic and atraumatic  Eyes: pupils equal, round, and reactive to light  Neck: supple and non-tender without mass, no thyromegaly   Musculoskeletal: normal range of motion, no joint swelling, deformity or tenderness  Neurological: alert, oriented, normal speech, no focal findings or movement disorder noted    Lab Data:    Cardiac Enzymes:  No results for input(s): CKTOTAL, CKMB, CKMBINDEX, TROPONINI in the last 72 hours.     CBC:   Lab Results   Component Value Date/Time    WBC 5.3 03/09/2023 06:42 PM    RBC 4.46 03/09/2023 06:42 PM    RBC 4.27 07/18/2011 09:30 PM    HGB 13.3 03/09/2023 06:42 PM    HCT 41.9 03/09/2023 06:42 PM     03/09/2023 06:42 PM       CMP:    Lab Results   Component Value Date/Time     03/09/2023 06:42 PM    K 4.6 03/09/2023 06:42 PM    CL 94 03/09/2023 06:42 PM    CO2 31 03/09/2023 06:42 PM    BUN 28 03/09/2023 06:42 PM    CREATININE 0.9 03/09/2023 06:42 PM    LABGLOM 59 03/09/2023 06:42 PM    GLUCOSE 119 03/09/2023 06:42 PM    GLUCOSE 111 05/15/2012 09:25 AM    CALCIUM 9.6 03/09/2023 06:42 PM Hepatic Function Panel:    Lab Results   Component Value Date/Time    ALKPHOS 82 03/09/2023 06:42 PM    ALT 33 03/09/2023 06:42 PM    AST 36 03/09/2023 06:42 PM    PROT 6.8 03/09/2023 06:42 PM    BILITOT 0.5 03/09/2023 06:42 PM    BILIDIR <0.2 03/09/2023 06:42 PM    LABALBU 3.9 03/09/2023 06:42 PM    LABALBU 4.3 05/15/2012 09:25 AM       Magnesium:    Lab Results   Component Value Date/Time    MG 2.3 12/02/2022 10:48 AM       PT/INR:    Lab Results   Component Value Date/Time    PROTIME 2.09 12/20/2011 10:21 AM    INR 2.48 04/21/2022 12:05 AM       HgBA1c:    Lab Results   Component Value Date/Time    LABA1C 6.2 05/13/2022 03:02 PM       FLP:    Lab Results   Component Value Date/Time    TRIG 132 12/15/2021 04:57 AM    HDL 41 12/15/2021 04:57 AM    LDLCALC 55 12/15/2021 04:57 AM    LDLDIRECT 80.55 02/21/2017 10:37 AM       TSH:    Lab Results   Component Value Date/Time    TSH 2.670 12/02/2022 10:48 AM        Diagnosis Orders   1. HTN (hypertension), benign  95526 - SC ELECTROCARDIOGRAM, COMPLETE      2. Acute on chronic systolic congestive heart failure (HCC)  ECHO Complete 2D W Doppler W Color      3. Persistent atrial fibrillation ([address]        4. Dyslipidemia (high LDL; low HDL)  CBC    Basic Metabolic Panel    Lipid Panel    Hepatic Function Panel           Assessment/Plan    Cris Gilman is a 80years old lady history of coronary artery disease history of pacemaker history of atrial fibs she is a pacer dependent she has been on Xarelto. She was in the hospital emergency room recently with pneumonia she got antibiotics she is feeling better she utilizing the bronchospasm the bronchodilator I did ask her to take an extra Bumex every now and then if she had pedal edema shortness of breath or weight gain. The patient still lives independently.   The patient has a lab work which was okay her EKG showed paced rhythm atrial fibrillation the patient lab finding and the chest x-ray finding EKG findings were discussed with her her son in great details her medications reconciled sent to her pharmacy all her question were answered to her satisfaction she will be seen in 6 months thank you    Orders Placed This Encounter   Procedures    CBC     Standing Status:   Future     Standing Expiration Date:   5/34/1974    Basic Metabolic Panel     Standing Status:   Future     Standing Expiration Date:   3/13/2024    Lipid Panel     Standing Status:   Future     Standing Expiration Date:   3/13/2024     Order Specific Question:   Is Patient Fasting?/# of Hours     Answer:   12 hours    Hepatic Function Panel     Standing Status:   Future     Standing Expiration Date:   3/13/2024    ECHO Complete 2D W Doppler W Color     Standing Status:   Future     Standing Expiration Date:   3/12/2024     Order Specific Question:   Reason for exam:     Answer:   left vent function    18482 - DE ELECTROCARDIOGRAM, COMPLETE       Return in about 6 months (around 9/13/2023) for chf.      Katie Garcia MD

## 2023-03-20 ENCOUNTER — HOSPITAL ENCOUNTER (OUTPATIENT)
Dept: NON INVASIVE DIAGNOSTICS | Age: 88
Discharge: HOME | End: 2023-03-20
Payer: MEDICARE

## 2023-03-20 DIAGNOSIS — I50.23 ACUTE ON CHRONIC SYSTOLIC CONGESTIVE HEART FAILURE (HCC): ICD-10-CM

## 2023-03-20 LAB
LV EF: 55 %
LVEF MODALITY: NORMAL

## 2023-03-20 PROCEDURE — 93306 TTE W/DOPPLER COMPLETE: CPT

## 2023-05-18 ENCOUNTER — PROCEDURE VISIT (OUTPATIENT)
Dept: CARDIOLOGY CLINIC | Age: 88
End: 2023-05-18

## 2023-05-18 DIAGNOSIS — Z95.0 PACEMAKER: Primary | ICD-10-CM

## 2023-05-18 NOTE — PROGRESS NOTES
Dr Daniele Longoria pt   Medtronic single pacer remote   Battery 11.3 yrs remaining      Vvir     V paced 99.4%    Vent impedence 399  Rv waves 13.6    Rv threshold 0.75 @ 0.4  Vent amplitude 2 @ 0.4

## 2023-08-23 PROCEDURE — 93294 REM INTERROG EVL PM/LDLS PM: CPT | Performed by: INTERNAL MEDICINE

## 2023-08-23 PROCEDURE — 93296 REM INTERROG EVL PM/IDS: CPT | Performed by: INTERNAL MEDICINE

## 2023-08-24 ENCOUNTER — PROCEDURE VISIT (OUTPATIENT)
Dept: CARDIOLOGY CLINIC | Age: 88
End: 2023-08-24
Payer: MEDICARE

## 2023-08-24 DIAGNOSIS — Z95.0 PACEMAKER: Primary | ICD-10-CM

## 2023-10-17 ENCOUNTER — HOSPITAL ENCOUNTER (OUTPATIENT)
Age: 88
Discharge: HOME | End: 2023-10-17
Payer: MEDICARE

## 2023-10-17 DIAGNOSIS — E78.5 DYSLIPIDEMIA (HIGH LDL; LOW HDL): ICD-10-CM

## 2023-10-17 LAB
ALBUMIN SERPL BCG-MCNC: 4.1 G/DL (ref 3.5–5.1)
ALP SERPL-CCNC: 71 U/L (ref 38–126)
ALT SERPL W/O P-5'-P-CCNC: 26 U/L (ref 11–66)
ANION GAP SERPL CALC-SCNC: 11 MEQ/L (ref 8–16)
AST SERPL-CCNC: 31 U/L (ref 5–40)
BILIRUB CONJ SERPL-MCNC: < 0.2 MG/DL (ref 0–0.3)
BILIRUB SERPL-MCNC: 0.7 MG/DL (ref 0.3–1.2)
BUN SERPL-MCNC: 27 MG/DL (ref 7–22)
CALCIUM SERPL-MCNC: 10.1 MG/DL (ref 8.5–10.5)
CHLORIDE SERPL-SCNC: 98 MEQ/L (ref 98–111)
CHOLEST SERPL-MCNC: 161 MG/DL (ref 100–199)
CO2 SERPL-SCNC: 33 MEQ/L (ref 23–33)
CREAT SERPL-MCNC: 1 MG/DL (ref 0.4–1.2)
DEPRECATED RDW RBC AUTO: 49.9 FL (ref 35–45)
ERYTHROCYTE [DISTWIDTH] IN BLOOD BY AUTOMATED COUNT: 13.6 % (ref 11.5–14.5)
GFR SERPL CREATININE-BSD FRML MDRD: 52 ML/MIN/1.73M2
GLUCOSE SERPL-MCNC: 108 MG/DL (ref 70–108)
HCT VFR BLD AUTO: 44.5 % (ref 37–47)
HDLC SERPL-MCNC: 49 MG/DL
HGB BLD-MCNC: 14.2 GM/DL (ref 12–16)
LDLC SERPL CALC-MCNC: 90 MG/DL
MCH RBC QN AUTO: 31.6 PG (ref 26–33)
MCHC RBC AUTO-ENTMCNC: 31.9 GM/DL (ref 32.2–35.5)
MCV RBC AUTO: 99.1 FL (ref 81–99)
PLATELET # BLD AUTO: 173 THOU/MM3 (ref 130–400)
PMV BLD AUTO: 9.4 FL (ref 9.4–12.4)
POTASSIUM SERPL-SCNC: 4.5 MEQ/L (ref 3.5–5.2)
PROT SERPL-MCNC: 7.1 G/DL (ref 6.1–8)
RBC # BLD AUTO: 4.49 MILL/MM3 (ref 4.2–5.4)
SODIUM SERPL-SCNC: 142 MEQ/L (ref 135–145)
TRIGL SERPL-MCNC: 108 MG/DL (ref 0–199)
WBC # BLD AUTO: 6.6 THOU/MM3 (ref 4.8–10.8)

## 2023-10-17 PROCEDURE — 80061 LIPID PANEL: CPT

## 2023-10-17 PROCEDURE — 36415 COLL VENOUS BLD VENIPUNCTURE: CPT

## 2023-10-17 PROCEDURE — 85027 COMPLETE CBC AUTOMATED: CPT

## 2023-10-17 PROCEDURE — 80053 COMPREHEN METABOLIC PANEL: CPT

## 2023-10-17 PROCEDURE — 82248 BILIRUBIN DIRECT: CPT

## 2023-10-24 ENCOUNTER — OFFICE VISIT (OUTPATIENT)
Dept: CARDIOLOGY CLINIC | Age: 88
End: 2023-10-24
Payer: MEDICARE

## 2023-10-24 VITALS
SYSTOLIC BLOOD PRESSURE: 137 MMHG | BODY MASS INDEX: 26.75 KG/M2 | HEIGHT: 63 IN | RESPIRATION RATE: 20 BRPM | DIASTOLIC BLOOD PRESSURE: 73 MMHG | HEART RATE: 75 BPM | WEIGHT: 151 LBS

## 2023-10-24 DIAGNOSIS — I10 ESSENTIAL HYPERTENSION: Primary | ICD-10-CM

## 2023-10-24 DIAGNOSIS — E78.5 DYSLIPIDEMIA (HIGH LDL; LOW HDL): ICD-10-CM

## 2023-10-24 PROCEDURE — 99214 OFFICE O/P EST MOD 30 MIN: CPT | Performed by: INTERNAL MEDICINE

## 2023-10-24 PROCEDURE — 93000 ELECTROCARDIOGRAM COMPLETE: CPT | Performed by: INTERNAL MEDICINE

## 2023-10-24 PROCEDURE — 1123F ACP DISCUSS/DSCN MKR DOCD: CPT | Performed by: INTERNAL MEDICINE

## 2023-10-24 RX ORDER — NITROGLYCERIN 0.4 MG/1
TABLET SUBLINGUAL
Qty: 25 TABLET | Refills: 3 | Status: CANCELLED | OUTPATIENT
Start: 2023-10-24

## 2023-10-24 ASSESSMENT — ENCOUNTER SYMPTOMS
COUGH: 0
CHEST TIGHTNESS: 0
STRIDOR: 0
TROUBLE SWALLOWING: 0
VOICE CHANGE: 0
ABDOMINAL PAIN: 0
COLOR CHANGE: 0
VOMITING: 0
APNEA: 0
WHEEZING: 0
ABDOMINAL DISTENTION: 0
NAUSEA: 0
ANAL BLEEDING: 0
BLOOD IN STOOL: 0
SHORTNESS OF BREATH: 1
CHOKING: 0

## 2023-10-24 NOTE — PROGRESS NOTES
[address] [facility]  [address] [facility],[address]  Dept: 400 [facility]  Loc: [phone]     10/24/2023       Obie Ricketts is here today for   Chief Complaint   Patient presents with    Follow-up           Referring Physician:  No ref. provider found     Patient Active Problem List   Diagnosis    Renal mass, right    GISELL (stress urinary incontinence, female)    Flushing    Essential hypertension    Chronic atrial fibrillation (HCC)    Hepatic lesion    Chest pain    Hypertensive urgency    Atypical chest pain    Pneumonia    Acute pain of left shoulder    Sepsis ([address])    Cellulitis of right lower extremity    Primary osteoarthritis of left shoulder    Spondylosis of cervical region without myelopathy or radiculopathy    Bilateral pleural effusion    Class 1 obesity due to excess calories with serious comorbidity and body mass index (BMI) of 31.0 to 31.9 in adult    Streptococcal bacteremia    Hyponatremia    Type 2 diabetes mellitus    Symptomatic bradycardia    Dyspnea    Acute on chronic diastolic congestive heart failure (HCC)    Acute pulmonary edema (HCC)    Chronic renal disease, stage III (HCC) [250854]    Thrombocytopenia, unspecified    Shortness of breath    Acute on chronic congestive heart failure (HCC)    Medtronic single pacemaker        Review of Systems   Constitutional:  Negative for activity change, appetite change, fatigue, fever and unexpected weight change. HENT:  Negative for congestion, trouble swallowing and voice change. Eyes:  Negative for visual disturbance. Respiratory:  Positive for shortness of breath. Negative for apnea, cough, choking, chest tightness, wheezing and stridor. Cardiovascular:  Negative for chest pain, palpitations and leg swelling. Gastrointestinal:  Negative for abdominal distention, abdominal pain, anal bleeding, blood in stool, nausea and vomiting.    Endocrine: Negative

## 2023-11-14 ENCOUNTER — APPOINTMENT (OUTPATIENT)
Dept: GENERAL RADIOLOGY | Age: 88
End: 2023-11-14
Payer: MEDICARE

## 2023-11-14 ENCOUNTER — HOSPITAL ENCOUNTER (EMERGENCY)
Age: 88
Discharge: HOME | End: 2023-11-14
Attending: EMERGENCY MEDICINE
Payer: MEDICARE

## 2023-11-14 VITALS
HEIGHT: 63 IN | DIASTOLIC BLOOD PRESSURE: 76 MMHG | RESPIRATION RATE: 11 BRPM | BODY MASS INDEX: 26.22 KG/M2 | TEMPERATURE: 97.8 F | HEART RATE: 60 BPM | SYSTOLIC BLOOD PRESSURE: 158 MMHG | WEIGHT: 148 LBS | OXYGEN SATURATION: 100 %

## 2023-11-14 DIAGNOSIS — I10 HYPERTENSION, UNSPECIFIED TYPE: Primary | ICD-10-CM

## 2023-11-14 LAB
ALBUMIN SERPL BCG-MCNC: 4.1 G/DL (ref 3.5–5.1)
ALP SERPL-CCNC: 67 U/L (ref 38–126)
ALT SERPL W/O P-5'-P-CCNC: 20 U/L (ref 11–66)
ANION GAP SERPL CALC-SCNC: 9 MEQ/L (ref 8–16)
AST SERPL-CCNC: 23 U/L (ref 5–40)
BASOPHILS ABSOLUTE: 0 THOU/MM3 (ref 0–0.1)
BASOPHILS NFR BLD AUTO: 0.4 %
BILIRUB SERPL-MCNC: 0.5 MG/DL (ref 0.3–1.2)
BUN SERPL-MCNC: 31 MG/DL (ref 7–22)
CALCIUM SERPL-MCNC: 9.7 MG/DL (ref 8.5–10.5)
CHLORIDE SERPL-SCNC: 96 MEQ/L (ref 98–111)
CO2 SERPL-SCNC: 34 MEQ/L (ref 23–33)
CREAT SERPL-MCNC: 1.1 MG/DL (ref 0.4–1.2)
DEPRECATED RDW RBC AUTO: 46.9 FL (ref 35–45)
EOSINOPHIL NFR BLD AUTO: 1.1 %
EOSINOPHILS ABSOLUTE: 0.1 THOU/MM3 (ref 0–0.4)
ERYTHROCYTE [DISTWIDTH] IN BLOOD BY AUTOMATED COUNT: 13.5 % (ref 11.5–14.5)
GFR SERPL CREATININE-BSD FRML MDRD: 46 ML/MIN/1.73M2
GLUCOSE SERPL-MCNC: 129 MG/DL (ref 70–108)
HCT VFR BLD AUTO: 41.6 % (ref 37–47)
HGB BLD-MCNC: 13.7 GM/DL (ref 12–16)
IMM GRANULOCYTES # BLD AUTO: 0.02 THOU/MM3 (ref 0–0.07)
IMM GRANULOCYTES NFR BLD AUTO: 0.4 %
LYMPHOCYTES ABSOLUTE: 1.5 THOU/MM3 (ref 1–4.8)
LYMPHOCYTES NFR BLD AUTO: 28.2 %
MCH RBC QN AUTO: 31.2 PG (ref 26–33)
MCHC RBC AUTO-ENTMCNC: 32.9 GM/DL (ref 32.2–35.5)
MCV RBC AUTO: 94.8 FL (ref 81–99)
MONOCYTES ABSOLUTE: 0.4 THOU/MM3 (ref 0.4–1.3)
MONOCYTES NFR BLD AUTO: 7.4 %
NEUTROPHILS NFR BLD AUTO: 62.5 %
NRBC BLD AUTO-RTO: 0 /100 WBC
NT-PROBNP SERPL IA-MCNC: 897.7 PG/ML (ref 0–449)
OSMOLALITY SERPL CALC.SUM OF ELEC: 285.8 MOSMOL/KG (ref 275–300)
PLATELET # BLD AUTO: 166 THOU/MM3 (ref 130–400)
PMV BLD AUTO: 9.6 FL (ref 9.4–12.4)
POTASSIUM SERPL-SCNC: 4.2 MEQ/L (ref 3.5–5.2)
PROT SERPL-MCNC: 6.9 G/DL (ref 6.1–8)
RBC # BLD AUTO: 4.39 MILL/MM3 (ref 4.2–5.4)
SEGMENTED NEUTROPHILS ABSOLUTE COUNT: 3.4 THOU/MM3 (ref 1.8–7.7)
SODIUM SERPL-SCNC: 139 MEQ/L (ref 135–145)
TROPONIN, HIGH SENSITIVITY: 19 NG/L (ref 0–12)
TROPONIN, HIGH SENSITIVITY: 19 NG/L (ref 0–12)
WBC # BLD AUTO: 5.4 THOU/MM3 (ref 4.8–10.8)

## 2023-11-14 PROCEDURE — 83880 ASSAY OF NATRIURETIC PEPTIDE: CPT

## 2023-11-14 PROCEDURE — 80053 COMPREHEN METABOLIC PANEL: CPT

## 2023-11-14 PROCEDURE — 36415 COLL VENOUS BLD VENIPUNCTURE: CPT

## 2023-11-14 PROCEDURE — 93005 ELECTROCARDIOGRAM TRACING: CPT | Performed by: STUDENT IN AN ORGANIZED HEALTH CARE EDUCATION/TRAINING PROGRAM

## 2023-11-14 PROCEDURE — 99285 EMERGENCY DEPT VISIT HI MDM: CPT

## 2023-11-14 PROCEDURE — 85025 COMPLETE CBC W/AUTO DIFF WBC: CPT

## 2023-11-14 PROCEDURE — 93010 ELECTROCARDIOGRAM REPORT: CPT | Performed by: NUCLEAR MEDICINE

## 2023-11-14 PROCEDURE — 84484 ASSAY OF TROPONIN QUANT: CPT

## 2023-11-14 PROCEDURE — 71045 X-RAY EXAM CHEST 1 VIEW: CPT

## 2023-11-14 ASSESSMENT — PAIN - FUNCTIONAL ASSESSMENT: PAIN_FUNCTIONAL_ASSESSMENT: NONE - DENIES PAIN

## 2023-11-14 NOTE — ED TRIAGE NOTES
Patient presents to ED from home with complaints of hypertension and leg swelling. Patient states that she feels flushed, and her blood pressure has been elevated three different times that she took it this evening. Patient states she took a clonidine at 1000 and 2235 this evening. Patient also states that she takes a water pill, and was advised to take two today to see if her swelling would decrease. Patient states she does have cellulitis in her legs. Patient is alert and oriented, ambulatory, VSS at this time.

## 2023-11-14 NOTE — ED NOTES
Patient ambulated to ED restroom with steady gait, back to bed and placed back on monitor. Patient resting in bed. Respirations easy and unlabored. No distress noted. Call light within reach.        Alexa Zavala RN  11/14/23 1135

## 2023-11-14 NOTE — ED PROVIDER NOTES
[address] [facility] EMERGENCY DEPT    Pt Name: Lupis Nichols  MRN: [id number]  [address] 4/22/1929  Date of evaluation: 11/14/2023  Resident Physician: Lenny Reynaga MD  Supervising Physician: Dr. Hung Davenport MD    [address]       Chief Complaint   Patient presents with    Hypertension    Leg Swelling       HISTORY OF PRESENT ILLNESS   Lupis Nichols is a 80 y.o. female with PMHx of CHF, CKD stage III, pacemaker, A-fib  who presents to the emergency department for evaluation of htn, leg swelling. Patient states that she checked her blood pressure at 10 AM and noticed it to be 173/88. Took her clonidine at this time. At 10:35 PM she took her blood pressure again and noted to be 169/104. States that she took another clonidine. At 12:25 AM she checked her blood pressure again and it was 176/99. Patient states that she did not know what to do therefore came to ED for further evaluation. Today, patient also noting that she is having increased leg swelling. Is currently on Bumex twice daily. States that her cardiologist, Dr. Kaylah Bates told her that if she has increased welling that she could take an extra pill. Today she took a total of 3 doses. Patient denies any chest pain, shortness of breath, or headache. The patient has no other acute complaints at this time. Review of Systems    Negative Except as Documented Above.     PASTMEDICAL HISTORY     Past Medical History:   Diagnosis Date    Arthritis     Atrial fibrillation ([address])     Cancer ([address])     Skin Cancer    CHF (congestive heart failure) ([address])     Chronic renal disease, stage III [facility]) [915466] 5/18/2022    Gross hematuria 2014    Dr Eric Shoemaker    HTN (hypertension)     Hyperlipidemia     Medtronic single pacemaker  3/7/2023    Pneumonia 12/2/2018    Type 2 diabetes mellitus 11/23/2021       Patient Active Problem List   Diagnosis Code    Renal mass, right N28.89    GISELL (stress urinary incontinence, female) N39.3    Flushing R23.2

## 2023-11-14 NOTE — ED NOTES
Patient resting in bed. Respirations easy and unlabored. No distress noted. Call light within reach.        Anna Plunkett RN  11/14/23 7369

## 2023-11-14 NOTE — ED NOTES
Patient resting in bed. Respirations easy and unlabored. No distress noted. Call light within reach.        Waqas Hilliard RN  11/14/23 5830

## 2023-11-14 NOTE — DISCHARGE INSTRUCTIONS
You are seen today in the emergency department for high blood pressure and leg swelling. Your labs are reassuring. Please call your cardiologist regarding today's visit and let him know that you were seen today in the emergency department. You may need to increase dosage of your medications.     Please return to the emergency department immediately if you develop any new/concerning symptoms such as chest pain or shortness of breath

## 2023-11-14 NOTE — ED NOTES
Discharge instructions and follow up discussed with pt. Pt verbalized understanding and denied further questions. LDA removed. Pt discharged with all belongings.         Maryan Hoang RN  11/14/23 2564

## 2023-11-19 LAB
EKG ATRIAL RATE: 375 BPM
EKG Q-T INTERVAL: 438 MS
EKG QRS DURATION: 176 MS
EKG QTC CALCULATION (BAZETT): 465 MS
EKG R AXIS: 115 DEGREES
EKG T AXIS: -39 DEGREES
EKG VENTRICULAR RATE: 68 BPM

## 2023-11-30 ENCOUNTER — PROCEDURE VISIT (OUTPATIENT)
Dept: CARDIOLOGY CLINIC | Age: 88
End: 2023-11-30

## 2023-11-30 DIAGNOSIS — Z95.0 PACEMAKER: Primary | ICD-10-CM

## 2023-11-30 NOTE — PROGRESS NOTES
[facility] Numonyx single pacemaker   Patient of Lex    Battery 10.8 years    Presenting rhythm     RV impedance 399    RV sense 13.6    RV paced 97.6%    V Thresholds [email or web address]  V Amplitude 2.0 @ 0.40    Episodes   none

## 2023-12-08 ENCOUNTER — HOSPITAL ENCOUNTER (EMERGENCY)
Age: 88
Discharge: HOME | End: 2023-12-08
Attending: EMERGENCY MEDICINE
Payer: MEDICARE

## 2023-12-08 ENCOUNTER — APPOINTMENT (OUTPATIENT)
Dept: GENERAL RADIOLOGY | Age: 88
End: 2023-12-08
Payer: MEDICARE

## 2023-12-08 VITALS
DIASTOLIC BLOOD PRESSURE: 59 MMHG | WEIGHT: 148 LBS | SYSTOLIC BLOOD PRESSURE: 128 MMHG | TEMPERATURE: 97.7 F | OXYGEN SATURATION: 94 % | BODY MASS INDEX: 26.22 KG/M2 | HEIGHT: 63 IN | RESPIRATION RATE: 16 BRPM | HEART RATE: 59 BPM

## 2023-12-08 DIAGNOSIS — I10 HYPERTENSION, UNSPECIFIED TYPE: Primary | ICD-10-CM

## 2023-12-08 LAB
ANION GAP SERPL CALC-SCNC: 9 MEQ/L (ref 8–16)
BASOPHILS ABSOLUTE: 0 THOU/MM3 (ref 0–0.1)
BASOPHILS NFR BLD AUTO: 0.4 %
BUN SERPL-MCNC: 27 MG/DL (ref 7–22)
CALCIUM SERPL-MCNC: 9.4 MG/DL (ref 8.5–10.5)
CHLORIDE SERPL-SCNC: 95 MEQ/L (ref 98–111)
CO2 SERPL-SCNC: 33 MEQ/L (ref 23–33)
CREAT SERPL-MCNC: 1.1 MG/DL (ref 0.4–1.2)
DEPRECATED RDW RBC AUTO: 48.5 FL (ref 35–45)
EOSINOPHIL NFR BLD AUTO: 0.7 %
EOSINOPHILS ABSOLUTE: 0 THOU/MM3 (ref 0–0.4)
ERYTHROCYTE [DISTWIDTH] IN BLOOD BY AUTOMATED COUNT: 13.5 % (ref 11.5–14.5)
GFR SERPL CREATININE-BSD FRML MDRD: 46 ML/MIN/1.73M2
GLUCOSE SERPL-MCNC: 119 MG/DL (ref 70–108)
HCT VFR BLD AUTO: 40.9 % (ref 37–47)
HGB BLD-MCNC: 13.2 GM/DL (ref 12–16)
IMM GRANULOCYTES # BLD AUTO: 0.02 THOU/MM3 (ref 0–0.07)
IMM GRANULOCYTES NFR BLD AUTO: 0.4 %
LYMPHOCYTES ABSOLUTE: 1.6 THOU/MM3 (ref 1–4.8)
LYMPHOCYTES NFR BLD AUTO: 29.1 %
MCH RBC QN AUTO: 31.4 PG (ref 26–33)
MCHC RBC AUTO-ENTMCNC: 32.3 GM/DL (ref 32.2–35.5)
MCV RBC AUTO: 97.1 FL (ref 81–99)
MONOCYTES ABSOLUTE: 0.4 THOU/MM3 (ref 0.4–1.3)
MONOCYTES NFR BLD AUTO: 7.7 %
NEUTROPHILS NFR BLD AUTO: 61.7 %
NRBC BLD AUTO-RTO: 0 /100 WBC
OSMOLALITY SERPL CALC.SUM OF ELEC: 280.1 MOSMOL/KG (ref 275–300)
PLATELET # BLD AUTO: 153 THOU/MM3 (ref 130–400)
PMV BLD AUTO: 9.7 FL (ref 9.4–12.4)
POTASSIUM SERPL-SCNC: 4.2 MEQ/L (ref 3.5–5.2)
RBC # BLD AUTO: 4.21 MILL/MM3 (ref 4.2–5.4)
SEGMENTED NEUTROPHILS ABSOLUTE COUNT: 3.3 THOU/MM3 (ref 1.8–7.7)
SODIUM SERPL-SCNC: 137 MEQ/L (ref 135–145)
TROPONIN, HIGH SENSITIVITY: 19 NG/L (ref 0–12)
WBC # BLD AUTO: 5.4 THOU/MM3 (ref 4.8–10.8)

## 2023-12-08 PROCEDURE — 84484 ASSAY OF TROPONIN QUANT: CPT

## 2023-12-08 PROCEDURE — 36415 COLL VENOUS BLD VENIPUNCTURE: CPT

## 2023-12-08 PROCEDURE — 99285 EMERGENCY DEPT VISIT HI MDM: CPT

## 2023-12-08 PROCEDURE — 93005 ELECTROCARDIOGRAM TRACING: CPT

## 2023-12-08 PROCEDURE — 80048 BASIC METABOLIC PNL TOTAL CA: CPT

## 2023-12-08 PROCEDURE — 71045 X-RAY EXAM CHEST 1 VIEW: CPT

## 2023-12-08 PROCEDURE — 85025 COMPLETE CBC W/AUTO DIFF WBC: CPT

## 2023-12-08 ASSESSMENT — PAIN - FUNCTIONAL ASSESSMENT
PAIN_FUNCTIONAL_ASSESSMENT: NONE - DENIES PAIN
PAIN_FUNCTIONAL_ASSESSMENT: NONE - DENIES PAIN

## 2023-12-08 NOTE — ED PROVIDER NOTES
[address] ENCOUNTER          Pt Name: Chris Pickreing  MRN: [id number]  [address] 4/22/1929  Date of evaluation: 12/8/2023  Resident Physician: Omer Ruvalcaba MD EM Resident PGY-2  Attending Physician: Noemí Bowser MD      [address]       Chief Complaint   Patient presents with    Hypertension         HISTORY OF PRESENT ILLNESS    HPI  Chris Pickering is a 80 y.o. female who presents to the emergency department from home, by private vehicle for evaluation of HTN. Patient states that she took her blood pressure at home 171/100 at 2230 and 184/110 at 0040 and took her clonidine after the second reading. Patient states that she felt \"out of it \". She denies any active headache, vision change, chest pain, shortness of breath, abdominal pain, fever, chills, nausea, vomiting, diarrhea, constipation, dysuria. Patient states that she is frustrated as she is having frequent high blood pressure readings at home despite taking her as needed clonidine. Patient denies any anxiety currently. The patient has no other acute complaints at this time. ROS negative except as stated above.     PAST MEDICAL AND SURGICAL HISTORY     Past Medical History:   Diagnosis Date    Arthritis     Atrial fibrillation ([address])     Cancer ([address])     Skin Cancer    CHF (congestive heart failure) ([address])     Chronic renal disease, stage III [facility]) [002140] 5/18/2022    Gross hematuria 2014    Dr Candi Boles    HTN (hypertension)     Hyperlipidemia     Medtronic single pacemaker  3/7/2023    Pneumonia 12/2/2018    Type 2 diabetes mellitus 11/23/2021     Past Surgical History:   Procedure Laterality Date    CARPAL TUNNEL RELEASE  10/23/2018    right hand    COLONOSCOPY      EYE SURGERY      FOOT SURGERY      x2    HYSTERECTOMY (CERVIX STATUS UNKNOWN)  1982    KNEE SURGERY  2010    LIVER BIOPSY  08/23/2017    SKIN BIOPSY           MEDICATIONS   No current facility-administered not limited to:  Cardiac arrhythmia  Electrolyte abnormality  Pneumonia  Pneumothorax      Decision Rules/Clinical Scores utilized:            Plan:   Labs, EKG, chest x-ray      Code Status:  Not addressed during this ED visit    Social determinants of health impacting treatment or disposition:  Not Applicable. PREVIOUS RECORDS  AND EXTERNAL INFORMATION REVIEWED   History obtained from: chart review and the patient. Pertinent previous and/or external records reviewed: Patient seen in this ED 2 weeks prior for similar complaint of hypertension. Case discussed with specialties other than Emergency Medicine: None      ED COURSE   ED Medications administered this visit (None if left blank):   Medications - No data to display      ED Course as of 12/08/23 0736   [facility] Dec 08, 2023   0340 XR CHEST PORTABLE  IMPRESSION:  Cardiomegaly with mild interstitial edema and pulmonary vasculature   engorgement. [WF]      ED Course User Index  [WF] Aleah Awan MD       PROCEDURES: (None if blank)  Procedures:       MEDICATION CHANGES     Discharge Medication List as of 12/8/2023  3:42 AM            FINAL DISPOSITION   MDM  Labs, EKG, chest x-ray within patient's baseline. Patient remained normotensive while in the ED. Patient given reassurance, instruction to take blood pressure at home after sitting for 5 minutes with cuff on upper arm. Instructed to follow-up primary care physician for further evaluation of hypertension and chronic management. Patient agreeable with plan for discharge home no further questions at this time. Shared Decision-Making was performed, disposition discussed with the patient/family and questions answered.     Outpatient follow up (If applicable):  Matias Lujan  [address]  [phone]    Schedule an appointment as soon as possible for a visit on 12/11/2023      [facility] EMERGENCY DEPT  [address] [facility]  [address] [facility] [address] [facility]  Go to

## 2023-12-08 NOTE — DISCHARGE INSTRUCTIONS
You were seen and evaluated in the emergency department today for concern for high blood pressure. On evaluation emergency department your blood pressure was within normal range. Follow-up with your primary care physician on Monday for further management of blood pressure medications. Continue to take your prescriptions as prescribed. Take your blood pressure in the sitting position after sitting for 5 minutes. Return to emergency department anytime for worsening changes including significant chest pain, shortness of breath or any other concerning symptoms.

## 2023-12-08 NOTE — ED TRIAGE NOTES
Patient presents to ED from home with C/O hypertension. Patient states her blood pressure the past couple days has been high. Patient states her blood pressure at 2230 was 171/100 and 184/110 at 0040. Patient states she took her Clonidine after she took her last Blood pressure. Patient denies any chest pain. Patient states she has a pacemaker. Patient denies any numbness or tingling in any extremities at this time. Patient states her head \"just doesn't feel right\". Patient states she \"just feels out of it\".

## 2023-12-09 LAB
EKG Q-T INTERVAL: 452 MS
EKG QRS DURATION: 142 MS
EKG QTC CALCULATION (BAZETT): 477 MS
EKG R AXIS: -50 DEGREES
EKG T AXIS: 99 DEGREES
EKG VENTRICULAR RATE: 67 BPM

## 2023-12-09 PROCEDURE — 93010 ELECTROCARDIOGRAM REPORT: CPT | Performed by: INTERNAL MEDICINE

## 2024-01-19 ENCOUNTER — APPOINTMENT (OUTPATIENT)
Dept: GENERAL RADIOLOGY | Age: 89
DRG: 193 | End: 2024-01-19
Payer: MEDICARE

## 2024-01-19 ENCOUNTER — HOSPITAL ENCOUNTER (INPATIENT)
Age: 89
LOS: 6 days | Discharge: HOME HEALTH | DRG: 193 | End: 2024-01-25
Attending: EMERGENCY MEDICINE | Admitting: STUDENT IN AN ORGANIZED HEALTH CARE EDUCATION/TRAINING PROGRAM
Payer: MEDICARE

## 2024-01-19 ENCOUNTER — APPOINTMENT (OUTPATIENT)
Dept: CT IMAGING | Age: 89
DRG: 193 | End: 2024-01-19
Payer: MEDICARE

## 2024-01-19 DIAGNOSIS — R09.02 HYPOXIA: ICD-10-CM

## 2024-01-19 DIAGNOSIS — J18.9 PNEUMONIA OF RIGHT LUNG DUE TO INFECTIOUS ORGANISM, UNSPECIFIED PART OF LUNG: Primary | ICD-10-CM

## 2024-01-19 DIAGNOSIS — J96.01 ACUTE RESPIRATORY FAILURE WITH HYPOXIA (HCC): ICD-10-CM

## 2024-01-19 LAB
ALBUMIN SERPL BCG-MCNC: 4.3 G/DL (ref 3.5–5.1)
ALP SERPL-CCNC: 95 U/L (ref 38–126)
ALT SERPL W/O P-5'-P-CCNC: 40 U/L (ref 11–66)
ANION GAP SERPL CALC-SCNC: 7 MEQ/L (ref 8–16)
AST SERPL-CCNC: 35 U/L (ref 5–40)
B PERT DNA NPH QL NAA+PROBE: NOT DETECTED
BASOPHILS ABSOLUTE: 0 THOU/MM3 (ref 0–0.1)
BASOPHILS NFR BLD AUTO: 0.2 %
BILIRUB CONJ SERPL-MCNC: < 0.2 MG/DL (ref 0–0.3)
BILIRUB SERPL-MCNC: 0.8 MG/DL (ref 0.3–1.2)
BORDETELLA PARAPERTUSSIS BY PCR: NOT DETECTED
BUN SERPL-MCNC: 22 MG/DL (ref 7–22)
C PNEUM DNA SPEC QL NAA+PROBE: NOT DETECTED
CALCIUM SERPL-MCNC: 10.1 MG/DL (ref 8.5–10.5)
CHLORIDE SERPL-SCNC: 97 MEQ/L (ref 98–111)
CO2 SERPL-SCNC: 35 MEQ/L (ref 23–33)
CREAT SERPL-MCNC: 0.9 MG/DL (ref 0.4–1.2)
CRP SERPL-MCNC: 3.55 MG/DL (ref 0–1)
DEPRECATED RDW RBC AUTO: 51.6 FL (ref 35–45)
EOSINOPHIL NFR BLD AUTO: 0 %
EOSINOPHILS ABSOLUTE: 0 THOU/MM3 (ref 0–0.4)
ERYTHROCYTE [DISTWIDTH] IN BLOOD BY AUTOMATED COUNT: 14.1 % (ref 11.5–14.5)
FLUAV RNA NPH QL NAA+PROBE: NOT DETECTED
FLUAV RNA RESP QL NAA+PROBE: NOT DETECTED
FLUBV RNA NPH QL NAA+PROBE: NOT DETECTED
FLUBV RNA RESP QL NAA+PROBE: NOT DETECTED
GFR SERPL CREATININE-BSD FRML MDRD: 59 ML/MIN/1.73M2
GLUCOSE SERPL-MCNC: 209 MG/DL (ref 70–108)
HADV DNA NPH QL NAA+PROBE: NOT DETECTED
HCOV 229E RNA SPEC QL NAA+PROBE: NOT DETECTED
HCOV HKU1 RNA SPEC QL NAA+PROBE: NOT DETECTED
HCOV NL63 RNA SPEC QL NAA+PROBE: NOT DETECTED
HCOV OC43 RNA SPEC QL NAA+PROBE: NOT DETECTED
HCT VFR BLD AUTO: 44.1 % (ref 37–47)
HGB BLD-MCNC: 14.1 GM/DL (ref 12–16)
HMPV RNA NPH QL NAA+PROBE: NOT DETECTED
HPIV1 RNA NPH QL NAA+PROBE: NOT DETECTED
HPIV2 RNA NPH QL NAA+PROBE: NOT DETECTED
HPIV3 RNA NPH QL NAA+PROBE: NOT DETECTED
HPIV4 RNA NPH QL NAA+PROBE: NOT DETECTED
IMM GRANULOCYTES # BLD AUTO: 0.03 THOU/MM3 (ref 0–0.07)
IMM GRANULOCYTES NFR BLD AUTO: 0.3 %
LACTATE SERPL-SCNC: 2.2 MMOL/L (ref 0.5–2)
LYMPHOCYTES ABSOLUTE: 0.9 THOU/MM3 (ref 1–4.8)
LYMPHOCYTES NFR BLD AUTO: 7.5 %
M PNEUMO DNA SPEC QL NAA+PROBE: NOT DETECTED
MCH RBC QN AUTO: 31.4 PG (ref 26–33)
MCHC RBC AUTO-ENTMCNC: 32 GM/DL (ref 32.2–35.5)
MCV RBC AUTO: 98.2 FL (ref 81–99)
MONOCYTES ABSOLUTE: 0.9 THOU/MM3 (ref 0.4–1.3)
MONOCYTES NFR BLD AUTO: 7.7 %
NEUTROPHILS NFR BLD AUTO: 84.3 %
NRBC BLD AUTO-RTO: 0 /100 WBC
NT-PROBNP SERPL IA-MCNC: 1844 PG/ML (ref 0–449)
OSMOLALITY SERPL CALC.SUM OF ELEC: 287 MOSMOL/KG (ref 275–300)
PLATELET # BLD AUTO: 158 THOU/MM3 (ref 130–400)
PMV BLD AUTO: 9.4 FL (ref 9.4–12.4)
POTASSIUM SERPL-SCNC: 4.7 MEQ/L (ref 3.5–5.2)
PROCALCITONIN SERPL IA-MCNC: 0.06 NG/ML (ref 0.01–0.09)
PROT SERPL-MCNC: 7.3 G/DL (ref 6.1–8)
RBC # BLD AUTO: 4.49 MILL/MM3 (ref 4.2–5.4)
RSV COMMENT: NORMAL
RSV COMMENT: NORMAL
RSV RNA NPH QL NAA+PROBE: NOT DETECTED
RV+EV RNA SPEC QL NAA+PROBE: NOT DETECTED
SARS-COV-2 RNA NPH QL NAA+NON-PROBE: NOT DETECTED
SARS-COV-2 RNA RESP QL NAA+PROBE: NOT DETECTED
SEGMENTED NEUTROPHILS ABSOLUTE COUNT: 9.6 THOU/MM3 (ref 1.8–7.7)
SODIUM SERPL-SCNC: 139 MEQ/L (ref 135–145)
TROPONIN, HIGH SENSITIVITY: 18 NG/L (ref 0–12)
TROPONIN, HIGH SENSITIVITY: 20 NG/L (ref 0–12)
WBC # BLD AUTO: 11.4 THOU/MM3 (ref 4.8–10.8)

## 2024-01-19 PROCEDURE — 87070 CULTURE OTHR SPECIMN AEROBIC: CPT

## 2024-01-19 PROCEDURE — 6360000004 HC RX CONTRAST MEDICATION: Performed by: EMERGENCY MEDICINE

## 2024-01-19 PROCEDURE — 86140 C-REACTIVE PROTEIN: CPT

## 2024-01-19 PROCEDURE — 87040 BLOOD CULTURE FOR BACTERIA: CPT

## 2024-01-19 PROCEDURE — 96365 THER/PROPH/DIAG IV INF INIT: CPT

## 2024-01-19 PROCEDURE — 93005 ELECTROCARDIOGRAM TRACING: CPT | Performed by: EMERGENCY MEDICINE

## 2024-01-19 PROCEDURE — 6360000002 HC RX W HCPCS

## 2024-01-19 PROCEDURE — 84484 ASSAY OF TROPONIN QUANT: CPT

## 2024-01-19 PROCEDURE — 99285 EMERGENCY DEPT VISIT HI MDM: CPT

## 2024-01-19 PROCEDURE — 80076 HEPATIC FUNCTION PANEL: CPT

## 2024-01-19 PROCEDURE — 84145 PROCALCITONIN (PCT): CPT

## 2024-01-19 PROCEDURE — 80048 BASIC METABOLIC PNL TOTAL CA: CPT

## 2024-01-19 PROCEDURE — 2700000000 HC OXYGEN THERAPY PER DAY

## 2024-01-19 PROCEDURE — 71275 CT ANGIOGRAPHY CHEST: CPT

## 2024-01-19 PROCEDURE — 85025 COMPLETE CBC W/AUTO DIFF WBC: CPT

## 2024-01-19 PROCEDURE — 83605 ASSAY OF LACTIC ACID: CPT

## 2024-01-19 PROCEDURE — 6370000000 HC RX 637 (ALT 250 FOR IP)

## 2024-01-19 PROCEDURE — 71045 X-RAY EXAM CHEST 1 VIEW: CPT

## 2024-01-19 PROCEDURE — 87636 SARSCOV2 & INF A&B AMP PRB: CPT

## 2024-01-19 PROCEDURE — 2580000003 HC RX 258

## 2024-01-19 PROCEDURE — 2580000003 HC RX 258: Performed by: EMERGENCY MEDICINE

## 2024-01-19 PROCEDURE — 0202U NFCT DS 22 TRGT SARS-COV-2: CPT

## 2024-01-19 PROCEDURE — 36415 COLL VENOUS BLD VENIPUNCTURE: CPT

## 2024-01-19 PROCEDURE — 99223 1ST HOSP IP/OBS HIGH 75: CPT | Performed by: NURSE PRACTITIONER

## 2024-01-19 PROCEDURE — 94640 AIRWAY INHALATION TREATMENT: CPT

## 2024-01-19 PROCEDURE — 2060000000 HC ICU INTERMEDIATE R&B

## 2024-01-19 PROCEDURE — 83880 ASSAY OF NATRIURETIC PEPTIDE: CPT

## 2024-01-19 PROCEDURE — 87641 MR-STAPH DNA AMP PROBE: CPT

## 2024-01-19 RX ORDER — SODIUM CHLORIDE 9 MG/ML
INJECTION, SOLUTION INTRAVENOUS CONTINUOUS
Status: DISCONTINUED | OUTPATIENT
Start: 2024-01-19 | End: 2024-01-21

## 2024-01-19 RX ORDER — ALBUTEROL SULFATE 90 UG/1
2 AEROSOL, METERED RESPIRATORY (INHALATION) EVERY 4 HOURS PRN
Status: DISCONTINUED | OUTPATIENT
Start: 2024-01-19 | End: 2024-01-21

## 2024-01-19 RX ORDER — AMLODIPINE BESYLATE 5 MG/1
5 TABLET ORAL DAILY
Status: DISCONTINUED | OUTPATIENT
Start: 2024-01-20 | End: 2024-01-25 | Stop reason: HOSPADM

## 2024-01-19 RX ORDER — IPRATROPIUM BROMIDE AND ALBUTEROL SULFATE 2.5; .5 MG/3ML; MG/3ML
2 SOLUTION RESPIRATORY (INHALATION) ONCE
Status: COMPLETED | OUTPATIENT
Start: 2024-01-19 | End: 2024-01-19

## 2024-01-19 RX ORDER — LOTEPREDNOL ETABONATE 5 MG/ML
1 SUSPENSION/ DROPS OPHTHALMIC EVERY OTHER DAY
Status: DISCONTINUED | OUTPATIENT
Start: 2024-01-21 | End: 2024-01-25 | Stop reason: HOSPADM

## 2024-01-19 RX ORDER — ISOSORBIDE MONONITRATE 30 MG/1
30 TABLET, EXTENDED RELEASE ORAL DAILY
Status: DISCONTINUED | OUTPATIENT
Start: 2024-01-20 | End: 2024-01-25 | Stop reason: HOSPADM

## 2024-01-19 RX ORDER — BUMETANIDE 1 MG/1
1 TABLET ORAL 2 TIMES DAILY
Status: DISCONTINUED | OUTPATIENT
Start: 2024-01-19 | End: 2024-01-25 | Stop reason: HOSPADM

## 2024-01-19 RX ORDER — BUMETANIDE 0.25 MG/ML
1 INJECTION INTRAMUSCULAR; INTRAVENOUS ONCE
Status: COMPLETED | OUTPATIENT
Start: 2024-01-19 | End: 2024-01-20

## 2024-01-19 RX ORDER — ATENOLOL 25 MG/1
25 TABLET ORAL DAILY
Status: DISCONTINUED | OUTPATIENT
Start: 2024-01-20 | End: 2024-01-25 | Stop reason: HOSPADM

## 2024-01-19 RX ORDER — LISINOPRIL 20 MG/1
20 TABLET ORAL DAILY
Status: DISCONTINUED | OUTPATIENT
Start: 2024-01-20 | End: 2024-01-25 | Stop reason: HOSPADM

## 2024-01-19 RX ADMIN — SODIUM CHLORIDE: 9 INJECTION, SOLUTION INTRAVENOUS at 18:58

## 2024-01-19 RX ADMIN — IOPAMIDOL 80 ML: 755 INJECTION, SOLUTION INTRAVENOUS at 19:29

## 2024-01-19 RX ADMIN — IPRATROPIUM BROMIDE AND ALBUTEROL SULFATE 2 DOSE: .5; 3 SOLUTION RESPIRATORY (INHALATION) at 18:00

## 2024-01-19 RX ADMIN — CEFTRIAXONE SODIUM 1000 MG: 1 INJECTION, POWDER, FOR SOLUTION INTRAMUSCULAR; INTRAVENOUS at 21:34

## 2024-01-19 RX ADMIN — AZITHROMYCIN MONOHYDRATE 500 MG: 500 INJECTION, POWDER, LYOPHILIZED, FOR SOLUTION INTRAVENOUS at 22:29

## 2024-01-19 ASSESSMENT — PAIN SCALES - GENERAL
PAINLEVEL_OUTOF10: 0
PAINLEVEL_OUTOF10: 0

## 2024-01-19 ASSESSMENT — PAIN - FUNCTIONAL ASSESSMENT
PAIN_FUNCTIONAL_ASSESSMENT: NONE - DENIES PAIN

## 2024-01-19 NOTE — ED PROVIDER NOTES
[facility] EMERGENCY DEPT  EMERGENCY DEPARTMENT ENCOUNTER          Pt Name: Daniela Pacheco  MRN: [id number]  Birthdate 4/22/1929  Date of evaluation: 1/19/2024  Physician: Shelli Corea MD  Supervising Attending Physician: Jose Elias Gomez DO       CHIEF COMPLAINT       Chief Complaint   Patient presents with    Shortness of Breath         HISTORY OF PRESENT ILLNESS    HPI  Daniela Pacheco is a 89 y.o. female who presents to the emergency department from home, by private vehicle for evaluation of shortness of breath    Patient was at her usual health until yesterday when she started to have shortness of breath and coughing.  She denies any fever chills or orthopnea.  She feels tired generally.  She has been exposed to COVID and RSV over the past few days.  Patient has a history of congestive heart failure and was checking her pulse ox at home and she was constantly getting readings in the 80s.    The patient has no other acute complaints at this time.      REVIEW OF SYSTEMS   Review of Systems      PAST MEDICAL AND SURGICAL HISTORY     Past Medical History:   Diagnosis Date    Arthritis     Atrial fibrillation (HCC)     Cancer (HCC)     Skin Cancer    CHF (congestive heart failure) (HCC)     Chronic renal disease, stage III (HCC) [053698] 5/18/2022    Gross hematuria 2014    Dr Zayas    HTN (hypertension)     Hyperlipidemia     Medtronic single pacemaker  3/7/2023    Pneumonia 12/2/2018    Type 2 diabetes mellitus 11/23/2021     Past Surgical History:   Procedure Laterality Date    CARPAL TUNNEL RELEASE  10/23/2018    right hand    COLONOSCOPY      EYE SURGERY      FOOT SURGERY      x2    HYSTERECTOMY (CERVIX STATUS UNKNOWN)  1982    KNEE SURGERY  2010    LIVER BIOPSY  08/23/2017    SKIN BIOPSY           MEDICATIONS     Current Facility-Administered Medications:     0.9 % sodium chloride infusion, , IntraVENous, Continuous, Jose Elias Gomez DO, Last Rate: 125 mL/hr at 01/19/24 1858, New Bag at 01/19/24

## 2024-01-20 PROBLEM — N28.9 KIDNEY LESION, NATIVE, RIGHT: Status: ACTIVE | Noted: 2024-01-20

## 2024-01-20 PROBLEM — E87.1 HYPONATREMIA: Status: RESOLVED | Noted: 2018-12-24 | Resolved: 2024-01-20

## 2024-01-20 PROBLEM — R23.2 FLUSHING: Status: RESOLVED | Noted: 2017-07-29 | Resolved: 2024-01-20

## 2024-01-20 PROBLEM — E11.9 TYPE 2 DIABETES MELLITUS (HCC): Status: RESOLVED | Noted: 2021-11-23 | Resolved: 2024-01-20

## 2024-01-20 PROBLEM — J18.9 PNEUMONIA: Status: RESOLVED | Noted: 2018-12-02 | Resolved: 2024-01-20

## 2024-01-20 PROBLEM — J90 BILATERAL PLEURAL EFFUSION: Status: RESOLVED | Noted: 2018-12-12 | Resolved: 2024-01-20

## 2024-01-20 PROBLEM — M19.012 PRIMARY OSTEOARTHRITIS OF LEFT SHOULDER: Status: RESOLVED | Noted: 2018-12-12 | Resolved: 2024-01-20

## 2024-01-20 PROBLEM — E66.09 CLASS 1 OBESITY DUE TO EXCESS CALORIES WITH SERIOUS COMORBIDITY AND BODY MASS INDEX (BMI) OF 31.0 TO 31.9 IN ADULT: Status: RESOLVED | Noted: 2018-12-12 | Resolved: 2024-01-20

## 2024-01-20 PROBLEM — L03.115 CELLULITIS OF RIGHT LOWER EXTREMITY: Status: RESOLVED | Noted: 2018-12-12 | Resolved: 2024-01-20

## 2024-01-20 PROBLEM — Z79.01 CHRONIC ANTICOAGULATION: Status: ACTIVE | Noted: 2024-01-20

## 2024-01-20 PROBLEM — R78.81 STREPTOCOCCAL BACTEREMIA: Status: RESOLVED | Noted: 2018-12-23 | Resolved: 2024-01-20

## 2024-01-20 PROBLEM — I48.0 PAF (PAROXYSMAL ATRIAL FIBRILLATION) (HCC): Status: ACTIVE | Noted: 2024-01-20

## 2024-01-20 PROBLEM — M25.512 ACUTE PAIN OF LEFT SHOULDER: Status: RESOLVED | Noted: 2018-12-02 | Resolved: 2024-01-20

## 2024-01-20 PROBLEM — R06.00 DYSPNEA: Status: RESOLVED | Noted: 2022-05-15 | Resolved: 2024-01-20

## 2024-01-20 PROBLEM — R07.9 CHEST PAIN: Status: RESOLVED | Noted: 2018-04-29 | Resolved: 2024-01-20

## 2024-01-20 PROBLEM — D72.829 LEUKOCYTOSIS: Status: ACTIVE | Noted: 2024-01-20

## 2024-01-20 PROBLEM — D69.6 THROMBOCYTOPENIA, UNSPECIFIED (HCC): Status: RESOLVED | Noted: 2022-05-18 | Resolved: 2024-01-20

## 2024-01-20 PROBLEM — E78.5 HYPERLIPIDEMIA: Status: ACTIVE | Noted: 2024-01-20

## 2024-01-20 PROBLEM — M47.812 SPONDYLOSIS OF CERVICAL REGION WITHOUT MYELOPATHY OR RADICULOPATHY: Status: RESOLVED | Noted: 2018-12-12 | Resolved: 2024-01-20

## 2024-01-20 PROBLEM — B95.5 STREPTOCOCCAL BACTEREMIA: Status: RESOLVED | Noted: 2018-12-23 | Resolved: 2024-01-20

## 2024-01-20 PROBLEM — R73.01 IFG (IMPAIRED FASTING GLUCOSE): Status: ACTIVE | Noted: 2024-01-20

## 2024-01-20 LAB
ALBUMIN SERPL BCG-MCNC: 3.8 G/DL (ref 3.5–5.1)
ALP SERPL-CCNC: 84 U/L (ref 38–126)
ALT SERPL W/O P-5'-P-CCNC: 34 U/L (ref 11–66)
ANION GAP SERPL CALC-SCNC: 10 MEQ/L (ref 8–16)
AST SERPL-CCNC: 30 U/L (ref 5–40)
BACTERIA: ABNORMAL
BASOPHILS ABSOLUTE: 0 THOU/MM3 (ref 0–0.1)
BASOPHILS NFR BLD AUTO: 0.3 %
BILIRUB SERPL-MCNC: 0.7 MG/DL (ref 0.3–1.2)
BILIRUB UR QL STRIP: NEGATIVE
BUN SERPL-MCNC: 17 MG/DL (ref 7–22)
CALCIUM SERPL-MCNC: 9.4 MG/DL (ref 8.5–10.5)
CASTS #/AREA URNS LPF: ABNORMAL /LPF
CASTS #/AREA URNS LPF: ABNORMAL /LPF
CHARACTER UR: CLEAR
CHARCOAL URNS QL MICRO: ABNORMAL
CHLORIDE SERPL-SCNC: 95 MEQ/L (ref 98–111)
CO2 SERPL-SCNC: 29 MEQ/L (ref 23–33)
COLOR UR: YELLOW
CREAT SERPL-MCNC: 0.8 MG/DL (ref 0.4–1.2)
CRYSTALS URNS QL MICRO: ABNORMAL
DEPRECATED MEAN GLUCOSE BLD GHB EST-ACNC: 129 MG/DL (ref 70–126)
DEPRECATED RDW RBC AUTO: 51.1 FL (ref 35–45)
EOSINOPHIL NFR BLD AUTO: 0 %
EOSINOPHILS ABSOLUTE: 0 THOU/MM3 (ref 0–0.4)
EPITHELIAL CELLS, UA: ABNORMAL /HPF
ERYTHROCYTE [DISTWIDTH] IN BLOOD BY AUTOMATED COUNT: 14.5 % (ref 11.5–14.5)
GFR SERPL CREATININE-BSD FRML MDRD: > 60 ML/MIN/1.73M2
GLUCOSE BLD STRIP.AUTO-MCNC: 129 MG/DL (ref 70–108)
GLUCOSE BLD STRIP.AUTO-MCNC: 141 MG/DL (ref 70–108)
GLUCOSE BLD STRIP.AUTO-MCNC: 172 MG/DL (ref 70–108)
GLUCOSE SERPL-MCNC: 161 MG/DL (ref 70–108)
GLUCOSE UR QL STRIP.AUTO: NEGATIVE MG/DL
HBA1C MFR BLD HPLC: 6.3 % (ref 4.4–6.4)
HCT VFR BLD AUTO: 37.6 % (ref 37–47)
HGB BLD-MCNC: 12.4 GM/DL (ref 12–16)
HGB UR QL STRIP.AUTO: NEGATIVE
IMM GRANULOCYTES # BLD AUTO: 0.06 THOU/MM3 (ref 0–0.07)
IMM GRANULOCYTES NFR BLD AUTO: 0.5 %
KETONES UR QL STRIP.AUTO: NEGATIVE
LACTATE SERPL-SCNC: 1.4 MMOL/L (ref 0.5–2)
LEUKOCYTE ESTERASE UR QL STRIP.AUTO: ABNORMAL
LYMPHOCYTES ABSOLUTE: 0.9 THOU/MM3 (ref 1–4.8)
LYMPHOCYTES NFR BLD AUTO: 8.2 %
MCH RBC QN AUTO: 31.7 PG (ref 26–33)
MCHC RBC AUTO-ENTMCNC: 33 GM/DL (ref 32.2–35.5)
MCV RBC AUTO: 96.2 FL (ref 81–99)
MONOCYTES ABSOLUTE: 1.1 THOU/MM3 (ref 0.4–1.3)
MONOCYTES NFR BLD AUTO: 9.9 %
MRSA DNA SPEC QL NAA+PROBE: NEGATIVE
NEUTROPHILS NFR BLD AUTO: 81.1 %
NITRITE UR QL STRIP.AUTO: NEGATIVE
NRBC BLD AUTO-RTO: 0 /100 WBC
PH UR STRIP.AUTO: 6 [PH] (ref 5–9)
PLATELET # BLD AUTO: 151 THOU/MM3 (ref 130–400)
PMV BLD AUTO: 10 FL (ref 9.4–12.4)
POTASSIUM SERPL-SCNC: 4.2 MEQ/L (ref 3.5–5.2)
PROT SERPL-MCNC: 6.4 G/DL (ref 6.1–8)
PROT UR STRIP.AUTO-MCNC: NEGATIVE MG/DL
RBC # BLD AUTO: 3.91 MILL/MM3 (ref 4.2–5.4)
RBC #/AREA URNS HPF: ABNORMAL /HPF
RENAL EPI CELLS #/AREA URNS HPF: ABNORMAL /[HPF]
SEGMENTED NEUTROPHILS ABSOLUTE COUNT: 9.1 THOU/MM3 (ref 1.8–7.7)
SODIUM SERPL-SCNC: 134 MEQ/L (ref 135–145)
SP GR UR REFRACT.AUTO: 1.03 (ref 1–1.03)
UROBILINOGEN UR QL STRIP.AUTO: 0.2 EU/DL (ref 0–1)
WBC # BLD AUTO: 11.2 THOU/MM3 (ref 4.8–10.8)
WBC #/AREA URNS HPF: ABNORMAL /HPF
YEAST LIKE FUNGI URNS QL MICRO: ABNORMAL

## 2024-01-20 PROCEDURE — 93010 ELECTROCARDIOGRAM REPORT: CPT | Performed by: NUCLEAR MEDICINE

## 2024-01-20 PROCEDURE — 81001 URINALYSIS AUTO W/SCOPE: CPT

## 2024-01-20 PROCEDURE — 6370000000 HC RX 637 (ALT 250 FOR IP): Performed by: NURSE PRACTITIONER

## 2024-01-20 PROCEDURE — 2580000003 HC RX 258

## 2024-01-20 PROCEDURE — 2580000003 HC RX 258: Performed by: NURSE PRACTITIONER

## 2024-01-20 PROCEDURE — 82948 REAGENT STRIP/BLOOD GLUCOSE: CPT

## 2024-01-20 PROCEDURE — 94761 N-INVAS EAR/PLS OXIMETRY MLT: CPT

## 2024-01-20 PROCEDURE — 1200000003 HC TELEMETRY R&B

## 2024-01-20 PROCEDURE — 6360000002 HC RX W HCPCS: Performed by: NURSE PRACTITIONER

## 2024-01-20 PROCEDURE — 80053 COMPREHEN METABOLIC PANEL: CPT

## 2024-01-20 PROCEDURE — 94669 MECHANICAL CHEST WALL OSCILL: CPT

## 2024-01-20 PROCEDURE — 99233 SBSQ HOSP IP/OBS HIGH 50: CPT | Performed by: FAMILY MEDICINE

## 2024-01-20 PROCEDURE — 83036 HEMOGLOBIN GLYCOSYLATED A1C: CPT

## 2024-01-20 PROCEDURE — 94640 AIRWAY INHALATION TREATMENT: CPT

## 2024-01-20 PROCEDURE — 83605 ASSAY OF LACTIC ACID: CPT

## 2024-01-20 PROCEDURE — 6360000002 HC RX W HCPCS

## 2024-01-20 PROCEDURE — 85025 COMPLETE CBC W/AUTO DIFF WBC: CPT

## 2024-01-20 PROCEDURE — 36415 COLL VENOUS BLD VENIPUNCTURE: CPT

## 2024-01-20 RX ORDER — ACETAMINOPHEN 650 MG/1
650 SUPPOSITORY RECTAL EVERY 6 HOURS PRN
Status: DISCONTINUED | OUTPATIENT
Start: 2024-01-20 | End: 2024-01-25 | Stop reason: HOSPADM

## 2024-01-20 RX ORDER — POTASSIUM CHLORIDE 7.45 MG/ML
10 INJECTION INTRAVENOUS PRN
Status: DISCONTINUED | OUTPATIENT
Start: 2024-01-20 | End: 2024-01-25 | Stop reason: HOSPADM

## 2024-01-20 RX ORDER — ONDANSETRON 4 MG/1
4 TABLET, ORALLY DISINTEGRATING ORAL EVERY 8 HOURS PRN
Status: DISCONTINUED | OUTPATIENT
Start: 2024-01-20 | End: 2024-01-25 | Stop reason: HOSPADM

## 2024-01-20 RX ORDER — ONDANSETRON 2 MG/ML
4 INJECTION INTRAMUSCULAR; INTRAVENOUS EVERY 6 HOURS PRN
Status: DISCONTINUED | OUTPATIENT
Start: 2024-01-20 | End: 2024-01-25 | Stop reason: HOSPADM

## 2024-01-20 RX ORDER — POLYETHYLENE GLYCOL 3350 17 G/17G
17 POWDER, FOR SOLUTION ORAL DAILY PRN
Status: DISCONTINUED | OUTPATIENT
Start: 2024-01-20 | End: 2024-01-25 | Stop reason: HOSPADM

## 2024-01-20 RX ORDER — POTASSIUM CHLORIDE 20 MEQ/1
40 TABLET, EXTENDED RELEASE ORAL PRN
Status: DISCONTINUED | OUTPATIENT
Start: 2024-01-20 | End: 2024-01-25 | Stop reason: HOSPADM

## 2024-01-20 RX ORDER — MAGNESIUM SULFATE IN WATER 40 MG/ML
2000 INJECTION, SOLUTION INTRAVENOUS PRN
Status: DISCONTINUED | OUTPATIENT
Start: 2024-01-20 | End: 2024-01-25 | Stop reason: HOSPADM

## 2024-01-20 RX ORDER — ACETAMINOPHEN 325 MG/1
650 TABLET ORAL EVERY 6 HOURS PRN
Status: DISCONTINUED | OUTPATIENT
Start: 2024-01-20 | End: 2024-01-25 | Stop reason: HOSPADM

## 2024-01-20 RX ORDER — SODIUM CHLORIDE 0.9 % (FLUSH) 0.9 %
5-40 SYRINGE (ML) INJECTION EVERY 12 HOURS SCHEDULED
Status: DISCONTINUED | OUTPATIENT
Start: 2024-01-20 | End: 2024-01-25 | Stop reason: HOSPADM

## 2024-01-20 RX ORDER — INSULIN LISPRO 100 [IU]/ML
0-4 INJECTION, SOLUTION INTRAVENOUS; SUBCUTANEOUS
Status: DISCONTINUED | OUTPATIENT
Start: 2024-01-20 | End: 2024-01-25 | Stop reason: HOSPADM

## 2024-01-20 RX ORDER — SODIUM CHLORIDE 0.9 % (FLUSH) 0.9 %
5-40 SYRINGE (ML) INJECTION PRN
Status: DISCONTINUED | OUTPATIENT
Start: 2024-01-20 | End: 2024-01-25 | Stop reason: HOSPADM

## 2024-01-20 RX ORDER — DEXTROSE MONOHYDRATE 100 MG/ML
INJECTION, SOLUTION INTRAVENOUS CONTINUOUS PRN
Status: DISCONTINUED | OUTPATIENT
Start: 2024-01-20 | End: 2024-01-25 | Stop reason: HOSPADM

## 2024-01-20 RX ORDER — SODIUM CHLORIDE 9 MG/ML
INJECTION, SOLUTION INTRAVENOUS PRN
Status: DISCONTINUED | OUTPATIENT
Start: 2024-01-20 | End: 2024-01-25 | Stop reason: HOSPADM

## 2024-01-20 RX ORDER — INSULIN LISPRO 100 [IU]/ML
0-4 INJECTION, SOLUTION INTRAVENOUS; SUBCUTANEOUS NIGHTLY
Status: DISCONTINUED | OUTPATIENT
Start: 2024-01-20 | End: 2024-01-25 | Stop reason: HOSPADM

## 2024-01-20 RX ADMIN — SODIUM CHLORIDE, PRESERVATIVE FREE 10 ML: 5 INJECTION INTRAVENOUS at 23:25

## 2024-01-20 RX ADMIN — CEFTRIAXONE SODIUM 1000 MG: 1 INJECTION, POWDER, FOR SOLUTION INTRAMUSCULAR; INTRAVENOUS at 22:43

## 2024-01-20 RX ADMIN — ATENOLOL 25 MG: 25 TABLET ORAL at 08:26

## 2024-01-20 RX ADMIN — SODIUM CHLORIDE, PRESERVATIVE FREE 10 ML: 5 INJECTION INTRAVENOUS at 08:26

## 2024-01-20 RX ADMIN — AZITHROMYCIN DIHYDRATE 500 MG: 500 INJECTION, POWDER, LYOPHILIZED, FOR SOLUTION INTRAVENOUS at 23:25

## 2024-01-20 RX ADMIN — ALBUTEROL SULFATE 2 PUFF: 90 AEROSOL, METERED RESPIRATORY (INHALATION) at 14:28

## 2024-01-20 RX ADMIN — BUMETANIDE 1 MG: 0.25 INJECTION INTRAMUSCULAR; INTRAVENOUS at 00:59

## 2024-01-20 RX ADMIN — ISOSORBIDE MONONITRATE 30 MG: 30 TABLET, EXTENDED RELEASE ORAL at 08:24

## 2024-01-20 RX ADMIN — RIVAROXABAN 15 MG: 15 TABLET, FILM COATED ORAL at 08:24

## 2024-01-20 RX ADMIN — LISINOPRIL 20 MG: 20 TABLET ORAL at 08:26

## 2024-01-20 RX ADMIN — AMLODIPINE BESYLATE 5 MG: 5 TABLET ORAL at 08:24

## 2024-01-20 ASSESSMENT — PAIN SCALES - GENERAL: PAINLEVEL_OUTOF10: 0

## 2024-01-20 NOTE — H&P
911. 12/17/21   Laya Burnett MD   Multiple Vitamin (MULTI-VITAMIN DAILY PO) Take by mouth daily    Kenyatta Andrews MD   Glucosamine-Chondroitin (GLUCOSAMINE CHONDR COMPLEX PO) Take 2 tablets by mouth daily Move Free    Kenyatta Andrews MD   Cholecalciferol (VITAMIN D-3 PO) Take 1,000 Units by mouth daily     Kenyatta Andrews MD   Multiple Vitamins-Minerals (OCUVITE PO) Take 1 tablet by mouth daily     Kenyatta Andrews MD   calcium carbonate 600 MG TABS tablet Take 1 tablet by mouth daily    Kenyatta Andrews MD       Allergies:  Bactrim [sulfamethoxazole-trimethoprim], Ciprofloxacin, and Sulfa antibiotics    Social History:   TOBACCO:   reports that she has never smoked. She has never used smokeless tobacco.  ETOH:   reports no history of alcohol use.  OCCUPATION:  retired,     Family History:       Problem Relation Age of Onset    Cancer Mother     High Blood Pressure Father     Arthritis Father     Heart Attack Father     Heart Disease Father     Cancer Sister     Diabetes Sister     Cancer Brother     Early Death Brother     Cancer Sister     Diabetes Sister        REVIEW OF SYSTEMS:  GENERAL: Positive for fatigue, weakness, weight gain no fever   SKN: No lesions or rashes.  HEAD: No headaches or recent injury  EYES: No acute changes in vision, no diplopia or blurred vision  EARS: No hearing loss, no tinnitus  NOSE/THROAT: No rhinorrhea or pharyngitis, no nasal drainage  NECK: No lumps or unusual neck stiffness  PULMONARY: Positive for hypoxia, dyspnea, orthopnea denies any stridor   CARDIAC: Positive for lower leg edema, neck vein distention, denies any chest pain pressure heaviness or tightness  GI: No history melena or hematochezia, no diarrhea or constipation  PERIPHERAL VASCULAR: No intermittent claudication or unusual leg cramps  MUSCULOSKELETAL: Occasional arthralgias, myalgias  NEUROLOGICAL: No headache, dizziness, seizures or Syncope  HEMATOLOGIC:  No  unusual bruising

## 2024-01-20 NOTE — ED NOTES
ED to inpatient nurses report      Chief Complaint:  Chief Complaint   Patient presents with    Shortness of Breath     Present to ED from: Home    MOA:     LOC: alert and orientated to name, place, date  Mobility: Requires assistance * 1  Oxygen Baseline: Room Air    Current needs required: 2LPM via nc     Code Status:   Prior    What abnormal results were found and what did you give/do to treat them? Pneumonia  Any procedures or intervention occur? Antibiotics, CTA, blood work    Mental Status:  Level of Consciousness: Alert (0)    Psych Assessment:        Vitals:  Patient Vitals for the past 24 hrs:   BP Temp Temp src Pulse Resp SpO2   01/19/24 2036 137/75 -- -- 71 20 95 %   01/19/24 1900 (!) 124/51 -- -- 63 16 94 %   01/19/24 1800 (!) 134/94 -- -- 66 18 96 %   01/19/24 1709 -- 98 °F (36.7 °C) Oral 66 15 94 %        LDAs:   Peripheral IV 01/19/24 Right Antecubital (Active)   Site Assessment Clean, dry & intact 01/19/24 1852   Line Status Blood return noted;Flushed;Normal saline locked;Specimen collected 01/19/24 1852   Phlebitis Assessment No symptoms 01/19/24 1852   Infiltration Assessment 0 01/19/24 1852   Dressing Status Clean, dry & intact 01/19/24 1852   Dressing Type Transparent 01/19/24 1852   Dressing Intervention New 01/19/24 1852       Ambulatory Status:  Presents to emergency department  because of falls (Syncope, seizure, or loss of consciousness): No, Age > 70: Yes, Altered Mental Status, Intoxication with alcohol or substance confusion (Disorientation, impaired judgment, poor safety awaremess, or inability to follow instructions): No, Impaired Mobility: Ambulates or transfers with assistive devices or assistance; Unable to ambulate or transer.: No    Diagnosis:  DISPOSITION Decision To Admit 01/19/2024 08:48:51 PM   Final diagnoses:   Pneumonia of right lung due to infectious organism, unspecified part of lung   Hypoxia        Consults:  None     Pain Score:  Pain Assessment  Pain Assessment: None - 
In for hourly rounding. Pt resting on cot in position of comfort. Pt remains A&Ox4, resps easy and unlabored. Pt continues on 2LPM via nc. IV shows no s/s of infection or infiltration. Pt pain remains unchanged at this time. External catheter placed for comfort at this time. Family requesting something to eat for pt. Will discuss with provider. Monitor remains in place. Updated pt on POC. Will monitor.  
PT resting in bed. Respirations equal and unlabored. Call light in reach. Family at bedside.   
Pt arrives to ED from home with c/o SOB. Pt states she has been coughing since yesterday and has been getting more SOB since then. Pt states she has CHF  SPO2 86% on RA, placed on 2 LNC.   
Upon first contact with pt, pt resting on cot in position of comfort. Pt is A&Ox4, resps easy and unlabored. Pt continues on 2LPM via nc. Pt reports improvement of breathing with breathing treatment. IV flushed and infusing, shows no s/s of infection or infiltration. Pt denies pain. Pt and family questioning RSV testing and results. This RN out to discuss cancelled test with Dr Gomez. Pt Jason states wanting the test ran. This RN in to swab pt again and sent to lab. Updated pt and family at bedside on POC. VS as noted. Will monitor.  
    Consults:  None     Pain Score:  Pain Assessment  Pain Assessment: None - Denies Pain  Pain Level: 0    C-SSRS:   Risk of Suicide: No Risk    Sepsis Screening:  Sepsis Risk Score: 3.4    [place] Fall Risk:  [place] 1 Fall Risk  Presents to emergency department  because of falls (Syncope, seizure, or loss of consciousness): No  Age > 70: Yes  Altered Mental Status, Intoxication with alcohol or substance confusion (Disorientation, impaired judgment, poor safety awaremess, or inability to follow instructions): No  Impaired Mobility: Ambulates or transfers with assistive devices or assistance; Unable to ambulate or transer.: No    Swallow Screening        Preferred Language:   English      ALLERGIES     Bactrim [sulfamethoxazole-trimethoprim], Ciprofloxacin, and Sulfa antibiotics    SURGICAL HISTORY       Past Surgical History:   Procedure Laterality Date    CARPAL TUNNEL RELEASE  10/23/2018    right hand    COLONOSCOPY      EYE SURGERY      FOOT SURGERY      x2    HYSTERECTOMY (CERVIX STATUS UNKNOWN)  1982    KNEE SURGERY  2010    LIVER BIOPSY  08/23/2017    SKIN BIOPSY         PAST MEDICAL HISTORY       Past Medical History:   Diagnosis Date    Arthritis     Atrial fibrillation (HCC)     Cancer (HCC)     Skin Cancer    CHF (congestive heart failure) (HCC)     Chronic renal disease, stage III (HCC) [654768] 5/18/2022    Gross hematuria 2014    Dr Zayas    HTN (hypertension)     Hyperlipidemia     Medtronic single pacemaker  3/7/2023    Pneumonia 12/2/2018    Type 2 diabetes mellitus 11/23/2021           Electronically signed by Summer Reagan RN on 1/19/2024 at 10:32 PM

## 2024-01-21 PROBLEM — R53.81 PHYSICAL DECONDITIONING: Status: ACTIVE | Noted: 2024-01-21

## 2024-01-21 LAB
ANION GAP SERPL CALC-SCNC: 5 MEQ/L (ref 8–16)
BACTERIA SPEC AEROBE CULT: NORMAL
BASOPHILS ABSOLUTE: 0 THOU/MM3 (ref 0–0.1)
BASOPHILS NFR BLD AUTO: 0.3 %
BUN SERPL-MCNC: 26 MG/DL (ref 7–22)
CALCIUM SERPL-MCNC: 9.4 MG/DL (ref 8.5–10.5)
CHLORIDE SERPL-SCNC: 97 MEQ/L (ref 98–111)
CO2 SERPL-SCNC: 33 MEQ/L (ref 23–33)
CREAT SERPL-MCNC: 0.9 MG/DL (ref 0.4–1.2)
DEPRECATED RDW RBC AUTO: 54.5 FL (ref 35–45)
EKG Q-T INTERVAL: 430 MS
EKG QRS DURATION: 172 MS
EKG QTC CALCULATION (BAZETT): 454 MS
EKG R AXIS: 114 DEGREES
EKG T AXIS: -44 DEGREES
EKG VENTRICULAR RATE: 67 BPM
EOSINOPHIL NFR BLD AUTO: 0.9 %
EOSINOPHILS ABSOLUTE: 0.1 THOU/MM3 (ref 0–0.4)
ERYTHROCYTE [DISTWIDTH] IN BLOOD BY AUTOMATED COUNT: 14.5 % (ref 11.5–14.5)
GFR SERPL CREATININE-BSD FRML MDRD: 59 ML/MIN/1.73M2
GLUCOSE BLD STRIP.AUTO-MCNC: 112 MG/DL (ref 70–108)
GLUCOSE BLD STRIP.AUTO-MCNC: 153 MG/DL (ref 70–108)
GLUCOSE BLD STRIP.AUTO-MCNC: 164 MG/DL (ref 70–108)
GLUCOSE BLD STRIP.AUTO-MCNC: 207 MG/DL (ref 70–108)
GLUCOSE SERPL-MCNC: 123 MG/DL (ref 70–108)
HCT VFR BLD AUTO: 39.7 % (ref 37–47)
HGB BLD-MCNC: 12.3 GM/DL (ref 12–16)
IMM GRANULOCYTES # BLD AUTO: 0.04 THOU/MM3 (ref 0–0.07)
IMM GRANULOCYTES NFR BLD AUTO: 0.4 %
LYMPHOCYTES ABSOLUTE: 1 THOU/MM3 (ref 1–4.8)
LYMPHOCYTES NFR BLD AUTO: 10.8 %
MCH RBC QN AUTO: 31.3 PG (ref 26–33)
MCHC RBC AUTO-ENTMCNC: 31 GM/DL (ref 32.2–35.5)
MCV RBC AUTO: 101 FL (ref 81–99)
MONOCYTES ABSOLUTE: 1 THOU/MM3 (ref 0.4–1.3)
MONOCYTES NFR BLD AUTO: 11.3 %
NEUTROPHILS NFR BLD AUTO: 76.3 %
NRBC BLD AUTO-RTO: 0 /100 WBC
PLATELET # BLD AUTO: 136 THOU/MM3 (ref 130–400)
PMV BLD AUTO: 9.7 FL (ref 9.4–12.4)
POTASSIUM SERPL-SCNC: 4.5 MEQ/L (ref 3.5–5.2)
RBC # BLD AUTO: 3.93 MILL/MM3 (ref 4.2–5.4)
SEGMENTED NEUTROPHILS ABSOLUTE COUNT: 6.9 THOU/MM3 (ref 1.8–7.7)
SODIUM SERPL-SCNC: 135 MEQ/L (ref 135–145)
WBC # BLD AUTO: 9 THOU/MM3 (ref 4.8–10.8)

## 2024-01-21 PROCEDURE — 94640 AIRWAY INHALATION TREATMENT: CPT

## 2024-01-21 PROCEDURE — 6370000000 HC RX 637 (ALT 250 FOR IP): Performed by: NURSE PRACTITIONER

## 2024-01-21 PROCEDURE — 36415 COLL VENOUS BLD VENIPUNCTURE: CPT

## 2024-01-21 PROCEDURE — 1200000003 HC TELEMETRY R&B

## 2024-01-21 PROCEDURE — 82948 REAGENT STRIP/BLOOD GLUCOSE: CPT

## 2024-01-21 PROCEDURE — 99233 SBSQ HOSP IP/OBS HIGH 50: CPT | Performed by: FAMILY MEDICINE

## 2024-01-21 PROCEDURE — 6360000002 HC RX W HCPCS

## 2024-01-21 PROCEDURE — 2580000003 HC RX 258: Performed by: NURSE PRACTITIONER

## 2024-01-21 PROCEDURE — 80048 BASIC METABOLIC PNL TOTAL CA: CPT

## 2024-01-21 PROCEDURE — 2700000000 HC OXYGEN THERAPY PER DAY

## 2024-01-21 PROCEDURE — 94760 N-INVAS EAR/PLS OXIMETRY 1: CPT

## 2024-01-21 PROCEDURE — 85025 COMPLETE CBC W/AUTO DIFF WBC: CPT

## 2024-01-21 PROCEDURE — 2580000003 HC RX 258

## 2024-01-21 RX ORDER — ALBUTEROL SULFATE 90 UG/1
2 AEROSOL, METERED RESPIRATORY (INHALATION) EVERY 4 HOURS PRN
Status: DISCONTINUED | OUTPATIENT
Start: 2024-01-21 | End: 2024-01-25 | Stop reason: HOSPADM

## 2024-01-21 RX ORDER — ALBUTEROL SULFATE 90 UG/1
2 AEROSOL, METERED RESPIRATORY (INHALATION) 3 TIMES DAILY
Status: DISCONTINUED | OUTPATIENT
Start: 2024-01-22 | End: 2024-01-25 | Stop reason: HOSPADM

## 2024-01-21 RX ADMIN — ATENOLOL 25 MG: 25 TABLET ORAL at 08:29

## 2024-01-21 RX ADMIN — AZITHROMYCIN DIHYDRATE 500 MG: 500 INJECTION, POWDER, LYOPHILIZED, FOR SOLUTION INTRAVENOUS at 23:35

## 2024-01-21 RX ADMIN — ALBUTEROL SULFATE 2 PUFF: 90 AEROSOL, METERED RESPIRATORY (INHALATION) at 20:34

## 2024-01-21 RX ADMIN — LISINOPRIL 20 MG: 20 TABLET ORAL at 08:30

## 2024-01-21 RX ADMIN — SODIUM CHLORIDE, PRESERVATIVE FREE 10 ML: 5 INJECTION INTRAVENOUS at 20:02

## 2024-01-21 RX ADMIN — RIVAROXABAN 15 MG: 15 TABLET, FILM COATED ORAL at 08:30

## 2024-01-21 RX ADMIN — ALBUTEROL SULFATE 2 PUFF: 90 AEROSOL, METERED RESPIRATORY (INHALATION) at 10:25

## 2024-01-21 RX ADMIN — BUMETANIDE 1 MG: 1 TABLET ORAL at 20:02

## 2024-01-21 RX ADMIN — ISOSORBIDE MONONITRATE 30 MG: 30 TABLET, EXTENDED RELEASE ORAL at 08:29

## 2024-01-21 RX ADMIN — CEFTRIAXONE SODIUM 1000 MG: 1 INJECTION, POWDER, FOR SOLUTION INTRAMUSCULAR; INTRAVENOUS at 20:17

## 2024-01-21 RX ADMIN — SODIUM CHLORIDE, PRESERVATIVE FREE 10 ML: 5 INJECTION INTRAVENOUS at 08:29

## 2024-01-21 RX ADMIN — ALBUTEROL SULFATE 2 PUFF: 90 AEROSOL, METERED RESPIRATORY (INHALATION) at 03:30

## 2024-01-21 RX ADMIN — LOTEPREDNOL ETABONATE 1 DROP: 5 SUSPENSION/ DROPS OPHTHALMIC at 09:00

## 2024-01-21 RX ADMIN — AMLODIPINE BESYLATE 5 MG: 5 TABLET ORAL at 08:29

## 2024-01-21 ASSESSMENT — PAIN SCALES - GENERAL
PAINLEVEL_OUTOF10: 0

## 2024-01-21 NOTE — CARE COORDINATION
01/21/24 0937   Service Assessment   Patient Orientation Alert and Oriented   Cognition Alert   History Provided By Medical Record   Primary Caregiver Self   Accompanied By/Relationship none   Support Systems Children;Family Members   Patient's Healthcare Decision Maker is: Legal Next of Kin  (son Mango is decision maker)   PCP Verified by CM Yes   Last Visit to PCP Within last year   Prior Functional Level Assistance with the following:;Other (see comment)  (driving)   Current Functional Level Assistance with the following:;Other (see comment)  (driving)   Can patient return to prior living arrangement Yes   Ability to make needs known: Good   Family able to assist with home care needs: No   Would you like for me to discuss the discharge plan with any other family members/significant others, and if so, who? No   Financial Resources Medicare   Community Resources None   CM/SW Referral ADLs/IADLs   Social/Functional History   Lives With Alone   Type of Home House   Active  No   Patient's  Info Patients son provides all transportation needs due to patients vision impairments.   Discharge Planning   Type of Residence House   Living Arrangements Alone   Current Services Prior To Admission Durable Medical Equipment   Current DME Prior to Arrival Walker;Cane   Potential Assistance Needed N/A   DME Ordered? No   Potential Assistance Purchasing Medications No   Type of Home Care Services None   Patient expects to be discharged to: House   Follow Up Appointment: Best Day/Time    (either)   One/Two Story Residence Other (comment)  (unknown)   Lift Chair Available No   History of falls? 0   Services At/After Discharge   Transition of Care Consult (CM Consult) N/A;Discharge Planning   Services At/After Discharge None   [place] Resource Information Provided? No   Mode of Transport at Discharge Other (see comment)  (son Oleg)   Confirm Follow Up Transport Family   Condition of Participation: Discharge Planning   The

## 2024-01-22 ENCOUNTER — APPOINTMENT (OUTPATIENT)
Age: 89
DRG: 193 | End: 2024-01-22
Attending: NURSE PRACTITIONER
Payer: MEDICARE

## 2024-01-22 ENCOUNTER — APPOINTMENT (OUTPATIENT)
Dept: GENERAL RADIOLOGY | Age: 89
DRG: 193 | End: 2024-01-22
Payer: MEDICARE

## 2024-01-22 LAB
ANION GAP SERPL CALC-SCNC: 7 MEQ/L (ref 8–16)
ARTERIAL PATENCY WRIST A: POSITIVE
BASE EXCESS BLDA CALC-SCNC: 7.8 MMOL/L (ref -2.5–2.5)
BASOPHILS ABSOLUTE: 0 THOU/MM3 (ref 0–0.1)
BASOPHILS NFR BLD AUTO: 0.2 %
BDY SITE: ABNORMAL
BUN SERPL-MCNC: 22 MG/DL (ref 7–22)
CALCIUM SERPL-MCNC: 9.2 MG/DL (ref 8.5–10.5)
CHLORIDE SERPL-SCNC: 96 MEQ/L (ref 98–111)
CO2 SERPL-SCNC: 35 MEQ/L (ref 23–33)
COLLECTED BY:: ABNORMAL
CREAT SERPL-MCNC: 0.8 MG/DL (ref 0.4–1.2)
DEPRECATED RDW RBC AUTO: 51.3 FL (ref 35–45)
DEVICE: ABNORMAL
ECHO AO ASC DIAM: 3.1 CM
ECHO AO ASCENDING AORTA INDEX: 1.76 CM/M2
ECHO AV CUSP MM: 1.6 CM
ECHO AV PEAK GRADIENT: 11 MMHG
ECHO AV PEAK VELOCITY: 1.6 M/S
ECHO AV VELOCITY RATIO: 0.63
ECHO BSA: 1.78 M2
ECHO EST RA PRESSURE: 10 MMHG
ECHO LA AREA 2C: 16.9 CM2
ECHO LA AREA 4C: 18.5 CM2
ECHO LA DIAMETER INDEX: 2.27 CM/M2
ECHO LA DIAMETER: 4 CM
ECHO LA MAJOR AXIS: 5.7 CM
ECHO LA MINOR AXIS: 4.8 CM
ECHO LA VOL BP: 54 ML (ref 22–52)
ECHO LA VOL MOD A2C: 50 ML (ref 22–52)
ECHO LA VOL MOD A4C: 49 ML (ref 22–52)
ECHO LA VOL/BSA BIPLANE: 31 ML/M2 (ref 16–34)
ECHO LA VOLUME INDEX MOD A2C: 28 ML/M2 (ref 16–34)
ECHO LA VOLUME INDEX MOD A4C: 28 ML/M2 (ref 16–34)
ECHO LV FRACTIONAL SHORTENING: 28 % (ref 28–44)
ECHO LV INTERNAL DIMENSION DIASTOLE INDEX: 2.67 CM/M2
ECHO LV INTERNAL DIMENSION DIASTOLIC: 4.7 CM (ref 3.9–5.3)
ECHO LV INTERNAL DIMENSION SYSTOLIC INDEX: 1.93 CM/M2
ECHO LV INTERNAL DIMENSION SYSTOLIC: 3.4 CM
ECHO LV ISOVOLUMETRIC RELAXATION TIME (IVRT): 74 MS
ECHO LV IVSD: 1.2 CM (ref 0.6–0.9)
ECHO LV MASS 2D: 212 G (ref 67–162)
ECHO LV MASS INDEX 2D: 120.5 G/M2 (ref 43–95)
ECHO LV POSTERIOR WALL DIASTOLIC: 1.2 CM (ref 0.6–0.9)
ECHO LV RELATIVE WALL THICKNESS RATIO: 0.51
ECHO LVOT PEAK GRADIENT: 4 MMHG
ECHO LVOT PEAK VELOCITY: 1 M/S
ECHO MV E DECELERATION TIME (DT): 218 MS
ECHO MV E VELOCITY: 1.14 M/S
ECHO MV REGURGITANT PEAK GRADIENT: 61 MMHG
ECHO MV REGURGITANT PEAK VELOCITY: 3.9 M/S
ECHO PULMONARY ARTERY END DIASTOLIC PRESSURE: 4 MMHG
ECHO PV MAX VELOCITY: 0.5 M/S
ECHO PV MAX VELOCITY: 1 M/S
ECHO PV PEAK GRADIENT: 1 MMHG
ECHO RIGHT VENTRICULAR SYSTOLIC PRESSURE (RVSP): 46 MMHG
ECHO RV INTERNAL DIMENSION: 3.2 CM
ECHO TV E WAVE: 0.7 M/S
ECHO TV REGURGITANT MAX VELOCITY: 2.99 M/S
ECHO TV REGURGITANT PEAK GRADIENT: 36 MMHG
EOSINOPHIL NFR BLD AUTO: 1.7 %
EOSINOPHILS ABSOLUTE: 0.1 THOU/MM3 (ref 0–0.4)
ERYTHROCYTE [DISTWIDTH] IN BLOOD BY AUTOMATED COUNT: 14.1 % (ref 11.5–14.5)
FIO2 ON VENT O2 ANALYZER: 3 %
GFR SERPL CREATININE-BSD FRML MDRD: > 60 ML/MIN/1.73M2
GLUCOSE BLD STRIP.AUTO-MCNC: 139 MG/DL (ref 70–108)
GLUCOSE BLD STRIP.AUTO-MCNC: 176 MG/DL (ref 70–108)
GLUCOSE BLD STRIP.AUTO-MCNC: 179 MG/DL (ref 70–108)
GLUCOSE BLD STRIP.AUTO-MCNC: 231 MG/DL (ref 70–108)
GLUCOSE SERPL-MCNC: 132 MG/DL (ref 70–108)
HCO3 BLDA-SCNC: 34 MMOL/L (ref 23–28)
HCT VFR BLD AUTO: 38.4 % (ref 37–47)
HGB BLD-MCNC: 12.1 GM/DL (ref 12–16)
IMM GRANULOCYTES # BLD AUTO: 0.03 THOU/MM3 (ref 0–0.07)
IMM GRANULOCYTES NFR BLD AUTO: 0.4 %
LYMPHOCYTES ABSOLUTE: 0.8 THOU/MM3 (ref 1–4.8)
LYMPHOCYTES NFR BLD AUTO: 9.5 %
MAGNESIUM SERPL-MCNC: 2.1 MG/DL (ref 1.6–2.4)
MCH RBC QN AUTO: 31.3 PG (ref 26–33)
MCHC RBC AUTO-ENTMCNC: 31.5 GM/DL (ref 32.2–35.5)
MCV RBC AUTO: 99.2 FL (ref 81–99)
MONOCYTES ABSOLUTE: 1 THOU/MM3 (ref 0.4–1.3)
MONOCYTES NFR BLD AUTO: 11.6 %
NEUTROPHILS NFR BLD AUTO: 76.6 %
NRBC BLD AUTO-RTO: 0 /100 WBC
PCO2 BLDA: 55 MMHG (ref 35–45)
PH BLDA: 7.4 [PH] (ref 7.35–7.45)
PLATELET # BLD AUTO: 158 THOU/MM3 (ref 130–400)
PMV BLD AUTO: 9.8 FL (ref 9.4–12.4)
PO2 BLDA: 74 MMHG (ref 71–104)
POTASSIUM SERPL-SCNC: 4.5 MEQ/L (ref 3.5–5.2)
RBC # BLD AUTO: 3.87 MILL/MM3 (ref 4.2–5.4)
SAO2 % BLDA: 94 %
SEGMENTED NEUTROPHILS ABSOLUTE COUNT: 6.3 THOU/MM3 (ref 1.8–7.7)
SODIUM SERPL-SCNC: 138 MEQ/L (ref 135–145)
WBC # BLD AUTO: 8.2 THOU/MM3 (ref 4.8–10.8)

## 2024-01-22 PROCEDURE — 71046 X-RAY EXAM CHEST 2 VIEWS: CPT

## 2024-01-22 PROCEDURE — 2700000000 HC OXYGEN THERAPY PER DAY

## 2024-01-22 PROCEDURE — 97535 SELF CARE MNGMENT TRAINING: CPT

## 2024-01-22 PROCEDURE — 93306 TTE W/DOPPLER COMPLETE: CPT

## 2024-01-22 PROCEDURE — 1200000003 HC TELEMETRY R&B

## 2024-01-22 PROCEDURE — 6360000002 HC RX W HCPCS

## 2024-01-22 PROCEDURE — 82803 BLOOD GASES ANY COMBINATION: CPT

## 2024-01-22 PROCEDURE — 93306 TTE W/DOPPLER COMPLETE: CPT | Performed by: INTERNAL MEDICINE

## 2024-01-22 PROCEDURE — 2580000003 HC RX 258: Performed by: NURSE PRACTITIONER

## 2024-01-22 PROCEDURE — 85025 COMPLETE CBC W/AUTO DIFF WBC: CPT

## 2024-01-22 PROCEDURE — 6370000000 HC RX 637 (ALT 250 FOR IP): Performed by: NURSE PRACTITIONER

## 2024-01-22 PROCEDURE — 2580000003 HC RX 258

## 2024-01-22 PROCEDURE — 80048 BASIC METABOLIC PNL TOTAL CA: CPT

## 2024-01-22 PROCEDURE — 1200000000 HC SEMI PRIVATE

## 2024-01-22 PROCEDURE — 36600 WITHDRAWAL OF ARTERIAL BLOOD: CPT

## 2024-01-22 PROCEDURE — 94640 AIRWAY INHALATION TREATMENT: CPT

## 2024-01-22 PROCEDURE — 97166 OT EVAL MOD COMPLEX 45 MIN: CPT

## 2024-01-22 PROCEDURE — 71045 X-RAY EXAM CHEST 1 VIEW: CPT

## 2024-01-22 PROCEDURE — 94761 N-INVAS EAR/PLS OXIMETRY MLT: CPT

## 2024-01-22 PROCEDURE — 94669 MECHANICAL CHEST WALL OSCILL: CPT

## 2024-01-22 PROCEDURE — 99233 SBSQ HOSP IP/OBS HIGH 50: CPT | Performed by: FAMILY MEDICINE

## 2024-01-22 PROCEDURE — 83735 ASSAY OF MAGNESIUM: CPT

## 2024-01-22 PROCEDURE — 82948 REAGENT STRIP/BLOOD GLUCOSE: CPT

## 2024-01-22 PROCEDURE — 94760 N-INVAS EAR/PLS OXIMETRY 1: CPT

## 2024-01-22 PROCEDURE — 36415 COLL VENOUS BLD VENIPUNCTURE: CPT

## 2024-01-22 RX ADMIN — SODIUM CHLORIDE, PRESERVATIVE FREE 10 ML: 5 INJECTION INTRAVENOUS at 20:41

## 2024-01-22 RX ADMIN — ISOSORBIDE MONONITRATE 30 MG: 30 TABLET, EXTENDED RELEASE ORAL at 07:52

## 2024-01-22 RX ADMIN — ATENOLOL 25 MG: 25 TABLET ORAL at 07:52

## 2024-01-22 RX ADMIN — ALBUTEROL SULFATE 2 PUFF: 90 AEROSOL, METERED RESPIRATORY (INHALATION) at 23:29

## 2024-01-22 RX ADMIN — RIVAROXABAN 15 MG: 15 TABLET, FILM COATED ORAL at 08:53

## 2024-01-22 RX ADMIN — CEFTRIAXONE SODIUM 1000 MG: 1 INJECTION, POWDER, FOR SOLUTION INTRAMUSCULAR; INTRAVENOUS at 20:42

## 2024-01-22 RX ADMIN — BUMETANIDE 1 MG: 1 TABLET ORAL at 07:52

## 2024-01-22 RX ADMIN — BUMETANIDE 1 MG: 1 TABLET ORAL at 20:41

## 2024-01-22 RX ADMIN — ALBUTEROL SULFATE 2 PUFF: 90 AEROSOL, METERED RESPIRATORY (INHALATION) at 06:55

## 2024-01-22 RX ADMIN — ALBUTEROL SULFATE 2 PUFF: 90 AEROSOL, METERED RESPIRATORY (INHALATION) at 12:46

## 2024-01-22 RX ADMIN — SODIUM CHLORIDE, PRESERVATIVE FREE 10 ML: 5 INJECTION INTRAVENOUS at 07:53

## 2024-01-22 RX ADMIN — AMLODIPINE BESYLATE 5 MG: 5 TABLET ORAL at 07:52

## 2024-01-22 RX ADMIN — AZITHROMYCIN DIHYDRATE 500 MG: 500 INJECTION, POWDER, LYOPHILIZED, FOR SOLUTION INTRAVENOUS at 21:20

## 2024-01-22 RX ADMIN — LISINOPRIL 20 MG: 20 TABLET ORAL at 07:52

## 2024-01-22 ASSESSMENT — PAIN SCALES - GENERAL
PAINLEVEL_OUTOF10: 0

## 2024-01-22 NOTE — CARE COORDINATION
1/22/24, 1:34 PM EST    DISCHARGE ON GOING EVALUATION    Daniela POP [facility] day: 3  Location: [facility]24/024- Reason for admit: Hypoxia [R09.02]  Acute respiratory failure with hypoxia (HCC) [J96.01]  Pneumonia of right lung due to infectious organism, unspecified part of lung [J18.9]   Procedure:   1/19 CXR:  Developing rounded densities of the bilateral upper lobes, may reflect   infection or pulmonary nodules, recommend CT chest with IV contrast for   further evaluation. 2. Cardiomegaly, increased pulmonary interstitial edema and new small left   pleural effusion, concerning for developing congestive heart failure.  1/19 CTA Chest W WO: No pulmonary embolus. 2. Cardiomegaly with small bilateral pleural effusions and mild lower lobe predominant interlobular septal thickening in the setting of interstitial  edema. Mild CHF is possible. 3. Nonspecific patchy centrilobular and subpleural consolidations greatest   in the right upper lung with milder involvement in the right middle and lower lungs. Pulmonary edema is possible in the setting of CHF but can be followed to exclude infectious infiltrates  1/22 CXR: Worsening bilateral infiltrates.   1/22 ECHO: ordered, not resulted    Barriers to Discharge: Hospitalist following. PT/OT. IS. Telemetry. 2L NC-room air baseline. IV zithromax q24h. PO bumex BID. IV rocephin q24h. DM management.    Transferred off stepdown to  today.   PCP: Gaudencio Sepulveda  Readmission Risk Score: 15%  Patient Goals/Plan/Treatment Preferences: Daniela plans to return home alone. Her son Mango provides transportation. Has DME. Denies needs for HH.

## 2024-01-22 NOTE — CARE COORDINATION
DISCHARGE PLANNING EVALUATION  1/22/24, 10:12 AM EST    Reason for Referral: Discharge planning  Mental Status: Alert and Oriented  Decision Making: Self  Family/Social/Home Environment: Patient lives in her home alone. She reported that her son and daughter both live close.   Current Services including food security, transportation and housekeeping: Patient reported that she manages herself at home and remains independent. She reported that she does not drive and her son transports. She reported that she does not have any home services. She reported that an RN Radha calls her from [employer] and checks in on her 1 time a month.   Current Equipment: cane, walker (only uses when she feels like she needs to)  Payment Source: Medical [facility]  Concerns or Barriers to Discharge: None  Post-acute (PAC) provider list was provided to patient. Patient was informed of their freedom to choose PAC provider. Discussed and offered to show the patient the relevant PAC Providers quality and resource use measures on Medicare Compare web site via computer based on patient's goals of care and treatment preferences. Questions regarding selection process were answered.      Teach Back Method used with patient regarding care plan and needs.   Patient verbalized understanding of the plan of care and contribute to goal setting.       Patient goals, treatment preferences and discharge plan: Patient plans to return home at discharge. She reported that she has been up walking with RN and feels she is doing well. Declined HH and other services. SW to follow as needed for discharge planning needs.     Electronically signed by HORACIO Leung on 1/22/2024 at 10:12 AM

## 2024-01-23 LAB
ANION GAP SERPL CALC-SCNC: 9 MEQ/L (ref 8–16)
BASOPHILS ABSOLUTE: 0 THOU/MM3 (ref 0–0.1)
BASOPHILS NFR BLD AUTO: 0.1 %
BUN SERPL-MCNC: 23 MG/DL (ref 7–22)
CALCIUM SERPL-MCNC: 9.5 MG/DL (ref 8.5–10.5)
CHLORIDE SERPL-SCNC: 97 MEQ/L (ref 98–111)
CO2 SERPL-SCNC: 32 MEQ/L (ref 23–33)
CREAT SERPL-MCNC: 0.8 MG/DL (ref 0.4–1.2)
DEPRECATED RDW RBC AUTO: 51.5 FL (ref 35–45)
EOSINOPHIL NFR BLD AUTO: 1.7 %
EOSINOPHILS ABSOLUTE: 0.1 THOU/MM3 (ref 0–0.4)
ERYTHROCYTE [DISTWIDTH] IN BLOOD BY AUTOMATED COUNT: 14.1 % (ref 11.5–14.5)
GFR SERPL CREATININE-BSD FRML MDRD: > 60 ML/MIN/1.73M2
GLUCOSE BLD STRIP.AUTO-MCNC: 128 MG/DL (ref 70–108)
GLUCOSE BLD STRIP.AUTO-MCNC: 155 MG/DL (ref 70–108)
GLUCOSE BLD STRIP.AUTO-MCNC: 185 MG/DL (ref 70–108)
GLUCOSE BLD STRIP.AUTO-MCNC: 228 MG/DL (ref 70–108)
GLUCOSE SERPL-MCNC: 126 MG/DL (ref 70–108)
HCT VFR BLD AUTO: 39 % (ref 37–47)
HGB BLD-MCNC: 12.2 GM/DL (ref 12–16)
IMM GRANULOCYTES # BLD AUTO: 0.03 THOU/MM3 (ref 0–0.07)
IMM GRANULOCYTES NFR BLD AUTO: 0.4 %
LYMPHOCYTES ABSOLUTE: 0.9 THOU/MM3 (ref 1–4.8)
LYMPHOCYTES NFR BLD AUTO: 12.5 %
MCH RBC QN AUTO: 31.1 PG (ref 26–33)
MCHC RBC AUTO-ENTMCNC: 31.3 GM/DL (ref 32.2–35.5)
MCV RBC AUTO: 99.5 FL (ref 81–99)
MONOCYTES ABSOLUTE: 0.8 THOU/MM3 (ref 0.4–1.3)
MONOCYTES NFR BLD AUTO: 11.3 %
NEUTROPHILS NFR BLD AUTO: 74 %
NRBC BLD AUTO-RTO: 0 /100 WBC
PLATELET # BLD AUTO: 188 THOU/MM3 (ref 130–400)
PMV BLD AUTO: 9.8 FL (ref 9.4–12.4)
POTASSIUM SERPL-SCNC: 4.3 MEQ/L (ref 3.5–5.2)
RBC # BLD AUTO: 3.92 MILL/MM3 (ref 4.2–5.4)
SEGMENTED NEUTROPHILS ABSOLUTE COUNT: 5.1 THOU/MM3 (ref 1.8–7.7)
SODIUM SERPL-SCNC: 138 MEQ/L (ref 135–145)
WBC # BLD AUTO: 6.9 THOU/MM3 (ref 4.8–10.8)

## 2024-01-23 PROCEDURE — 97530 THERAPEUTIC ACTIVITIES: CPT

## 2024-01-23 PROCEDURE — 6370000000 HC RX 637 (ALT 250 FOR IP): Performed by: NURSE PRACTITIONER

## 2024-01-23 PROCEDURE — 80048 BASIC METABOLIC PNL TOTAL CA: CPT

## 2024-01-23 PROCEDURE — 94761 N-INVAS EAR/PLS OXIMETRY MLT: CPT

## 2024-01-23 PROCEDURE — 6360000002 HC RX W HCPCS

## 2024-01-23 PROCEDURE — 94640 AIRWAY INHALATION TREATMENT: CPT

## 2024-01-23 PROCEDURE — 1200000000 HC SEMI PRIVATE

## 2024-01-23 PROCEDURE — 6370000000 HC RX 637 (ALT 250 FOR IP)

## 2024-01-23 PROCEDURE — 99232 SBSQ HOSP IP/OBS MODERATE 35: CPT | Performed by: FAMILY MEDICINE

## 2024-01-23 PROCEDURE — 2580000003 HC RX 258: Performed by: NURSE PRACTITIONER

## 2024-01-23 PROCEDURE — 97162 PT EVAL MOD COMPLEX 30 MIN: CPT

## 2024-01-23 PROCEDURE — 36415 COLL VENOUS BLD VENIPUNCTURE: CPT

## 2024-01-23 PROCEDURE — 85025 COMPLETE CBC W/AUTO DIFF WBC: CPT

## 2024-01-23 PROCEDURE — 2700000000 HC OXYGEN THERAPY PER DAY

## 2024-01-23 PROCEDURE — 82948 REAGENT STRIP/BLOOD GLUCOSE: CPT

## 2024-01-23 PROCEDURE — 94669 MECHANICAL CHEST WALL OSCILL: CPT

## 2024-01-23 PROCEDURE — 97110 THERAPEUTIC EXERCISES: CPT

## 2024-01-23 PROCEDURE — 2580000003 HC RX 258

## 2024-01-23 PROCEDURE — 97116 GAIT TRAINING THERAPY: CPT

## 2024-01-23 RX ORDER — AZITHROMYCIN 250 MG/1
500 TABLET, FILM COATED ORAL DAILY
Status: COMPLETED | OUTPATIENT
Start: 2024-01-23 | End: 2024-01-23

## 2024-01-23 RX ADMIN — RIVAROXABAN 15 MG: 15 TABLET, FILM COATED ORAL at 08:12

## 2024-01-23 RX ADMIN — SODIUM CHLORIDE, PRESERVATIVE FREE 10 ML: 5 INJECTION INTRAVENOUS at 08:13

## 2024-01-23 RX ADMIN — ALBUTEROL SULFATE 2 PUFF: 90 AEROSOL, METERED RESPIRATORY (INHALATION) at 11:50

## 2024-01-23 RX ADMIN — AZITHROMYCIN DIHYDRATE 500 MG: 250 TABLET ORAL at 21:02

## 2024-01-23 RX ADMIN — CEFTRIAXONE SODIUM 1000 MG: 1 INJECTION, POWDER, FOR SOLUTION INTRAMUSCULAR; INTRAVENOUS at 21:01

## 2024-01-23 RX ADMIN — INSULIN LISPRO 1 UNITS: 100 INJECTION, SOLUTION INTRAVENOUS; SUBCUTANEOUS at 12:39

## 2024-01-23 RX ADMIN — LOTEPREDNOL ETABONATE 1 DROP: 5 SUSPENSION/ DROPS OPHTHALMIC at 08:12

## 2024-01-23 RX ADMIN — ALBUTEROL SULFATE 2 PUFF: 90 AEROSOL, METERED RESPIRATORY (INHALATION) at 08:03

## 2024-01-23 RX ADMIN — BUMETANIDE 1 MG: 1 TABLET ORAL at 21:02

## 2024-01-23 RX ADMIN — SODIUM CHLORIDE, PRESERVATIVE FREE 10 ML: 5 INJECTION INTRAVENOUS at 21:01

## 2024-01-23 RX ADMIN — BUMETANIDE 1 MG: 1 TABLET ORAL at 08:12

## 2024-01-23 RX ADMIN — AMLODIPINE BESYLATE 5 MG: 5 TABLET ORAL at 08:13

## 2024-01-23 RX ADMIN — ISOSORBIDE MONONITRATE 30 MG: 30 TABLET, EXTENDED RELEASE ORAL at 08:12

## 2024-01-23 RX ADMIN — LISINOPRIL 20 MG: 20 TABLET ORAL at 08:12

## 2024-01-23 ASSESSMENT — PAIN SCALES - GENERAL
PAINLEVEL_OUTOF10: 0

## 2024-01-23 NOTE — CARE COORDINATION
1/23/24, 11:28 AM EST    DISCHARGE PLANNING EVALUATION    Spoke with patient, she is now agreeable to HH, nursing, PT & OT.  She has has [facility] in the past and  would like again.     Left message at [facility] with referral.

## 2024-01-23 NOTE — RT PROTOCOL NOTE
RT Inhaler-Nebulizer Bronchodilator Protocol Note    There is a bronchodilator order in the chart from a provider indicating to follow the RT Bronchodilator Protocol and there is an “Initiate RT Inhaler-Nebulizer Bronchodilator Protocol” order as well (see protocol at bottom of note).    CXR Findings:  No results found.    The findings from the last RT Protocol Assessment were as follows:   History Pulmonary Disease: Chronic pulmonary disease  Respiratory Pattern: Dyspnea on exertion or RR 21-25 bpm  Breath Sounds: Intermittent or unilateral wheezes  Cough: Strong, spontaneous, non-productive  Indication for Bronchodilator Therapy: On home bronchodilators  Bronchodilator Assessment Score: 8    Aerosolized bronchodilator medication orders have been revised according to the RT Inhaler-Nebulizer Bronchodilator Protocol below.    Respiratory Therapist to perform RT Therapy Protocol Assessment initially then follow the protocol.  Repeat RT Therapy Protocol Assessment PRN for score 0-3 or on second treatment, BID, and PRN for scores above 3.    No Indications - adjust the frequency to every 6 hours PRN wheezing or bronchospasm, if no treatments needed after 48 hours then discontinue using Per Protocol order mode.     If indication present, adjust the RT bronchodilator orders based on the Bronchodilator Assessment Score as indicated below.  Use Inhaler orders unless patient has one or more of the following: on home nebulizer, not able to hold breath for 10 seconds, is not alert and oriented, cannot activate and use MDI correctly, or respiratory rate 25 breaths per minute or more, then use the equivalent nebulizer order(s) with same Frequency and PRN reasons based on the score.  If a patient is on this medication at home then do not decrease Frequency below that used at home.    0-3 - enter or revise RT bronchodilator order(s) to equivalent RT Bronchodilator order with Frequency of every 4 hours PRN for wheezing or 
RT Inhaler-Nebulizer Bronchodilator Protocol Note    There is a bronchodilator order in the chart from a provider indicating to follow the RT Bronchodilator Protocol and there is an “Initiate RT Inhaler-Nebulizer Bronchodilator Protocol” order as well (see protocol at bottom of note).    CXR Findings:  XR CHEST PORTABLE    Result Date: 1/19/2024  Impression: 1. Developing rounded densities of the bilateral upper lobes, may reflect infection or pulmonary nodules, recommend CT chest with IV contrast for further evaluation. 2. Cardiomegaly, increased pulmonary interstitial edema and new small left pleural effusion, concerning for developing congestive heart failure. This document has been electronically signed by: Aditi Sebastian MD on 01/19/2024 05:54 PM      The findings from the last RT Protocol Assessment were as follows:   History Pulmonary Disease: Chronic pulmonary disease  Respiratory Pattern: Regular pattern and RR 12-20 bpm  Breath Sounds: Slightly diminished and/or crackles  Cough: Strong, spontaneous, non-productive  Indication for Bronchodilator Therapy: On home bronchodilators (prn at home)  Bronchodilator Assessment Score: 4    Aerosolized bronchodilator medication orders have been revised according to the RT Inhaler-Nebulizer Bronchodilator Protocol below.    Respiratory Therapist to perform RT Therapy Protocol Assessment initially then follow the protocol.  Repeat RT Therapy Protocol Assessment PRN for score 0-3 or on second treatment, BID, and PRN for scores above 3.    No Indications - adjust the frequency to every 6 hours PRN wheezing or bronchospasm, if no treatments needed after 48 hours then discontinue using Per Protocol order mode.     If indication present, adjust the RT bronchodilator orders based on the Bronchodilator Assessment Score as indicated below.  Use Inhaler orders unless patient has one or more of the following: on home nebulizer, not able to hold breath for 10 seconds, is not alert and 
RT Inhaler-Nebulizer Bronchodilator Protocol Note    There is a bronchodilator order in the chart from a provider indicating to follow the RT Bronchodilator Protocol and there is an “Initiate RT Inhaler-Nebulizer Bronchodilator Protocol” order as well (see protocol at bottom of note).    CXR Findings:  XR CHEST PORTABLE    Result Date: 1/22/2024  Worsening bilateral infiltrates. **This report has been created using voice recognition software. It may contain minor errors which are inherent in voice recognition technology.** Final report electronically signed by Dr. Larissa Garcia on 1/22/2024 7:19 AM      The findings from the last RT Protocol Assessment were as follows:   History Pulmonary Disease: Chronic pulmonary disease (per xray on 1/22)  Respiratory Pattern: Dyspnea on exertion or RR 21-25 bpm  Breath Sounds: Inspiratory and expiratory or bilateral wheezing and/or rhonchi  Cough: Strong, spontaneous, non-productive  Indication for Bronchodilator Therapy: Wheezing associated with pulm disorder  Bronchodilator Assessment Score: 10    Aerosolized bronchodilator medication orders have been revised according to the RT Inhaler-Nebulizer Bronchodilator Protocol below.    Respiratory Therapist to perform RT Therapy Protocol Assessment initially then follow the protocol.  Repeat RT Therapy Protocol Assessment PRN for score 0-3 or on second treatment, BID, and PRN for scores above 3.    No Indications - adjust the frequency to every 6 hours PRN wheezing or bronchospasm, if no treatments needed after 48 hours then discontinue using Per Protocol order mode.     If indication present, adjust the RT bronchodilator orders based on the Bronchodilator Assessment Score as indicated below.  Use Inhaler orders unless patient has one or more of the following: on home nebulizer, not able to hold breath for 10 seconds, is not alert and oriented, cannot activate and use MDI correctly, or respiratory rate 25 breaths per minute or more, 
RT Inhaler-Nebulizer Bronchodilator Protocol Note    There is a bronchodilator order in the chart from a provider indicating to follow the RT Bronchodilator Protocol and there is an “Initiate RT Inhaler-Nebulizer Bronchodilator Protocol” order as well (see protocol at bottom of note).    CXR Findings:  XR CHEST PORTABLE    Result Date: 1/22/2024  Worsening bilateral infiltrates. **This report has been created using voice recognition software. It may contain minor errors which are inherent in voice recognition technology.** Final report electronically signed by Dr. Larissa Garcia on 1/22/2024 7:19 AM      The findings from the last RT Protocol Assessment were as follows:   History Pulmonary Disease: None or smoker <15 pack years  Respiratory Pattern: Mild dyspnea at rest, irregular pattern, or RR 21-25 bpm  Breath Sounds: Inspiratory and expiratory or bilateral wheezing and/or rhonchi  Cough: Strong, spontaneous, non-productive  Indication for Bronchodilator Therapy: On home bronchodilators  Bronchodilator Assessment Score: 10    Aerosolized bronchodilator medication orders have been revised according to the RT Inhaler-Nebulizer Bronchodilator Protocol below.    Respiratory Therapist to perform RT Therapy Protocol Assessment initially then follow the protocol.  Repeat RT Therapy Protocol Assessment PRN for score 0-3 or on second treatment, BID, and PRN for scores above 3.    No Indications - adjust the frequency to every 6 hours PRN wheezing or bronchospasm, if no treatments needed after 48 hours then discontinue using Per Protocol order mode.     If indication present, adjust the RT bronchodilator orders based on the Bronchodilator Assessment Score as indicated below.  Use Inhaler orders unless patient has one or more of the following: on home nebulizer, not able to hold breath for 10 seconds, is not alert and oriented, cannot activate and use MDI correctly, or respiratory rate 25 breaths per minute or more, then use 
correctly, or respiratory rate 25 breaths per minute or more, then use the equivalent nebulizer order(s) with same Frequency and PRN reasons based on the score.  If a patient is on this medication at home then do not decrease Frequency below that used at home.    0-3 - enter or revise RT bronchodilator order(s) to equivalent RT Bronchodilator order with Frequency of every 4 hours PRN for wheezing or increased work of breathing using Per Protocol order mode.        4-6 - enter or revise RT Bronchodilator order(s) to two equivalent RT bronchodilator orders with one order with BID Frequency and one order with Frequency of every 4 hours PRN wheezing or increased work of breathing using Per Protocol order mode.        7-10 - enter or revise RT Bronchodilator order(s) to two equivalent RT bronchodilator orders with one order with TID Frequency and one order with Frequency of every 4 hours PRN wheezing or increased work of breathing using Per Protocol order mode.       11-13 - enter or revise RT Bronchodilator order(s) to one equivalent RT bronchodilator order with QID Frequency and an Albuterol order with Frequency of every 4 hours PRN wheezing or increased work of breathing using Per Protocol order mode.      Greater than 13 - enter or revise RT Bronchodilator order(s) to one equivalent RT bronchodilator order with every 4 hours Frequency and an Albuterol order with Frequency of every 2 hours PRN wheezing or increased work of breathing using Per Protocol order mode.     RT to enter RT Home Evaluation for COPD & MDI Assessment order using Per Protocol order mode.    Electronically signed by Jeffery Lundberg RCP on 1/20/2024 at 5:57 AM

## 2024-01-23 NOTE — CARE COORDINATION
1/23/24, 3:22 PM EST    DISCHARGE ON GOING EVALUATION    Daniela POP [facility] day: 4  Location: [facility]24/024-A Reason for admit: Hypoxia [R09.02]  Acute respiratory failure with hypoxia (HCC) [J96.01]  Pneumonia of right lung due to infectious organism, unspecified part of lung [J18.9]   Procedure:   1/19 CXR:  Developing rounded densities of the bilateral upper lobes, may reflect   infection or pulmonary nodules, recommend CT chest with IV contrast for   further evaluation. 2. Cardiomegaly, increased pulmonary interstitial edema and new small left   pleural effusion, concerning for developing congestive heart failure.  1/19 CTA Chest W WO: No pulmonary embolus. 2. Cardiomegaly with small bilateral pleural effusions and mild lower lobe predominant interlobular septal thickening in the setting of interstitial  edema. Mild CHF is possible. 3. Nonspecific patchy centrilobular and subpleural consolidations greatest   in the right upper lung with milder involvement in the right middle and lower lungs. Pulmonary edema is possible in the setting of CHF but can be followed to exclude infectious infiltrates  1/22 CXR: Worsening bilateral infiltrates.   1/22 ECHO: EF of 60 %.     Barriers to Discharge: Hospitalist following. PT/OT. IS. Inhalers. PO bumex BID. IV rocephin q24h. IV zithromax transitioned to PO. 94% on 1L NC- room air baseline.     PCP: Gaudencio Sepulveda  Readmission Risk Score: 15.3%  Patient Goals/Plan/Treatment Preferences:  Daniela plans to return home alone. Her son provides transportation. Has DME. [facility]. SW following.

## 2024-01-24 PROBLEM — I27.20 MILD PULMONARY HYPERTENSION (HCC): Status: ACTIVE | Noted: 2024-01-24

## 2024-01-24 PROBLEM — I34.0 MILD MITRAL REGURGITATION: Status: ACTIVE | Noted: 2024-01-24

## 2024-01-24 LAB
ANION GAP SERPL CALC-SCNC: 9 MEQ/L (ref 8–16)
BACTERIA BLD AEROBE CULT: NORMAL
BACTERIA BLD AEROBE CULT: NORMAL
BUN SERPL-MCNC: 22 MG/DL (ref 7–22)
CALCIUM SERPL-MCNC: 9.2 MG/DL (ref 8.5–10.5)
CHLORIDE SERPL-SCNC: 95 MEQ/L (ref 98–111)
CO2 SERPL-SCNC: 34 MEQ/L (ref 23–33)
CREAT SERPL-MCNC: 0.7 MG/DL (ref 0.4–1.2)
DEPRECATED RDW RBC AUTO: 49.9 FL (ref 35–45)
ERYTHROCYTE [DISTWIDTH] IN BLOOD BY AUTOMATED COUNT: 13.8 % (ref 11.5–14.5)
GFR SERPL CREATININE-BSD FRML MDRD: > 60 ML/MIN/1.73M2
GLUCOSE BLD STRIP.AUTO-MCNC: 136 MG/DL (ref 70–108)
GLUCOSE BLD STRIP.AUTO-MCNC: 152 MG/DL (ref 70–108)
GLUCOSE BLD STRIP.AUTO-MCNC: 155 MG/DL (ref 70–108)
GLUCOSE BLD STRIP.AUTO-MCNC: 216 MG/DL (ref 70–108)
GLUCOSE SERPL-MCNC: 134 MG/DL (ref 70–108)
HCT VFR BLD AUTO: 38.3 % (ref 37–47)
HGB BLD-MCNC: 12.2 GM/DL (ref 12–16)
MCH RBC QN AUTO: 31.4 PG (ref 26–33)
MCHC RBC AUTO-ENTMCNC: 31.9 GM/DL (ref 32.2–35.5)
MCV RBC AUTO: 98.5 FL (ref 81–99)
PLATELET # BLD AUTO: 200 THOU/MM3 (ref 130–400)
PMV BLD AUTO: 9.5 FL (ref 9.4–12.4)
POTASSIUM SERPL-SCNC: 4.4 MEQ/L (ref 3.5–5.2)
RBC # BLD AUTO: 3.89 MILL/MM3 (ref 4.2–5.4)
SODIUM SERPL-SCNC: 138 MEQ/L (ref 135–145)
WBC # BLD AUTO: 7.6 THOU/MM3 (ref 4.8–10.8)

## 2024-01-24 PROCEDURE — 94669 MECHANICAL CHEST WALL OSCILL: CPT

## 2024-01-24 PROCEDURE — 2580000003 HC RX 258: Performed by: INTERNAL MEDICINE

## 2024-01-24 PROCEDURE — 82948 REAGENT STRIP/BLOOD GLUCOSE: CPT

## 2024-01-24 PROCEDURE — 99232 SBSQ HOSP IP/OBS MODERATE 35: CPT | Performed by: INTERNAL MEDICINE

## 2024-01-24 PROCEDURE — 85027 COMPLETE CBC AUTOMATED: CPT

## 2024-01-24 PROCEDURE — 1200000000 HC SEMI PRIVATE

## 2024-01-24 PROCEDURE — 97530 THERAPEUTIC ACTIVITIES: CPT

## 2024-01-24 PROCEDURE — 94640 AIRWAY INHALATION TREATMENT: CPT

## 2024-01-24 PROCEDURE — 94761 N-INVAS EAR/PLS OXIMETRY MLT: CPT

## 2024-01-24 PROCEDURE — 2580000003 HC RX 258: Performed by: NURSE PRACTITIONER

## 2024-01-24 PROCEDURE — 97110 THERAPEUTIC EXERCISES: CPT

## 2024-01-24 PROCEDURE — 6360000002 HC RX W HCPCS: Performed by: INTERNAL MEDICINE

## 2024-01-24 PROCEDURE — 6370000000 HC RX 637 (ALT 250 FOR IP): Performed by: NURSE PRACTITIONER

## 2024-01-24 PROCEDURE — 97535 SELF CARE MNGMENT TRAINING: CPT

## 2024-01-24 PROCEDURE — 80048 BASIC METABOLIC PNL TOTAL CA: CPT

## 2024-01-24 PROCEDURE — 97116 GAIT TRAINING THERAPY: CPT

## 2024-01-24 PROCEDURE — 36415 COLL VENOUS BLD VENIPUNCTURE: CPT

## 2024-01-24 RX ADMIN — ALBUTEROL SULFATE 2 PUFF: 90 AEROSOL, METERED RESPIRATORY (INHALATION) at 18:24

## 2024-01-24 RX ADMIN — SODIUM CHLORIDE, PRESERVATIVE FREE 10 ML: 5 INJECTION INTRAVENOUS at 08:06

## 2024-01-24 RX ADMIN — BUMETANIDE 1 MG: 1 TABLET ORAL at 08:05

## 2024-01-24 RX ADMIN — SODIUM CHLORIDE, PRESERVATIVE FREE 10 ML: 5 INJECTION INTRAVENOUS at 20:25

## 2024-01-24 RX ADMIN — ALBUTEROL SULFATE 2 PUFF: 90 AEROSOL, METERED RESPIRATORY (INHALATION) at 08:04

## 2024-01-24 RX ADMIN — BUMETANIDE 1 MG: 1 TABLET ORAL at 16:59

## 2024-01-24 RX ADMIN — CEFTRIAXONE SODIUM 1000 MG: 1 INJECTION, POWDER, FOR SOLUTION INTRAMUSCULAR; INTRAVENOUS at 20:23

## 2024-01-24 RX ADMIN — ALBUTEROL SULFATE 2 PUFF: 90 AEROSOL, METERED RESPIRATORY (INHALATION) at 13:24

## 2024-01-24 RX ADMIN — LISINOPRIL 20 MG: 20 TABLET ORAL at 08:05

## 2024-01-24 RX ADMIN — ISOSORBIDE MONONITRATE 30 MG: 30 TABLET, EXTENDED RELEASE ORAL at 08:04

## 2024-01-24 RX ADMIN — AMLODIPINE BESYLATE 5 MG: 5 TABLET ORAL at 08:05

## 2024-01-24 RX ADMIN — RIVAROXABAN 15 MG: 15 TABLET, FILM COATED ORAL at 08:04

## 2024-01-24 RX ADMIN — ATENOLOL 25 MG: 25 TABLET ORAL at 08:05

## 2024-01-24 ASSESSMENT — PAIN SCALES - GENERAL
PAINLEVEL_OUTOF10: 0
PAINLEVEL_OUTOF10: 0

## 2024-01-24 NOTE — CARE COORDINATION
1/24/24, 2:10 PM EST    DISCHARGE ON GOING EVALUATION    Daniela POP [facility] day: 5  Location: [facility]24/024-A Reason for admit: Hypoxia [R09.02]  Acute respiratory failure with hypoxia (HCC) [J96.01]  Pneumonia of right lung due to infectious organism, unspecified part of lung [J18.9]   Procedure:   1/19 CXR:  Developing rounded densities of the bilateral upper lobes, may reflect   infection or pulmonary nodules, recommend CT chest with IV contrast for   further evaluation. 2. Cardiomegaly, increased pulmonary interstitial edema and new small left   pleural effusion, concerning for developing congestive heart failure.  1/19 CTA Chest W WO: No pulmonary embolus. 2. Cardiomegaly with small bilateral pleural effusions and mild lower lobe predominant interlobular septal thickening in the setting of interstitial  edema. Mild CHF is possible. 3. Nonspecific patchy centrilobular and subpleural consolidations greatest   in the right upper lung with milder involvement in the right middle and lower lungs. Pulmonary edema is possible in the setting of CHF but can be followed to exclude infectious infiltrates  1/22 CXR: Worsening bilateral infiltrates.   1/22 ECHO: EF of 60 %.   Barriers to Discharge: Hospitalist following. PT/OT. IS. Inhalers. PO bumex BID. IV rocephin q24h. Attempt to wean to room air unsuccessful. O2 dropped to 88%. On 1L NC.   PCP: Gaudencio Sepulveda  Readmission Risk Score: 15.1%  Patient Goals/Plan/Treatment Preferences: Daniela plans to return home alone. Her son provides transportation. Has DME. [facility]. SW following.

## 2024-01-25 VITALS
SYSTOLIC BLOOD PRESSURE: 108 MMHG | OXYGEN SATURATION: 95 % | HEIGHT: 63 IN | HEART RATE: 66 BPM | TEMPERATURE: 98 F | WEIGHT: 160.94 LBS | BODY MASS INDEX: 28.52 KG/M2 | RESPIRATION RATE: 18 BRPM | DIASTOLIC BLOOD PRESSURE: 68 MMHG

## 2024-01-25 LAB
ANION GAP SERPL CALC-SCNC: 8 MEQ/L (ref 8–16)
BUN SERPL-MCNC: 22 MG/DL (ref 7–22)
CALCIUM SERPL-MCNC: 9.6 MG/DL (ref 8.5–10.5)
CHLORIDE SERPL-SCNC: 89 MEQ/L (ref 98–111)
CO2 SERPL-SCNC: 36 MEQ/L (ref 23–33)
CREAT SERPL-MCNC: 0.8 MG/DL (ref 0.4–1.2)
DEPRECATED RDW RBC AUTO: 49.3 FL (ref 35–45)
ERYTHROCYTE [DISTWIDTH] IN BLOOD BY AUTOMATED COUNT: 13.8 % (ref 11.5–14.5)
GFR SERPL CREATININE-BSD FRML MDRD: > 60 ML/MIN/1.73M2
GLUCOSE BLD STRIP.AUTO-MCNC: 141 MG/DL (ref 70–108)
GLUCOSE BLD STRIP.AUTO-MCNC: 190 MG/DL (ref 70–108)
GLUCOSE BLD STRIP.AUTO-MCNC: 195 MG/DL (ref 70–108)
GLUCOSE SERPL-MCNC: 145 MG/DL (ref 70–108)
HCT VFR BLD AUTO: 38.4 % (ref 37–47)
HGB BLD-MCNC: 12.4 GM/DL (ref 12–16)
MCH RBC QN AUTO: 31.5 PG (ref 26–33)
MCHC RBC AUTO-ENTMCNC: 32.3 GM/DL (ref 32.2–35.5)
MCV RBC AUTO: 97.5 FL (ref 81–99)
PLATELET # BLD AUTO: 216 THOU/MM3 (ref 130–400)
PMV BLD AUTO: 9.4 FL (ref 9.4–12.4)
POTASSIUM SERPL-SCNC: 5.1 MEQ/L (ref 3.5–5.2)
RBC # BLD AUTO: 3.94 MILL/MM3 (ref 4.2–5.4)
SODIUM SERPL-SCNC: 133 MEQ/L (ref 135–145)
WBC # BLD AUTO: 8.6 THOU/MM3 (ref 4.8–10.8)

## 2024-01-25 PROCEDURE — 6370000000 HC RX 637 (ALT 250 FOR IP): Performed by: NURSE PRACTITIONER

## 2024-01-25 PROCEDURE — 97535 SELF CARE MNGMENT TRAINING: CPT

## 2024-01-25 PROCEDURE — 2580000003 HC RX 258: Performed by: NURSE PRACTITIONER

## 2024-01-25 PROCEDURE — 82948 REAGENT STRIP/BLOOD GLUCOSE: CPT

## 2024-01-25 PROCEDURE — 36415 COLL VENOUS BLD VENIPUNCTURE: CPT

## 2024-01-25 PROCEDURE — 94640 AIRWAY INHALATION TREATMENT: CPT

## 2024-01-25 PROCEDURE — 85027 COMPLETE CBC AUTOMATED: CPT

## 2024-01-25 PROCEDURE — 99239 HOSP IP/OBS DSCHRG MGMT >30: CPT | Performed by: INTERNAL MEDICINE

## 2024-01-25 PROCEDURE — 80048 BASIC METABOLIC PNL TOTAL CA: CPT

## 2024-01-25 PROCEDURE — 97530 THERAPEUTIC ACTIVITIES: CPT

## 2024-01-25 RX ORDER — CEFDINIR 300 MG/1
300 CAPSULE ORAL 2 TIMES DAILY
Qty: 8 CAPSULE | Refills: 0 | Status: SHIPPED | OUTPATIENT
Start: 2024-01-25 | End: 2024-01-29

## 2024-01-25 RX ADMIN — ALBUTEROL SULFATE 2 PUFF: 90 AEROSOL, METERED RESPIRATORY (INHALATION) at 09:28

## 2024-01-25 RX ADMIN — BUMETANIDE 1 MG: 1 TABLET ORAL at 09:37

## 2024-01-25 RX ADMIN — BUMETANIDE 1 MG: 1 TABLET ORAL at 16:26

## 2024-01-25 RX ADMIN — LOTEPREDNOL ETABONATE 1 DROP: 5 SUSPENSION/ DROPS OPHTHALMIC at 09:38

## 2024-01-25 RX ADMIN — ATENOLOL 25 MG: 25 TABLET ORAL at 09:37

## 2024-01-25 RX ADMIN — LISINOPRIL 20 MG: 20 TABLET ORAL at 09:37

## 2024-01-25 RX ADMIN — AMLODIPINE BESYLATE 5 MG: 5 TABLET ORAL at 09:37

## 2024-01-25 RX ADMIN — ISOSORBIDE MONONITRATE 30 MG: 30 TABLET, EXTENDED RELEASE ORAL at 09:37

## 2024-01-25 RX ADMIN — RIVAROXABAN 15 MG: 15 TABLET, FILM COATED ORAL at 09:37

## 2024-01-25 RX ADMIN — SODIUM CHLORIDE, PRESERVATIVE FREE 10 ML: 5 INJECTION INTRAVENOUS at 09:38

## 2024-01-25 ASSESSMENT — PAIN SCALES - GENERAL
PAINLEVEL_OUTOF10: 0

## 2024-01-25 NOTE — DISCHARGE SUMMARY
DISCHARGE SUMMARY      Patient Identification:   Daniela Pacheco   : 1929  MRN: [id number]   Account: [id number]      Patient's PCP: Gaudencio Sepulveda    Admit Date: 2024     Discharge Date: 2024    Admitting Physician: No admitting provider for patient encounter.     Discharge Physician: Eric Graff MD     Discharge Diagnoses:      Acute hypoxic respiratory failure secondary to right upper lung pneumonia, acute on chronic diastolic CHF, PoA  RUL > RML/RLL pneumonia likely due to atypical organism vs Gram(+) vs viral, PoA  Acute on Chronic heart failure with preserved ejection fraction  Leukocytosis: Not sepsis  Hyponatremia  Chronic Atrial fibrillation with secondary hypercoagulable state with PPM  Impaired glucose tolerance  CKD stage 3a  Right kidney lesion  Essential hypertension  Deconditioning      The patient was seen and examined on day of discharge and this discharge summary is in conjunction with any daily progress note from day of discharge.      Hospital Course: Per HPI:  \"Daniela Pacheco is a 89 year old female nonagenarian presented to [facility] ER 2024 from home by private vehicle with son and daughter with chief complaint of shortness of breath.     Patient has past medical history significant for lifetime non-smoker, single lead PPM-atrial fibrillation, bradycardia, HFpEF-ECHO 3/2023 LVEF 55%, mild PAH right ventricular systolic pressure of 35-40, essential hypertension, DMT2, CKD stage 3a (baseline Crea 0.9-1.1) dyslipidemia, skin cancer.  Patient lives home independently.      Patient presented with complaint of shortness of breath and nonproductive cough. Recently family celebrated [place] and she was exposed to COVID and RSV. Home SaO2 monitoring noted readings in the 80's on room air. She identifies +weight gain of 5+ pounds over the past several days. Daniela did not take her diuretics the day of admission because she was concerned how long she may be waiting in the

## 2024-01-25 NOTE — CARE COORDINATION
1/25/24, 1:54 PM EST    DISCHARGE PLANNING EVALUATION    Spoke with patient regarding discharge plan.  She stated she will be staying wit her daughter.  Plan is for discharge today.    Spoke with daughter Tess, she confirmed plan for patient to stay with her at discharge.  Advised anticipate discharge today.  Her address is [address] [place], [place].     1/25/24, 1:59 PM EST    Patient goals/plan/ treatment preferences discussed by  and .  Patient goals/plan/ treatment preferences reviewed with patient/ family.  Patient/ family verbalize understanding of discharge plan and are in agreement with goal/plan/treatment preferences.  Understanding was demonstrated using the teach back method.  AVS provided by RN at time of discharge, which includes all necessary medical information pertaining to the patients current course of illness, treatment, post-discharge goals of care, and treatment preferences.     Services At/After Discharge: Home Health, Nursing service, OT, and PT       IMM Letter  IMM Letter given to Patient/Family/Significant other/Guardian/POA/by:: Jo-Ann JORGE   IMM Letter date given:: 01/25/24  IMM Letter time given:: 1230     Anticipate discharge today to home with namita Pulido. Left message for [facility], notified of anticipated discharge and [place] address.

## 2024-01-25 NOTE — CARE COORDINATION
1/25/24, 12:03 PM EST    DISCHARGE ON GOING EVALUATION    Daniela POP [facility] day: 6  Location: [facility]24/024A Reason for admit: Hypoxia [R09.02]  Acute respiratory failure with hypoxia (HCC) [J96.01]  Pneumonia of right lung due to infectious organism, unspecified part of lung [J18.9]   Procedure:   1/19 CXR:  Developing rounded densities of the bilateral upper lobes, may reflect   infection or pulmonary nodules, recommend CT chest with IV contrast for   further evaluation. 2. Cardiomegaly, increased pulmonary interstitial edema and new small left   pleural effusion, concerning for developing congestive heart failure.  1/19 CTA Chest W WO: No pulmonary embolus. 2. Cardiomegaly with small bilateral pleural effusions and mild lower lobe predominant interlobular septal thickening in the setting of interstitial  edema. Mild CHF is possible. 3. Nonspecific patchy centrilobular and subpleural consolidations greatest   in the right upper lung with milder involvement in the right middle and lower lungs. Pulmonary edema is possible in the setting of CHF but can be followed to exclude infectious infiltrates  1/22 CXR: Worsening bilateral infiltrates.   1/22 ECHO: EF of 60 %    Barriers to Discharge: Hospitalist following. PT/OT. IS. Inhalers. PO bumex BID. IV rocephin q24h. 94% on 1L NC. Home O2 eval ordered.     PCP: Gaudencio Sepulveda  Readmission Risk Score: 14.8%  Patient Goals/Plan/Treatment Preferences: Daniel[facility] If needing home O2, patient prefers [facility]. See  note for address.       Post-acute (PAC) provider list was provided to patient. Patient was informed of their freedom to choose PAC provider. Discussed and offered to show the patient the relevant PAC Providers quality and resource use measures on Medicare Compare web site via computer based on patient's goals of care and treatment preferences. Questions regarding selection process were

## 2024-01-25 NOTE — PROGRESS NOTES
[facility] Adult Intravenous to Oral Protocol    Azithromycin changed to PO per [facility] P&T IV to PO protocol.    Patient meets criteria based on the following:  Tolerating diet more advanced than clear liquids: Yes  Tolerating other oral medications: Yes  Not on vasopressors: Yes  No nausea/vomiting within past 24 hours: Yes  No active GI bleed:  Yes  No gastrectomy, gastric outlet or bowel obstruction, ileus, malabsorption syndrome: Yes  No seizures for 72 hours (antiepileptics only): Yes    Antibiotics only:  Received IV antibiotic for at least 24 hours: Yes  Infection is not meningitis, endocarditis, osteomyelitis, or pancreatitis: Yes  Afebrile for 24 hours: Yes  No clinical deteriorating/instability: Yes    Thank you,  Krissy Chambers, PharmD  PGY1 Resident     
                           Progress Note    Patient:  Daniela [facility]    Unit/Bed:8B-24/024-A  YOB: 1929  MRN: [id number]   Acct: [id number]   Admit date: 1/19/2024      Principal Problem:    Acute respiratory failure with hypoxia (HCC)  Active Problems:    Acute on chronic congestive heart failure (HCC)    Acute on chronic diastolic CHF (congestive heart failure) (HCC)    Chronic renal disease, stage III (HCC) [501617]    Medtronic single pacemaker     Essential hypertension    Chronic atrial fibrillation (HCC)    Pneumonia of right lung due to infectious organism    Hyponatremia    Symptomatic bradycardia    Leukocytosis    Kidney lesion, native, right    PAF (paroxysmal atrial fibrillation) (HCC)    Chronic anticoagulation    Hyperlipidemia    IFG (impaired fasting glucose)    Physical deconditioning    Mild mitral regurgitation    Mild pulmonary hypertension (HCC)  Resolved Problems:    * No resolved hospital problems. *    Disposition possible discharge in next 1 to 2 days      Assessment and Plan:  Acute hypoxic respiratory failure secondary to right lung pneumonia she also may have had a contributing component from impaired cardiac output from pre-existing atrial fibrillation, will continue diuretic at current regimen and extend antibiotic since she has had multifocal pneumonia, I will consider discharging this patient on home oxygen  Right upper lobe and right middle lobe pneumonia we will treat as community-acquired pneumonia extend IV Rocephin reassess oxygen requirements in a.m. tomorrow she is able to speak in complete sentences and has had no increased oxygen requirements therefore we will not repeat x-ray  Atrial fibrillation this is chronic will continue Xarelto for embolic prophylaxis review of her telemetry indicates she goes between paroxysmal atrial fibrillation and sinus rhythm with PACs and sinus rhythm in any event she has been rate controlled and she has had no chest 
   01/23/24 0806   RT Protocol   History Pulmonary Disease 2  (per xray on 1/22)   Respiratory pattern 2   Breath sounds 6   Cough 0   Indications for Bronchodilator Therapy Wheezing associated with pulm disorder   Bronchodilator Assessment Score 10       
  Hospitalist Progress Note      [facility]      Unit[facility]/007-A    YOB: 1929    MRN: [id number]       Acct: [id number]     PCP: Gaudencio Sepulveda    Date of Admission: 1/19/2024    Date/Time of Evaluation:  1/21/2024 at 9:42 AM    Assessment/Plan:    Acute hypoxic respiratory failure secondary to right upper lung pneumonia, acute on chronic diastolic CHF -- POA  - Patient was noted to be hypoxic on arrival to ED with a pulse ox noted to be 86% on RA and placed on 2L O2 (per ER documentation by RN) --> wean O2 as able - still on 1L O2 as of 1/21/2024   - Chest x-ray 1/19 was noted to show rounded densities of the bilateral upper lobes, may reflect an infection or pulmonary nodules, recommend CT chest with IV contrast for further evaluation.  Cardiomegaly, increased pulmonary interstitial edema and new small left pleural effusion, concerning for developing congestive heart failure --> given bumex 1 mg IV x 1 on 1/19 -> resume home po bumex 1 mg bid on 1/21 -- monitor fluid status  - CTA chest 1/19 = (-) pulmonary embolism.  Cardiomegaly with small bilateral pleural effusions and mild lower lobe predominant interlobular septal thickening in the setting of interstitial edema.  Mild CHF is possible.  Nonspecific patchy centrilobular and subpleural consolidations greatest to the right upper lung with milder involvement in the right middle and lower lungs.  Pulmonary edema is possible in the setting of CHF but cannot exclude infectious infiltrates --> sx c/w pneumonia  -- repeat CXR 1/22 as still on O2  -- Respiratory panel 1/19 (-)  -- COVID and influenza 1/19 (-)  -- MRSA screening (-)  -- Blood cultures x2 1/19 (-) to date  - P IV Rocephin and azithromycin both started on 1/19 x 5-day course  - DuoNebs every 4 hours while awake  - Incentive spirometry, Acapella  -- trops (-) thus unlikely cardiac  -- echo (p)     RUL > RML/RLL pneumonia likely due to atypical organism vs Gram (+) 
  PROGRESS NOTE      [facility]      Unit/Bed:4K-07/007-A    YOB: 1929    MRN: [id number]       Acct: [id number]     PCP: Gaudencio Sepulveda    Date of Admission: 1/19/2024      Assessment/Plan:    Anticipated Discharge in : Pending clinical course    Active Hospital Problems    Diagnosis Date Noted    Acute on chronic congestive heart failure (HCC) [I50.9]      Priority: High    Medtronic single pacemaker  [Z95.0] 03/07/2023     Priority: Medium    Chronic renal disease, stage III ([facility]) [870257] [N18.30] 05/18/2022     Priority: Medium    Acute on chronic diastolic CHF (congestive heart failure) ([facility]) [I50.33] 05/15/2022     Priority: Medium    Physical deconditioning [R53.81] 01/21/2024    Leukocytosis [D72.829] 01/20/2024    Kidney lesion, native, right [N28.9] 01/20/2024    PAF (paroxysmal atrial fibrillation) ([facility]) [I48.0] 01/20/2024    Chronic anticoagulation [Z79.01] 01/20/2024    Hyperlipidemia [E78.5] 01/20/2024    IFG (impaired fasting glucose) [R73.01] 01/20/2024    Acute respiratory failure with hypoxia ([facility]) [J96.01] 01/19/2024    Symptomatic bradycardia [R00.1] 12/13/2021    Hyponatremia [E87.1] 12/24/2018    Pneumonia of right lung due to infectious organism [J18.9] 12/02/2018    Essential hypertension [I10] 07/29/2017    Chronic atrial fibrillation (HCC) [I48.20] 07/29/2017       Acute hypoxic respiratory failure secondary to right upper lung pneumonia, acute on chronic diastolic CHF, PoA  Hypoxic on arrival to ED, pulse ox 86% on RA, placed on 2L O2.  On 3L 1/22, wean as able  CXR 1/19 rounded densities of bilateral upper lobes, may reflect infection or pulmonary nodules, recommend CT chest with IV contrast for further evaluation. Cardiomegaly, increased pulmonary interstitial edema and new small left pleural effusion, concerning for developing congestive heart failure.   CTA chest 1/19 (-) pulmonary embolism. Cardiomegaly with small bilateral pleural effusions and 
  PROGRESS NOTE      [facility]      Unit/Bed:4-07/007-A    YOB: 1929    MRN: [id number]       Acct: [id number]     PCP: Gaudencio Sepulveda    Date of Admission: 1/19/2024      Assessment/Plan:    Anticipated Discharge in : 2-3 days    Acute ?hypoxic respiratory failure secondary to right upper lung pneumonia  - Patient was noted to be hypoxic on arrival to ED with a pulse ox noted to be 86% on 2 L NC O2 --> this is not documented aside from the ED note  - Chest x-ray was noted to show rounded densities of the bilateral upper lobes, may reflect an infection or pulmonary nodules, recommend CT chest with IV contrast for further evaluation.  Cardiomegaly, increased pulmonary interstitial edema and new small left pleural effusion, concerning for developing congestive heart failure  - CTA chest showed no pulmonary embolism.  Cardiomegaly with small bilateral pleural effusions and mild lower lobe predominant interlobular septal thickening in the setting of interstitial edema.  Mild CHF is possible.  Nonspecific patchy centrilobular and subpleural consolidations greatest to the right upper lung with milder involvement in the right middle and lower lungs.  Pulmonary edema is possible in the setting of CHF but cannot exclude infectious infiltrates  -- Respiratory panel is negative  -- COVID and influenza are negative  -- MRSA screening is negative  -- Blood cultures x2 are pending  - Believe that findings are consistent with pneumonia  - Patient started on IV Rocephin and azithromycin in ED on 1/19, will continue at this time for 5-day course  - DuoNebs every 4 hours while awake  - Incentive spirometry  - Acapella    Chronic heart failure with preserved ejection fraction  - Chest x-ray findings as stated above concerning for cardiomegaly  - CTA chest also concerning for cardiomegaly  -- ECHO from 3/2023 shows EF estimated at 55%  -- Pro-BNP elevated at 1844 however previous BNP's have noted 
  Physician Progress Note      PATIENT:               CRUZ RANDALL  CSN #:                  [id number]  :                       1929  ADMIT DATE:       2024 5:01 PM  DISCH DATE:  RESPONDING  PROVIDER #:        Evelin Dudley MD          QUERY TEXT:    Patient admitted with Acute hypoxic respiratory failure secondary to right   upper lung pneumonia, noted to have atrial fibrillation and is maintained on   XARELTO. If possible, please document in progress notes and discharge summary   if you are evaluating and/or treating any of the following:    The medical record reflects the following:  Risk Factors: Patient is an 89-year-old male with hypertension and CHF,   history of Atrial fibrillation  Clinical Indicators: maintained on XARELTO  Treatment: XARELTO management, bleeding precautions    Thank you. Malu Sims RN, Clinical Documentation Integrity, [employer]   [employer], [facility], CRCR  Options provided:  -- Secondary hypercoagulable state due to Atrial Fibrillation.  -- Anticoagulation is prophylactic treatment only.  -- Other - I will add my own diagnosis  -- Disagree - Not applicable / Not valid  -- Disagree - Clinically unable to determine / Unknown  -- Refer to Clinical Documentation Reviewer    PROVIDER RESPONSE TEXT:    This patient has Secondary hypercoagulable state due to Atrial Fibrillation.    Query created by: Malu Sims on 2024 9:31 AM      Electronically signed by:  Evelin Dudley MD 2024 9:41 AM          
 [facility]  INPATIENT PHYSICAL THERAPY  DAILY NOTE  STRZ MED SURG 8AB - 8B-24/024-A  Time In: 1137  Time Out: 1215  Timed Code Treatment Minutes: 38 Minutes  Minutes: 38          Date: 2024  Patient Name: Daniela Pacheco,  Gender:  female        MRN: [id number]  : 1929  (89 y.o.)     Referring Practitioner: Evelin Dudley MD  Diagnosis: hypoxia  Additional Pertinent Hx: Per EMR \"Daniela Pacheco is a 89 year old female nonagenarian presented to [facility] ER 2024 from home by private vehicle with son and daughter with chief complaint of shortness of breath.     Patient has past medical history significant for lifetime non-smoker, single lead PPM-atrial fibrillation, bradycardia, HFpEF-ECHO 3/2023 LVEF 55%, mild PAH right ventricular systolic pressure of 35-40, essential hypertension, DMT2, CKD stage 3a (baseline Crea 0.9-1.1) dyslipidemia, skin cancer.  Patient lives home independently.      Patient presented with complaint of shortness of breath and nonproductive cough. Recently family celebrated [place] and she was exposed to COVID and RSV. Home SaO2 monitoring noted readings in the 80's on room air. She identifies +weight gain of 5+ pounds over the past several days. Daniela did not take her diuretics the day of admission because she was concerned how long she may be waiting in the ER prior to decision making and \"I did not want to make a mess\". No complaint of fever, diarrhea or chest pain. 2024 CTA Chest completed while in the ER revealed no PE. While in the ER patient did receive Rocephin/Zithromax/Duoneb aerosol/0.9NS fluid bolus.   \"     Prior Level of Function:  Lives With: Alone  Type of Home: House  Home Layout: Laundry in basement, Two level, Performs ADL's on one level  Home Access: Stairs to enter with rails  Entrance Stairs - Number of Steps: 2 JUDD  grab bar, flight of steps to basement with B rails  Home Equipment: Walker, rolling, Cane   Bathroom Shower/Tub: Tub/Shower 
A home oxygen evaluation has been completed.     [x]Patient is an inpatient. It is expected that the patient will be discharged within the next 48 hours. Qualified provider to write order for home prescription if patient qualifies. Social service/care managers will arrange for home oxygen.  If patient is active, arrange for Home Medical supplier to assess for Oxygen Conserving Device per pulse oximetry.  []Patient is an outpatient. Results will be faxed to the ordering provider. Qualified provider to write order for home prescription if patient qualifies and arranges for home oxygen.      Patient was placed on room air for 20 minutes. SpO2 was 86 % on room air at rest. Patients SpO2 was below 89% and qualified for home oxygen. Oxygen was applied at 2 lpm via nasal cannula to maintain a SpO2 between 90-92% while at rest. Actual SpO2 was 82 %. Patient can ambulate for exercise flow rate. Patients was ambulated, SpO2 was 92% on 4 lpm to maintain SpO2 between 90-92% while exercising.  
[facility]  STRZ MED SURG 8AB  Occupational Therapy  Daily Note  Time:   Time In: 905  Time Out: 943  Timed Code Treatment Minutes: 38 Minutes  Minutes: 38          Date: 2024  Patient Name: Daniela Pacheco,   Gender: female      Room: [facility]/024-A  MRN: [id number]  : 1929  (89 y.o.)  Referring Practitioner: Evelin Dudley MD  Diagnosis: Acute respiratory failure with hypoxia  Additional Pertinent Hx: Per H&P, \"Daniela Pacheco is a 89 year old female nonagenarian presented to [facility] ER 2024 from home by private vehicle with son and daughter with chief complaint of shortness of breath.     Patient has past medical history significant for lifetime non-smoker, single lead PPM-atrial fibrillation, bradycardia, HFpEF-ECHO 3/2023 LVEF 55%, mild PAH right ventricular systolic pressure of 35-40, essential hypertension, DMT2, CKD stage 3a (baseline Crea 0.9-1.1) dyslipidemia, skin cancer.  Patient lives home independently.      Patient presented with complaint of shortness of breath and nonproductive cough. Recently family celebrated [place] and she was exposed to COVID and RSV. Home SaO2 monitoring noted readings in the 80's on room air. She identifies +weight gain of 5+ pounds over the past several days. Daniela did not take her diuretics the day of admission because she was concerned how long she may be waiting in the ER prior to decision making and \"I did not want to make a mess\". No complaint of fever, diarrhea or chest pain. 2024 CTA Chest completed while in the ER revealed no PE. While in the ER patient did receive Rocephin/Zithromax/Duoneb aerosol/0.9NS fluid bolus.\"    Restrictions/Precautions:  Restrictions/Precautions: General Precautions, Fall Risk  Position Activity Restriction  Other position/activity restrictions: room air at baseline     SUBJECTIVE: RN okayed OT session. Pt. In room and agreeable to participate.    PAIN: Denies    Vitals: Oxygen: 1L O2 at 90-91% with 
Patient educated on how to use incentive spirometer. Patient verbalized understanding and demonstrated proper use. Emphasized importance and usage of device, with coughing and deep breathing every 4 hours while awake.        
Pt was sitting and was alone at the time of the visit. She was dealing with acute respiratory failure with hypoxia. She was hopeful and encouraged. Prayer was appreciated.    01/25/24 1417   Encounter Summary   Service Provided For: Patient   Referral/Consult From: [facility]   Support System Family members   Last Encounter  01/25/24   Complexity of Encounter Low   Begin Time 1403   End Time  1408   Total Time Calculated 5 min   Spiritual/Emotional needs   Type Spiritual Support   Assessment/Intervention/Outcome   Assessment Calm   Intervention Empowerment       
Received report from primary RN [facility]. Patient has call light within reach and states no concerns at this time. JUSTINO LAYTON/RSC  
Reported off to primary nurse Ildefonso Madrid SN/RSC  
Nurse attempted to wean patient and pulse ox dropped to 87%    COGNITION: WFL    ADL:   Grooming: with set-up.  Combed hair sitting in bedside chair .    ADDITIONAL ACTIVITIES:  Patient completed BUE strengthening exercises with skilled education on HEP: completed x10 reps x1 set with a orange band in all joints and all planes in order to improve UE strength and activity tolerance required for BADL routine and toilet / shower transfers. Patient tolerated well, requiring minimal rest breaks. Patient also required minimal cues for technique.            Modified [place] Scale:  Not Applicable    ASSESSMENT:     Activity Tolerance:  Patient tolerance of  treatment: Good treatment tolerance      Discharge Recommendations: Home with assist as needed  Equipment Recommendations: Equipment Needed: No  Plan: Times Per Week: 5x  Times Per Day: Once a day  Current Treatment Recommendations: Strengthening, Balance training, Functional mobility training, Endurance training, Self-Care / ADL, Home management training, Safety education & training, Equipment evaluation, education, & procurement, Patient/Caregiver education & training    Education:  Learners: Patient  ADL's and Home Exercise Program    Goals  Short Term Goals  Time Frame for Short Term Goals: Until discharge  Short Term Goal 1: Pt will complete BUE light resistive exercise HEP x 10 reps with min vcs for technique to increase indep and endurance  in home environment.  Short Term Goal 2: Pt will complete standing tolerance x 5 minutes with Mod Indep to increase indep and endurance with all sinkside grooming.  Short Term Goal 3: Pt will navigate to/from BR and HH distances Indep to  increase indep with BADL routine.  Short Term Goal 4: Pt will complete BADL routine including shower with SBA to increase indep and endurance in home environment.  Additional Goals?: No    Following session, patient left in safe position with all fall risk precautions in place.        
her.  Pt. Called son this therapist spoke with the son and provided an update on recommendations at discharge at this time and also provided Pt. Progress.  Son very appreciative and reports Pt. Will discharge home with her daughter temporarily and he can assist prn.  Pt. Has audible wheezing with activity, brief rests taken frequently.    PAIN: Denies    Vitals: Oxygen: 95% on 1L NC at rest with standing on 1 LNC Pt. Declined to 87% and with mobility O2 increased to 3L to maintain 90% and above.  RN aware.     COGNITION: Decreased Recall    ADL:   Grooming: Stand By Assistance.  To stand at sink to complete oral care and to comb hair  Bathing: Stand By Assistance, with set-up, and with increased time for completion.  Fatigue noted   Upper Extremity Dressing: Minimal Assistance.  To don hospital gown  Lower Extremity Dressing: Stand By Assistance and with set-up.  To doff and don brief  Toileting: Stand By Assistance.     Toilet Transfer: Contact Guard Assistance.   .    BALANCE:  Sitting Balance:  Supervision. Seated on EOB  Standing Balance: Stand By Assistance, Contact Guard Assistance. Stood x 6 mins O2 declined to 87% on 1 L O2     BED MOBILITY:  Supine to Sit: Stand By Assistance    Scooting: Stand By Assistance      TRANSFERS:  Sit to Stand:  Stand By Assistance. From EOB toilet and recliner  Stand to Sit: Stand By Assistance.      FUNCTIONAL MOBILITY:  Assistive Device: Rolling Walker  Assist Level:  Contact Guard Assistance.   Distance: To and from bathroom and HH distance in hallway       ADDITIONAL ACTIVITIES:  Discharge recommendations discussed with Pt. And son.  Pt. Provided safety education on O2 tubing with mobility tasks requiring min/mod A to mange.      AM-PAC Inpatient Daily Activity Raw Score: 21  AM-PAC Inpatient ADL T-Scale Score : 44.27  ADL Inpatient CMS 0-100% Score: 32.79    Modified [place] Scale:  Not Applicable    ASSESSMENT:     Activity Tolerance:  Patient tolerance of  treatment: Fair 
continued PT at discharge  Plan: Current Treatment Recommendations: Strengthening, Balance training, Gait training, Stair training, Functional mobility training, Transfer training, Neuromuscular re-education, Endurance training, Equipment evaluation, education, & procurement, Patient/Caregiver education & training, Safety education & training, Therapeutic activities, Home exercise program  General Plan:  (5x GM)    Education  Education:  Learners: Patient  Patient Education: Plan of Care, Home Exercise Program, Up in Chair for All Meals    Goals:  Patient Goals : return home  Short Term Goals  Time Frame for Short Term Goals: by discharge  Short Term Goal 1: Pt will demo IND with bed mobility tasks with bed flat to return home safely.  Short Term Goal 2: Pt will demo sit to/from stand transfers with RW and IND to return home safely.  Short Term Goal 3: Pt will demo S for stair negotiation with rail for support to return home safely.  Short Term Goal 4: Pt will amb for >150 feet with RW for support and IND to return home safely.  Long Term Goals  Time Frame for Long Term Goals : NA due to short ELOS    Following session, patient left in safe position with all fall risk precautions in place.    
Needed: No    Plan:  Times Per Week: 5x  Times Per Day: Once a day  Current Treatment Recommendations: Strengthening, Balance training, Functional mobility training, Endurance training, Self-Care / ADL, Home management training, Safety education & training, Equipment evaluation, education, & procurement, Patient/Caregiver education & training.  See long-term goal time frame for expected duration of plan of care.  If no long-term goals established, a short length of stay is anticipated.    Goals:  Patient goals : Go Home  Short Term Goals  Time Frame for Short Term Goals: Until discharge  Short Term Goal 1: Pt will complete BUE light resistive exercise HEP x 10 reps with min vcs for technique to increase indep and endurance  in home environment.  Short Term Goal 2: Pt will complete standing tolerance x 5 minutes with Mod Indep to increase indep and endurance with all sinkside grooming.  Short Term Goal 3: Pt will navigate to/from BR and HH distances Indep to  increase indep with BADL routine.  Short Term Goal 4: Pt will complete BADL routine including shower with SBA to increase indep and endurance in home environment.  Additional Goals?: No    AM-PAC Inpatient Daily Activity Raw Score: 19  AM-PAC Inpatient ADL T-Scale Score : 40.22    Following session, patient left in safe position with all fall risk precautions in place.              
will demo sit to/from stand transfers with RW and IND to return home safely.  Short Term Goal 3: Pt will demo S for stair negotiation with rail for support to return home safely.  Short Term Goal 4: Pt will amb for >150 feet with RW for support and IND to return home safely.  Long Term Goals  Time Frame for Long Term Goals : NA due to short ELOS    Following session, patient left in safe position with all fall risk precautions in place. Pt in bedside chair following session, all needs and call light in reach, alarm on.       
setting of interstitial    edema. Mild CHF is possible.   3. Nonspecific patchy centrilobular and subpleural consolidations greatest    in the right upper lung with milder involvement in the right middle and    lower lungs. Pulmonary edema is possible in the setting of CHF but can be    followed to exclude infectious infiltrates.      This document has been electronically signed by: Bay Luo MD on    01/19/2024 08:14 PM      All CTs at this facility use dose modulation techniques and iterative    reconstructions, and/or weight-based dosing   when appropriate to reduce radiation to a low as reasonably achievable.      3D Post-processing was performed on this study.      XR CHEST PORTABLE   Final Result   Impression:   1. Developing rounded densities of the bilateral upper lobes, may reflect    infection or pulmonary nodules, recommend CT chest with IV contrast for    further evaluation.   2. Cardiomegaly, increased pulmonary interstitial edema and new small left    pleural effusion, concerning for developing congestive heart failure.      This document has been electronically signed by: Aditi Sebastian MD on 01/19/2024    05:54 PM          Diet: ADULT DIET; Regular; Low Fat/Low Chol/High Fiber/2 gm Na    DVT prophylaxis: [] Lovenox                                 [] SCDs                                 [] SQ Heparin                                 [] Encourage ambulation           [x] Already on Anticoagulation     Disposition:    [x] Home       [] TCU       [] Rehab       [] Psych       [] SNF       [] Long Term Care Facility       [] Other-    Code Status: Limited    PT/OT Eval Status: Evaluated      Electronically signed by Eric Graff MD on 1/23/2024 at 7:42 AM    This note was electronically signed. Parts of this note may have been dictated by use of voice recognition software and electronically transcribed. The note may contain errors not detected in proofreading. Please refer to my supervising physician's

## 2024-01-25 NOTE — PLAN OF CARE
Problem: Discharge Planning  Goal: Discharge to home or other facility with appropriate resources  1/19/2024 2346 by Grace Lamar, RN  Outcome: Progressing  1/19/2024 2346 by Grace Lamar RN  Outcome: Progressing     Problem: ABCDS Injury Assessment  Goal: Absence of physical injury  1/19/2024 2346 by Grace Lamar, RN  Outcome: Progressing  1/19/2024 2346 by Grace Lamar RN  Outcome: Progressing     Problem: Skin/Tissue Integrity  Goal: Absence of new skin breakdown  Description: 1.  Monitor for areas of redness and/or skin breakdown  2.  Assess vascular access sites hourly  3.  Every 4-6 hours minimum:  Change oxygen saturation probe site  4.  Every 4-6 hours:  If on nasal continuous positive airway pressure, respiratory therapy assess nares and determine need for appliance change or resting period.  Outcome: Progressing     Problem: Safety - Adult  Goal: Free from fall injury  Outcome: Progressing     
  Problem: Discharge Planning  Goal: Discharge to home or other facility with appropriate resources  1/20/2024 0913 by Jeffery Arellano RN  Outcome: Progressing  Flowsheets (Taken 1/20/2024 0913)  Discharge to home or other facility with appropriate resources:   Arrange for needed discharge resources and transportation as appropriate   Identify barriers to discharge with patient and caregiver   Identify discharge learning needs (meds, wound care, etc)   Refer to discharge planning if patient needs post-hospital services based on physician order or complex needs related to functional status, cognitive ability or social support system  1/19/2024 2346 by Grace Lamar RN  Outcome: Progressing  1/19/2024 2346 by Grace Lamar RN  Outcome: Progressing     Problem: ABCDS Injury Assessment  Goal: Absence of physical injury  1/20/2024 0913 by Jeffery Arellano RN  Outcome: Progressing  Flowsheets (Taken 1/20/2024 0913)  Absence of Physical Injury: Implement safety measures based on patient assessment  1/19/2024 2346 by Grace Lamar RN  Outcome: Progressing  1/19/2024 2346 by Grace Lamar RN  Outcome: Progressing     Problem: Skin/Tissue Integrity  Goal: Absence of new skin breakdown  Description: 1.  Monitor for areas of redness and/or skin breakdown  2.  Assess vascular access sites hourly  3.  Every 4-6 hours minimum:  Change oxygen saturation probe site  4.  Every 4-6 hours:  If on nasal continuous positive airway pressure, respiratory therapy assess nares and determine need for appliance change or resting period.  1/20/2024 0913 by Jeffery Arellano RN  Outcome: Progressing  Note: No new skin breakdown this shift.  Pt turned every 2 hours and as needed.      1/19/2024 2346 by Grace Lamar, RN  Outcome: Progressing     Problem: Safety - Adult  Goal: Free from fall injury  1/20/2024 0913 by Jeffery Arellano RN  Outcome: Progressing  Flowsheets (Taken 1/20/2024 0913)  Free From Fall 
  Problem: Discharge Planning  Goal: Discharge to home or other facility with appropriate resources  1/22/2024 1016 by Rahel Skelton LSW  Outcome: Progressing    
  Problem: Discharge Planning  Goal: Discharge to home or other facility with appropriate resources  1/22/2024 2000 by Ruchi Rico, RN  Outcome: Progressing  Flowsheets (Taken 1/22/2024 2000)  Discharge to home or other facility with appropriate resources: Identify barriers to discharge with patient and caregiver  Note: Plans to go home at discharge with possible home health.      Problem: ABCDS Injury Assessment  Goal: Absence of physical injury  Outcome: Progressing  Flowsheets (Taken 1/22/2024 2000)  Absence of Physical Injury: Implement safety measures based on patient assessment  Note: Pt absent of  falls this shift.  Falling star program within place. RN visual checks on pt hourly with rounds.  Call light in reach, bed in lowest position.  Fall band intact.  Bed alarm set.  Pt educated to not get up per self to reduce the risk for falls.       Problem: Skin/Tissue Integrity  Goal: Absence of new skin breakdown  Description: 1.  Monitor for areas of redness and/or skin breakdown  2.  Assess vascular access sites hourly  3.  Every 4-6 hours minimum:  Change oxygen saturation probe site  4.  Every 4-6 hours:  If on nasal continuous positive airway pressure, respiratory therapy assess nares and determine need for appliance change or resting period.  Outcome: Progressing  Note: No new skin issues noted.      Problem: Safety - Adult  Goal: Free from fall injury  Outcome: Progressing  Flowsheets (Taken 1/22/2024 2000)  Free From Fall Injury: Instruct family/caregiver on patient safety  Note: Pt absent of  falls this shift.  Falling star program within place. RN visual checks on pt hourly with rounds.  Call light in reach, bed in lowest position.  Fall band intact.  Bed alarm set.  Pt educated to not get up per self to reduce the risk for falls.     Care plan reviewed with patient.  Patient verbalize understanding of the plan of care and contribute to goal setting.     
  Problem: Discharge Planning  Goal: Discharge to home or other facility with appropriate resources  1/23/2024 0129 by Jn Reyes RN  Outcome: Progressing  Note: Pt will discharge home when appropriate.       Problem: ABCDS Injury Assessment  Goal: Absence of physical injury  1/23/2024 0129 by Jn Reyes RN  Outcome: Progressing  Note: Absence of physical injury.       Problem: Skin/Tissue Integrity  Goal: Absence of new skin breakdown  Description: 1.  Monitor for areas of redness and/or skin breakdown  2.  Assess vascular access sites hourly  3.  Every 4-6 hours minimum:  Change oxygen saturation probe site  4.  Every 4-6 hours:  If on nasal continuous positive airway pressure, respiratory therapy assess nares and determine need for appliance change or resting period.  1/23/2024 0129 by Jn Reyes RN  Outcome: Progressing  Note: Absence of new skin integrity issues on my shift. Will continue to assess.       Problem: Safety - Adult  Goal: Free from fall injury  1/23/2024 0129 by Jn Reyes RN  Outcome: Progressing  Note: Fall precaution in place. Bed alarm/chair alarm. Bed locked in lowest position. Fall band applied if applicable. Call light and overhead table within reach. Will continue to monitor.       Problem: Chronic Conditions and Co-morbidities  Goal: Patient's chronic conditions and co-morbidity symptoms are monitored and maintained or improved  1/23/2024 0129 by Jn Reyes RN  Outcome: Progressing  Note: Pt monitored and assessed on my shift       Problem: Pain  Goal: Verbalizes/displays adequate comfort level or baseline comfort level  1/23/2024 0129 by Jn Reyes RN  Outcome: Progressing  Note: Pt denies pain on my shift. Non pharmaceutical interventions like ice and repositioning offered before pain medications. Will continue to assess.       Problem: Respiratory - Adult  Goal: Achieves optimal ventilation and oxygenation  1/23/2024 0129 by Jn Reyes 
  Problem: Discharge Planning  Goal: Discharge to home or other facility with appropriate resources  1/24/2024 0958 by Palak Thibodeaux RN  Outcome: Progressing  Flowsheets (Taken 1/24/2024 0958)  Discharge to home or other facility with appropriate resources: Identify barriers to discharge with patient and caregiver  Note: Patient lives at home alone and plans to return. Patient may need home health. Social work following.      Problem: ABCDS Injury Assessment  Goal: Absence of physical injury  1/24/2024 0958 by Palak Thibodeaux RN  Outcome: Progressing  Flowsheets (Taken 1/24/2024 0958)  Absence of Physical Injury: Implement safety measures based on patient assessment  Note: Patient free from falls and pressure injuries.      Problem: Skin/Tissue Integrity  Goal: Absence of new skin breakdown  Description: 1.  Monitor for areas of redness and/or skin breakdown  2.  Assess vascular access sites hourly  3.  Every 4-6 hours minimum:  Change oxygen saturation probe site  4.  Every 4-6 hours:  If on nasal continuous positive airway pressure, respiratory therapy assess nares and determine need for appliance change or resting period.  1/24/2024 0958 by Palak Thibodeaux RN  Outcome: Progressing  Note: Skin assessed every shift. Encouraging repositioning for pressure ulcer prevention. Patient able to shift weight to prevent breakdown.        Problem: Safety - Adult  Goal: Free from fall injury  1/24/2024 0958 by Palak Thibodeaux RN  Outcome: Progressing  Flowsheets (Taken 1/24/2024 0958)  Free From Fall Injury: Instruct family/caregiver on patient safety  Note: No falls this shift. Fall precautions in place and alarms in use. Patient uses call light appropriately to ask for assistance.        Problem: Chronic Conditions and Co-morbidities  Goal: Patient's chronic conditions and co-morbidity symptoms are monitored and maintained or improved  1/24/2024 0958 by Palak Thibodeaux, RN  Outcome: Progressing  Flowsheets (Taken 
  Problem: Discharge Planning  Goal: Discharge to home or other facility with appropriate resources  Outcome: Progressing  Flowsheets (Taken 1/21/2024 0303)  Discharge to home or other facility with appropriate resources:   Identify barriers to discharge with patient and caregiver   Identify discharge learning needs (meds, wound care, etc)   Refer to discharge planning if patient needs post-hospital services based on physician order or complex needs related to functional status, cognitive ability or social support system   Arrange for needed discharge resources and transportation as appropriate     Problem: ABCDS Injury Assessment  Goal: Absence of physical injury  Outcome: Progressing  Flowsheets (Taken 1/21/2024 0303)  Absence of Physical Injury: Implement safety measures based on patient assessment     Problem: Skin/Tissue Integrity  Goal: Absence of new skin breakdown  Description: 1.  Monitor for areas of redness and/or skin breakdown  2.  Assess vascular access sites hourly  3.  Every 4-6 hours minimum:  Change oxygen saturation probe site  4.  Every 4-6 hours:  If on nasal continuous positive airway pressure, respiratory therapy assess nares and determine need for appliance change or resting period.  Outcome: Progressing     Problem: Safety - Adult  Goal: Free from fall injury  Outcome: Progressing  Flowsheets (Taken 1/21/2024 0303)  Free From Fall Injury:   Instruct family/caregiver on patient safety   Based on caregiver fall risk screen, instruct family/caregiver to ask for assistance with transferring infant if caregiver noted to have fall risk factors     Problem: Chronic Conditions and Co-morbidities  Goal: Patient's chronic conditions and co-morbidity symptoms are monitored and maintained or improved  Outcome: Progressing  Flowsheets (Taken 1/21/2024 0303)  Care Plan - Patient's Chronic Conditions and Co-Morbidity Symptoms are Monitored and Maintained or Improved:   Monitor and assess patient's chronic 
  Problem: Discharge Planning  Goal: Discharge to home or other facility with appropriate resources  Outcome: Progressing  Flowsheets (Taken 1/21/2024 1953)  Discharge to home or other facility with appropriate resources:   Identify barriers to discharge with patient and caregiver   Arrange for needed discharge resources and transportation as appropriate   Identify discharge learning needs (meds, wound care, etc)     Problem: ABCDS Injury Assessment  Goal: Absence of physical injury  Flowsheets (Taken 1/21/2024 2113)  Absence of Physical Injury: Implement safety measures based on patient assessment     Problem: Safety - Adult  Goal: Free from fall injury  Outcome: Progressing  Flowsheets (Taken 1/21/2024 2113)  Free From Fall Injury: Instruct family/caregiver on patient safety     Problem: Chronic Conditions and Co-morbidities  Goal: Patient's chronic conditions and co-morbidity symptoms are monitored and maintained or improved  Outcome: Progressing  Flowsheets (Taken 1/21/2024 1953)  Care Plan - Patient's Chronic Conditions and Co-Morbidity Symptoms are Monitored and Maintained or Improved: Monitor and assess patient's chronic conditions and comorbid symptoms for stability, deterioration, or improvement     Problem: Pain  Goal: Verbalizes/displays adequate comfort level or baseline comfort level  Outcome: Progressing  Flowsheets (Taken 1/21/2024 1953)  Verbalizes/displays adequate comfort level or baseline comfort level:   Encourage patient to monitor pain and request assistance   Assess pain using appropriate pain scale   Administer analgesics based on type and severity of pain and evaluate response     Care plan reviewed with patient.  Patient verbalizes understanding of the plan of care and contributed to goal setting.      
  Problem: Discharge Planning  Goal: Discharge to home or other facility with appropriate resources  Outcome: Progressing  Flowsheets (Taken 1/23/2024 2015)  Discharge to home or other facility with appropriate resources: Identify barriers to discharge with patient and caregiver     Problem: ABCDS Injury Assessment  Goal: Absence of physical injury  Outcome: Progressing  Flowsheets (Taken 1/23/2024 2015)  Absence of Physical Injury: Implement safety measures based on patient assessment     Problem: Skin/Tissue Integrity  Goal: Absence of new skin breakdown  Description: 1.  Monitor for areas of redness and/or skin breakdown  2.  Assess vascular access sites hourly  3.  Every 4-6 hours minimum:  Change oxygen saturation probe site  4.  Every 4-6 hours:  If on nasal continuous positive airway pressure, respiratory therapy assess nares and determine need for appliance change or resting period.  Outcome: Progressing     Problem: Safety - Adult  Goal: Free from fall injury  Outcome: Progressing  Flowsheets (Taken 1/23/2024 2015)  Free From Fall Injury: Instruct family/caregiver on patient safety     Problem: Chronic Conditions and Co-morbidities  Goal: Patient's chronic conditions and co-morbidity symptoms are monitored and maintained or improved  Outcome: Progressing  Flowsheets (Taken 1/23/2024 2015)  Care Plan - Patient's Chronic Conditions and Co-Morbidity Symptoms are Monitored and Maintained or Improved: Monitor and assess patient's chronic conditions and comorbid symptoms for stability, deterioration, or improvement     Problem: Pain  Goal: Verbalizes/displays adequate comfort level or baseline comfort level  Outcome: Progressing     Problem: Respiratory - Adult  Goal: Clear lung sounds  1/24/2024 0852 by Liliana Otto, RN  Outcome: Progressing  1/24/2024 0808 by Barak Almanza, CHERIE  Outcome: Progressing  Goal: Achieves optimal ventilation and oxygenation  1/24/2024 0852 by Liliana Otto, RN  Outcome: 
  Problem: Discharge Planning  Goal: Discharge to home or other facility with appropriate resources  Outcome: Progressing  Flowsheets (Taken 1/24/2024 2023)  Discharge to home or other facility with appropriate resources: Identify barriers to discharge with patient and caregiver     Problem: ABCDS Injury Assessment  Goal: Absence of physical injury  Outcome: Progressing  Flowsheets (Taken 1/24/2024 2023)  Absence of Physical Injury: Implement safety measures based on patient assessment     Problem: Skin/Tissue Integrity  Goal: Absence of new skin breakdown  Description: 1.  Monitor for areas of redness and/or skin breakdown  2.  Assess vascular access sites hourly  3.  Every 4-6 hours minimum:  Change oxygen saturation probe site  4.  Every 4-6 hours:  If on nasal continuous positive airway pressure, respiratory therapy assess nares and determine need for appliance change or resting period.  Outcome: Progressing     Problem: Safety - Adult  Goal: Free from fall injury  Outcome: Progressing  Flowsheets (Taken 1/24/2024 2023)  Free From Fall Injury: Instruct family/caregiver on patient safety     Problem: Chronic Conditions and Co-morbidities  Goal: Patient's chronic conditions and co-morbidity symptoms are monitored and maintained or improved  Outcome: Progressing  Flowsheets (Taken 1/24/2024 2023)  Care Plan - Patient's Chronic Conditions and Co-Morbidity Symptoms are Monitored and Maintained or Improved: Monitor and assess patient's chronic conditions and comorbid symptoms for stability, deterioration, or improvement     Problem: Pain  Goal: Verbalizes/displays adequate comfort level or baseline comfort level  Outcome: Progressing  Flowsheets (Taken 1/24/2024 2023)  Verbalizes/displays adequate comfort level or baseline comfort level: Encourage patient to monitor pain and request assistance     Problem: Respiratory - Adult  Goal: Clear lung sounds  Outcome: Progressing  Goal: Achieves optimal ventilation and 
  Problem: Respiratory - Adult  Goal: Clear lung sounds  Outcome: Progressing     Problem: Respiratory - Adult  Goal: Achieves optimal ventilation and oxygenation  Outcome: Progressing   Patient mutually agreed on goals.      
  Problem: Respiratory - Adult  Goal: Clear lung sounds  Outcome: Progressing  Note: Mdi to help improve lung aeration. Patient mutually agreed on goals.       
Patient was evaluated today for the diagnosis of  Acute respiratory failure secondary to bilateral multifocal pneumonia .  I entered a DME order for home oxygen at 2-4 lpm because the diagnosis and testing require the patient to have supplemental oxygen.  Condition will improve or be benefited by oxygen use.  The patient is  able to perform good mobility in a home setting and therefore does require the use of a portable oxygen system.  The need for this equipment was discussed with the patient and she understands and is in agreement.    
Chronic Conditions and Co-Morbidity Symptoms are Monitored and Maintained or Improved: Monitor and assess patient's chronic conditions and comorbid symptoms for stability, deterioration, or improvement  Note: Chronic conditions and co morbidities managed.      Problem: Pain  Goal: Verbalizes/displays adequate comfort level or baseline comfort level  1/23/2024 1516 by Palak Thibodeaux RN  Outcome: Progressing  Flowsheets (Taken 1/23/2024 1516)  Verbalizes/displays adequate comfort level or baseline comfort level: Encourage patient to monitor pain and request assistance  Note: Patient denies pain when asked. PRN medications available if needed.      Problem: Respiratory - Adult  Goal: Clear lung sounds  1/23/2024 1516 by Palak Thibodeaux RN  Outcome: Progressing  Note: Lung sounds wheezy. Encouraging use of acapella and incentive spirometry.      Problem: Respiratory - Adult  Goal: Achieves optimal ventilation and oxygenation  1/23/2024 1516 by Palak Thibodeaux RN  Outcome: Progressing  Flowsheets (Taken 1/23/2024 1516)  Achieves optimal ventilation and oxygenation: Assess for changes in respiratory status  Note: Patient continues on oxygen at 1L. Attempted to wean patient to room air, but she desatted to 88-89%. 1L reapplied with sats to 93%.      Care plan reviewed with patient .  Patient verbalizes understanding of the plan of care and contributes to goal setting.

## 2024-01-28 ENCOUNTER — HOSPITAL ENCOUNTER (INPATIENT)
Age: 89
LOS: 16 days | Discharge: NURSING, REHAB OR LONG-TERM CARE | DRG: 189 | End: 2024-02-14
Attending: STUDENT IN AN ORGANIZED HEALTH CARE EDUCATION/TRAINING PROGRAM | Admitting: STUDENT IN AN ORGANIZED HEALTH CARE EDUCATION/TRAINING PROGRAM
Payer: MEDICARE

## 2024-01-28 DIAGNOSIS — J18.9 PNEUMONIA OF RIGHT LUNG DUE TO INFECTIOUS ORGANISM, UNSPECIFIED PART OF LUNG: ICD-10-CM

## 2024-01-28 DIAGNOSIS — J96.21 ACUTE ON CHRONIC RESPIRATORY FAILURE WITH HYPOXIA AND HYPERCAPNIA (HCC): Primary | ICD-10-CM

## 2024-01-28 DIAGNOSIS — I50.33 ACUTE ON CHRONIC DIASTOLIC CONGESTIVE HEART FAILURE (HCC): ICD-10-CM

## 2024-01-28 DIAGNOSIS — J90 PLEURAL EFFUSION: ICD-10-CM

## 2024-01-28 DIAGNOSIS — J96.22 ACUTE ON CHRONIC RESPIRATORY FAILURE WITH HYPOXIA AND HYPERCAPNIA (HCC): Primary | ICD-10-CM

## 2024-01-28 PROCEDURE — 93005 ELECTROCARDIOGRAM TRACING: CPT | Performed by: STUDENT IN AN ORGANIZED HEALTH CARE EDUCATION/TRAINING PROGRAM

## 2024-01-28 PROCEDURE — 99285 EMERGENCY DEPT VISIT HI MDM: CPT

## 2024-01-28 RX ORDER — 0.9 % SODIUM CHLORIDE 0.9 %
1000 INTRAVENOUS SOLUTION INTRAVENOUS ONCE
Status: DISCONTINUED | OUTPATIENT
Start: 2024-01-29 | End: 2024-01-29

## 2024-01-29 ENCOUNTER — APPOINTMENT (OUTPATIENT)
Dept: GENERAL RADIOLOGY | Age: 89
DRG: 189 | End: 2024-01-29
Payer: MEDICARE

## 2024-01-29 PROBLEM — J96.22 ACUTE ON CHRONIC RESPIRATORY FAILURE WITH HYPOXIA AND HYPERCAPNIA (HCC): Status: ACTIVE | Noted: 2024-01-29

## 2024-01-29 PROBLEM — J96.21 ACUTE ON CHRONIC RESPIRATORY FAILURE WITH HYPOXIA AND HYPERCAPNIA (HCC): Status: ACTIVE | Noted: 2024-01-29

## 2024-01-29 LAB
ALBUMIN SERPL BCG-MCNC: 3.4 G/DL (ref 3.5–5.1)
ALP SERPL-CCNC: 80 U/L (ref 38–126)
ALT SERPL W/O P-5'-P-CCNC: 32 U/L (ref 11–66)
ANION GAP SERPL CALC-SCNC: 5 MEQ/L (ref 8–16)
ARTERIAL PATENCY WRIST A: POSITIVE
AST SERPL-CCNC: 19 U/L (ref 5–40)
BASE EXCESS BLDA CALC-SCNC: 11.8 MMOL/L (ref -2.5–2.5)
BASE EXCESS BLDA CALC-SCNC: 15.4 MMOL/L (ref -2.5–2.5)
BASE EXCESS BLDA CALC-SCNC: 6.9 MMOL/L (ref -2–3)
BASE EXCESS BLDA CALC-SCNC: 8.2 MMOL/L (ref -2.5–2.5)
BASO STIPL BLD QL SMEAR: ABNORMAL
BASOPHILS ABSOLUTE: 0.1 THOU/MM3 (ref 0–0.1)
BASOPHILS NFR BLD AUTO: 0.6 %
BDY SITE: ABNORMAL
BILIRUB SERPL-MCNC: 0.3 MG/DL (ref 0.3–1.2)
BUN SERPL-MCNC: 27 MG/DL (ref 7–22)
CALCIUM SERPL-MCNC: 9.6 MG/DL (ref 8.5–10.5)
CHLORIDE SERPL-SCNC: 88 MEQ/L (ref 98–111)
CO2 SERPL-SCNC: 36 MEQ/L (ref 23–33)
COLLECTED BY:: ABNORMAL
CREAT SERPL-MCNC: 0.8 MG/DL (ref 0.4–1.2)
DEPRECATED RDW RBC AUTO: 49.7 FL (ref 35–45)
DEVICE: ABNORMAL
EKG Q-T INTERVAL: 444 MS
EKG QRS DURATION: 184 MS
EKG QTC CALCULATION (BAZETT): 502 MS
EKG R AXIS: 121 DEGREES
EKG T AXIS: -55 DEGREES
EKG VENTRICULAR RATE: 77 BPM
EOSINOPHIL NFR BLD AUTO: 0.1 %
EOSINOPHILS ABSOLUTE: 0 THOU/MM3 (ref 0–0.4)
ERYTHROCYTE [DISTWIDTH] IN BLOOD BY AUTOMATED COUNT: 13.6 % (ref 11.5–14.5)
FIO2 ON VENT O2 ANALYZER: 100 %
FIO2 ON VENT O2 ANALYZER: 90 %
FIO2 ON VENT O2 ANALYZER: 90 %
GFR SERPL CREATININE-BSD FRML MDRD: > 60 ML/MIN/1.73M2
GLUCOSE BLD STRIP.AUTO-MCNC: 106 MG/DL (ref 70–108)
GLUCOSE BLD STRIP.AUTO-MCNC: 160 MG/DL (ref 70–108)
GLUCOSE BLD STRIP.AUTO-MCNC: 188 MG/DL (ref 70–108)
GLUCOSE BLD STRIP.AUTO-MCNC: 242 MG/DL (ref 70–108)
GLUCOSE SERPL-MCNC: 238 MG/DL (ref 70–108)
HCO3 BLDA-SCNC: 41 MMOL/L (ref 23–28)
HCO3 BLDA-SCNC: 43 MMOL/L (ref 23–28)
HCO3 BLDA-SCNC: 44 MMOL/L (ref 23–28)
HCT VFR BLD AUTO: 43.3 % (ref 37–47)
HGB BLD-MCNC: 13.7 GM/DL (ref 12–16)
IMM GRANULOCYTES # BLD AUTO: 0.78 THOU/MM3 (ref 0–0.07)
IMM GRANULOCYTES NFR BLD AUTO: 5.4 %
LACTATE SERPL-SCNC: 1.1 MMOL/L (ref 0.5–2)
LYMPHOCYTES ABSOLUTE: 1.5 THOU/MM3 (ref 1–4.8)
LYMPHOCYTES NFR BLD AUTO: 10.1 %
MCH RBC QN AUTO: 31.5 PG (ref 26–33)
MCHC RBC AUTO-ENTMCNC: 31.6 GM/DL (ref 32.2–35.5)
MCV RBC AUTO: 99.5 FL (ref 81–99)
MONOCYTES ABSOLUTE: 1 THOU/MM3 (ref 0.4–1.3)
MONOCYTES NFR BLD AUTO: 6.9 %
NEUTROPHILS NFR BLD AUTO: 76.9 %
NRBC BLD AUTO-RTO: 0 /100 WBC
NT-PROBNP SERPL IA-MCNC: 6210 PG/ML (ref 0–449)
OSMOLALITY SERPL CALC.SUM OF ELEC: 271.8 MOSMOL/KG (ref 275–300)
PCO2 BLDA: 115 MMHG (ref 35–45)
PCO2 BLDA: 142 MMHG (ref 35–45)
PCO2 BLDA: 67 MMHG (ref 35–45)
PCO2 TEMP ADJ BLDMV: 106 MMHG (ref 41–51)
PEEP SETTING VENT: 6 MMHG
PH BLDA: 7.1 [PH] (ref 7.35–7.45)
PH BLDA: 7.21 [PH] (ref 7.35–7.45)
PH BLDA: 7.42 [PH] (ref 7.35–7.45)
PH BLDMV: 7.19 [PH] (ref 7.31–7.41)
PIP: 26 CMH2O
PIP: 26 CMH2O
PLATELET # BLD AUTO: 344 THOU/MM3 (ref 130–400)
PLATELET BLD QL SMEAR: ADEQUATE
PMV BLD AUTO: 9 FL (ref 9.4–12.4)
PO2 BLDA: 147 MMHG (ref 71–104)
PO2 BLDA: 87 MMHG (ref 71–104)
PO2 BLDA: 95 MMHG (ref 71–104)
PO2 BLDMV: 44 MMHG (ref 25–40)
POTASSIUM SERPL-SCNC: 5.3 MEQ/L (ref 3.5–5.2)
POTASSIUM SERPL-SCNC: 6.1 MEQ/L (ref 3.5–5.2)
PROT SERPL-MCNC: 6.4 G/DL (ref 6.1–8)
RBC # BLD AUTO: 4.35 MILL/MM3 (ref 4.2–5.4)
REASON FOR REJECTION: NORMAL
REJECTED TEST: NORMAL
SAO2 % BLDA: 92 %
SAO2 % BLDA: 93 %
SAO2 % BLDA: 99 %
SAO2 % BLDMV: 65 %
SCAN OF BLOOD SMEAR: NORMAL
SEGMENTED NEUTROPHILS ABSOLUTE COUNT: 11.1 THOU/MM3 (ref 1.8–7.7)
SODIUM SERPL-SCNC: 129 MEQ/L (ref 135–145)
TROPONIN, HIGH SENSITIVITY: 17 NG/L (ref 0–12)
VENTILATION MODE VENT: ABNORMAL
WBC # BLD AUTO: 14.4 THOU/MM3 (ref 4.8–10.8)

## 2024-01-29 PROCEDURE — 2500000003 HC RX 250 WO HCPCS: Performed by: PHYSICIAN ASSISTANT

## 2024-01-29 PROCEDURE — 84132 ASSAY OF SERUM POTASSIUM: CPT

## 2024-01-29 PROCEDURE — 36600 WITHDRAWAL OF ARTERIAL BLOOD: CPT

## 2024-01-29 PROCEDURE — 71045 X-RAY EXAM CHEST 1 VIEW: CPT

## 2024-01-29 PROCEDURE — 94660 CPAP INITIATION&MGMT: CPT

## 2024-01-29 PROCEDURE — 2580000003 HC RX 258: Performed by: PHYSICIAN ASSISTANT

## 2024-01-29 PROCEDURE — 6370000000 HC RX 637 (ALT 250 FOR IP): Performed by: PHYSICIAN ASSISTANT

## 2024-01-29 PROCEDURE — 6360000002 HC RX W HCPCS: Performed by: PHYSICIAN ASSISTANT

## 2024-01-29 PROCEDURE — 85025 COMPLETE CBC W/AUTO DIFF WBC: CPT

## 2024-01-29 PROCEDURE — 83880 ASSAY OF NATRIURETIC PEPTIDE: CPT

## 2024-01-29 PROCEDURE — 82948 REAGENT STRIP/BLOOD GLUCOSE: CPT

## 2024-01-29 PROCEDURE — 2060000000 HC ICU INTERMEDIATE R&B

## 2024-01-29 PROCEDURE — 36415 COLL VENOUS BLD VENIPUNCTURE: CPT

## 2024-01-29 PROCEDURE — 80053 COMPREHEN METABOLIC PANEL: CPT

## 2024-01-29 PROCEDURE — 2700000000 HC OXYGEN THERAPY PER DAY

## 2024-01-29 PROCEDURE — 83605 ASSAY OF LACTIC ACID: CPT

## 2024-01-29 PROCEDURE — 6360000002 HC RX W HCPCS: Performed by: EMERGENCY MEDICINE

## 2024-01-29 PROCEDURE — 84484 ASSAY OF TROPONIN QUANT: CPT

## 2024-01-29 PROCEDURE — 99223 1ST HOSP IP/OBS HIGH 75: CPT | Performed by: PHYSICIAN ASSISTANT

## 2024-01-29 PROCEDURE — 93010 ELECTROCARDIOGRAM REPORT: CPT | Performed by: INTERNAL MEDICINE

## 2024-01-29 PROCEDURE — 5A09357 ASSISTANCE WITH RESPIRATORY VENTILATION, LESS THAN 24 CONSECUTIVE HOURS, CONTINUOUS POSITIVE AIRWAY PRESSURE: ICD-10-PCS | Performed by: INTERNAL MEDICINE

## 2024-01-29 PROCEDURE — 87040 BLOOD CULTURE FOR BACTERIA: CPT

## 2024-01-29 PROCEDURE — 82803 BLOOD GASES ANY COMBINATION: CPT

## 2024-01-29 PROCEDURE — 2580000003 HC RX 258: Performed by: STUDENT IN AN ORGANIZED HEALTH CARE EDUCATION/TRAINING PROGRAM

## 2024-01-29 RX ORDER — ISOSORBIDE MONONITRATE 30 MG/1
30 TABLET, EXTENDED RELEASE ORAL DAILY
Status: DISCONTINUED | OUTPATIENT
Start: 2024-01-29 | End: 2024-01-30

## 2024-01-29 RX ORDER — SODIUM CHLORIDE 0.9 % (FLUSH) 0.9 %
5-40 SYRINGE (ML) INJECTION EVERY 12 HOURS SCHEDULED
Status: DISCONTINUED | OUTPATIENT
Start: 2024-01-29 | End: 2024-02-14 | Stop reason: HOSPADM

## 2024-01-29 RX ORDER — ALBUTEROL SULFATE 90 UG/1
2 AEROSOL, METERED RESPIRATORY (INHALATION) 4 TIMES DAILY PRN
Status: DISCONTINUED | OUTPATIENT
Start: 2024-01-29 | End: 2024-02-14 | Stop reason: HOSPADM

## 2024-01-29 RX ORDER — SODIUM CHLORIDE 9 MG/ML
INJECTION, SOLUTION INTRAVENOUS PRN
Status: DISCONTINUED | OUTPATIENT
Start: 2024-01-29 | End: 2024-02-14 | Stop reason: HOSPADM

## 2024-01-29 RX ORDER — CALCIUM GLUCONATE 94 MG/ML
1000 INJECTION, SOLUTION INTRAVENOUS ONCE
Status: COMPLETED | OUTPATIENT
Start: 2024-01-29 | End: 2024-01-29

## 2024-01-29 RX ORDER — DEXTROSE MONOHYDRATE 100 MG/ML
INJECTION, SOLUTION INTRAVENOUS CONTINUOUS PRN
Status: DISCONTINUED | OUTPATIENT
Start: 2024-01-29 | End: 2024-02-14 | Stop reason: HOSPADM

## 2024-01-29 RX ORDER — ACETAMINOPHEN 650 MG/1
650 SUPPOSITORY RECTAL EVERY 6 HOURS PRN
Status: DISCONTINUED | OUTPATIENT
Start: 2024-01-29 | End: 2024-02-14 | Stop reason: HOSPADM

## 2024-01-29 RX ORDER — POTASSIUM CHLORIDE 20 MEQ/1
40 TABLET, EXTENDED RELEASE ORAL PRN
Status: DISCONTINUED | OUTPATIENT
Start: 2024-01-29 | End: 2024-02-14 | Stop reason: HOSPADM

## 2024-01-29 RX ORDER — BUMETANIDE 0.25 MG/ML
1 INJECTION INTRAMUSCULAR; INTRAVENOUS 2 TIMES DAILY
Status: DISCONTINUED | OUTPATIENT
Start: 2024-01-29 | End: 2024-01-31

## 2024-01-29 RX ORDER — CALCIUM CARBONATE 500(1250)
1 TABLET ORAL DAILY
Status: DISCONTINUED | OUTPATIENT
Start: 2024-01-29 | End: 2024-02-14 | Stop reason: HOSPADM

## 2024-01-29 RX ORDER — ACETAMINOPHEN 325 MG/1
650 TABLET ORAL EVERY 6 HOURS PRN
Status: DISCONTINUED | OUTPATIENT
Start: 2024-01-29 | End: 2024-02-14 | Stop reason: HOSPADM

## 2024-01-29 RX ORDER — CLONIDINE HYDROCHLORIDE 0.1 MG/1
0.1 TABLET ORAL 2 TIMES DAILY PRN
Status: DISCONTINUED | OUTPATIENT
Start: 2024-01-29 | End: 2024-02-14 | Stop reason: HOSPADM

## 2024-01-29 RX ORDER — POTASSIUM CHLORIDE 7.45 MG/ML
10 INJECTION INTRAVENOUS PRN
Status: DISCONTINUED | OUTPATIENT
Start: 2024-01-29 | End: 2024-02-14 | Stop reason: HOSPADM

## 2024-01-29 RX ORDER — POLYETHYLENE GLYCOL 3350 17 G/17G
17 POWDER, FOR SOLUTION ORAL DAILY PRN
Status: DISCONTINUED | OUTPATIENT
Start: 2024-01-29 | End: 2024-02-14 | Stop reason: HOSPADM

## 2024-01-29 RX ORDER — LISINOPRIL 20 MG/1
20 TABLET ORAL DAILY
Status: DISCONTINUED | OUTPATIENT
Start: 2024-01-29 | End: 2024-01-30

## 2024-01-29 RX ORDER — ONDANSETRON 4 MG/1
4 TABLET, ORALLY DISINTEGRATING ORAL EVERY 8 HOURS PRN
Status: DISCONTINUED | OUTPATIENT
Start: 2024-01-29 | End: 2024-02-14 | Stop reason: HOSPADM

## 2024-01-29 RX ORDER — SODIUM CHLORIDE 0.9 % (FLUSH) 0.9 %
5-40 SYRINGE (ML) INJECTION PRN
Status: DISCONTINUED | OUTPATIENT
Start: 2024-01-29 | End: 2024-02-14 | Stop reason: HOSPADM

## 2024-01-29 RX ORDER — DEXTROSE MONOHYDRATE 25 G/50ML
25 INJECTION, SOLUTION INTRAVENOUS ONCE
Status: COMPLETED | OUTPATIENT
Start: 2024-01-29 | End: 2024-01-29

## 2024-01-29 RX ORDER — ATENOLOL 25 MG/1
25 TABLET ORAL DAILY
Status: DISCONTINUED | OUTPATIENT
Start: 2024-01-29 | End: 2024-01-30

## 2024-01-29 RX ORDER — MAGNESIUM SULFATE IN WATER 40 MG/ML
2000 INJECTION, SOLUTION INTRAVENOUS PRN
Status: DISCONTINUED | OUTPATIENT
Start: 2024-01-29 | End: 2024-02-14 | Stop reason: HOSPADM

## 2024-01-29 RX ORDER — FUROSEMIDE 10 MG/ML
60 INJECTION INTRAMUSCULAR; INTRAVENOUS ONCE
Status: COMPLETED | OUTPATIENT
Start: 2024-01-29 | End: 2024-01-29

## 2024-01-29 RX ORDER — BUMETANIDE 1 MG/1
1 TABLET ORAL 2 TIMES DAILY
Status: DISCONTINUED | OUTPATIENT
Start: 2024-01-29 | End: 2024-02-01

## 2024-01-29 RX ORDER — ONDANSETRON 2 MG/ML
4 INJECTION INTRAMUSCULAR; INTRAVENOUS EVERY 6 HOURS PRN
Status: DISCONTINUED | OUTPATIENT
Start: 2024-01-29 | End: 2024-02-14 | Stop reason: HOSPADM

## 2024-01-29 RX ORDER — AMLODIPINE BESYLATE 5 MG/1
5 TABLET ORAL DAILY
Status: DISCONTINUED | OUTPATIENT
Start: 2024-01-29 | End: 2024-01-30

## 2024-01-29 RX ADMIN — INSULIN HUMAN 10 UNITS: 100 INJECTION, SOLUTION PARENTERAL at 07:41

## 2024-01-29 RX ADMIN — SODIUM CHLORIDE, PRESERVATIVE FREE 10 ML: 5 INJECTION INTRAVENOUS at 07:51

## 2024-01-29 RX ADMIN — BUMETANIDE 1 MG: 0.25 INJECTION, SOLUTION INTRAMUSCULAR; INTRAVENOUS at 20:24

## 2024-01-29 RX ADMIN — CEFEPIME 2000 MG: 2 INJECTION, POWDER, FOR SOLUTION INTRAVENOUS at 13:04

## 2024-01-29 RX ADMIN — SODIUM CHLORIDE 1000 ML: 9 INJECTION, SOLUTION INTRAVENOUS at 00:05

## 2024-01-29 RX ADMIN — DEXTROSE MONOHYDRATE 25 G: 25 INJECTION, SOLUTION INTRAVENOUS at 07:41

## 2024-01-29 RX ADMIN — BUMETANIDE 1 MG: 0.25 INJECTION, SOLUTION INTRAMUSCULAR; INTRAVENOUS at 07:51

## 2024-01-29 RX ADMIN — FUROSEMIDE 60 MG: 10 INJECTION, SOLUTION INTRAMUSCULAR; INTRAVENOUS at 00:34

## 2024-01-29 RX ADMIN — SODIUM CHLORIDE, PRESERVATIVE FREE 10 ML: 5 INJECTION INTRAVENOUS at 20:24

## 2024-01-29 RX ADMIN — CEFEPIME 2000 MG: 2 INJECTION, POWDER, FOR SOLUTION INTRAVENOUS at 04:54

## 2024-01-29 RX ADMIN — CALCIUM GLUCONATE 1000 MG: 98 INJECTION, SOLUTION INTRAVENOUS at 07:41

## 2024-01-29 NOTE — H&P
fibrillation (HCC)     Cancer (HCC)     Skin Cancer    CHF (congestive heart failure) (HCC)     Chronic renal disease, stage III (HCC) [749757] 05/18/2022    Gross hematuria 2014    Dr Zayas    Hepatic lesion     HTN (hypertension)     Hyperlipidemia     Medtronic single pacemaker  03/07/2023    Pneumonia 12/02/2018    Type 2 diabetes mellitus 11/23/2021     SHX:    Social History     Socioeconomic History    Marital status:      Spouse name: Not on file    Number of children: 3    Years of education: Not on file    Highest education level: Not on file   Occupational History    Occupation: Retired   Tobacco Use    Smoking status: Never    Smokeless tobacco: Never   Vaping Use    Vaping Use: Never used   Substance and Sexual Activity    Alcohol use: No    Drug use: No    Sexual activity: Not Currently   Other Topics Concern    Not on file   Social History Narrative    - Never     Social Determinants of Health     Financial Resource Strain: Low Risk  (5/23/2022)    Overall Financial Resource Strain (CARDIA)     Difficulty of Paying Living Expenses: Not hard at all   Food Insecurity: No Food Insecurity (1/19/2024)    Hunger Vital Sign     Worried About Running Out of Food in the Last Year: Never true     Ran Out of Food in the Last Year: Never true   Transportation Needs: No Transportation Needs (1/19/2024)    PRAPARE - Transportation     Lack of Transportation (Medical): No     Lack of Transportation (Non-Medical): No   Physical Activity: Not on file   Stress: Not on file   Social Connections: Not on file   Intimate Partner Violence: Not on file   Housing Stability: Low Risk  (1/19/2024)    Housing Stability Vital Sign     Unable to Pay for Housing in the Last Year: No     Number of Places Lived in the Last Year: 1     Unstable Housing in the Last Year: No     FHX:   Family History   Problem Relation Age of Onset    Cancer Mother     High Blood Pressure Father     Arthritis Father     Heart Attack

## 2024-01-29 NOTE — ED PROVIDER NOTES
administration in time range)   ceFEPIme (MAXIPIME) 2,000 mg in sodium chloride 0.9 % 100 mL IVPB (mini-bag) (2,000 mg IntraVENous New Bag 1/29/24 0454)     Followed by   ceFEPIme (MAXIPIME) 2,000 mg in sodium chloride 0.9 % 100 mL IVPB (mini-bag) (has no administration in time range)   bumetanide (BUMEX) injection 1 mg (has no administration in time range)   furosemide (LASIX) injection 60 mg (60 mg IntraVENous Given 1/29/24 0034)       MEDICAL DECISION MAKING      Available laboratory and imaging results were independently reviewed and clinically correlated.    Decision Rules/Clinical Scores utilized:   no    Code Status:  Not addressed during this ED visit    Social determinants of health impacting treatment or disposition:  N/A  Medical Commodities impacting treatment or disposition:    Past Medical History:   Diagnosis Date    Arthritis     Atrial fibrillation (HCC)     Cancer (HCC)     Skin Cancer    CHF (congestive heart failure) (HCC)     Chronic renal disease, stage III (HCC) [[id number]] 05/18/2022    Gross hematuria 2014    Dr Zayas    Hepatic lesion     HTN (hypertension)     Hyperlipidemia     Medtronic single pacemaker  03/07/2023    Pneumonia 12/02/2018    Type 2 diabetes mellitus 11/23/2021       Consultants: Not Applicable.    Final Assessment and Plan:   Admit for resp failure     MEDICATION CHANGES   DISCHARGE MEDICATIONS:  Current Discharge Medication List             FINAL DISPOSITION     Final diagnoses:   Acute on chronic respiratory failure with hypoxia and hypercapnia (HCC)     Condition: stable  Dispo: Admit to med/surg floor    PATIENT REFERRED TO:  No follow-up provider specified.    The results of pertinent diagnostic studies and exam findings were discussed. The patient’s provisional diagnosis and plan of care were discussed with the patient who expressed understanding.     PROCEDURES: (None if blank)  Procedures:     This transcription was electronically signed. Parts of this

## 2024-01-29 NOTE — CARE COORDINATION
IM addendum / Resuming care :       Pt admitted early morning by night hospitalist team. Pt seen and   H/P noted, and spoke with her family . Pt with acute on chronic resp failure was on BIPAP due to hypercapnic respiratory failure , repeat ABG better PCO2 is down to 60's  vs 140's . So changed to 6 LNC o2  And family is at bed side. Limted code x 4 , with CKD and chronic atrial fib, recently Rx RUL pneumonia. She is alert and able to answer simple questions, start on clears and obtain formal swallow eval .   She did pass the bed side swallow yolis, spoke with her RN. Use BIPAP as needed . D/w son and grand daughter today .         Electronically signed by Christiano Lowery MD on 1/29/2024 at 4:29 PM

## 2024-01-29 NOTE — CARE COORDINATION
01/29/24 1514   Readmission Assessment   Number of Days since last admission? 1-7 days   Previous Disposition Home with Home Health   Who is being Interviewed Unable to Complete  (Not appropriate at this time d/t pt condition)   What was the patient's/caregiver's perception as to why they think they needed to return back to the hospital? Other (Comment)  (Found unresponsive)   Did you visit your Primary Care Physician after you left the hospital, before you returned this time? No   Why weren't you able to visit your PCP? Other (Comment)  (Was scheduled for 2/5/24; only out of hospital for 4 days)   Did you see a specialist, such as Cardiac, Pulmonary, Orthopedic Physician, etc. after you left the hospital? No   Who advised the patient to return to the hospital? Other (Comment)  (Family)   Does the patient report anything that got in the way of taking their medications? No   In our efforts to provide the best possible care to you and others like you, can you think of anything that we could have done to help you after you left the hospital the first time, so that you might not have needed to return so soon? Other (Comment)  (MEREDITH, d/t patient condition)

## 2024-01-29 NOTE — ED TRIAGE NOTES
Pt presents to the ED via EMS with c/o AMS and SOB. Diagnosed with pneumonia last week and was in the hospital for approx one week and discharged on Thursday. Family reports she has been sleeping all day. Pt unresponsive to painful stimuli but moaning. She is at 4 L NC at baseline. She was at 84% on 4 L NC when EMS arrived to home. Pt arrived to ED on 10 L NRB and at 90%. Family at bedside and states she has had a decreased appetite today. Family states she was on IV antibiotics while hospitalized and now taking oral antibiotics. EKG complete

## 2024-01-29 NOTE — ED NOTES
PT resting in bed with family at bedside. PT making purposeful movements but does not follow commands at this time. PT tolerating BiPAP well.

## 2024-01-29 NOTE — ED NOTES
ED nurse-to-nurse bedside report    Chief Complaint   Patient presents with    Shortness of Breath      LOC: does not respond to painful or verbal stimuli- pt has do not intubate order  Vital signs   Vitals:    01/28/24 2352 01/29/24 0006 01/29/24 0031 01/29/24 0034   BP: 108/71 139/83 (!) 121/53 (!) 117/51   Pulse: 85 63 60    Resp: 24 20 16    Temp: 97.3 °F (36.3 °C)      TempSrc: Rectal      SpO2: 90% 93% 99%    Weight: 72.6 kg (160 lb)      Height: 1.575 m (5' 2\")         Pain:    Pain Interventions:   Pain Goal: NA  Oxygen: Yes    Current needs required BIPAP   Telemetry: Yes  LDAs:   Peripheral IV 01/29/24 Left Antecubital (Active)   Site Assessment Clean, dry & intact 01/29/24 0001   Line Status Normal saline locked 01/29/24 0031   Line Care Connections checked and tightened 01/29/24 0031   Phlebitis Assessment No symptoms 01/29/24 0031   Infiltration Assessment 0 01/29/24 0031   Dressing Status Clean, dry & intact 01/29/24 0031   Dressing Type Transparent 01/29/24 0001   Dressing Intervention New 01/29/24 0001       Peripheral IV 01/29/24 Left;Proximal;Anterior Cephalic (Active)   Site Assessment Clean, dry & intact 01/29/24 0032   Line Status Normal saline locked 01/29/24 0032   Line Care Connections checked and tightened 01/29/24 0032   Phlebitis Assessment No symptoms 01/29/24 0032   Infiltration Assessment 0 01/29/24 0032   Dressing Status Clean, dry & intact 01/29/24 0032     Continuous Infusions:   Mobility: Fully dependent  Keane Fall Risk Score:        No data to display              Fall Interventions: side rails up, wheels locked, bed in lowest position, family at bedside  Report given to: Summer JORGE

## 2024-01-29 NOTE — CARE COORDINATION
Case Management Assessment  Initial Evaluation    Date/Time of Evaluation: 1/29/2024 3:18 PM  Assessment Completed by: Brittany Steward RN    If patient is discharged prior to next notation, then this note serves as note for discharge by case management.    Patient Name: Daniela Pacheco                   YOB: 1929  Diagnosis: Acute on chronic respiratory failure with hypoxia and hypercapnia (HCC) [J96.21, J96.22]                   Date / Time: 1/28/2024 11:48 PM  Location: [address] [place] [address]     Patient Admission Status: Inpatient   Readmission Risk Low 0-14, Mod 15-19), High > 20: Readmission Risk Score: 19    Current PCP: Gaudencio Sepulveda  PCP verified by CM? Yes    Chart Reviewed: Yes      History Provided by: Medical Record (Pt not doing well; pt/family assessment deferred today)  Patient Orientation: Alert and Oriented, Person    Patient Cognition: Alert    Hospitalization in the last 30 days (Readmission):  Yes    If yes, Readmission Assessment in CM Navigator will be completed.    Advance Directives:      Code Status: Limited   Patient's Primary Decision Maker is: Legal Next of Kin    Primary Decision Maker: Mango Pacheco Poa - Child - [phone]    Secondary Decision Maker: Tess Ewacharlee Poa - Child - [phone]    Discharge Planning:    Patient lives with: Alone Type of Home: House  Primary Care Giver: Self  Patient Support Systems include: Children, Family Members   Current Financial resources: Medicare  Current community resources: ECF/Home Care ([facility])  Current services prior to admission: Durable Medical Equipment, Oxygen Therapy (2L at rest; 4L w/activity thru [facility])            Current DME: Cane, Walker            Type of Home Care services:  Nursing Services, OT, PT    ADLS  Prior functional level: Independent in ADLs/IADLs  Current functional level: Other (see comment) (TBD)    Family can provide assistance at DC: Other (comment) (MEREDITH)  Would you like Case Management to discuss the

## 2024-01-30 ENCOUNTER — APPOINTMENT (OUTPATIENT)
Dept: GENERAL RADIOLOGY | Age: 89
DRG: 189 | End: 2024-01-30
Payer: MEDICARE

## 2024-01-30 LAB
ANION GAP SERPL CALC-SCNC: 5 MEQ/L (ref 8–16)
ARTERIAL PATENCY WRIST A: POSITIVE
BASE EXCESS BLDA CALC-SCNC: 6.9 MMOL/L (ref -2.5–2.5)
BASOPHILS ABSOLUTE: 0.1 THOU/MM3 (ref 0–0.1)
BASOPHILS NFR BLD AUTO: 0.5 %
BDY SITE: ABNORMAL
BUN SERPL-MCNC: 26 MG/DL (ref 7–22)
CALCIUM SERPL-MCNC: 9.7 MG/DL (ref 8.5–10.5)
CHLORIDE SERPL-SCNC: 86 MEQ/L (ref 98–111)
CO2 SERPL-SCNC: 40 MEQ/L (ref 23–33)
COLLECTED BY:: ABNORMAL
CREAT SERPL-MCNC: 0.7 MG/DL (ref 0.4–1.2)
DEPRECATED RDW RBC AUTO: 48.1 FL (ref 35–45)
DEVICE: ABNORMAL
EOSINOPHIL NFR BLD AUTO: 0.2 %
EOSINOPHILS ABSOLUTE: 0 THOU/MM3 (ref 0–0.4)
ERYTHROCYTE [DISTWIDTH] IN BLOOD BY AUTOMATED COUNT: 13.6 % (ref 11.5–14.5)
FIO2 ON VENT O2 ANALYZER: 4 %
GFR SERPL CREATININE-BSD FRML MDRD: > 60 ML/MIN/1.73M2
GLUCOSE BLD STRIP.AUTO-MCNC: 144 MG/DL (ref 70–108)
GLUCOSE BLD STRIP.AUTO-MCNC: 227 MG/DL (ref 70–108)
GLUCOSE BLD STRIP.AUTO-MCNC: 269 MG/DL (ref 70–108)
GLUCOSE SERPL-MCNC: 137 MG/DL (ref 70–108)
HCO3 BLDA-SCNC: 32 MMOL/L (ref 23–28)
HCT VFR BLD AUTO: 38.9 % (ref 37–47)
HGB BLD-MCNC: 12.7 GM/DL (ref 12–16)
IMM GRANULOCYTES # BLD AUTO: 0.14 THOU/MM3 (ref 0–0.07)
IMM GRANULOCYTES NFR BLD AUTO: 1.1 %
LYMPHOCYTES ABSOLUTE: 1.2 THOU/MM3 (ref 1–4.8)
LYMPHOCYTES NFR BLD AUTO: 9.3 %
MCH RBC QN AUTO: 31.7 PG (ref 26–33)
MCHC RBC AUTO-ENTMCNC: 32.6 GM/DL (ref 32.2–35.5)
MCV RBC AUTO: 97 FL (ref 81–99)
MONOCYTES ABSOLUTE: 1.1 THOU/MM3 (ref 0.4–1.3)
MONOCYTES NFR BLD AUTO: 8.4 %
NEUTROPHILS NFR BLD AUTO: 80.5 %
NRBC BLD AUTO-RTO: 0 /100 WBC
PCO2 BLDA: 48 MMHG (ref 35–45)
PH BLDA: 7.44 [PH] (ref 7.35–7.45)
PLATELET # BLD AUTO: 246 THOU/MM3 (ref 130–400)
PMV BLD AUTO: 8.8 FL (ref 9.4–12.4)
PO2 BLDA: 64 MMHG (ref 71–104)
POTASSIUM SERPL-SCNC: 5.1 MEQ/L (ref 3.5–5.2)
RBC # BLD AUTO: 4.01 MILL/MM3 (ref 4.2–5.4)
SAO2 % BLDA: 92 %
SEGMENTED NEUTROPHILS ABSOLUTE COUNT: 10.1 THOU/MM3 (ref 1.8–7.7)
SODIUM SERPL-SCNC: 131 MEQ/L (ref 135–145)
WBC # BLD AUTO: 12.5 THOU/MM3 (ref 4.8–10.8)

## 2024-01-30 PROCEDURE — 82803 BLOOD GASES ANY COMBINATION: CPT

## 2024-01-30 PROCEDURE — 97535 SELF CARE MNGMENT TRAINING: CPT

## 2024-01-30 PROCEDURE — 36415 COLL VENOUS BLD VENIPUNCTURE: CPT

## 2024-01-30 PROCEDURE — 80048 BASIC METABOLIC PNL TOTAL CA: CPT

## 2024-01-30 PROCEDURE — 97162 PT EVAL MOD COMPLEX 30 MIN: CPT

## 2024-01-30 PROCEDURE — 6370000000 HC RX 637 (ALT 250 FOR IP): Performed by: PHYSICIAN ASSISTANT

## 2024-01-30 PROCEDURE — 94761 N-INVAS EAR/PLS OXIMETRY MLT: CPT

## 2024-01-30 PROCEDURE — 36600 WITHDRAWAL OF ARTERIAL BLOOD: CPT

## 2024-01-30 PROCEDURE — 97530 THERAPEUTIC ACTIVITIES: CPT

## 2024-01-30 PROCEDURE — 2060000000 HC ICU INTERMEDIATE R&B

## 2024-01-30 PROCEDURE — 71045 X-RAY EXAM CHEST 1 VIEW: CPT

## 2024-01-30 PROCEDURE — 82948 REAGENT STRIP/BLOOD GLUCOSE: CPT

## 2024-01-30 PROCEDURE — 97166 OT EVAL MOD COMPLEX 45 MIN: CPT

## 2024-01-30 PROCEDURE — 85025 COMPLETE CBC W/AUTO DIFF WBC: CPT

## 2024-01-30 PROCEDURE — 2700000000 HC OXYGEN THERAPY PER DAY

## 2024-01-30 PROCEDURE — 6360000002 HC RX W HCPCS: Performed by: PHYSICIAN ASSISTANT

## 2024-01-30 PROCEDURE — 97110 THERAPEUTIC EXERCISES: CPT

## 2024-01-30 PROCEDURE — 2580000003 HC RX 258: Performed by: PHYSICIAN ASSISTANT

## 2024-01-30 PROCEDURE — 99232 SBSQ HOSP IP/OBS MODERATE 35: CPT | Performed by: PHYSICIAN ASSISTANT

## 2024-01-30 RX ORDER — LOTEPREDNOL ETABONATE 5 MG/ML
1 SUSPENSION/ DROPS OPHTHALMIC EVERY OTHER DAY
Status: DISCONTINUED | OUTPATIENT
Start: 2024-01-30 | End: 2024-02-14 | Stop reason: HOSPADM

## 2024-01-30 RX ORDER — ISOSORBIDE MONONITRATE 30 MG/1
30 TABLET, EXTENDED RELEASE ORAL DAILY
Status: DISCONTINUED | OUTPATIENT
Start: 2024-01-30 | End: 2024-02-14 | Stop reason: HOSPADM

## 2024-01-30 RX ORDER — LISINOPRIL 20 MG/1
20 TABLET ORAL DAILY
Status: DISCONTINUED | OUTPATIENT
Start: 2024-01-30 | End: 2024-02-07

## 2024-01-30 RX ORDER — AMLODIPINE BESYLATE 5 MG/1
5 TABLET ORAL DAILY
Status: DISCONTINUED | OUTPATIENT
Start: 2024-01-30 | End: 2024-02-07

## 2024-01-30 RX ORDER — ATENOLOL 25 MG/1
25 TABLET ORAL DAILY
Status: DISCONTINUED | OUTPATIENT
Start: 2024-01-30 | End: 2024-02-14 | Stop reason: HOSPADM

## 2024-01-30 RX ORDER — CARBOXYMETHYLCELLULOSE SODIUM 5 MG/ML
1 SOLUTION/ DROPS OPHTHALMIC 3 TIMES DAILY
Status: DISCONTINUED | OUTPATIENT
Start: 2024-01-30 | End: 2024-01-30 | Stop reason: CLARIF

## 2024-01-30 RX ADMIN — Medication 1 DROP: at 11:46

## 2024-01-30 RX ADMIN — BUMETANIDE 1 MG: 1 TABLET ORAL at 17:07

## 2024-01-30 RX ADMIN — LOTEPREDNOL ETABONATE 1 DROP: 5 SUSPENSION/ DROPS OPHTHALMIC at 21:21

## 2024-01-30 RX ADMIN — Medication 1 DROP: at 21:21

## 2024-01-30 RX ADMIN — CEFEPIME 2000 MG: 2 INJECTION, POWDER, FOR SOLUTION INTRAVENOUS at 01:51

## 2024-01-30 RX ADMIN — CALCIUM 500 MG: 500 TABLET ORAL at 09:12

## 2024-01-30 RX ADMIN — ISOSORBIDE MONONITRATE 30 MG: 30 TABLET, EXTENDED RELEASE ORAL at 11:47

## 2024-01-30 RX ADMIN — BUMETANIDE 1 MG: 0.25 INJECTION, SOLUTION INTRAMUSCULAR; INTRAVENOUS at 21:20

## 2024-01-30 RX ADMIN — AMLODIPINE BESYLATE 5 MG: 5 TABLET ORAL at 11:47

## 2024-01-30 RX ADMIN — SODIUM CHLORIDE, PRESERVATIVE FREE 10 ML: 5 INJECTION INTRAVENOUS at 09:13

## 2024-01-30 RX ADMIN — SODIUM CHLORIDE: 9 INJECTION, SOLUTION INTRAVENOUS at 01:50

## 2024-01-30 RX ADMIN — LISINOPRIL 20 MG: 20 TABLET ORAL at 11:47

## 2024-01-30 RX ADMIN — ATENOLOL 25 MG: 25 TABLET ORAL at 11:47

## 2024-01-30 RX ADMIN — RIVAROXABAN 15 MG: 15 TABLET, FILM COATED ORAL at 11:54

## 2024-01-30 RX ADMIN — BUMETANIDE 1 MG: 0.25 INJECTION, SOLUTION INTRAMUSCULAR; INTRAVENOUS at 09:13

## 2024-01-30 RX ADMIN — SODIUM CHLORIDE, PRESERVATIVE FREE 10 ML: 5 INJECTION INTRAVENOUS at 21:21

## 2024-01-30 RX ADMIN — CEFEPIME 2000 MG: 2 INJECTION, POWDER, FOR SOLUTION INTRAVENOUS at 13:12

## 2024-01-30 NOTE — CARE COORDINATION
1/30/24, 4:23 PM EST    DISCHARGE ON GOING EVALUATION    [facility] day: 1  Location: -08/008-A Reason for admit: Acute on chronic respiratory failure with hypoxia and hypercapnia (HCC) [J96.21, J96.22]   Procedure:   1/29 CXR: Right upper lobe infiltrate appears improved, without complete resolution; Worsening bibasilar infiltrates; Small right pleural effusion appears mildly increased. Small left   pleural effusion is also suspected to be increased.  1/30 CXR: Increased discoid atelectasis in the right midlung and right upper lobe   infiltrate. Otherwise no significant change.    Barriers to Discharge: Weaned off bipap. Started on regular diet. Now on 6L O2 with sats 91%. Afebrile. Vpaced. Ox3, not situation. Follows commands. PT/OT. Telemetry, external urinary catheter. Limited Code - no x4. Norvasc, atenolol, IV bumex 1 mg bid, oscal, IV cefepime, imdur, lisinopril, lotemax eye drops, polyethylene glycol eye drops, xarelto, Electrolyte replacement protocols. Na+ 131, wbc 12.5.     PCP: Gaudencio Sepulveda  Readmission Risk Score: 19.6%  Patient Goals/Plan/Treatment Preferences: [facility]. Wears 2L O2 at rest and 4L O2 w/activity thru [facility]. Has cane and walker. Pt refused SNF. SW on case.

## 2024-01-30 NOTE — CARE COORDINATION
DISCHARGE PLANNING EVALUATION  1/30/24, 12:05 PM EST    Reason for Referral: Current with [facility] for therapy only.  Mental Status: alert and oriented. Pt is [place].  Decision Making: makes own decisions.  Family/Social/Home Environment: Assessment completed with pt, states she lives alone in a 2 story home and states she stays on the main floor. Pt states she does her own personal care, cooking, cleaning and laundry. Pt states she does not drive therefore her son takes her to doctors appointments, grocery shopping, etc.   Current Services including food security, transportation and housekeeping: see note above.  Current Equipment: Pt states she uses a walker around the house, has a shower chair, grab bars and high toilet.   Payment Source:[employer] Medicare Advantage.   Concerns or Barriers to Discharge: none reported.   Post-acute (PAC) provider list was provided to patient. Patient was informed of their freedom to choose PAC provider. Discussed and offered to show the patient the relevant PAC Providers quality and resource use measures on Medicare Compare web site via computer based on patient's goals of care and treatment preferences. Questions regarding selection process were answered.      Teach Back Method used with pt regarding care plan and discharge planning.  Patient verbalized understanding of the plan of care and contribute to goal setting.       Patient goals, treatment preferences and discharge plan:   Pt states she plans to return home alone and feels she will be fine with her son checking on her and HH services. Sw offered SNF for therapy prior to returning home, pt declined.   Therapy on case, waiting on recommendation closer to discharge.     Electronically signed by HORACIO Bartlett on 1/30/2024 at 12:05 PM

## 2024-01-30 NOTE — RT PROTOCOL NOTE
RT Inhaler-Nebulizer Bronchodilator Protocol Note    There is a bronchodilator order in the chart from a provider indicating to follow the RT Bronchodilator Protocol and there is an “Initiate RT Inhaler-Nebulizer Bronchodilator Protocol” order as well (see protocol at bottom of note).    CXR Findings:  XR CHEST PORTABLE    Result Date: 1/30/2024  1. Increased discoid atelectasis in the right midlung and right upper lobe infiltrate. Otherwise no significant change. This document has been electronically signed by: Jourdan Blackburn MD on 01/30/2024 04:05 AM    XR CHEST PORTABLE    Result Date: 1/29/2024  1. Right upper lobe infiltrate appears improved, without complete resolution. 2. Worsening bibasilar infiltrates. 3. Small right pleural effusion appears mildly increased. Small left pleural effusion is also suspected to be increased. This document has been electronically signed by: Marciano Yung M.D. on 01/29/2024 12:31 AM      The findings from the last RT Protocol Assessment were as follows:   History Pulmonary Disease: None or smoker <15 pack years  Respiratory Pattern: Dyspnea on exertion or RR 21-25 bpm  Breath Sounds: Clear breath sounds  Cough: Strong, spontaneous, non-productive  Indication for Bronchodilator Therapy:    Bronchodilator Assessment Score: 2    Aerosolized bronchodilator medication orders have been revised according to the RT Inhaler-Nebulizer Bronchodilator Protocol below.    Respiratory Therapist to perform RT Therapy Protocol Assessment initially then follow the protocol.  Repeat RT Therapy Protocol Assessment PRN for score 0-3 or on second treatment, BID, and PRN for scores above 3.    No Indications - adjust the frequency to every 6 hours PRN wheezing or bronchospasm, if no treatments needed after 48 hours then discontinue using Per Protocol order mode.     If indication present, adjust the RT bronchodilator orders based on the Bronchodilator Assessment Score as indicated below.  Use Inhaler

## 2024-01-31 LAB
ANION GAP SERPL CALC-SCNC: 8 MEQ/L (ref 8–16)
BUN SERPL-MCNC: 24 MG/DL (ref 7–22)
CALCIUM SERPL-MCNC: 9.5 MG/DL (ref 8.5–10.5)
CHLORIDE SERPL-SCNC: 84 MEQ/L (ref 98–111)
CO2 SERPL-SCNC: 38 MEQ/L (ref 23–33)
CREAT SERPL-MCNC: 0.6 MG/DL (ref 0.4–1.2)
GFR SERPL CREATININE-BSD FRML MDRD: > 60 ML/MIN/1.73M2
GLUCOSE BLD STRIP.AUTO-MCNC: 118 MG/DL (ref 70–108)
GLUCOSE BLD STRIP.AUTO-MCNC: 124 MG/DL (ref 70–108)
GLUCOSE BLD STRIP.AUTO-MCNC: 144 MG/DL (ref 70–108)
GLUCOSE BLD STRIP.AUTO-MCNC: 144 MG/DL (ref 70–108)
GLUCOSE BLD STRIP.AUTO-MCNC: 168 MG/DL (ref 70–108)
GLUCOSE SERPL-MCNC: 106 MG/DL (ref 70–108)
POTASSIUM SERPL-SCNC: 5 MEQ/L (ref 3.5–5.2)
SODIUM SERPL-SCNC: 130 MEQ/L (ref 135–145)

## 2024-01-31 PROCEDURE — 94660 CPAP INITIATION&MGMT: CPT

## 2024-01-31 PROCEDURE — 80048 BASIC METABOLIC PNL TOTAL CA: CPT

## 2024-01-31 PROCEDURE — 87449 NOS EACH ORGANISM AG IA: CPT

## 2024-01-31 PROCEDURE — 2700000000 HC OXYGEN THERAPY PER DAY

## 2024-01-31 PROCEDURE — 94761 N-INVAS EAR/PLS OXIMETRY MLT: CPT

## 2024-01-31 PROCEDURE — 82948 REAGENT STRIP/BLOOD GLUCOSE: CPT

## 2024-01-31 PROCEDURE — 97535 SELF CARE MNGMENT TRAINING: CPT

## 2024-01-31 PROCEDURE — 87899 AGENT NOS ASSAY W/OPTIC: CPT

## 2024-01-31 PROCEDURE — 2580000003 HC RX 258: Performed by: PHYSICIAN ASSISTANT

## 2024-01-31 PROCEDURE — 99233 SBSQ HOSP IP/OBS HIGH 50: CPT | Performed by: PHYSICIAN ASSISTANT

## 2024-01-31 PROCEDURE — 2060000000 HC ICU INTERMEDIATE R&B

## 2024-01-31 PROCEDURE — 6370000000 HC RX 637 (ALT 250 FOR IP): Performed by: PHYSICIAN ASSISTANT

## 2024-01-31 PROCEDURE — 36415 COLL VENOUS BLD VENIPUNCTURE: CPT

## 2024-01-31 PROCEDURE — 97530 THERAPEUTIC ACTIVITIES: CPT

## 2024-01-31 PROCEDURE — 6360000002 HC RX W HCPCS: Performed by: PHYSICIAN ASSISTANT

## 2024-01-31 RX ORDER — SODIUM CHLORIDE 1 G/1
1 TABLET ORAL 2 TIMES DAILY WITH MEALS
Status: DISCONTINUED | OUTPATIENT
Start: 2024-01-31 | End: 2024-02-01

## 2024-01-31 RX ADMIN — Medication 1 DROP: at 21:41

## 2024-01-31 RX ADMIN — CALCIUM 500 MG: 500 TABLET ORAL at 08:56

## 2024-01-31 RX ADMIN — BUMETANIDE 1 MG: 1 TABLET ORAL at 08:56

## 2024-01-31 RX ADMIN — SODIUM CHLORIDE, PRESERVATIVE FREE 10 ML: 5 INJECTION INTRAVENOUS at 21:42

## 2024-01-31 RX ADMIN — Medication 1 DROP: at 13:18

## 2024-01-31 RX ADMIN — CEFEPIME 2000 MG: 2 INJECTION, POWDER, FOR SOLUTION INTRAVENOUS at 01:27

## 2024-01-31 RX ADMIN — BUMETANIDE 1 MG: 1 TABLET ORAL at 17:31

## 2024-01-31 RX ADMIN — SODIUM CHLORIDE, PRESERVATIVE FREE 10 ML: 5 INJECTION INTRAVENOUS at 08:57

## 2024-01-31 RX ADMIN — ISOSORBIDE MONONITRATE 30 MG: 30 TABLET, EXTENDED RELEASE ORAL at 08:57

## 2024-01-31 RX ADMIN — RIVAROXABAN 15 MG: 15 TABLET, FILM COATED ORAL at 08:56

## 2024-01-31 RX ADMIN — LISINOPRIL 20 MG: 20 TABLET ORAL at 08:56

## 2024-01-31 RX ADMIN — ATENOLOL 25 MG: 25 TABLET ORAL at 08:56

## 2024-01-31 RX ADMIN — CEFEPIME 2000 MG: 2 INJECTION, POWDER, FOR SOLUTION INTRAVENOUS at 13:10

## 2024-01-31 RX ADMIN — AMLODIPINE BESYLATE 5 MG: 5 TABLET ORAL at 08:56

## 2024-01-31 RX ADMIN — SODIUM CHLORIDE 1 G: 1 TABLET ORAL at 17:31

## 2024-01-31 RX ADMIN — Medication 1 DROP: at 08:57

## 2024-01-31 NOTE — CARE COORDINATION
1/31/24, 11:18 AM EST    DISCHARGE PLANNING EVALUATION     Federica spoke with pt again, expressed concern about her returning home alone and safety issues. Pt did agree for SNF for rehab prior to returning home and asked that sw call her son Mango.  FEDERICA spoke with pts son, Mango, he is agreeable to SNF and will need some time to discuss with his brother and sister. FEDERICA encouraged Mango to google Medicare.gov for SNF ratings. Mango agreed. FEDERICA explained pt will be ready for discharge soon and her insurance requires a precert. Mango verbalized understanding.    Pc received from  nurse,  pts daughter Tess is here and would like to speak with FEDERICA.    FEDERICA spoke with pt and daughter Tess, federica reviewed need for picking a nursing home soon, explained Precert process and asked that they chose their top 3 facilities and get back with FEDERICA. Tess states pt has a nursing home policy and they will look at that before deciding on a facility. FEDERICA provided SNF list and encouraged Tess to google Medicare.gov.

## 2024-01-31 NOTE — CARE COORDINATION
1/31/24, 3:34 PM EST    DISCHARGE ON GOING EVALUATION    [facility] day: 2  Location: [facility]08/008-A Reason for admit: Acute on chronic respiratory failure with hypoxia and hypercapnia (HCC) [J96.21, J96.22]   Procedure:   1/29 CXR: Right upper lobe infiltrate appears improved, without complete resolution; Worsening bibasilar infiltrates; Small right pleural effusion appears mildly increased. Small left   pleural effusion is also suspected to be increased.  1/30 CXR: Increased discoid atelectasis in the right midlung and right upper lobe   infiltrate. Otherwise no significant change.    Barriers to Discharge: Dr. Posey/Palliative Care consulted today.     On 5L O2 with sats 94%. Bipap HS. Afebrile. Vpaced. Ox4. Follows commands. PT/OT. Telemetry, external urinary catheter. Limited Code - no x4. Norvasc, atenolol, po bumex 1 mg bid, oscal, IV cefepime, imdur, lisinopril, lotemax eye drops, polyethylene glycol eye drops, xarelto, sodium chloride tabs, Electrolyte replacement protocols.      PCP: Gaudencio Sepulveda  Readmission Risk Score: 19.6%  Patient Goals/Plan/Treatment Preferences: From home alone and current [facility] for RN/PT/OT. Wears 2L O2 at rest and 4L O2 w/activity thru [facility]. Has cane and walker. Today SW saw and pt agreeable to SNF; SW spoke with son Mango regarding SNF - see MARILU note. Will require precert.

## 2024-02-01 PROBLEM — Z51.5 PALLIATIVE CARE PATIENT: Status: ACTIVE | Noted: 2024-02-01

## 2024-02-01 LAB
ANION GAP SERPL CALC-SCNC: 8 MEQ/L (ref 8–16)
BUN SERPL-MCNC: 21 MG/DL (ref 7–22)
CALCIUM SERPL-MCNC: 9 MG/DL (ref 8.5–10.5)
CHLORIDE SERPL-SCNC: 83 MEQ/L (ref 98–111)
CO2 SERPL-SCNC: 40 MEQ/L (ref 23–33)
CREAT SERPL-MCNC: 0.7 MG/DL (ref 0.4–1.2)
DEPRECATED RDW RBC AUTO: 47.7 FL (ref 35–45)
ERYTHROCYTE [DISTWIDTH] IN BLOOD BY AUTOMATED COUNT: 13.4 % (ref 11.5–14.5)
GFR SERPL CREATININE-BSD FRML MDRD: > 60 ML/MIN/1.73M2
GLUCOSE BLD STRIP.AUTO-MCNC: 118 MG/DL (ref 70–108)
GLUCOSE BLD STRIP.AUTO-MCNC: 167 MG/DL (ref 70–108)
GLUCOSE BLD STRIP.AUTO-MCNC: 242 MG/DL (ref 70–108)
GLUCOSE SERPL-MCNC: 102 MG/DL (ref 70–108)
HCT VFR BLD AUTO: 37.8 % (ref 37–47)
HGB BLD-MCNC: 12.6 GM/DL (ref 12–16)
L PNEUMO1 AG UR QL IA.RAPID: NEGATIVE
MCH RBC QN AUTO: 32.3 PG (ref 26–33)
MCHC RBC AUTO-ENTMCNC: 33.3 GM/DL (ref 32.2–35.5)
MCV RBC AUTO: 96.9 FL (ref 81–99)
PLATELET # BLD AUTO: 209 THOU/MM3 (ref 130–400)
PMV BLD AUTO: 9.2 FL (ref 9.4–12.4)
POTASSIUM SERPL-SCNC: 4 MEQ/L (ref 3.5–5.2)
RBC # BLD AUTO: 3.9 MILL/MM3 (ref 4.2–5.4)
SODIUM SERPL-SCNC: 131 MEQ/L (ref 135–145)
STREP PNEUMO AG, UR: NEGATIVE
WBC # BLD AUTO: 11.8 THOU/MM3 (ref 4.8–10.8)

## 2024-02-01 PROCEDURE — 2700000000 HC OXYGEN THERAPY PER DAY

## 2024-02-01 PROCEDURE — 85027 COMPLETE CBC AUTOMATED: CPT

## 2024-02-01 PROCEDURE — 94660 CPAP INITIATION&MGMT: CPT

## 2024-02-01 PROCEDURE — 82948 REAGENT STRIP/BLOOD GLUCOSE: CPT

## 2024-02-01 PROCEDURE — 99221 1ST HOSP IP/OBS SF/LOW 40: CPT | Performed by: STUDENT IN AN ORGANIZED HEALTH CARE EDUCATION/TRAINING PROGRAM

## 2024-02-01 PROCEDURE — 2580000003 HC RX 258: Performed by: PHYSICIAN ASSISTANT

## 2024-02-01 PROCEDURE — 6360000002 HC RX W HCPCS: Performed by: PHYSICIAN ASSISTANT

## 2024-02-01 PROCEDURE — 99222 1ST HOSP IP/OBS MODERATE 55: CPT | Performed by: INTERNAL MEDICINE

## 2024-02-01 PROCEDURE — 97535 SELF CARE MNGMENT TRAINING: CPT

## 2024-02-01 PROCEDURE — 94761 N-INVAS EAR/PLS OXIMETRY MLT: CPT

## 2024-02-01 PROCEDURE — 6370000000 HC RX 637 (ALT 250 FOR IP): Performed by: PHYSICIAN ASSISTANT

## 2024-02-01 PROCEDURE — 97530 THERAPEUTIC ACTIVITIES: CPT

## 2024-02-01 PROCEDURE — 80048 BASIC METABOLIC PNL TOTAL CA: CPT

## 2024-02-01 PROCEDURE — 2060000000 HC ICU INTERMEDIATE R&B

## 2024-02-01 PROCEDURE — 36415 COLL VENOUS BLD VENIPUNCTURE: CPT

## 2024-02-01 PROCEDURE — 6360000002 HC RX W HCPCS: Performed by: INTERNAL MEDICINE

## 2024-02-01 PROCEDURE — 2580000003 HC RX 258: Performed by: INTERNAL MEDICINE

## 2024-02-01 RX ADMIN — AMLODIPINE BESYLATE 5 MG: 5 TABLET ORAL at 09:13

## 2024-02-01 RX ADMIN — RIVAROXABAN 15 MG: 15 TABLET, FILM COATED ORAL at 09:12

## 2024-02-01 RX ADMIN — ATENOLOL 25 MG: 25 TABLET ORAL at 09:13

## 2024-02-01 RX ADMIN — SODIUM CHLORIDE, PRESERVATIVE FREE 10 ML: 5 INJECTION INTRAVENOUS at 09:14

## 2024-02-01 RX ADMIN — CEFEPIME 2000 MG: 2 INJECTION, POWDER, FOR SOLUTION INTRAVENOUS at 14:14

## 2024-02-01 RX ADMIN — LISINOPRIL 20 MG: 20 TABLET ORAL at 09:12

## 2024-02-01 RX ADMIN — ISOSORBIDE MONONITRATE 30 MG: 30 TABLET, EXTENDED RELEASE ORAL at 09:13

## 2024-02-01 RX ADMIN — LOTEPREDNOL ETABONATE 1 DROP: 5 SUSPENSION/ DROPS OPHTHALMIC at 14:06

## 2024-02-01 RX ADMIN — Medication 1 DROP: at 20:24

## 2024-02-01 RX ADMIN — SODIUM CHLORIDE 1 G: 1 TABLET ORAL at 09:13

## 2024-02-01 RX ADMIN — CEFEPIME 2000 MG: 2 INJECTION, POWDER, FOR SOLUTION INTRAVENOUS at 01:45

## 2024-02-01 RX ADMIN — BUMETANIDE 1 MG: 1 TABLET ORAL at 09:12

## 2024-02-01 RX ADMIN — ACETAMINOPHEN 650 MG: 325 TABLET ORAL at 14:04

## 2024-02-01 RX ADMIN — CALCIUM 500 MG: 500 TABLET ORAL at 09:13

## 2024-02-01 RX ADMIN — BUMETANIDE 0.2 MG/HR: 0.25 INJECTION INTRAMUSCULAR; INTRAVENOUS at 12:22

## 2024-02-01 RX ADMIN — Medication 1 DROP: at 14:04

## 2024-02-01 NOTE — PALLIATIVE CARE
Initial Evaluation        Patient:   Daniela Pacheco  YOB: 1929  Age:  89 y.o.  Room:  [address] [place] [address]  MRN:  [id number]   Acct: [id number]    Date of Admission:  1/28/2024 11:48 PM  Date of Service:  2/1/2024  Completed By:  Crystal Suazo RN                 Reason for Palliative Care Evaluation:-               [] Code Status Discussion              [x] Goals of Care              [] Pain/Symptom Management               [] Emotional Support              [] Other:                    Current Issues:-   []  Pain  []  Fatigue  []  Nausea  []  Anxiety  []  Depression  [x]  Shortness of Breath  []  Constipation  []  Appetite  []  Other:              Advance Directives:-    [x] [place] DNR Form  [] Living Will  [] Medical POA              Current Code Status:-     [] Full Resuscitation  [] DNR-Comfort Care-Arrest  [] DNR-Comfort Care       [x] Limited Resuscitation             [x] No CPR            [x] No shock            [x] No ET intubation/reintubation            [x] No resuscitative medications            [] Other limitation:               Palliative Performance Status:-      [] 100%  Full ambulation; normal activity and work; no evidence of disease; able to do own self care; normal intake; fully conscious     [] 90%   Full ambulation; normal activity and work; some evidence of disease; able to do own self care; normal intake; fully conscious    [] 80%   Full ambulation; normal activity with effort; some evidence of disease; able to do own self care; normal or reduced intake; fully conscious    [] 70%  Ambulation reduced; unable to perform normal job/work; significant  disease; able to do own self care; normal or reduced intake; fully conscious    [] 60%  Ambulation reduced; Significant disease;Can't do hobbies/housework; intake normal or reduced; occasional assist; LOC full/confusion    [x] 50%  Mainly sit/lie; Extensive disease; Can't do any work; Considerable assist; intake normal or reduced; LOC

## 2024-02-01 NOTE — CONSULTS
Physician Consult Note        Patient:   Daniela Pacheco  YOB: 1929  Age:  89 y.o.  Room:  [address] [place] [address]  MRN:  [id number]   Acct: [id number]  PCP: Gaudencio Sepulveda    Date of Admission:  1/28/2024 11:48 PM  Date of Service:  2/1/2024    Reason for Consult: Goals of Care             Subjective   Chief Complaint:-    Chief Complaint   Patient presents with    Shortness of Breath        History Obtained From:-  Family member(s) - Son, Electronic Medical Record, Reason patient could not give history: Sleeping comfortably    History of Present Illness:-            Daniela Pacheco is a 89 y.o. female who  has a past medical history of Arthritis, Atrial fibrillation (HCC), Cancer (HCC), CHF (congestive heart failure) (HCC), Chronic renal disease, stage III (HCC) [771342], Gross hematuria, Hepatic lesion, HTN (hypertension), Hyperlipidemia, [employer] single pacemaker , Pneumonia, and Type 2 diabetes mellitus. They present to the hospital with Shortness of Breath and are admitted for Acute on chronic respiratory failure with hypoxia and hypercapnia (HCC). Patient initially presented to the emergency department on January 28, 2024 with a chief complaint shortness of breath and unresponsiveness.  Family reports that she been drowsy all day prior to admission.  She was difficult to wake up.  She was found to be hypoxic and was requiring 10 L with a nonrebreather while en route to the hospital.  She was placed on BiPAP in the emergency department.  Her CODE STATUS was changed to DNR CCA.  She was then admitted to the  hospital for further care. Palliative care was consulted for Goals of Care.    Symptoms:  Pain: Unable to report pain.  Shortness of breath: They are unable to report if they are having any shortness of breath, difficulty breathing or cough.  Sleep:  Patient was sleeping during my evaluation    Serious Illness Conversation:  Please refer to the Palliative Care RN

## 2024-02-01 NOTE — PALLIATIVE CARE
Follow Up / Progress Note        Patient:   Daniela Pacheco  YOB: 1929  Age:  89 y.o.  Room:  [facility]08/[facility]  MRN:  [id number]          Updated Dr. Mcgill. No family present at bedside at this time. Will follow up tomorrow.               Palliative Care Office: [phone]

## 2024-02-01 NOTE — CARE COORDINATION
2/1/24, 10:47 AM EST    DISCHARGE ON GOING EVALUATION    [facility] day: 3  Location: -08/008-A Reason for admit: Acute on chronic respiratory failure with hypoxia and hypercapnia (HCC) [J96.21, J96.22]   Procedure: 1/29 CXR: Right upper lobe infiltrate appears improved, without complete resolution; Worsening bibasilar infiltrates; Small right pleural effusion appears mildly increased. Small left   pleural effusion is also suspected to be increased.  1/30 CXR: Increased discoid atelectasis in the right midlung and right upper lobe   infiltrate. Otherwise no significant change.  Barriers to Discharge: Hospitalist, Palliative care, and therapy continue to follow. 5L O2 with PAP overnight. Legionella and strep pneumonia specimens collected late yesterday. Bumex gtt ordered to start today. Continue IV Cefepime for pneumonia. Afebrile. WBC 11.8 today.   PCP: Gaudencio Sepulveda  Readmission Risk Score: 19%  Patient Goals/Plan/Treatment Preferences: From home alone with current [facility] (RN, PT, OT). Wears 2L at rest and 4L O2 with activity from [facility]. Has cane and walker.   SW following. Family to choose facilities today. Will need Precert.

## 2024-02-01 NOTE — PALLIATIVE CARE
Follow Up / Progress Note        Patient:   Daniela Pacheco  YOB: 1929  Age:  89 y.o.  Room:  [address] [place] [address]  MRN:  [id number]         Family/Patient Discussion:  Notified by nurse at this time that family now at bedside. Visit made, daughter Tess and son Mango at bedside. Daniela was resting in bed with eyes closed. They have been reviewing nursing homes and would like for Daniela to go to [facility] if available. They denied questions at this time. Informed them Dr. Mcgill will be in to speak with them.             Electronically signed by Crystal Suazo RN on 2/1/2024 at 3:33 PM             Palliative Care Office: [phone]

## 2024-02-01 NOTE — CARE COORDINATION
2/1/24, 12:12 PM EST    DISCHARGE PLANNING EVALUATION    This SW did meet with patient, son and daughter in room this morning after receiving a call from son. After much discussion about ECF options, patient's family would like a referral made to [facility]. Their backup options are [facility] and [facility], unsure which they would like second at this time. They would really prefer [facility]. SW will make referral.       12:17 PM    Spoke with Mary Alice from [facility], she does not believe that they are in network but she will run the insurance and check for out of network benefits. She will call SW back with a response.       2:28 PM    This SW did receive a message from Mary Alice, patient does not have the same out of network benefits. States that there would be a 30% co-pay and patient does not have a secondary benefit. Spoke with Burt from [facility], they are not in network. Spoke with Larissa from [facility], she is unsure if they are in network but will check and call SW back.

## 2024-02-02 LAB
GLUCOSE BLD STRIP.AUTO-MCNC: 161 MG/DL (ref 70–108)
GLUCOSE BLD STRIP.AUTO-MCNC: 181 MG/DL (ref 70–108)
GLUCOSE BLD STRIP.AUTO-MCNC: 212 MG/DL (ref 70–108)
GLUCOSE BLD STRIP.AUTO-MCNC: 268 MG/DL (ref 70–108)

## 2024-02-02 PROCEDURE — 6370000000 HC RX 637 (ALT 250 FOR IP): Performed by: PHYSICIAN ASSISTANT

## 2024-02-02 PROCEDURE — 99222 1ST HOSP IP/OBS MODERATE 55: CPT | Performed by: INTERNAL MEDICINE

## 2024-02-02 PROCEDURE — 92610 EVALUATE SWALLOWING FUNCTION: CPT

## 2024-02-02 PROCEDURE — 2580000003 HC RX 258: Performed by: PHYSICIAN ASSISTANT

## 2024-02-02 PROCEDURE — 6370000000 HC RX 637 (ALT 250 FOR IP): Performed by: INTERNAL MEDICINE

## 2024-02-02 PROCEDURE — 97535 SELF CARE MNGMENT TRAINING: CPT

## 2024-02-02 PROCEDURE — 82948 REAGENT STRIP/BLOOD GLUCOSE: CPT

## 2024-02-02 PROCEDURE — 2060000000 HC ICU INTERMEDIATE R&B

## 2024-02-02 PROCEDURE — 2700000000 HC OXYGEN THERAPY PER DAY

## 2024-02-02 PROCEDURE — 94761 N-INVAS EAR/PLS OXIMETRY MLT: CPT

## 2024-02-02 PROCEDURE — 6360000002 HC RX W HCPCS: Performed by: PHYSICIAN ASSISTANT

## 2024-02-02 RX ORDER — INSULIN LISPRO 100 [IU]/ML
0-4 INJECTION, SOLUTION INTRAVENOUS; SUBCUTANEOUS
Status: DISCONTINUED | OUTPATIENT
Start: 2024-02-02 | End: 2024-02-14 | Stop reason: HOSPADM

## 2024-02-02 RX ORDER — INSULIN LISPRO 100 [IU]/ML
0-4 INJECTION, SOLUTION INTRAVENOUS; SUBCUTANEOUS NIGHTLY
Status: DISCONTINUED | OUTPATIENT
Start: 2024-02-02 | End: 2024-02-14 | Stop reason: HOSPADM

## 2024-02-02 RX ADMIN — Medication 1 DROP: at 08:41

## 2024-02-02 RX ADMIN — Medication 1 DROP: at 20:34

## 2024-02-02 RX ADMIN — RIVAROXABAN 15 MG: 15 TABLET, FILM COATED ORAL at 08:37

## 2024-02-02 RX ADMIN — CEFEPIME 2000 MG: 2 INJECTION, POWDER, FOR SOLUTION INTRAVENOUS at 14:27

## 2024-02-02 RX ADMIN — CALCIUM 500 MG: 500 TABLET ORAL at 08:37

## 2024-02-02 RX ADMIN — AMLODIPINE BESYLATE 5 MG: 5 TABLET ORAL at 08:37

## 2024-02-02 RX ADMIN — ISOSORBIDE MONONITRATE 30 MG: 30 TABLET, EXTENDED RELEASE ORAL at 08:37

## 2024-02-02 RX ADMIN — LISINOPRIL 20 MG: 20 TABLET ORAL at 08:37

## 2024-02-02 RX ADMIN — INSULIN LISPRO 1 UNITS: 100 INJECTION, SOLUTION INTRAVENOUS; SUBCUTANEOUS at 17:03

## 2024-02-02 RX ADMIN — ATENOLOL 25 MG: 25 TABLET ORAL at 08:37

## 2024-02-02 RX ADMIN — CEFEPIME 2000 MG: 2 INJECTION, POWDER, FOR SOLUTION INTRAVENOUS at 02:12

## 2024-02-02 RX ADMIN — Medication 1 DROP: at 14:27

## 2024-02-02 NOTE — CARE COORDINATION
8:29 AM    Left a message for Larissa at [facility] checking in on being in network with patient's insurance.      2:14 PM    Left another message for Larissa at [facility] about being in network with [employer].

## 2024-02-03 LAB
ANION GAP SERPL CALC-SCNC: 7 MEQ/L (ref 8–16)
BACTERIA BLD AEROBE CULT: NORMAL
BACTERIA BLD AEROBE CULT: NORMAL
BASOPHILS ABSOLUTE: 0.1 THOU/MM3 (ref 0–0.1)
BASOPHILS NFR BLD AUTO: 0.6 %
BUN SERPL-MCNC: 21 MG/DL (ref 7–22)
CALCIUM SERPL-MCNC: 8.9 MG/DL (ref 8.5–10.5)
CHLORIDE SERPL-SCNC: 79 MEQ/L (ref 98–111)
CO2 SERPL-SCNC: 46 MEQ/L (ref 23–33)
CREAT SERPL-MCNC: 0.8 MG/DL (ref 0.4–1.2)
DEPRECATED RDW RBC AUTO: 48.9 FL (ref 35–45)
EOSINOPHIL NFR BLD AUTO: 1 %
EOSINOPHILS ABSOLUTE: 0.1 THOU/MM3 (ref 0–0.4)
ERYTHROCYTE [DISTWIDTH] IN BLOOD BY AUTOMATED COUNT: 13.6 % (ref 11.5–14.5)
GFR SERPL CREATININE-BSD FRML MDRD: > 60 ML/MIN/1.73M2
GLUCOSE BLD STRIP.AUTO-MCNC: 159 MG/DL (ref 70–108)
GLUCOSE BLD STRIP.AUTO-MCNC: 189 MG/DL (ref 70–108)
GLUCOSE BLD STRIP.AUTO-MCNC: 225 MG/DL (ref 70–108)
GLUCOSE BLD STRIP.AUTO-MCNC: 262 MG/DL (ref 70–108)
GLUCOSE SERPL-MCNC: 118 MG/DL (ref 70–108)
HCT VFR BLD AUTO: 39.1 % (ref 37–47)
HGB BLD-MCNC: 12.5 GM/DL (ref 12–16)
IMM GRANULOCYTES # BLD AUTO: 0.06 THOU/MM3 (ref 0–0.07)
IMM GRANULOCYTES NFR BLD AUTO: 0.6 %
LYMPHOCYTES ABSOLUTE: 1.4 THOU/MM3 (ref 1–4.8)
LYMPHOCYTES NFR BLD AUTO: 13.2 %
MCH RBC QN AUTO: 30.9 PG (ref 26–33)
MCHC RBC AUTO-ENTMCNC: 32 GM/DL (ref 32.2–35.5)
MCV RBC AUTO: 96.8 FL (ref 81–99)
MONOCYTES ABSOLUTE: 1 THOU/MM3 (ref 0.4–1.3)
MONOCYTES NFR BLD AUTO: 9.2 %
NEUTROPHILS NFR BLD AUTO: 75.4 %
NRBC BLD AUTO-RTO: 0 /100 WBC
PLATELET # BLD AUTO: 193 THOU/MM3 (ref 130–400)
PMV BLD AUTO: 9.2 FL (ref 9.4–12.4)
POTASSIUM SERPL-SCNC: 3.3 MEQ/L (ref 3.5–5.2)
RBC # BLD AUTO: 4.04 MILL/MM3 (ref 4.2–5.4)
SEGMENTED NEUTROPHILS ABSOLUTE COUNT: 8.2 THOU/MM3 (ref 1.8–7.7)
SODIUM SERPL-SCNC: 132 MEQ/L (ref 135–145)
WBC # BLD AUTO: 10.9 THOU/MM3 (ref 4.8–10.8)

## 2024-02-03 PROCEDURE — 80048 BASIC METABOLIC PNL TOTAL CA: CPT

## 2024-02-03 PROCEDURE — 36415 COLL VENOUS BLD VENIPUNCTURE: CPT

## 2024-02-03 PROCEDURE — 2060000000 HC ICU INTERMEDIATE R&B

## 2024-02-03 PROCEDURE — 2580000003 HC RX 258: Performed by: PHYSICIAN ASSISTANT

## 2024-02-03 PROCEDURE — 99222 1ST HOSP IP/OBS MODERATE 55: CPT | Performed by: INTERNAL MEDICINE

## 2024-02-03 PROCEDURE — 85025 COMPLETE CBC W/AUTO DIFF WBC: CPT

## 2024-02-03 PROCEDURE — 6370000000 HC RX 637 (ALT 250 FOR IP): Performed by: INTERNAL MEDICINE

## 2024-02-03 PROCEDURE — 82948 REAGENT STRIP/BLOOD GLUCOSE: CPT

## 2024-02-03 PROCEDURE — 6370000000 HC RX 637 (ALT 250 FOR IP): Performed by: PHYSICIAN ASSISTANT

## 2024-02-03 PROCEDURE — 6360000002 HC RX W HCPCS: Performed by: PHYSICIAN ASSISTANT

## 2024-02-03 RX ORDER — POLYETHYLENE GLYCOL 3350 17 G/17G
17 POWDER, FOR SOLUTION ORAL DAILY
Status: DISCONTINUED | OUTPATIENT
Start: 2024-02-03 | End: 2024-02-14 | Stop reason: HOSPADM

## 2024-02-03 RX ORDER — SENNA AND DOCUSATE SODIUM 50; 8.6 MG/1; MG/1
1 TABLET, FILM COATED ORAL NIGHTLY
Status: DISCONTINUED | OUTPATIENT
Start: 2024-02-03 | End: 2024-02-14 | Stop reason: HOSPADM

## 2024-02-03 RX ORDER — BISACODYL 10 MG
10 SUPPOSITORY, RECTAL RECTAL DAILY PRN
Status: DISCONTINUED | OUTPATIENT
Start: 2024-02-03 | End: 2024-02-14 | Stop reason: HOSPADM

## 2024-02-03 RX ADMIN — INSULIN LISPRO 2 UNITS: 100 INJECTION, SOLUTION INTRAVENOUS; SUBCUTANEOUS at 12:41

## 2024-02-03 RX ADMIN — Medication 1 DROP: at 19:54

## 2024-02-03 RX ADMIN — CALCIUM 500 MG: 500 TABLET ORAL at 08:34

## 2024-02-03 RX ADMIN — Medication 1 DROP: at 08:34

## 2024-02-03 RX ADMIN — RIVAROXABAN 15 MG: 15 TABLET, FILM COATED ORAL at 08:34

## 2024-02-03 RX ADMIN — ACETAMINOPHEN 650 MG: 325 TABLET ORAL at 19:54

## 2024-02-03 RX ADMIN — DOCUSATE SODIUM 50 MG AND SENNOSIDES 8.6 MG 1 TABLET: 8.6; 5 TABLET, FILM COATED ORAL at 19:54

## 2024-02-03 RX ADMIN — ISOSORBIDE MONONITRATE 30 MG: 30 TABLET, EXTENDED RELEASE ORAL at 08:34

## 2024-02-03 RX ADMIN — BISACODYL 10 MG: 10 SUPPOSITORY RECTAL at 12:41

## 2024-02-03 RX ADMIN — AMLODIPINE BESYLATE 5 MG: 5 TABLET ORAL at 08:34

## 2024-02-03 RX ADMIN — POLYETHYLENE GLYCOL 3350 17 G: 17 POWDER, FOR SOLUTION ORAL at 10:52

## 2024-02-03 RX ADMIN — CEFEPIME 2000 MG: 2 INJECTION, POWDER, FOR SOLUTION INTRAVENOUS at 01:01

## 2024-02-03 RX ADMIN — POTASSIUM CHLORIDE 40 MEQ: 1500 TABLET, EXTENDED RELEASE ORAL at 08:34

## 2024-02-03 RX ADMIN — ACETAMINOPHEN 650 MG: 325 TABLET ORAL at 03:31

## 2024-02-03 RX ADMIN — ATENOLOL 25 MG: 25 TABLET ORAL at 08:34

## 2024-02-03 RX ADMIN — ACETAMINOPHEN 650 MG: 325 TABLET ORAL at 10:52

## 2024-02-03 RX ADMIN — LISINOPRIL 20 MG: 20 TABLET ORAL at 08:34

## 2024-02-03 RX ADMIN — LOTEPREDNOL ETABONATE 1 DROP: 5 SUSPENSION/ DROPS OPHTHALMIC at 08:40

## 2024-02-03 RX ADMIN — CEFEPIME 2000 MG: 2 INJECTION, POWDER, FOR SOLUTION INTRAVENOUS at 12:43

## 2024-02-03 RX ADMIN — Medication 1 DROP: at 12:43

## 2024-02-04 LAB
ANION GAP SERPL CALC-SCNC: 2 MEQ/L (ref 8–16)
BASOPHILS ABSOLUTE: 0 THOU/MM3 (ref 0–0.1)
BASOPHILS NFR BLD AUTO: 0.4 %
BUN SERPL-MCNC: 27 MG/DL (ref 7–22)
CALCIUM SERPL-MCNC: 9.2 MG/DL (ref 8.5–10.5)
CHLORIDE SERPL-SCNC: 77 MEQ/L (ref 98–111)
CO2 SERPL-SCNC: > 50 MEQ/L (ref 23–33)
CREAT SERPL-MCNC: 0.8 MG/DL (ref 0.4–1.2)
DEPRECATED RDW RBC AUTO: 48.3 FL (ref 35–45)
EOSINOPHIL NFR BLD AUTO: 1.2 %
EOSINOPHILS ABSOLUTE: 0.1 THOU/MM3 (ref 0–0.4)
ERYTHROCYTE [DISTWIDTH] IN BLOOD BY AUTOMATED COUNT: 13.7 % (ref 11.5–14.5)
GFR SERPL CREATININE-BSD FRML MDRD: > 60 ML/MIN/1.73M2
GLUCOSE BLD STRIP.AUTO-MCNC: 137 MG/DL (ref 70–108)
GLUCOSE BLD STRIP.AUTO-MCNC: 143 MG/DL (ref 70–108)
GLUCOSE BLD STRIP.AUTO-MCNC: 220 MG/DL (ref 70–108)
GLUCOSE BLD STRIP.AUTO-MCNC: 237 MG/DL (ref 70–108)
GLUCOSE SERPL-MCNC: 128 MG/DL (ref 70–108)
HCT VFR BLD AUTO: 39.6 % (ref 37–47)
HGB BLD-MCNC: 12.8 GM/DL (ref 12–16)
IMM GRANULOCYTES # BLD AUTO: 0.06 THOU/MM3 (ref 0–0.07)
IMM GRANULOCYTES NFR BLD AUTO: 0.6 %
LYMPHOCYTES ABSOLUTE: 1.4 THOU/MM3 (ref 1–4.8)
LYMPHOCYTES NFR BLD AUTO: 15.2 %
MCH RBC QN AUTO: 31 PG (ref 26–33)
MCHC RBC AUTO-ENTMCNC: 32.3 GM/DL (ref 32.2–35.5)
MCV RBC AUTO: 95.9 FL (ref 81–99)
MONOCYTES ABSOLUTE: 0.9 THOU/MM3 (ref 0.4–1.3)
MONOCYTES NFR BLD AUTO: 9.5 %
NEUTROPHILS NFR BLD AUTO: 73.1 %
NRBC BLD AUTO-RTO: 0 /100 WBC
PLATELET # BLD AUTO: 182 THOU/MM3 (ref 130–400)
PMV BLD AUTO: 9 FL (ref 9.4–12.4)
POTASSIUM SERPL-SCNC: 3.7 MEQ/L (ref 3.5–5.2)
RBC # BLD AUTO: 4.13 MILL/MM3 (ref 4.2–5.4)
SEGMENTED NEUTROPHILS ABSOLUTE COUNT: 6.9 THOU/MM3 (ref 1.8–7.7)
SODIUM SERPL-SCNC: 129 MEQ/L (ref 135–145)
WBC # BLD AUTO: 9.5 THOU/MM3 (ref 4.8–10.8)

## 2024-02-04 PROCEDURE — 36415 COLL VENOUS BLD VENIPUNCTURE: CPT

## 2024-02-04 PROCEDURE — 6370000000 HC RX 637 (ALT 250 FOR IP): Performed by: INTERNAL MEDICINE

## 2024-02-04 PROCEDURE — 6370000000 HC RX 637 (ALT 250 FOR IP): Performed by: PHYSICIAN ASSISTANT

## 2024-02-04 PROCEDURE — 6360000002 HC RX W HCPCS: Performed by: PHYSICIAN ASSISTANT

## 2024-02-04 PROCEDURE — 2580000003 HC RX 258: Performed by: PHYSICIAN ASSISTANT

## 2024-02-04 PROCEDURE — 80048 BASIC METABOLIC PNL TOTAL CA: CPT

## 2024-02-04 PROCEDURE — 85025 COMPLETE CBC W/AUTO DIFF WBC: CPT

## 2024-02-04 PROCEDURE — 2060000000 HC ICU INTERMEDIATE R&B

## 2024-02-04 PROCEDURE — 82948 REAGENT STRIP/BLOOD GLUCOSE: CPT

## 2024-02-04 PROCEDURE — 99222 1ST HOSP IP/OBS MODERATE 55: CPT | Performed by: INTERNAL MEDICINE

## 2024-02-04 RX ORDER — BUMETANIDE 1 MG/1
1 TABLET ORAL DAILY
Status: DISCONTINUED | OUTPATIENT
Start: 2024-02-05 | End: 2024-02-07

## 2024-02-04 RX ADMIN — CALCIUM 500 MG: 500 TABLET ORAL at 09:25

## 2024-02-04 RX ADMIN — RIVAROXABAN 15 MG: 15 TABLET, FILM COATED ORAL at 09:25

## 2024-02-04 RX ADMIN — INSULIN LISPRO 1 UNITS: 100 INJECTION, SOLUTION INTRAVENOUS; SUBCUTANEOUS at 12:23

## 2024-02-04 RX ADMIN — CEFEPIME 2000 MG: 2 INJECTION, POWDER, FOR SOLUTION INTRAVENOUS at 13:58

## 2024-02-04 RX ADMIN — SODIUM CHLORIDE, PRESERVATIVE FREE 10 ML: 5 INJECTION INTRAVENOUS at 20:09

## 2024-02-04 RX ADMIN — CEFEPIME 2000 MG: 2 INJECTION, POWDER, FOR SOLUTION INTRAVENOUS at 01:55

## 2024-02-04 RX ADMIN — DOCUSATE SODIUM 50 MG AND SENNOSIDES 8.6 MG 1 TABLET: 8.6; 5 TABLET, FILM COATED ORAL at 20:15

## 2024-02-04 RX ADMIN — POLYETHYLENE GLYCOL 3350 17 G: 17 POWDER, FOR SOLUTION ORAL at 09:25

## 2024-02-04 RX ADMIN — Medication 1 DROP: at 09:23

## 2024-02-04 RX ADMIN — Medication 1 DROP: at 20:10

## 2024-02-05 LAB
ANION GAP SERPL CALC-SCNC: 4 MEQ/L (ref 8–16)
BASOPHILS ABSOLUTE: 0.1 THOU/MM3 (ref 0–0.1)
BASOPHILS NFR BLD AUTO: 0.7 %
BUN SERPL-MCNC: 30 MG/DL (ref 7–22)
CALCIUM SERPL-MCNC: 9.5 MG/DL (ref 8.5–10.5)
CHLORIDE SERPL-SCNC: 79 MEQ/L (ref 98–111)
CO2 SERPL-SCNC: 48 MEQ/L (ref 23–33)
CREAT SERPL-MCNC: 0.8 MG/DL (ref 0.4–1.2)
DEPRECATED RDW RBC AUTO: 48 FL (ref 35–45)
EOSINOPHIL NFR BLD AUTO: 1.4 %
EOSINOPHILS ABSOLUTE: 0.1 THOU/MM3 (ref 0–0.4)
ERYTHROCYTE [DISTWIDTH] IN BLOOD BY AUTOMATED COUNT: 13.6 % (ref 11.5–14.5)
GFR SERPL CREATININE-BSD FRML MDRD: > 60 ML/MIN/1.73M2
GLUCOSE BLD STRIP.AUTO-MCNC: 118 MG/DL (ref 70–108)
GLUCOSE BLD STRIP.AUTO-MCNC: 168 MG/DL (ref 70–108)
GLUCOSE BLD STRIP.AUTO-MCNC: 238 MG/DL (ref 70–108)
GLUCOSE BLD STRIP.AUTO-MCNC: 311 MG/DL (ref 70–108)
GLUCOSE SERPL-MCNC: 116 MG/DL (ref 70–108)
HCT VFR BLD AUTO: 38.8 % (ref 37–47)
HGB BLD-MCNC: 12.7 GM/DL (ref 12–16)
IMM GRANULOCYTES # BLD AUTO: 0.04 THOU/MM3 (ref 0–0.07)
IMM GRANULOCYTES NFR BLD AUTO: 0.5 %
LYMPHOCYTES ABSOLUTE: 1.4 THOU/MM3 (ref 1–4.8)
LYMPHOCYTES NFR BLD AUTO: 16.6 %
MCH RBC QN AUTO: 31.3 PG (ref 26–33)
MCHC RBC AUTO-ENTMCNC: 32.7 GM/DL (ref 32.2–35.5)
MCV RBC AUTO: 95.6 FL (ref 81–99)
MONOCYTES ABSOLUTE: 1 THOU/MM3 (ref 0.4–1.3)
MONOCYTES NFR BLD AUTO: 11.9 %
NEUTROPHILS NFR BLD AUTO: 68.9 %
NRBC BLD AUTO-RTO: 0 /100 WBC
PLATELET # BLD AUTO: 166 THOU/MM3 (ref 130–400)
PMV BLD AUTO: 9.4 FL (ref 9.4–12.4)
POTASSIUM SERPL-SCNC: 3.8 MEQ/L (ref 3.5–5.2)
RBC # BLD AUTO: 4.06 MILL/MM3 (ref 4.2–5.4)
SEGMENTED NEUTROPHILS ABSOLUTE COUNT: 5.9 THOU/MM3 (ref 1.8–7.7)
SODIUM SERPL-SCNC: 131 MEQ/L (ref 135–145)
WBC # BLD AUTO: 8.5 THOU/MM3 (ref 4.8–10.8)

## 2024-02-05 PROCEDURE — 6370000000 HC RX 637 (ALT 250 FOR IP): Performed by: PHYSICIAN ASSISTANT

## 2024-02-05 PROCEDURE — 6370000000 HC RX 637 (ALT 250 FOR IP): Performed by: INTERNAL MEDICINE

## 2024-02-05 PROCEDURE — 97535 SELF CARE MNGMENT TRAINING: CPT

## 2024-02-05 PROCEDURE — 6360000002 HC RX W HCPCS: Performed by: PHYSICIAN ASSISTANT

## 2024-02-05 PROCEDURE — 99222 1ST HOSP IP/OBS MODERATE 55: CPT | Performed by: INTERNAL MEDICINE

## 2024-02-05 PROCEDURE — 97530 THERAPEUTIC ACTIVITIES: CPT

## 2024-02-05 PROCEDURE — 94660 CPAP INITIATION&MGMT: CPT

## 2024-02-05 PROCEDURE — 6370000000 HC RX 637 (ALT 250 FOR IP): Performed by: STUDENT IN AN ORGANIZED HEALTH CARE EDUCATION/TRAINING PROGRAM

## 2024-02-05 PROCEDURE — 2580000003 HC RX 258: Performed by: PHYSICIAN ASSISTANT

## 2024-02-05 PROCEDURE — 85025 COMPLETE CBC W/AUTO DIFF WBC: CPT

## 2024-02-05 PROCEDURE — 94761 N-INVAS EAR/PLS OXIMETRY MLT: CPT

## 2024-02-05 PROCEDURE — 99233 SBSQ HOSP IP/OBS HIGH 50: CPT | Performed by: STUDENT IN AN ORGANIZED HEALTH CARE EDUCATION/TRAINING PROGRAM

## 2024-02-05 PROCEDURE — 36415 COLL VENOUS BLD VENIPUNCTURE: CPT

## 2024-02-05 PROCEDURE — 80048 BASIC METABOLIC PNL TOTAL CA: CPT

## 2024-02-05 PROCEDURE — 2700000000 HC OXYGEN THERAPY PER DAY

## 2024-02-05 PROCEDURE — 82948 REAGENT STRIP/BLOOD GLUCOSE: CPT

## 2024-02-05 PROCEDURE — 2060000000 HC ICU INTERMEDIATE R&B

## 2024-02-05 PROCEDURE — 97116 GAIT TRAINING THERAPY: CPT

## 2024-02-05 RX ORDER — OXYCODONE HYDROCHLORIDE 5 MG/1
2.5 TABLET ORAL EVERY 6 HOURS PRN
Status: DISCONTINUED | OUTPATIENT
Start: 2024-02-05 | End: 2024-02-14 | Stop reason: HOSPADM

## 2024-02-05 RX ADMIN — Medication 1 DROP: at 20:19

## 2024-02-05 RX ADMIN — ACETAMINOPHEN 650 MG: 325 TABLET ORAL at 11:00

## 2024-02-05 RX ADMIN — BUMETANIDE 1 MG: 1 TABLET ORAL at 09:56

## 2024-02-05 RX ADMIN — OXYCODONE 2.5 MG: 5 TABLET ORAL at 20:18

## 2024-02-05 RX ADMIN — Medication 1 DROP: at 09:56

## 2024-02-05 RX ADMIN — CALCIUM 500 MG: 500 TABLET ORAL at 09:56

## 2024-02-05 RX ADMIN — SODIUM CHLORIDE, PRESERVATIVE FREE 10 ML: 5 INJECTION INTRAVENOUS at 20:20

## 2024-02-05 RX ADMIN — LOTEPREDNOL ETABONATE 1 DROP: 5 SUSPENSION/ DROPS OPHTHALMIC at 09:55

## 2024-02-05 RX ADMIN — POLYETHYLENE GLYCOL 3350 17 G: 17 POWDER, FOR SOLUTION ORAL at 09:53

## 2024-02-05 RX ADMIN — CEFEPIME 2000 MG: 2 INJECTION, POWDER, FOR SOLUTION INTRAVENOUS at 02:05

## 2024-02-05 RX ADMIN — SODIUM CHLORIDE, PRESERVATIVE FREE 10 ML: 5 INJECTION INTRAVENOUS at 09:57

## 2024-02-05 RX ADMIN — RIVAROXABAN 15 MG: 15 TABLET, FILM COATED ORAL at 09:56

## 2024-02-05 RX ADMIN — DOCUSATE SODIUM 50 MG AND SENNOSIDES 8.6 MG 1 TABLET: 8.6; 5 TABLET, FILM COATED ORAL at 20:18

## 2024-02-05 RX ADMIN — Medication 1 DROP: at 12:48

## 2024-02-05 RX ADMIN — INSULIN LISPRO 1 UNITS: 100 INJECTION, SOLUTION INTRAVENOUS; SUBCUTANEOUS at 12:46

## 2024-02-05 RX ADMIN — INSULIN LISPRO 4 UNITS: 100 INJECTION, SOLUTION INTRAVENOUS; SUBCUTANEOUS at 20:18

## 2024-02-05 NOTE — CARE COORDINATION
2/5/24, 3:31 PM EST    DISCHARGE ON GOING EVALUATION    [facility] day: 7  Location: [facility]08/008-A Reason for admit: Acute on chronic respiratory failure with hypoxia and hypercapnia (HCC) [J96.21, J96.22]   Procedure:   1/29 CXR: Right upper lobe infiltrate appears improved, without complete resolution; Worsening bibasilar infiltrates; Small right pleural effusion appears mildly increased. Small left   pleural effusion is also suspected to be increased.  1/30 CXR: Increased discoid atelectasis in the right midlung and right upper lobe   infiltrate. Otherwise no significant change.    Barriers to Discharge: Bumex drip stopped yesterday. IV ATB completed today. Hospitalist, Palliative care, and PT/OT continue to follow. 4L O2 with bipap overnight. Bumex gtt ordered to start today. Continue IV Cefepime for pneumonia. Afebrile. Ox4. Limited Code - no x4.       PCP: Gaudencio Sepulveda  Readmission Risk Score: 18.9%  Patient Goals/Plan/Treatment Preferences: From home alone with current [facility] (RN, PT, OT). Wears 2L at rest and 4L O2 with activity from [facility]. Has cane and walker.   SW following. Facilities family chose for SNF were out of network with patient's insurance. MARILU LM for family today with list of facilities in-network with her insurance; awaiting return call with choice. Will require precert.

## 2024-02-05 NOTE — CARE COORDINATION
2/5/24, 9:07 AM EST    DISCHARGE PLANNING EVALUATION    This SW did receive a message from Larissa with [facility], states that they do not have any beds available at this time. SW will talk with family and come up with more options.     10:51 AM    Left a message for patient's son Mango and made him aware that the facilities previously chosen are not going to work. Listed that [facility], [facility] and [facility] are all showing as in network for [facility] in [facility] and [facility]. Asked for a call back with more choices from these facilities.

## 2024-02-06 PROBLEM — E44.0 MODERATE MALNUTRITION (HCC): Status: ACTIVE | Noted: 2024-02-06

## 2024-02-06 LAB
ANION GAP SERPL CALC-SCNC: 8 MEQ/L (ref 8–16)
BUN SERPL-MCNC: 34 MG/DL (ref 7–22)
CALCIUM SERPL-MCNC: 10 MG/DL (ref 8.5–10.5)
CHLORIDE SERPL-SCNC: 75 MEQ/L (ref 98–111)
CO2 SERPL-SCNC: 43 MEQ/L (ref 23–33)
CREAT SERPL-MCNC: 0.9 MG/DL (ref 0.4–1.2)
GFR SERPL CREATININE-BSD FRML MDRD: 59 ML/MIN/1.73M2
GLUCOSE BLD STRIP.AUTO-MCNC: 121 MG/DL (ref 70–108)
GLUCOSE BLD STRIP.AUTO-MCNC: 131 MG/DL (ref 70–108)
GLUCOSE BLD STRIP.AUTO-MCNC: 212 MG/DL (ref 70–108)
GLUCOSE BLD STRIP.AUTO-MCNC: 221 MG/DL (ref 70–108)
GLUCOSE SERPL-MCNC: 192 MG/DL (ref 70–108)
OSMOLALITY SERPL: 279 MOSMOL/KG (ref 275–295)
POTASSIUM SERPL-SCNC: 3.6 MEQ/L (ref 3.5–5.2)
SODIUM SERPL-SCNC: 126 MEQ/L (ref 135–145)

## 2024-02-06 PROCEDURE — 97110 THERAPEUTIC EXERCISES: CPT

## 2024-02-06 PROCEDURE — 2580000003 HC RX 258: Performed by: PHYSICIAN ASSISTANT

## 2024-02-06 PROCEDURE — 94761 N-INVAS EAR/PLS OXIMETRY MLT: CPT

## 2024-02-06 PROCEDURE — 97530 THERAPEUTIC ACTIVITIES: CPT

## 2024-02-06 PROCEDURE — 6370000000 HC RX 637 (ALT 250 FOR IP): Performed by: PHYSICIAN ASSISTANT

## 2024-02-06 PROCEDURE — 6370000000 HC RX 637 (ALT 250 FOR IP): Performed by: INTERNAL MEDICINE

## 2024-02-06 PROCEDURE — 36415 COLL VENOUS BLD VENIPUNCTURE: CPT

## 2024-02-06 PROCEDURE — 83930 ASSAY OF BLOOD OSMOLALITY: CPT

## 2024-02-06 PROCEDURE — 82948 REAGENT STRIP/BLOOD GLUCOSE: CPT

## 2024-02-06 PROCEDURE — 97535 SELF CARE MNGMENT TRAINING: CPT

## 2024-02-06 PROCEDURE — 80048 BASIC METABOLIC PNL TOTAL CA: CPT

## 2024-02-06 PROCEDURE — 94660 CPAP INITIATION&MGMT: CPT

## 2024-02-06 PROCEDURE — 2700000000 HC OXYGEN THERAPY PER DAY

## 2024-02-06 PROCEDURE — 97116 GAIT TRAINING THERAPY: CPT

## 2024-02-06 PROCEDURE — 2060000000 HC ICU INTERMEDIATE R&B

## 2024-02-06 PROCEDURE — 99232 SBSQ HOSP IP/OBS MODERATE 35: CPT | Performed by: INTERNAL MEDICINE

## 2024-02-06 RX ADMIN — SODIUM CHLORIDE, PRESERVATIVE FREE 10 ML: 5 INJECTION INTRAVENOUS at 10:06

## 2024-02-06 RX ADMIN — INSULIN LISPRO 1 UNITS: 100 INJECTION, SOLUTION INTRAVENOUS; SUBCUTANEOUS at 11:40

## 2024-02-06 RX ADMIN — POLYETHYLENE GLYCOL 3350 17 G: 17 POWDER, FOR SOLUTION ORAL at 10:06

## 2024-02-06 RX ADMIN — ATENOLOL 25 MG: 25 TABLET ORAL at 10:05

## 2024-02-06 RX ADMIN — DOCUSATE SODIUM 50 MG AND SENNOSIDES 8.6 MG 1 TABLET: 8.6; 5 TABLET, FILM COATED ORAL at 20:04

## 2024-02-06 RX ADMIN — Medication 1 DROP: at 10:06

## 2024-02-06 RX ADMIN — Medication 1 DROP: at 15:52

## 2024-02-06 RX ADMIN — CALCIUM 500 MG: 500 TABLET ORAL at 10:05

## 2024-02-06 RX ADMIN — Medication 1 DROP: at 20:03

## 2024-02-06 RX ADMIN — ISOSORBIDE MONONITRATE 30 MG: 30 TABLET, EXTENDED RELEASE ORAL at 10:05

## 2024-02-06 RX ADMIN — RIVAROXABAN 15 MG: 15 TABLET, FILM COATED ORAL at 10:05

## 2024-02-06 RX ADMIN — LISINOPRIL 20 MG: 20 TABLET ORAL at 10:05

## 2024-02-06 RX ADMIN — AMLODIPINE BESYLATE 5 MG: 5 TABLET ORAL at 10:04

## 2024-02-06 RX ADMIN — BUMETANIDE 1 MG: 1 TABLET ORAL at 10:05

## 2024-02-06 RX ADMIN — SODIUM CHLORIDE, PRESERVATIVE FREE 10 ML: 5 INJECTION INTRAVENOUS at 20:02

## 2024-02-06 NOTE — CARE COORDINATION
2/6/24, 8:24 AM EST    DISCHARGE PLANNING EVALUATION    Received a voicemail from patient's daughter Tess who states that they would like SW to look at [facility] and [facility] as next locations.     Left a message with [facility] for [facility].    8:48 AM    [facility] is not in network with [facility]. Left a message for [facility].    9:18 AM    Received a call from Mary, [facility] is not in network either.     Spoke with daughter Tess and post-acute (PAC) provider list was provided to patient. Patient was informed of their freedom to choose PAC provider. Discussed and offered to show the patient the relevant PAC Providers quality and resource use measures on Medicare Compare web site via computer based on patient's goals of care and treatment preferences. Questions regarding selection process were answered. Tess would like [facility] next and then potentially [facility] or [facility].     9:41 AM    Left a voicemail for [facility], asked for a call back to give referral.     12:26 PM- Left another voicemail for [facility].

## 2024-02-07 PROBLEM — I95.9 HYPOTENSION: Status: ACTIVE | Noted: 2024-02-07

## 2024-02-07 LAB
ANION GAP SERPL CALC-SCNC: 5 MEQ/L (ref 8–16)
BUN SERPL-MCNC: 39 MG/DL (ref 7–22)
CALCIUM SERPL-MCNC: 9.7 MG/DL (ref 8.5–10.5)
CHLORIDE SERPL-SCNC: 77 MEQ/L (ref 98–111)
CO2 SERPL-SCNC: 43 MEQ/L (ref 23–33)
CREAT SERPL-MCNC: 0.9 MG/DL (ref 0.4–1.2)
GFR SERPL CREATININE-BSD FRML MDRD: 59 ML/MIN/1.73M2
GLUCOSE BLD STRIP.AUTO-MCNC: 122 MG/DL (ref 70–108)
GLUCOSE BLD STRIP.AUTO-MCNC: 149 MG/DL (ref 70–108)
GLUCOSE BLD STRIP.AUTO-MCNC: 233 MG/DL (ref 70–108)
GLUCOSE BLD STRIP.AUTO-MCNC: 271 MG/DL (ref 70–108)
GLUCOSE SERPL-MCNC: 118 MG/DL (ref 70–108)
NT-PROBNP SERPL IA-MCNC: 1559 PG/ML (ref 0–449)
OSMOLALITY UR: 181 MOSMOL/KG (ref 250–750)
POTASSIUM SERPL-SCNC: 3.8 MEQ/L (ref 3.5–5.2)
SODIUM SERPL-SCNC: 125 MEQ/L (ref 135–145)
SODIUM SERPL-SCNC: 125 MEQ/L (ref 135–145)
SODIUM UR-SCNC: < 20 MEQ/L
URATE SERPL-MCNC: 6.3 MG/DL (ref 2.4–5.7)

## 2024-02-07 PROCEDURE — 80048 BASIC METABOLIC PNL TOTAL CA: CPT

## 2024-02-07 PROCEDURE — 84300 ASSAY OF URINE SODIUM: CPT

## 2024-02-07 PROCEDURE — 6370000000 HC RX 637 (ALT 250 FOR IP): Performed by: INTERNAL MEDICINE

## 2024-02-07 PROCEDURE — 6370000000 HC RX 637 (ALT 250 FOR IP): Performed by: PHYSICIAN ASSISTANT

## 2024-02-07 PROCEDURE — 83880 ASSAY OF NATRIURETIC PEPTIDE: CPT

## 2024-02-07 PROCEDURE — 82948 REAGENT STRIP/BLOOD GLUCOSE: CPT

## 2024-02-07 PROCEDURE — 97110 THERAPEUTIC EXERCISES: CPT

## 2024-02-07 PROCEDURE — 94660 CPAP INITIATION&MGMT: CPT

## 2024-02-07 PROCEDURE — 84550 ASSAY OF BLOOD/URIC ACID: CPT

## 2024-02-07 PROCEDURE — 97530 THERAPEUTIC ACTIVITIES: CPT

## 2024-02-07 PROCEDURE — 2580000003 HC RX 258: Performed by: INTERNAL MEDICINE

## 2024-02-07 PROCEDURE — 2060000000 HC ICU INTERMEDIATE R&B

## 2024-02-07 PROCEDURE — 99222 1ST HOSP IP/OBS MODERATE 55: CPT | Performed by: INTERNAL MEDICINE

## 2024-02-07 PROCEDURE — 97116 GAIT TRAINING THERAPY: CPT

## 2024-02-07 PROCEDURE — 94761 N-INVAS EAR/PLS OXIMETRY MLT: CPT

## 2024-02-07 PROCEDURE — 99232 SBSQ HOSP IP/OBS MODERATE 35: CPT | Performed by: INTERNAL MEDICINE

## 2024-02-07 PROCEDURE — 83935 ASSAY OF URINE OSMOLALITY: CPT

## 2024-02-07 PROCEDURE — 84295 ASSAY OF SERUM SODIUM: CPT

## 2024-02-07 PROCEDURE — 2580000003 HC RX 258: Performed by: PHYSICIAN ASSISTANT

## 2024-02-07 PROCEDURE — 2700000000 HC OXYGEN THERAPY PER DAY

## 2024-02-07 PROCEDURE — 6370000000 HC RX 637 (ALT 250 FOR IP): Performed by: STUDENT IN AN ORGANIZED HEALTH CARE EDUCATION/TRAINING PROGRAM

## 2024-02-07 PROCEDURE — 36415 COLL VENOUS BLD VENIPUNCTURE: CPT

## 2024-02-07 RX ORDER — SODIUM CHLORIDE 9 MG/ML
INJECTION, SOLUTION INTRAVENOUS CONTINUOUS
Status: ACTIVE | OUTPATIENT
Start: 2024-02-07 | End: 2024-02-08

## 2024-02-07 RX ORDER — BUMETANIDE 1 MG/1
1 TABLET ORAL 2 TIMES DAILY
Status: DISCONTINUED | OUTPATIENT
Start: 2024-02-07 | End: 2024-02-07

## 2024-02-07 RX ORDER — BUMETANIDE 1 MG/1
1 TABLET ORAL DAILY
Status: DISCONTINUED | OUTPATIENT
Start: 2024-02-08 | End: 2024-02-14 | Stop reason: HOSPADM

## 2024-02-07 RX ORDER — SODIUM CHLORIDE 1 G/1
2 TABLET ORAL ONCE
Status: COMPLETED | OUTPATIENT
Start: 2024-02-07 | End: 2024-02-07

## 2024-02-07 RX ADMIN — CALCIUM 500 MG: 500 TABLET ORAL at 08:32

## 2024-02-07 RX ADMIN — SODIUM CHLORIDE, PRESERVATIVE FREE 10 ML: 5 INJECTION INTRAVENOUS at 08:32

## 2024-02-07 RX ADMIN — Medication 1 DROP: at 08:32

## 2024-02-07 RX ADMIN — OXYCODONE 2.5 MG: 5 TABLET ORAL at 04:28

## 2024-02-07 RX ADMIN — BUMETANIDE 1 MG: 1 TABLET ORAL at 08:32

## 2024-02-07 RX ADMIN — DOCUSATE SODIUM 50 MG AND SENNOSIDES 8.6 MG 1 TABLET: 8.6; 5 TABLET, FILM COATED ORAL at 20:46

## 2024-02-07 RX ADMIN — SODIUM CHLORIDE, PRESERVATIVE FREE 10 ML: 5 INJECTION INTRAVENOUS at 20:46

## 2024-02-07 RX ADMIN — SODIUM CHLORIDE: 9 INJECTION, SOLUTION INTRAVENOUS at 12:42

## 2024-02-07 RX ADMIN — RIVAROXABAN 15 MG: 15 TABLET, FILM COATED ORAL at 08:32

## 2024-02-07 RX ADMIN — Medication 1 DROP: at 20:46

## 2024-02-07 RX ADMIN — POLYETHYLENE GLYCOL 3350 17 G: 17 POWDER, FOR SOLUTION ORAL at 08:32

## 2024-02-07 RX ADMIN — SODIUM CHLORIDE 2 G: 1 TABLET ORAL at 08:30

## 2024-02-07 RX ADMIN — LOTEPREDNOL ETABONATE 1 DROP: 5 SUSPENSION/ DROPS OPHTHALMIC at 08:33

## 2024-02-07 RX ADMIN — INSULIN LISPRO 2 UNITS: 100 INJECTION, SOLUTION INTRAVENOUS; SUBCUTANEOUS at 17:23

## 2024-02-07 RX ADMIN — INSULIN LISPRO 1 UNITS: 100 INJECTION, SOLUTION INTRAVENOUS; SUBCUTANEOUS at 12:42

## 2024-02-07 NOTE — CARE COORDINATION
2/7/24, 11:41 AM EST    DISCHARGE PLANNING EVALUATION     Federica spoke with pt, states she was up with therapy today and walked to the hallway. Pt is not sure what her children are planning on at discharge, SNF or Home with HH.   FEDERICA spoke with nurse, states pt did walk with therapy and did well.     FEDERICA called [facility], spoke with George to follow up on referral that federica Roca started. George will review.

## 2024-02-07 NOTE — CARE COORDINATION
2/7/24, 8:32 AM EST    DISCHARGE ON GOING EVALUATION    [facility] day: 9  Location: [facility]08/008-A Reason for admit: Acute on chronic respiratory failure with hypoxia and hypercapnia (HCC) [J96.21, J96.22]   Procedure:   1/29 CXR: Right upper lobe infiltrate appears improved, without complete resolution; Worsening bibasilar infiltrates; Small right pleural effusion appears mildly increased. Small left   pleural effusion is also suspected to be increased.  1/30 CXR: Increased discoid atelectasis in the right midlung and right upper lobe   infiltrate. Otherwise no significant change.     Barriers to Discharge: diabetes management, Na 125 with Nephrology consulted. PT/OT/SLP, Bumex, Zofran prn, Xarelto, electrolyte replacement protocols.  PCP: Gaudencio Sepulveda  Readmission Risk Score: 19.7%  Patient Goals/Plan/Treatment Preferences: From home. SW following for SNF placement. Refer to SW notes.

## 2024-02-08 LAB
ANION GAP SERPL CALC-SCNC: 8 MEQ/L (ref 8–16)
BUN SERPL-MCNC: 42 MG/DL (ref 7–22)
CALCIUM SERPL-MCNC: 9.7 MG/DL (ref 8.5–10.5)
CHLORIDE SERPL-SCNC: 81 MEQ/L (ref 98–111)
CO2 SERPL-SCNC: 42 MEQ/L (ref 23–33)
CREAT SERPL-MCNC: 1 MG/DL (ref 0.4–1.2)
GFR SERPL CREATININE-BSD FRML MDRD: 52 ML/MIN/1.73M2
GLUCOSE BLD STRIP.AUTO-MCNC: 136 MG/DL (ref 70–108)
GLUCOSE BLD STRIP.AUTO-MCNC: 175 MG/DL (ref 70–108)
GLUCOSE BLD STRIP.AUTO-MCNC: 184 MG/DL (ref 70–108)
GLUCOSE BLD STRIP.AUTO-MCNC: 236 MG/DL (ref 70–108)
GLUCOSE SERPL-MCNC: 102 MG/DL (ref 70–108)
POTASSIUM SERPL-SCNC: 4.1 MEQ/L (ref 3.5–5.2)
SODIUM SERPL-SCNC: 131 MEQ/L (ref 135–145)

## 2024-02-08 PROCEDURE — 99232 SBSQ HOSP IP/OBS MODERATE 35: CPT | Performed by: INTERNAL MEDICINE

## 2024-02-08 PROCEDURE — 80048 BASIC METABOLIC PNL TOTAL CA: CPT

## 2024-02-08 PROCEDURE — 2060000000 HC ICU INTERMEDIATE R&B

## 2024-02-08 PROCEDURE — 2580000003 HC RX 258: Performed by: PHYSICIAN ASSISTANT

## 2024-02-08 PROCEDURE — 97530 THERAPEUTIC ACTIVITIES: CPT

## 2024-02-08 PROCEDURE — 6370000000 HC RX 637 (ALT 250 FOR IP): Performed by: STUDENT IN AN ORGANIZED HEALTH CARE EDUCATION/TRAINING PROGRAM

## 2024-02-08 PROCEDURE — 97110 THERAPEUTIC EXERCISES: CPT

## 2024-02-08 PROCEDURE — 97535 SELF CARE MNGMENT TRAINING: CPT

## 2024-02-08 PROCEDURE — 6370000000 HC RX 637 (ALT 250 FOR IP): Performed by: PHYSICIAN ASSISTANT

## 2024-02-08 PROCEDURE — 36415 COLL VENOUS BLD VENIPUNCTURE: CPT

## 2024-02-08 PROCEDURE — 82948 REAGENT STRIP/BLOOD GLUCOSE: CPT

## 2024-02-08 PROCEDURE — 6370000000 HC RX 637 (ALT 250 FOR IP): Performed by: INTERNAL MEDICINE

## 2024-02-08 RX ADMIN — SODIUM CHLORIDE, PRESERVATIVE FREE 10 ML: 5 INJECTION INTRAVENOUS at 10:42

## 2024-02-08 RX ADMIN — OXYCODONE 2.5 MG: 5 TABLET ORAL at 13:54

## 2024-02-08 RX ADMIN — ATENOLOL 25 MG: 25 TABLET ORAL at 10:40

## 2024-02-08 RX ADMIN — BUMETANIDE 1 MG: 1 TABLET ORAL at 10:41

## 2024-02-08 RX ADMIN — Medication 1 DROP: at 10:43

## 2024-02-08 RX ADMIN — CALCIUM 500 MG: 500 TABLET ORAL at 10:42

## 2024-02-08 RX ADMIN — RIVAROXABAN 15 MG: 15 TABLET, FILM COATED ORAL at 10:42

## 2024-02-08 RX ADMIN — SODIUM CHLORIDE, PRESERVATIVE FREE 10 ML: 5 INJECTION INTRAVENOUS at 22:00

## 2024-02-08 RX ADMIN — Medication 1 DROP: at 22:00

## 2024-02-08 RX ADMIN — ISOSORBIDE MONONITRATE 30 MG: 30 TABLET, EXTENDED RELEASE ORAL at 12:43

## 2024-02-08 RX ADMIN — POLYETHYLENE GLYCOL 3350 17 G: 17 POWDER, FOR SOLUTION ORAL at 10:41

## 2024-02-08 NOTE — CARE COORDINATION
2/8/24, 8:28 AM EST    DISCHARGE ON GOING EVALUATION    [facility] day: 10  Location: [facility]08/008-A Reason for admit: Acute on chronic respiratory failure with hypoxia and hypercapnia (HCC) [J96.21, J96.22]   Procedure:   1/29 CXR: Right upper lobe infiltrate appears improved, without complete resolution; Worsening bibasilar infiltrates; Small right pleural effusion appears mildly increased. Small left   pleural effusion is also suspected to be increased.  1/30 CXR: Increased discoid atelectasis in the right midlung and right upper lobe   infiltrate. Otherwise no significant change.  Barriers to Discharge: Na 131  with Nephrology following, PT/OT/SLP, Bumex, Zofran prn, Xarelto, electrolyte replacement protocols. DIONNE kaba.   PCP: Gaudencio Sepulveda  Readmission Risk Score: 18.4%  Patient Goals/Plan/Treatment Preferences: From home. SW following for SNF placement. Refer to SW notes

## 2024-02-08 NOTE — CARE COORDINATION
2/8/24, 10:38 AM EST    DISCHARGE PLANNING EVALUATION     ADARSH called George at [facility], left message inquiring about status of referral and explained if they accept Precert needs started today.    ADARSH left 2nd message for George, no response.  Adarsh left message with Zeus in Administration.

## 2024-02-09 LAB
ANION GAP SERPL CALC-SCNC: 5 MEQ/L (ref 8–16)
BUN SERPL-MCNC: 38 MG/DL (ref 7–22)
CALCIUM SERPL-MCNC: 9.5 MG/DL (ref 8.5–10.5)
CHLORIDE SERPL-SCNC: 82 MEQ/L (ref 98–111)
CO2 SERPL-SCNC: 46 MEQ/L (ref 23–33)
CREAT SERPL-MCNC: 0.9 MG/DL (ref 0.4–1.2)
GFR SERPL CREATININE-BSD FRML MDRD: 59 ML/MIN/1.73M2
GLUCOSE BLD STRIP.AUTO-MCNC: 182 MG/DL (ref 70–108)
GLUCOSE BLD STRIP.AUTO-MCNC: 224 MG/DL (ref 70–108)
GLUCOSE BLD STRIP.AUTO-MCNC: 243 MG/DL (ref 70–108)
GLUCOSE SERPL-MCNC: 121 MG/DL (ref 70–108)
POTASSIUM SERPL-SCNC: 3.7 MEQ/L (ref 3.5–5.2)
SODIUM SERPL-SCNC: 133 MEQ/L (ref 135–145)

## 2024-02-09 PROCEDURE — 6370000000 HC RX 637 (ALT 250 FOR IP): Performed by: INTERNAL MEDICINE

## 2024-02-09 PROCEDURE — 80048 BASIC METABOLIC PNL TOTAL CA: CPT

## 2024-02-09 PROCEDURE — 2580000003 HC RX 258: Performed by: PHYSICIAN ASSISTANT

## 2024-02-09 PROCEDURE — 2700000000 HC OXYGEN THERAPY PER DAY

## 2024-02-09 PROCEDURE — 6370000000 HC RX 637 (ALT 250 FOR IP): Performed by: PHYSICIAN ASSISTANT

## 2024-02-09 PROCEDURE — 99232 SBSQ HOSP IP/OBS MODERATE 35: CPT | Performed by: INTERNAL MEDICINE

## 2024-02-09 PROCEDURE — 94761 N-INVAS EAR/PLS OXIMETRY MLT: CPT

## 2024-02-09 PROCEDURE — 36415 COLL VENOUS BLD VENIPUNCTURE: CPT

## 2024-02-09 PROCEDURE — 94660 CPAP INITIATION&MGMT: CPT

## 2024-02-09 PROCEDURE — 2140000000 HC CCU INTERMEDIATE R&B

## 2024-02-09 PROCEDURE — 97116 GAIT TRAINING THERAPY: CPT

## 2024-02-09 PROCEDURE — 82948 REAGENT STRIP/BLOOD GLUCOSE: CPT

## 2024-02-09 PROCEDURE — 97530 THERAPEUTIC ACTIVITIES: CPT

## 2024-02-09 PROCEDURE — 6370000000 HC RX 637 (ALT 250 FOR IP): Performed by: STUDENT IN AN ORGANIZED HEALTH CARE EDUCATION/TRAINING PROGRAM

## 2024-02-09 RX ADMIN — DOCUSATE SODIUM 50 MG AND SENNOSIDES 8.6 MG 1 TABLET: 8.6; 5 TABLET, FILM COATED ORAL at 19:48

## 2024-02-09 RX ADMIN — LOTEPREDNOL ETABONATE 1 DROP: 5 SUSPENSION/ DROPS OPHTHALMIC at 09:47

## 2024-02-09 RX ADMIN — Medication 1 DROP: at 19:48

## 2024-02-09 RX ADMIN — INSULIN LISPRO 1 UNITS: 100 INJECTION, SOLUTION INTRAVENOUS; SUBCUTANEOUS at 18:35

## 2024-02-09 RX ADMIN — Medication 1 DROP: at 15:09

## 2024-02-09 RX ADMIN — ATENOLOL 25 MG: 25 TABLET ORAL at 09:42

## 2024-02-09 RX ADMIN — Medication 1 DROP: at 09:49

## 2024-02-09 RX ADMIN — SODIUM CHLORIDE, PRESERVATIVE FREE 10 ML: 5 INJECTION INTRAVENOUS at 09:42

## 2024-02-09 RX ADMIN — OXYCODONE 2.5 MG: 5 TABLET ORAL at 13:20

## 2024-02-09 RX ADMIN — BUMETANIDE 1 MG: 1 TABLET ORAL at 09:42

## 2024-02-09 RX ADMIN — CALCIUM 500 MG: 500 TABLET ORAL at 09:48

## 2024-02-09 RX ADMIN — ISOSORBIDE MONONITRATE 30 MG: 30 TABLET, EXTENDED RELEASE ORAL at 09:42

## 2024-02-09 RX ADMIN — SODIUM CHLORIDE, PRESERVATIVE FREE 10 ML: 5 INJECTION INTRAVENOUS at 19:48

## 2024-02-09 RX ADMIN — RIVAROXABAN 15 MG: 15 TABLET, FILM COATED ORAL at 08:40

## 2024-02-09 NOTE — CARE COORDINATION
2/9/24, 9:00 AM EST    DISCHARGE PLANNING EVALUATION     ADARSH called [facility] and spoke with George in Admissions, she states they have accepted pt and PRECERT was started yesterday.     Adarsh spoke with daughter Tess for update and explained the precert process. Tess is uncertain how pt will transport at time of discharge, to be decided later.

## 2024-02-09 NOTE — DISCHARGE INSTR - COC
Continuity of Care Form    Patient Name: Daniela Pacheco   :  1929  MRN:  [id number]    Admit date:  2024  Discharge date:  24    Code Status Order: Limited   Advance Directives:     Admitting Physician:  No admitting provider for patient encounter.  PCP: Gaudencio Sepulveda    Discharging Nurse: [facility] Unit/Room#: 4B-08/008-A  Discharging Unit Phone Number: [phone]    Emergency Contact:   Extended Emergency Contact Information  Primary Emergency Contact: Mango Pacheco [facility]  Home Phone: [phone]  Mobile Phone: [phone]  Relation: Child  Secondary Emergency Contact: Tess Fernandez   [facility]  Home Phone: [phone]  Mobile Phone: [phone]  Relation: Child    Past Surgical History:  Past Surgical History:   Procedure Laterality Date    CARPAL TUNNEL RELEASE  10/23/2018    right hand    COLONOSCOPY      EYE SURGERY      FOOT SURGERY      x2    HYSTERECTOMY (CERVIX STATUS UNKNOWN)  1982    KNEE SURGERY  2010    LIVER BIOPSY  2017    SKIN BIOPSY         Immunization History:   Immunization History   Administered Date(s) Administered    PPD Test 2018, 2018       Active Problems:  Patient Active Problem List   Diagnosis Code    Renal mass, right N28.89    Essential hypertension I10    Chronic atrial fibrillation (HCC) I48.20    Hepatic lesion K76.9    Pneumonia of right lung due to infectious organism J18.9    Hyponatremia E87.1    Symptomatic bradycardia R00.1    Acute on chronic diastolic CHF (congestive heart failure) ([facility]) I50.33    Chronic renal disease, stage III ([facility]) [993487] N18.30    Shortness of breath R06.02    Acute on chronic congestive heart failure (HCC) I50.9    [employer] single pacemaker  Z95.0    Acute respiratory failure with hypoxia (HCC) J96.01    Leukocytosis D72.829    Kidney lesion, native, right N28.9    PAF (paroxysmal atrial fibrillation) ([facility]) I48.0    Chronic

## 2024-02-09 NOTE — CARE COORDINATION
2/9/24, 8:13 AM EST    DISCHARGE ON GOING EVALUATION    [facility] day: 11  Location: [facility]08/008-A Reason for admit: Acute on chronic respiratory failure with hypoxia and hypercapnia (HCC) [J96.21, J96.22]   Procedure:   1/29 CXR: Right upper lobe infiltrate appears improved, without complete resolution; Worsening bibasilar infiltrates; Small right pleural effusion appears mildly increased. Small left   pleural effusion is also suspected to be increased.  1/30 CXR: Increased discoid atelectasis in the right midlung and right upper lobe   infiltrate. Otherwise no significant change.  Barriers to Discharge: Na 133  with Nephrology following, PT/OT/SLP, Bumex, Zofran prn, Xarelto, electrolyte replacement protocols. DIONNE kaba.    PCP: Gaudencio Sepulveda  Readmission Risk Score: 18.5%  Patient Goals/Plan/Treatment Preferences: From home. SW following for SNF placement. Refer to SW notes

## 2024-02-10 ENCOUNTER — APPOINTMENT (OUTPATIENT)
Dept: CT IMAGING | Age: 89
DRG: 189 | End: 2024-02-10
Payer: MEDICARE

## 2024-02-10 LAB
ANION GAP SERPL CALC-SCNC: 6 MEQ/L (ref 8–16)
BUN SERPL-MCNC: 32 MG/DL (ref 7–22)
CALCIUM SERPL-MCNC: 9.7 MG/DL (ref 8.5–10.5)
CHLORIDE SERPL-SCNC: 84 MEQ/L (ref 98–111)
CO2 SERPL-SCNC: 43 MEQ/L (ref 23–33)
CREAT SERPL-MCNC: 0.8 MG/DL (ref 0.4–1.2)
GFR SERPL CREATININE-BSD FRML MDRD: > 60 ML/MIN/1.73M2
GLUCOSE BLD STRIP.AUTO-MCNC: 116 MG/DL (ref 70–108)
GLUCOSE BLD STRIP.AUTO-MCNC: 133 MG/DL (ref 70–108)
GLUCOSE BLD STRIP.AUTO-MCNC: 192 MG/DL (ref 70–108)
GLUCOSE BLD STRIP.AUTO-MCNC: 232 MG/DL (ref 70–108)
GLUCOSE SERPL-MCNC: 112 MG/DL (ref 70–108)
POTASSIUM SERPL-SCNC: 3.9 MEQ/L (ref 3.5–5.2)
SODIUM SERPL-SCNC: 133 MEQ/L (ref 135–145)

## 2024-02-10 PROCEDURE — 94660 CPAP INITIATION&MGMT: CPT

## 2024-02-10 PROCEDURE — 2140000000 HC CCU INTERMEDIATE R&B

## 2024-02-10 PROCEDURE — 6370000000 HC RX 637 (ALT 250 FOR IP): Performed by: INTERNAL MEDICINE

## 2024-02-10 PROCEDURE — 71275 CT ANGIOGRAPHY CHEST: CPT

## 2024-02-10 PROCEDURE — 80048 BASIC METABOLIC PNL TOTAL CA: CPT

## 2024-02-10 PROCEDURE — 99232 SBSQ HOSP IP/OBS MODERATE 35: CPT | Performed by: INTERNAL MEDICINE

## 2024-02-10 PROCEDURE — 6370000000 HC RX 637 (ALT 250 FOR IP): Performed by: PHYSICIAN ASSISTANT

## 2024-02-10 PROCEDURE — 36415 COLL VENOUS BLD VENIPUNCTURE: CPT

## 2024-02-10 PROCEDURE — 6360000004 HC RX CONTRAST MEDICATION: Performed by: STUDENT IN AN ORGANIZED HEALTH CARE EDUCATION/TRAINING PROGRAM

## 2024-02-10 PROCEDURE — 2580000003 HC RX 258: Performed by: PHYSICIAN ASSISTANT

## 2024-02-10 PROCEDURE — 82948 REAGENT STRIP/BLOOD GLUCOSE: CPT

## 2024-02-10 RX ADMIN — SODIUM CHLORIDE, PRESERVATIVE FREE 10 ML: 5 INJECTION INTRAVENOUS at 09:53

## 2024-02-10 RX ADMIN — ACETAMINOPHEN 650 MG: 325 TABLET ORAL at 14:27

## 2024-02-10 RX ADMIN — RIVAROXABAN 15 MG: 15 TABLET, FILM COATED ORAL at 09:52

## 2024-02-10 RX ADMIN — POLYETHYLENE GLYCOL 3350 17 G: 17 POWDER, FOR SOLUTION ORAL at 09:52

## 2024-02-10 RX ADMIN — CALCIUM 500 MG: 500 TABLET ORAL at 09:52

## 2024-02-10 RX ADMIN — INSULIN LISPRO 1 UNITS: 100 INJECTION, SOLUTION INTRAVENOUS; SUBCUTANEOUS at 12:49

## 2024-02-10 RX ADMIN — ISOSORBIDE MONONITRATE 30 MG: 30 TABLET, EXTENDED RELEASE ORAL at 09:52

## 2024-02-10 RX ADMIN — BUMETANIDE 1 MG: 1 TABLET ORAL at 09:52

## 2024-02-10 RX ADMIN — Medication 1 DROP: at 09:52

## 2024-02-10 RX ADMIN — ATENOLOL 25 MG: 25 TABLET ORAL at 12:13

## 2024-02-10 RX ADMIN — SODIUM CHLORIDE, PRESERVATIVE FREE 10 ML: 5 INJECTION INTRAVENOUS at 20:06

## 2024-02-10 RX ADMIN — IOPAMIDOL 80 ML: 755 INJECTION, SOLUTION INTRAVENOUS at 16:55

## 2024-02-10 RX ADMIN — Medication 1 DROP: at 20:06

## 2024-02-10 RX ADMIN — Medication 1 DROP: at 14:22

## 2024-02-11 LAB
ANION GAP SERPL CALC-SCNC: 8 MEQ/L (ref 8–16)
BUN SERPL-MCNC: 28 MG/DL (ref 7–22)
CALCIUM SERPL-MCNC: 9.7 MG/DL (ref 8.5–10.5)
CHLORIDE SERPL-SCNC: 87 MEQ/L (ref 98–111)
CO2 SERPL-SCNC: 38 MEQ/L (ref 23–33)
CREAT SERPL-MCNC: 0.7 MG/DL (ref 0.4–1.2)
GFR SERPL CREATININE-BSD FRML MDRD: > 60 ML/MIN/1.73M2
GLUCOSE BLD STRIP.AUTO-MCNC: 105 MG/DL (ref 70–108)
GLUCOSE BLD STRIP.AUTO-MCNC: 176 MG/DL (ref 70–108)
GLUCOSE BLD STRIP.AUTO-MCNC: 188 MG/DL (ref 70–108)
GLUCOSE BLD STRIP.AUTO-MCNC: 208 MG/DL (ref 70–108)
GLUCOSE SERPL-MCNC: 110 MG/DL (ref 70–108)
POTASSIUM SERPL-SCNC: 4.1 MEQ/L (ref 3.5–5.2)
SODIUM SERPL-SCNC: 133 MEQ/L (ref 135–145)

## 2024-02-11 PROCEDURE — 6370000000 HC RX 637 (ALT 250 FOR IP): Performed by: PHYSICIAN ASSISTANT

## 2024-02-11 PROCEDURE — 36415 COLL VENOUS BLD VENIPUNCTURE: CPT

## 2024-02-11 PROCEDURE — 99232 SBSQ HOSP IP/OBS MODERATE 35: CPT | Performed by: INTERNAL MEDICINE

## 2024-02-11 PROCEDURE — 2580000003 HC RX 258: Performed by: PHYSICIAN ASSISTANT

## 2024-02-11 PROCEDURE — 2140000000 HC CCU INTERMEDIATE R&B

## 2024-02-11 PROCEDURE — 6370000000 HC RX 637 (ALT 250 FOR IP): Performed by: INTERNAL MEDICINE

## 2024-02-11 PROCEDURE — 82948 REAGENT STRIP/BLOOD GLUCOSE: CPT

## 2024-02-11 PROCEDURE — 80048 BASIC METABOLIC PNL TOTAL CA: CPT

## 2024-02-11 RX ORDER — BUMETANIDE 1 MG/1
1 TABLET ORAL ONCE
Status: COMPLETED | OUTPATIENT
Start: 2024-02-11 | End: 2024-02-11

## 2024-02-11 RX ADMIN — BUMETANIDE 1 MG: 1 TABLET ORAL at 16:10

## 2024-02-11 RX ADMIN — DOCUSATE SODIUM 50 MG AND SENNOSIDES 8.6 MG 1 TABLET: 8.6; 5 TABLET, FILM COATED ORAL at 20:14

## 2024-02-11 RX ADMIN — CALCIUM 500 MG: 500 TABLET ORAL at 09:08

## 2024-02-11 RX ADMIN — Medication 1 DROP: at 14:17

## 2024-02-11 RX ADMIN — SODIUM CHLORIDE, PRESERVATIVE FREE 10 ML: 5 INJECTION INTRAVENOUS at 09:14

## 2024-02-11 RX ADMIN — ISOSORBIDE MONONITRATE 30 MG: 30 TABLET, EXTENDED RELEASE ORAL at 09:08

## 2024-02-11 RX ADMIN — SODIUM CHLORIDE, PRESERVATIVE FREE 10 ML: 5 INJECTION INTRAVENOUS at 20:14

## 2024-02-11 RX ADMIN — Medication 1 DROP: at 20:14

## 2024-02-11 RX ADMIN — LOTEPREDNOL ETABONATE 1 DROP: 5 SUSPENSION/ DROPS OPHTHALMIC at 09:09

## 2024-02-11 RX ADMIN — BUMETANIDE 1 MG: 1 TABLET ORAL at 09:08

## 2024-02-11 RX ADMIN — Medication 1 DROP: at 09:08

## 2024-02-11 RX ADMIN — ATENOLOL 25 MG: 25 TABLET ORAL at 09:08

## 2024-02-11 RX ADMIN — POLYETHYLENE GLYCOL 3350 17 G: 17 POWDER, FOR SOLUTION ORAL at 09:08

## 2024-02-11 RX ADMIN — RIVAROXABAN 15 MG: 15 TABLET, FILM COATED ORAL at 09:08

## 2024-02-11 NOTE — CONSULTS
[facility]      Patient - Daniela Pacheco   MRN -  [id number]   [facility] # - [id number]   - 1929      Date of Admission -  2024 11:48 PM  Date of evaluation -  2024  Room - 3B-36/[facility]-A   Hospital Day - 13  Consulting - Albert Cortez MD Primary Care Physician - Gaudencio Sepulveda     Problem List      Active Hospital Problems    Diagnosis Date Noted    Chronic renal disease, stage III (HCC) [092440] [N18.30] 2022     Priority: Medium    Hypotension [I95.9] 2024    Moderate malnutrition (HCC) [E44.0] 2024     Class: Acute    Palliative care patient [Z51.5] 2024    Acute on chronic respiratory failure with hypoxia and hypercapnia (HCC) [J96.21, J96.22] 2024    Chronic anticoagulation [Z79.01] 2024    Pneumonia of right lung due to infectious organism [J18.9] 2018    Essential hypertension [I10] 2017    Chronic atrial fibrillation (HCC) [I48.20] 2017     Reason for Consult    Bilateral pleural effusions, acute on chronic hypercapnic and hypoxic respiratory failure  HPI   History Obtained From: Patient and electronic medical record.    Daniela Pacheco is a 89 y.o. female who was admitted to [facility] for CO2 narcosis and volume overload.  She has had a hospital course since 2024 for which she has been diuresed.  Pulmonology was consulted following CTA chest which demonstrated bilateral pleural effusions.  Patient was evaluated at bedside this morning.  She reports overall feeling better and has a slight nonproductive cough.  She denies having any worsening shortness of breath, orthopnea, sputum production, fevers.  She has no acute respiratory concerns at this time.    PMHx   Past Medical History      Diagnosis Date    Arthritis     Atrial fibrillation (HCC)     Cancer (HCC)     Skin Cancer    CHF (congestive heart failure) (HCC)     Chronic renal disease, stage III (HCC) [001908]

## 2024-02-12 LAB
ANION GAP SERPL CALC-SCNC: 3 MEQ/L (ref 8–16)
ARTERIAL PATENCY WRIST A: POSITIVE
BASE EXCESS BLDA CALC-SCNC: 20.4 MMOL/L (ref -2.5–2.5)
BDY SITE: ABNORMAL
BUN SERPL-MCNC: 26 MG/DL (ref 7–22)
CALCIUM SERPL-MCNC: 9.3 MG/DL (ref 8.5–10.5)
CHLORIDE SERPL-SCNC: 85 MEQ/L (ref 98–111)
CO2 SERPL-SCNC: 46 MEQ/L (ref 23–33)
COLLECTED BY:: ABNORMAL
CREAT SERPL-MCNC: 0.6 MG/DL (ref 0.4–1.2)
DEVICE: ABNORMAL
FIO2 ON VENT O2 ANALYZER: 1 %
GFR SERPL CREATININE-BSD FRML MDRD: > 60 ML/MIN/1.73M2
GLUCOSE BLD STRIP.AUTO-MCNC: 110 MG/DL (ref 70–108)
GLUCOSE BLD STRIP.AUTO-MCNC: 136 MG/DL (ref 70–108)
GLUCOSE BLD STRIP.AUTO-MCNC: 166 MG/DL (ref 70–108)
GLUCOSE BLD STRIP.AUTO-MCNC: 198 MG/DL (ref 70–108)
GLUCOSE SERPL-MCNC: 113 MG/DL (ref 70–108)
HCO3 BLDA-SCNC: 47 MMOL/L (ref 23–28)
PCO2 BLDA: 58 MMHG (ref 35–45)
PH BLDA: 7.52 [PH] (ref 7.35–7.45)
PO2 BLDA: 60 MMHG (ref 71–104)
POTASSIUM SERPL-SCNC: 3.8 MEQ/L (ref 3.5–5.2)
SAO2 % BLDA: 92 %
SODIUM SERPL-SCNC: 134 MEQ/L (ref 135–145)

## 2024-02-12 PROCEDURE — 36415 COLL VENOUS BLD VENIPUNCTURE: CPT

## 2024-02-12 PROCEDURE — 94762 N-INVAS EAR/PLS OXIMTRY CONT: CPT

## 2024-02-12 PROCEDURE — 80048 BASIC METABOLIC PNL TOTAL CA: CPT

## 2024-02-12 PROCEDURE — 99232 SBSQ HOSP IP/OBS MODERATE 35: CPT | Performed by: INTERNAL MEDICINE

## 2024-02-12 PROCEDURE — 2140000000 HC CCU INTERMEDIATE R&B

## 2024-02-12 PROCEDURE — 97530 THERAPEUTIC ACTIVITIES: CPT

## 2024-02-12 PROCEDURE — 2580000003 HC RX 258: Performed by: PHYSICIAN ASSISTANT

## 2024-02-12 PROCEDURE — 82948 REAGENT STRIP/BLOOD GLUCOSE: CPT

## 2024-02-12 PROCEDURE — 94660 CPAP INITIATION&MGMT: CPT

## 2024-02-12 PROCEDURE — 6370000000 HC RX 637 (ALT 250 FOR IP): Performed by: PHYSICIAN ASSISTANT

## 2024-02-12 PROCEDURE — 2700000000 HC OXYGEN THERAPY PER DAY

## 2024-02-12 PROCEDURE — 97535 SELF CARE MNGMENT TRAINING: CPT

## 2024-02-12 PROCEDURE — 36600 WITHDRAWAL OF ARTERIAL BLOOD: CPT

## 2024-02-12 PROCEDURE — 97116 GAIT TRAINING THERAPY: CPT

## 2024-02-12 PROCEDURE — 6370000000 HC RX 637 (ALT 250 FOR IP): Performed by: INTERNAL MEDICINE

## 2024-02-12 PROCEDURE — 97110 THERAPEUTIC EXERCISES: CPT

## 2024-02-12 PROCEDURE — 82803 BLOOD GASES ANY COMBINATION: CPT

## 2024-02-12 RX ADMIN — Medication 1 DROP: at 08:53

## 2024-02-12 RX ADMIN — SODIUM CHLORIDE, PRESERVATIVE FREE 10 ML: 5 INJECTION INTRAVENOUS at 08:53

## 2024-02-12 RX ADMIN — BUMETANIDE 1 MG: 1 TABLET ORAL at 08:54

## 2024-02-12 RX ADMIN — CALCIUM 500 MG: 500 TABLET ORAL at 08:53

## 2024-02-12 RX ADMIN — ISOSORBIDE MONONITRATE 30 MG: 30 TABLET, EXTENDED RELEASE ORAL at 08:53

## 2024-02-12 RX ADMIN — SODIUM CHLORIDE, PRESERVATIVE FREE 10 ML: 5 INJECTION INTRAVENOUS at 20:08

## 2024-02-12 RX ADMIN — Medication 1 DROP: at 20:09

## 2024-02-12 RX ADMIN — RIVAROXABAN 15 MG: 15 TABLET, FILM COATED ORAL at 08:53

## 2024-02-12 RX ADMIN — DOCUSATE SODIUM 50 MG AND SENNOSIDES 8.6 MG 1 TABLET: 8.6; 5 TABLET, FILM COATED ORAL at 20:12

## 2024-02-12 RX ADMIN — ATENOLOL 25 MG: 25 TABLET ORAL at 08:54

## 2024-02-12 RX ADMIN — Medication 1 DROP: at 13:56

## 2024-02-12 NOTE — CARE COORDINATION
2/12/24, 12:51 PM EST    DISCHARGE ON GOING EVALUATION    [facility] day: 14  Location: [address] [place] [address]- Reason for admit: Acute on chronic respiratory failure with hypoxia and hypercapnia (HCC) [J96.21, J96.22]   Procedure:   1/29 CXR: Right upper lobe infiltrate appears improved, without complete resolution; Worsening bibasilar infiltrates; Small right pleural effusion appears mildly increased. Small left pleural effusion is also suspected to be increased.  1/30 CXR: Increased discoid atelectasis in the right midlung and right upper lobe infiltrate. Otherwise no significant change.  2/10 CTA chest: No pulmonary emboli are seen. Pulmonary arterial hypertension. Moderate cardiomegaly. Moderate-sized bilateral pleural effusions. Moderate atelectasis/pneumonia both lung bases: The lingula and right middle lobe.    Barriers to Discharge: Transferred from  to . Hospitalist, Nephrology, Pulmonology and Palliative Care following. Sodium 134, CO2 46, BUN 26, creatinine 0.6. Pulmonology planning BiPAP eval. BiPAP is currently being used during sleep. Room air. Precert started 2/8/24.    PCP: Gaudencio Sepulveda  Readmission Risk Score: 17.9%  Patient Goals/Plan/Treatment Preferences: From home alone. Planning [facility] at discharge, precert started 2/8/24. SW following.

## 2024-02-12 NOTE — CARE COORDINATION
2/12/24, 8:19 AM EST    DISCHARGE BARRIERS        Patient transferred to [facility]. Report given to unit SW, Lorena, regarding discharge plan for this patient.

## 2024-02-13 LAB
ANION GAP SERPL CALC-SCNC: 0 MEQ/L (ref 8–16)
BUN SERPL-MCNC: 26 MG/DL (ref 7–22)
CALCIUM SERPL-MCNC: 9.5 MG/DL (ref 8.5–10.5)
CHLORIDE SERPL-SCNC: 87 MEQ/L (ref 98–111)
CO2 SERPL-SCNC: 47 MEQ/L (ref 23–33)
CREAT SERPL-MCNC: 0.8 MG/DL (ref 0.4–1.2)
GFR SERPL CREATININE-BSD FRML MDRD: > 60 ML/MIN/1.73M2
GLUCOSE BLD STRIP.AUTO-MCNC: 121 MG/DL (ref 70–108)
GLUCOSE BLD STRIP.AUTO-MCNC: 129 MG/DL (ref 70–108)
GLUCOSE BLD STRIP.AUTO-MCNC: 139 MG/DL (ref 70–108)
GLUCOSE BLD STRIP.AUTO-MCNC: 163 MG/DL (ref 70–108)
GLUCOSE SERPL-MCNC: 108 MG/DL (ref 70–108)
POTASSIUM SERPL-SCNC: 3.9 MEQ/L (ref 3.5–5.2)
SODIUM SERPL-SCNC: 134 MEQ/L (ref 135–145)

## 2024-02-13 PROCEDURE — 6370000000 HC RX 637 (ALT 250 FOR IP): Performed by: PHYSICIAN ASSISTANT

## 2024-02-13 PROCEDURE — 99232 SBSQ HOSP IP/OBS MODERATE 35: CPT | Performed by: PHYSICIAN ASSISTANT

## 2024-02-13 PROCEDURE — 97110 THERAPEUTIC EXERCISES: CPT

## 2024-02-13 PROCEDURE — 80048 BASIC METABOLIC PNL TOTAL CA: CPT

## 2024-02-13 PROCEDURE — 99232 SBSQ HOSP IP/OBS MODERATE 35: CPT | Performed by: INTERNAL MEDICINE

## 2024-02-13 PROCEDURE — 97116 GAIT TRAINING THERAPY: CPT

## 2024-02-13 PROCEDURE — 36415 COLL VENOUS BLD VENIPUNCTURE: CPT

## 2024-02-13 PROCEDURE — 6370000000 HC RX 637 (ALT 250 FOR IP): Performed by: INTERNAL MEDICINE

## 2024-02-13 PROCEDURE — 2700000000 HC OXYGEN THERAPY PER DAY

## 2024-02-13 PROCEDURE — 2580000003 HC RX 258: Performed by: PHYSICIAN ASSISTANT

## 2024-02-13 PROCEDURE — 97530 THERAPEUTIC ACTIVITIES: CPT

## 2024-02-13 PROCEDURE — 2140000000 HC CCU INTERMEDIATE R&B

## 2024-02-13 PROCEDURE — 82948 REAGENT STRIP/BLOOD GLUCOSE: CPT

## 2024-02-13 PROCEDURE — 97535 SELF CARE MNGMENT TRAINING: CPT

## 2024-02-13 PROCEDURE — 94010 BREATHING CAPACITY TEST: CPT

## 2024-02-13 RX ORDER — ALBUTEROL SULFATE 90 UG/1
2 AEROSOL, METERED RESPIRATORY (INHALATION) EVERY 6 HOURS PRN
Qty: 18 G | Refills: 0 | DISCHARGE
Start: 2024-02-13

## 2024-02-13 RX ADMIN — RIVAROXABAN 15 MG: 15 TABLET, FILM COATED ORAL at 08:27

## 2024-02-13 RX ADMIN — LOTEPREDNOL ETABONATE 1 DROP: 5 SUSPENSION/ DROPS OPHTHALMIC at 08:26

## 2024-02-13 RX ADMIN — ATENOLOL 25 MG: 25 TABLET ORAL at 08:27

## 2024-02-13 RX ADMIN — Medication 1 DROP: at 08:26

## 2024-02-13 RX ADMIN — SODIUM CHLORIDE, PRESERVATIVE FREE 10 ML: 5 INJECTION INTRAVENOUS at 20:25

## 2024-02-13 RX ADMIN — DOCUSATE SODIUM 50 MG AND SENNOSIDES 8.6 MG 1 TABLET: 8.6; 5 TABLET, FILM COATED ORAL at 20:24

## 2024-02-13 RX ADMIN — Medication 1 DROP: at 13:47

## 2024-02-13 RX ADMIN — SODIUM CHLORIDE, PRESERVATIVE FREE 10 ML: 5 INJECTION INTRAVENOUS at 08:26

## 2024-02-13 RX ADMIN — CALCIUM 500 MG: 500 TABLET ORAL at 08:27

## 2024-02-13 RX ADMIN — BUMETANIDE 1 MG: 1 TABLET ORAL at 08:27

## 2024-02-13 RX ADMIN — ISOSORBIDE MONONITRATE 30 MG: 30 TABLET, EXTENDED RELEASE ORAL at 08:27

## 2024-02-13 RX ADMIN — Medication 1 DROP: at 20:25

## 2024-02-13 NOTE — CARE COORDINATION
2/13/24, 2:30 PM EST    DISCHARGE ON GOING EVALUATION    [facility] day: 15  Location: [address] [place] [address]- Reason for admit: Acute on chronic respiratory failure with hypoxia and hypercapnia (HCC) [J96.21, J96.22]   Procedure:   1/29 CXR: Right upper lobe infiltrate appears improved, without complete resolution; Worsening bibasilar infiltrates; Small right pleural effusion appears mildly increased. Small left pleural effusion is also suspected to be increased.  1/30 CXR: Increased discoid atelectasis in the right midlung and right upper lobe infiltrate. Otherwise no significant change.  2/10 CTA chest: No pulmonary emboli are seen. Pulmonary arterial hypertension. Moderate cardiomegaly. Moderate-sized bilateral pleural effusions. Moderate atelectasis/pneumonia both lung bases: The lingula and right middle lobe.    Barriers to Discharge: Hospitalist, Nephrology, Pulmonology and Palliative Care following. Planning BiPAP at facility. Precert approved, awaiting BiPAP at facility.     PCP: Gaudencio Sepulveda  Readmission Risk Score: 16.6%  Patient Goals/Plan/Treatment Preferences: From home alone. Planning [facility] at discharge, precert approved. SW following. Awaiting BiPAP at facility.     Phone call from Sergei with Pulmonology, planning [facility] when discharged from [facility]. He is helping with those arrangements.

## 2024-02-13 NOTE — CARE COORDINATION
2/13/24, 8:15 AM EST    DISCHARGE PLANNING EVALUATION    Spoke with George at [facility].  She told MARILU precert was approved last night.  Made Xavi MILLER aware precert has been approved.  George did not know how long the precert would be good.  She will call back.  MARILU left a message for George to make her aware bipap orders were sent.      Update 11:35 am: Spoke with George at [facility].  She will check to see how long it will be until they are able to get the patient a bi pap.      Update 12:10 pm: Spoke with George at [facility].  They can't get the bipap today.  Will plan on discharge for tomorrow.  Updated Vinay JORGE and Eric MILLER.      Update 3:05 pm: Spoke with Daniela regarding discharge.  She is aware her precert has been approved and we are planning on discharge tomorrow.  She told MARILU that family should be able to transport her tomorrow.  She told MARILU to call her son Mango regarding transport.  Mango said he and his sister will be here tomorrow at 2 pm to transport.  They are aware they will need to bring a portable oxygen tank.  Will verify with George in the morning that they received the bipap before sending.

## 2024-02-14 VITALS
SYSTOLIC BLOOD PRESSURE: 117 MMHG | TEMPERATURE: 97.9 F | HEIGHT: 62 IN | HEART RATE: 61 BPM | OXYGEN SATURATION: 96 % | BODY MASS INDEX: 29.9 KG/M2 | RESPIRATION RATE: 18 BRPM | DIASTOLIC BLOOD PRESSURE: 57 MMHG | WEIGHT: 162.48 LBS

## 2024-02-14 LAB
ANION GAP SERPL CALC-SCNC: 7 MEQ/L (ref 8–16)
BUN SERPL-MCNC: 23 MG/DL (ref 7–22)
CALCIUM SERPL-MCNC: 9.5 MG/DL (ref 8.5–10.5)
CHLORIDE SERPL-SCNC: 88 MEQ/L (ref 98–111)
CO2 SERPL-SCNC: 41 MEQ/L (ref 23–33)
CREAT SERPL-MCNC: 0.6 MG/DL (ref 0.4–1.2)
GFR SERPL CREATININE-BSD FRML MDRD: > 60 ML/MIN/1.73M2
GLUCOSE BLD STRIP.AUTO-MCNC: 113 MG/DL (ref 70–108)
GLUCOSE BLD STRIP.AUTO-MCNC: 122 MG/DL (ref 70–108)
GLUCOSE SERPL-MCNC: 100 MG/DL (ref 70–108)
POTASSIUM SERPL-SCNC: 3.7 MEQ/L (ref 3.5–5.2)
SODIUM SERPL-SCNC: 136 MEQ/L (ref 135–145)

## 2024-02-14 PROCEDURE — 99232 SBSQ HOSP IP/OBS MODERATE 35: CPT | Performed by: INTERNAL MEDICINE

## 2024-02-14 PROCEDURE — 97530 THERAPEUTIC ACTIVITIES: CPT

## 2024-02-14 PROCEDURE — 2580000003 HC RX 258: Performed by: PHYSICIAN ASSISTANT

## 2024-02-14 PROCEDURE — 94761 N-INVAS EAR/PLS OXIMETRY MLT: CPT

## 2024-02-14 PROCEDURE — 94660 CPAP INITIATION&MGMT: CPT

## 2024-02-14 PROCEDURE — 6370000000 HC RX 637 (ALT 250 FOR IP): Performed by: PHYSICIAN ASSISTANT

## 2024-02-14 PROCEDURE — 82948 REAGENT STRIP/BLOOD GLUCOSE: CPT

## 2024-02-14 PROCEDURE — 97110 THERAPEUTIC EXERCISES: CPT

## 2024-02-14 PROCEDURE — 80048 BASIC METABOLIC PNL TOTAL CA: CPT

## 2024-02-14 PROCEDURE — 97535 SELF CARE MNGMENT TRAINING: CPT

## 2024-02-14 PROCEDURE — 2700000000 HC OXYGEN THERAPY PER DAY

## 2024-02-14 PROCEDURE — 6370000000 HC RX 637 (ALT 250 FOR IP): Performed by: INTERNAL MEDICINE

## 2024-02-14 PROCEDURE — 36415 COLL VENOUS BLD VENIPUNCTURE: CPT

## 2024-02-14 PROCEDURE — 97116 GAIT TRAINING THERAPY: CPT

## 2024-02-14 RX ORDER — LISINOPRIL 20 MG/1
10 TABLET ORAL DAILY
Qty: 90 TABLET | Refills: 3 | DISCHARGE
Start: 2024-02-14

## 2024-02-14 RX ORDER — BUMETANIDE 1 MG/1
1 TABLET ORAL DAILY
Qty: 30 TABLET | Refills: 3 | DISCHARGE
Start: 2024-02-14

## 2024-02-14 RX ORDER — LISINOPRIL 20 MG/1
10 TABLET ORAL DAILY
Qty: 90 TABLET | Refills: 3 | Status: SHIPPED | OUTPATIENT
Start: 2024-02-14 | End: 2024-02-14

## 2024-02-14 RX ADMIN — RIVAROXABAN 15 MG: 15 TABLET, FILM COATED ORAL at 09:40

## 2024-02-14 RX ADMIN — ATENOLOL 25 MG: 25 TABLET ORAL at 09:40

## 2024-02-14 RX ADMIN — SODIUM CHLORIDE, PRESERVATIVE FREE 10 ML: 5 INJECTION INTRAVENOUS at 09:42

## 2024-02-14 RX ADMIN — POLYETHYLENE GLYCOL 3350 17 G: 17 POWDER, FOR SOLUTION ORAL at 09:40

## 2024-02-14 RX ADMIN — ISOSORBIDE MONONITRATE 30 MG: 30 TABLET, EXTENDED RELEASE ORAL at 09:40

## 2024-02-14 RX ADMIN — Medication 1 DROP: at 13:45

## 2024-02-14 RX ADMIN — Medication 1 DROP: at 09:40

## 2024-02-14 RX ADMIN — CALCIUM 500 MG: 500 TABLET ORAL at 10:51

## 2024-02-14 NOTE — CARE COORDINATION
2/14/24, 1:03 PM EST    Patient goals/plan/ treatment preferences discussed by  and .  Patient goals/plan/ treatment preferences reviewed with patient/ family.  Patient/ family verbalize understanding of discharge plan and are in agreement with goal/plan/treatment preferences.  Understanding was demonstrated using the teach back method.  AVS provided by RN at time of discharge, which includes all necessary medical information pertaining to the patients current course of illness, treatment, post-discharge goals of care, and treatment preferences.     Services At/After Discharge: Skilled Nursing Facility (SNF), Aide services, Nursing service, OT, and PT       IMM Letter  IMM Letter given to Patient/Family/Significant other/Guardian/POA/by:: Malu JORGE CM  IMM Letter date given:: 02/13/24  IMM Letter time given:: 1004     Daniela will be discharged to [facility] today.  She will be skilled at the facility under her [employer].  She will be transported by family.  Discharge instructions are attached to blue discharge packet.  Patient and family are agreeable to discharge plan.  Dank Vazquez in admissions at the facility and she is aware of discharge.

## 2024-02-14 NOTE — DISCHARGE INSTRUCTIONS
Non invasive ventilator prescription (a type of Positive airway pressure device \"breathing machine\" ) was sent to [facility] DME office number [phone]. They will be in contact with you to set-up for delivery of machine after you are discharged from the Skilled Nursing Facility. The Skilled Nursing Facility will have a (NIPPV) for you while you are staying there and need to notify the listed DME company of the anticipated Discharge date to coordinate delivery and teaching of how Non-invasive ventilator works prior to discharge from their facility.

## 2024-02-14 NOTE — PLAN OF CARE
Problem: Chronic Conditions and Co-morbidities  Goal: Patient's chronic conditions and co-morbidity symptoms are monitored and maintained or improved  1/31/2024 1029 by Cheryl Adler  Outcome: Progressing     Problem: Discharge Planning  Goal: Discharge to home or other facility with appropriate resources  1/31/2024 1029 by Cheryl Adler  Outcome: Progressing     Problem: Pain  Goal: Verbalizes/displays adequate comfort level or baseline comfort level  1/31/2024 1029 by Cheryl Adler  Outcome: Progressing     Problem: Skin/Tissue Integrity  Goal: Absence of new skin breakdown  Description: 1.  Monitor for areas of redness and/or skin breakdown  2.  Assess vascular access sites hourly  3.  Every 4-6 hours minimum:  Change oxygen saturation probe site  4.  Every 4-6 hours:  If on nasal continuous positive airway pressure, respiratory therapy assess nares and determine need for appliance change or resting period.  1/31/2024 1029 by Cheryl Adler  Outcome: Progressing     Problem: Safety - Adult  Goal: Free from fall injury  1/31/2024 1029 by Cheryl Adler  Outcome: Progressing     Problem: ABCDS Injury Assessment  Goal: Absence of physical injury  1/31/2024 1029 by Cheryl Adler  Outcome: Progressing     Problem: Confusion  Goal: Confusion, delirium, dementia, or psychosis is improved or at baseline  Description: INTERVENTIONS:  1. Assess for possible contributors to thought disturbance, including medications, impaired vision or hearing, underlying metabolic abnormalities, dehydration, psychiatric diagnoses, and notify attending LIP  2. [place] high risk fall precautions, as indicated  3. Provide frequent short contacts to provide reality reorientation, refocusing and direction  4. Decrease environmental stimuli, including noise as appropriate  5. Monitor and intervene to maintain adequate nutrition, hydration, elimination, sleep and activity  6. If unable to ensure safety without constant attention obtain sitter and 
  Problem: Chronic Conditions and Co-morbidities  Goal: Patient's chronic conditions and co-morbidity symptoms are monitored and maintained or improved  2/10/2024 1405 by Jo-Ann Delgado RN  Outcome: Progressing  Flowsheets (Taken 2/10/2024 1405)  Care Plan - Patient's Chronic Conditions and Co-Morbidity Symptoms are Monitored and Maintained or Improved: Monitor and assess patient's chronic conditions and comorbid symptoms for stability, deterioration, or improvement     Problem: Discharge Planning  Goal: Discharge to home or other facility with appropriate resources  2/10/2024 1405 by Jo-Ann Delgado, RN  Outcome: Progressing  Flowsheets (Taken 2/10/2024 1405)  Discharge to home or other facility with appropriate resources: Identify barriers to discharge with patient and caregiver  Note: D/c to ECF     Problem: Pain  Goal: Verbalizes/displays adequate comfort level or baseline comfort level  2/10/2024 1405 by Jo-Ann Delgado, RN  Outcome: Progressing  Flowsheets (Taken 2/10/2024 1405)  Verbalizes/displays adequate comfort level or baseline comfort level: Encourage patient to monitor pain and request assistance  Note: Patient educated on pain rating scale of 0-10 and verbalized pain goal of 0. Non-pharmacological measures implemented throughout shift to help achieve mutually met pain goal such as rest, repositioning, and emotional support. Pain rating obtaining prior to pain medications being administered along with assessing patients orientation and arousal and reassessing one hour after administering. Ensuring that pain scale matches with administration scale for medication. Educating patient to continue to notify this RN for changes in their pain or condition. Ongoing assessment and vitals as charted.        Problem: Skin/Tissue Integrity  Goal: Absence of new skin breakdown  Description: 1.  Monitor for areas of redness and/or skin breakdown  2.  Assess vascular access sites hourly  3.  Every 4-6 hours minimum: 
  Problem: Chronic Conditions and Co-morbidities  Goal: Patient's chronic conditions and co-morbidity symptoms are monitored and maintained or improved  2/11/2024 0246 by Flor Hickman RN  Outcome: Progressing  Flowsheets (Taken 2/11/2024 0246)  Care Plan - Patient's Chronic Conditions and Co-Morbidity Symptoms are Monitored and Maintained or Improved: Monitor and assess patient's chronic conditions and comorbid symptoms for stability, deterioration, or improvement     Problem: Pain  Goal: Verbalizes/displays adequate comfort level or baseline comfort level  2/11/2024 0246 by Flor Hickman RN  Outcome: Progressing  Flowsheets (Taken 2/11/2024 0246)  Verbalizes/displays adequate comfort level or baseline comfort level:   Encourage patient to monitor pain and request assistance   Administer analgesics based on type and severity of pain and evaluate response   Assess pain using appropriate pain scale   Implement non-pharmacological measures as appropriate and evaluate response     Problem: Skin/Tissue Integrity  Goal: Absence of new skin breakdown  Description: 1.  Monitor for areas of redness and/or skin breakdown  2.  Assess vascular access sites hourly  3.  Every 4-6 hours minimum:  Change oxygen saturation probe site  4.  Every 4-6 hours:  If on nasal continuous positive airway pressure, respiratory therapy assess nares and determine need for appliance change or resting period.  2/11/2024 0246 by Flor Hickman RN  Outcome: Progressing     Problem: Safety - Adult  Goal: Free from fall injury  2/11/2024 0246 by Flor Hickman RN  Outcome: Progressing  Flowsheets (Taken 2/11/2024 0246)  Free From Fall Injury: Instruct family/caregiver on patient safety     Problem: ABCDS Injury Assessment  Goal: Absence of physical injury  Outcome: Progressing  Flowsheets (Taken 2/11/2024 0246)  Absence of Physical Injury: Implement safety measures based on patient assessment     Problem: Confusion  Goal: Confusion, 
  Problem: Chronic Conditions and Co-morbidities  Goal: Patient's chronic conditions and co-morbidity symptoms are monitored and maintained or improved  Outcome: Progressing  Flowsheets (Taken 2/10/2024 0051)  Care Plan - Patient's Chronic Conditions and Co-Morbidity Symptoms are Monitored and Maintained or Improved: Monitor and assess patient's chronic conditions and comorbid symptoms for stability, deterioration, or improvement     Problem: Discharge Planning  Goal: Discharge to home or other facility with appropriate resources  Outcome: Progressing  Flowsheets (Taken 2/10/2024 0051)  Discharge to home or other facility with appropriate resources: Identify barriers to discharge with patient and caregiver     Problem: Pain  Goal: Verbalizes/displays adequate comfort level or baseline comfort level  Outcome: Progressing  Flowsheets (Taken 2/10/2024 0051)  Verbalizes/displays adequate comfort level or baseline comfort level:   Encourage patient to monitor pain and request assistance   Administer analgesics based on type and severity of pain and evaluate response   Assess pain using appropriate pain scale   Implement non-pharmacological measures as appropriate and evaluate response     Problem: Skin/Tissue Integrity  Goal: Absence of new skin breakdown  Description: 1.  Monitor for areas of redness and/or skin breakdown  2.  Assess vascular access sites hourly  3.  Every 4-6 hours minimum:  Change oxygen saturation probe site  4.  Every 4-6 hours:  If on nasal continuous positive airway pressure, respiratory therapy assess nares and determine need for appliance change or resting period.  Outcome: Progressing     Problem: Safety - Adult  Goal: Free from fall injury  Outcome: Progressing  Flowsheets (Taken 2/10/2024 0051)  Free From Fall Injury: Instruct family/caregiver on patient safety  Note: Bed locked & in low position, call light in reach, side-rails up x2, bed/chair alarm utilized, non-slip socks on when 
  Problem: Chronic Conditions and Co-morbidities  Goal: Patient's chronic conditions and co-morbidity symptoms are monitored and maintained or improved  Outcome: Progressing  Flowsheets (Taken 2/3/2024 0800)  Care Plan - Patient's Chronic Conditions and Co-Morbidity Symptoms are Monitored and Maintained or Improved: Monitor and assess patient's chronic conditions and comorbid symptoms for stability, deterioration, or improvement     Problem: Discharge Planning  Goal: Discharge to home or other facility with appropriate resources  Outcome: Progressing  Flowsheets (Taken 2/3/2024 0800)  Discharge to home or other facility with appropriate resources: Identify barriers to discharge with patient and caregiver     Problem: Pain  Goal: Verbalizes/displays adequate comfort level or baseline comfort level  Outcome: Progressing     Problem: Skin/Tissue Integrity  Goal: Absence of new skin breakdown  Description: 1.  Monitor for areas of redness and/or skin breakdown  2.  Assess vascular access sites hourly  3.  Every 4-6 hours minimum:  Change oxygen saturation probe site  4.  Every 4-6 hours:  If on nasal continuous positive airway pressure, respiratory therapy assess nares and determine need for appliance change or resting period.  Outcome: Progressing     Problem: Safety - Adult  Goal: Free from fall injury  Outcome: Progressing     Problem: ABCDS Injury Assessment  Goal: Absence of physical injury  Outcome: Progressing     
  Problem: Chronic Conditions and Co-morbidities  Goal: Patient's chronic conditions and co-morbidity symptoms are monitored and maintained or improved  Outcome: Progressing  Flowsheets (Taken 2/6/2024 0720 by Shraddha Salazar, RN)  Care Plan - Patient's Chronic Conditions and Co-Morbidity Symptoms are Monitored and Maintained or Improved: Monitor and assess patient's chronic conditions and comorbid symptoms for stability, deterioration, or improvement     Problem: Discharge Planning  Goal: Discharge to home or other facility with appropriate resources  Outcome: Progressing  Flowsheets (Taken 2/6/2024 0720 by Shraddha Salazar RN)  Discharge to home or other facility with appropriate resources: Identify barriers to discharge with patient and caregiver     Problem: Pain  Goal: Verbalizes/displays adequate comfort level or baseline comfort level  Outcome: Progressing  Flowsheets (Taken 2/4/2024 2005 by Niya Ruth RN)  Verbalizes/displays adequate comfort level or baseline comfort level:   Assess pain using appropriate pain scale   Encourage patient to monitor pain and request assistance   Administer analgesics based on type and severity of pain and evaluate response   Consider cultural and social influences on pain and pain management   Implement non-pharmacological measures as appropriate and evaluate response   Notify Licensed Independent Practitioner if interventions unsuccessful or patient reports new pain     Problem: Skin/Tissue Integrity  Goal: Absence of new skin breakdown  Description: 1.  Monitor for areas of redness and/or skin breakdown  2.  Assess vascular access sites hourly  3.  Every 4-6 hours minimum:  Change oxygen saturation probe site  4.  Every 4-6 hours:  If on nasal continuous positive airway pressure, respiratory therapy assess nares and determine need for appliance change or resting period.  Outcome: Progressing     Problem: Safety - Adult  Goal: Free from fall injury  Outcome: Progressing   
  Problem: Respiratory - Adult  Goal: Achieves optimal ventilation and oxygenation  Outcome: Progressing   Patient mutually agrees upon goal.   
  Problem: Respiratory - Adult  Goal: Achieves optimal ventilation and oxygenation  Outcome: Progressing  Patient mutually agrees upon goal.      
to report any pain, pressure, or shortness of breath to the nurse.  Pain medications provided per physician's orders.      Problem: Skin/Tissue Integrity  Goal: Absence of new skin breakdown  Description: 1.  Monitor for areas of redness and/or skin breakdown  2.  Assess vascular access sites hourly  3.  Every 4-6 hours minimum:  Change oxygen saturation probe site  4.  Every 4-6 hours:  If on nasal continuous positive airway pressure, respiratory therapy assess nares and determine need for appliance change or resting period.  Outcome: Progressing  Note: Ongoing assessment & interventions provided throughout shift.  Skin assessments provided.  Encouraging/assisting patient to turn as needed.      Problem: Safety - Adult  Goal: Free from fall injury  Outcome: Progressing  Flowsheets (Taken 2/13/2024 2212)  Free From Fall Injury: Instruct family/caregiver on patient safety  Note: Bed locked & in low position, call light in reach, side-rails up x2, bed/chair alarm utilized, non-slip socks on when ambulating, reminded patient to use call light to call for assistance.      Problem: ABCDS Injury Assessment  Goal: Absence of physical injury  Outcome: Progressing  Flowsheets (Taken 2/13/2024 2212)  Absence of Physical Injury: Implement safety measures based on patient assessment  Note: Bed locked & in low position, call light in reach, side-rails up x2, bed/chair alarm utilized, non-slip socks on when ambulating, reminded patient to use call light to call for assistance.      Problem: Confusion  Goal: Confusion, delirium, dementia, or psychosis is improved or at baseline  Description: INTERVENTIONS:  1. Assess for possible contributors to thought disturbance, including medications, impaired vision or hearing, underlying metabolic abnormalities, dehydration, psychiatric diagnoses, and notify attending LIP  2. [place] high risk fall precautions, as indicated  3. Provide frequent short contacts to provide reality 
contributed to goal setting.

## 2024-02-14 NOTE — PROGRESS NOTES
Hospitalist Progress Note      Patient:  Daniela POP [facility]    Unit/Bed:-08/008-A  YOB: 1929  MRN: [id number]   Acct: [id number]   PCP: Gaudencio Sepulveda  Date of Admission: 1/28/2024    Assessment/Plan:    #Acute on chronic respiratory failure with hypoxia and hypercapnia POA.Multifactorial CHF and pneumonia.  -CTA negative for PE, cardiomegaly with small bilateral pleural effusions and mild lower lobe predominant interlobular septal thickening in the setting of interstitial edema noted.  Nonspecific patchy centrilobular and subpleural consolidations greatest in right upper lung with milder involvement in the right middle and lower lungs.  Pulmonary edema is possible in the setting of CHF but can be followed to exclude infectious infiltrates.   -Patient was initially on BIPAP.Weaned off.  -Now on 5l nc.  -Continue to wean as tolerated.  -Continue with cefepime.  -Continue with  Bumex gtt.    #Right upper lobe pneumonia.  -Pneumonia panel pending.  -Continue with cefepime.  -Palliative care consulted, Dr. Mcgill to meet with patient     #CHFpEF/Small Pericardial Effusion.  -Echocardiogram ordered, patient with estimated EF 60%.  Normal wall motion.  Small less than 1 cm circumferential pericardial effusion present.  No indication of cardiac tamponade per report.  -S/P BIPAP.  -Continue with Bumex gtt.Up titrate as tolerated.  -Strict in and outs and daily weights.     #Chronic atrial fibrillation.  -Rate controlled with atenolol.  -Continue with Xarelto anticoagulated.     #Hyponatremia.Hypervolemic  -Fluid restriction.Will discontinue salt tablets.  -Labs ordered for tomorrow.     #Essential hypertension.  -Continue with home meds lisinopril,clonidine,atenolol.     #CKD stage III.  -Renal function is at baseline  -Continue to monitor with labs PRN          Subjective (past 24 hours):   Patient seen and examined at bed 
                                                                       Hospitalist Progress Note      Patient:  Daniela POP [facility]    Unit/Bed:4B-08/008-A  YOB: 1929  MRN: [id number]   Acct: [id number]   PCP: Gaudencio Sepulveda  Date of Admission: 1/28/2024    Assessment/Plan:    #Acute on chronic respiratory failure with hypoxia and hypercapnia POA.Multifactorial CHF and pneumonia.  -CTA negative for PE, cardiomegaly with small bilateral pleural effusions and mild lower lobe predominant interlobular septal thickening in the setting of interstitial edema noted.  Nonspecific patchy centrilobular and subpleural consolidations greatest in right upper lung with milder involvement in the right middle and lower lungs.  Pulmonary edema is possible in the setting of CHF but can be followed to exclude infectious infiltrates.   -Patient was initially on BIPAP.Weaned off.  -Now on 4l nc.  -Continue to wean as tolerated.  -Continue with cefepime.  -Will discontinue to Bumex drip.Will start from tomorrow bumex 1 mg daily.    #Right upper lobe pneumonia.  -Pneumonia panel not reported.  -Continue with cefepime.  -Palliative care consulted, Dr. Mcgill to meet with patient     #CHFpEF/Small Pericardial Effusion.  -Echocardiogram ordered, patient with estimated EF 60%.  Normal wall motion.  Small less than 1 cm circumferential pericardial effusion present.  No indication of cardiac tamponade per report.  -S/P BIPAP.  -Bumex gtt transitioned to po Bumex titrate as tolerated.  -Strict in and outs and daily weights.Output negative 1664.     #Chronic atrial fibrillation.  -Rate controlled with atenolol.  -Continue with Xarelto anticoagulated.     #Hyponatremia.Hypervolemic  -Fluid restriction.  -Na 131->132->129  -Goal of correction 6 meq in 24 hours..     #Essential hypertension.  -Hold home meds lisinopril,clonidine,atenolol. As BP soft.     #CKD stage III.  -Renal function is at baseline  -Continue to monitor with 
                                                                       Hospitalist Progress Note      Patient:  Daniela POP [facility]    Unit/Bed:4B-08/008-A  YOB: 1929  MRN: [id number]   Acct: [id number]   PCP: Gaudencio Sepulveda  Date of Admission: 1/28/2024    Assessment/Plan:    #Acute on chronic respiratory failure with hypoxia and hypercapnia POA.Multifactorial CHF and pneumonia.  -CTA negative for PE, cardiomegaly with small bilateral pleural effusions and mild lower lobe predominant interlobular septal thickening in the setting of interstitial edema noted.  Nonspecific patchy centrilobular and subpleural consolidations greatest in right upper lung with milder involvement in the right middle and lower lungs.  Pulmonary edema is possible in the setting of CHF but can be followed to exclude infectious infiltrates.   -Patient was initially on BIPAP.Weaned off.  -Now on 4l nc.  -Continue to wean as tolerated.  -Continue with cefepime.  -S/P Bumex drip.Now on po Bumex daily.  -PT/OT evaluation    #Right upper lobe pneumonia.  -Pneumonia panel not reported.  -Continue with cefepime.Last day today.Will complete 7 days course.  -Palliative care consulted, Dr. Mcgill to meet with patient     #CHFpEF/Small Pericardial Effusion.  -Echocardiogram ordered, patient with estimated EF 60%.  Normal wall motion.  Small less than 1 cm circumferential pericardial effusion present.  No indication of cardiac tamponade per report.  -S/P BIPAP.  -Bumex gtt transitioned to po Bumex titrate as tolerated.  -Strict in and outs and daily weights.Output negative 1664.     #Chronic atrial fibrillation.  -Rate controlled with atenolol.  -Continue with Xarelto anticoagulated.     #Hyponatremia.Hypervolemic  -Fluid restriction.  -Na 131->132->129->131  -Goal of correction 6 meq in 24 hours..     #Essential hypertension.  -Hold home meds lisinopril,clonidine,atenolol. As BP soft.     #CKD stage III.  -Renal function is at 
                                                                       Hospitalist Progress Note      Patient:  Daniela POP [facility]    Unit/Bed:4B-08/008-A  YOB: 1929  MRN: [id number]   Acct: [id number]   PCP: Gaudencio Sepulveda  Date of Admission: 1/28/2024    Assessment/Plan:    #Acute on chronic respiratory failure with hypoxia and hypercapnia POA.Multifactorial CHF and pneumonia.  -CTA negative for PE, cardiomegaly with small bilateral pleural effusions and mild lower lobe predominant interlobular septal thickening in the setting of interstitial edema noted.  Nonspecific patchy centrilobular and subpleural consolidations greatest in right upper lung with milder involvement in the right middle and lower lungs.  Pulmonary edema is possible in the setting of CHF but can be followed to exclude infectious infiltrates.   -Patient was initially on BIPAP.Weaned off.  -Now on 5l nc.  -Continue to wean as tolerated.  -Continue with cefepime.  -Started on Bumex gtt.    #Right upper lobe pneumonia.  -Pneumonia panel pending.  -Continue with cefepime.  -Palliative care consulted, Dr. Mcgill to meet with patient     #CHFpEF/Small Pericardial Effusion.  -Echocardiogram ordered, patient with estimated EF 60%.  Normal wall motion.  Small less than 1 cm circumferential pericardial effusion present.  No indication of cardiac tamponade per report.  -S/P BIPAP.  -Start Bumex gtt.Up titrate as tolerated.  -Strict in and outs and daily weights.     #Chronic atrial fibrillation.  -Rate controlled with atenolol.  -Continue with Xarelto anticoagulated.     #Hyponatremia.Hypervolemic  -Fluid restriction.Will discontinue salt tablets     #Essential hypertension.  -Continue with home meds lisinopril,clonidine,atenolol.     #CKD stage III.  -Renal function is at baseline  -Continue to monitor with labs PRN          Subjective (past 24 hours):   Patient seen and examined at bed side.Continue to report SOB.Her o2 saturation 
                             Physician Progress Note        Patient:   Daniela Pacheco  YOB: 1929  Age:  89 y.o.  Room:  [address] [place] [address]  MRN:  [id number]   Acct: [id number]  PCP: Gaudencio Sepulveda    Date of Admission:  1/28/2024 11:48 PM  Date of Service:  2/5/2024    Reason for Consult: Goals of Care and Symptom Management.    History Obtained From:-  Patient, Electronic Medical Record, Patient's RN, Family             Subjective   Daniela Pacheco was seen in their room today for follow up with the palliative care team. There was family present at the bedside. Patient is more awake and alert this morning compared to previous.  She denied having any pain or shortness of breath, however her family reports that she does get some pain at night. They have No Medications for scheduled symptom relief. From 8 AM yesterday to 8 AM today, they have used No Medications for as needed symptom relief. Their medications are not working well to control their symptoms. They did get a good night sleep. They are not eating their meals. The patient did have a bowel movement in the last 24 hours. She has not been feeling hungry lately.  This has been difficult to keep her energy levels since she can participate in therapy.  She is hopeful that she is able to do this and go home.     Objective   Vitals:-    BP (!) 116/58   Pulse 62   Temp 98.2 °F (36.8 °C) (Oral)   Resp 18   Ht 1.575 m (5' 2\")   Wt 70.7 kg (155 lb 13.8 oz)   SpO2 95%   BMI 28.51 kg/m²     Physical Exam  Constitutional:       General: She is awake. She is not in acute distress.  HENT:      Head: Normocephalic and atraumatic.      Mouth/Throat:      Mouth: Mucous membranes are moist.   Eyes:      General:         Right eye: No discharge.         Left eye: No discharge.   Cardiovascular:      Rate and Rhythm: Normal rate.   Pulmonary:      Effort: Pulmonary effort is normal. No respiratory distress.   Neurological:      Mental Status: She is 
    Hospitalist Progress Note    [facility]      [facility]-08/008-A    YOB: 1929    MRN: [id number]       Acct: [id number]     PCP: Gaudencio Sepulveda    Date of Admission: 1/28/2024    Assessment/Plan:    Acute on chronic respiratory failure with hypoxia and hypercapnea: Possibly secondary to acute HFpEF exacerbation versus bacterial pneumonia  Weaned off of BiPAP, continue supplemental O2 PRN  Wean supplemental O2 as able  CTA negative for PE, cardiomegaly with small bilateral pleural effusions and mild lower lobe predominant interlobular septal thickening in the setting of interstitial edema noted.  Nonspecific patchy centrilobular and subpleural consolidations greatest in right upper lung with milder involvement in the right middle and lower lungs.  Pulmonary edema is possible in the setting of CHF but can be followed to exclude infectious infiltrates.    Patient started on cefepime on admission, continues   Pneumonia panel ordered as well as strep pneumoniae and Legionella urine antigens, pending   Pulmonary hygiene  Okay for PT/OT eval and treat  Palliative care consulted, Dr. Mcgill to meet with patient    Right upper lobe pneumonia: On last admission - discharged 1/25/2024  Finished cefdinir (1/28/2024)  Ordered Cefepime on admission for 7 days, continues  Repeat blood cultures ordered, negative    Small Pericardial Effusion:  Noted on CTA  Echocardiogram ordered, patient with estimated EF 60%.  Normal wall motion.  Small less than 1 cm circumferential pericardial effusion present.  No indication of cardiac tamponade per report.  Patient continues on 1 mg Bumex twice daily    Chronic atrial fibrillation with secondary hypercoagulable state:   Anticoagulated on Xarelto, unheld  Rate controlled on admission    Hyponatremia:  Mild, 130 1/31/24  Salt Tabs BID WC    Essential hypertension:   Home BP meds unheld     CKD stage III:   Renal function is at baseline  Continue to 
    Hospitalist Progress Note    [facility]      [facility]:4B-08/008-A    YOB: 1929    MRN: [id number]       Acct: [id number]     PCP: Gaudencio Sepulveda    Date of Admission: 1/28/2024    Assessment/Plan:    Acute on chronic respiratory failure with hypoxia and hypercapnea:   Weaned off of BiPAP  Okay for PT/OT eval and treat  Wean supplemental O2 as able  Pulmonary hygiene    Right upper lobe pneumonia:   On last admission - discharged 1/25/2024  Finished cefdinir (1/28/2024)  Repeat blood cultures ordered, negative    Chronic atrial fibrillation:   Anticoagulated on Xarelto, unheld  Rate controlled on admission    Essential hypertension:   Home BP meds unheld     CKD stage III:   Renal function is at baseline  Continue to monitor with daily labs    Chief Complaint: unresponsive/shortness of breath       Subjective: 89 y.o. female admitted to the hospitalist service for shortness of breath and unresponsiveness. Patient weaned off BiPAP.  She is on oxygen via nasal cannula.  Patient denies chest pain.  She denies nausea or vomiting.  She denies shortness of breath.    Medications:    Infusion Medications    sodium chloride 10 mL/hr at 01/30/24 0150    dextrose       Scheduled Medications    loteprednol  1 drop Right Eye Every Other Day    carboxymethylcellulose  1 drop Left Eye TID    amLODIPine  5 mg Oral Daily    atenolol  25 mg Oral Daily    bumetanide  1 mg Oral BID    calcium elemental  1 tablet Oral Daily    isosorbide mononitrate  30 mg Oral Daily    lisinopril  20 mg Oral Daily    rivaroxaban  15 mg Oral Daily    sodium chloride flush  5-40 mL IntraVENous 2 times per day    cefepime  2,000 mg IntraVENous Q12H    bumetanide  1 mg IntraVENous BID     PRN Meds: albuterol sulfate HFA, [Held by provider] cloNIDine, sodium chloride flush, sodium chloride, potassium chloride **OR** potassium alternative oral replacement **OR** potassium chloride, magnesium sulfate, ondansetron 
    Hospitalist Progress Note    [facility]      [facility]:3B-36/036-A    YOB: 1929    MRN: [id number]       Acct: [id number]     PCP: Gaudencio Sepulveda    Date of Admission: 1/28/2024    Assessment/Plan:    Acute on chronic respiratory failure with hypoxia and hypercapnia with associated bilateral pleural effusions:   -Pulmonology consulted   -Continue BiPAP at night, BiPAP settings per pulmonology recommendations   -Pulmonary hygiene    CKD stage III:   -Nephrology consulted, following   -Nephrology holding Bumex today, serum bicarbonate increased to 47    Severe acute respiratory acidosis:   -Nephrology following   -Serum bicarbonate increased to 47, nephrology holding Bumex today   -ABG pending    Essential hypertension: History   -Patient with some hypotension this admission, Norvasc and lisinopril on hold   -Consider resumption of Norvasc    Chronic atrial fibrillation:   -Continue Xarelto   -Rate controlled, not currently on beta-blocker or medication    Moderate malnutrition:   -Dietician consulted    Chief Complaint: unresponsive/shortness of breath       Subjective: 89 y.o. female admitted to the hospitalist service for acute on chronic respiratory failure with hypoxia and hypercapnia.  Pulmonology and nephrology following.  Pulmonology repeated ABG today.  Patient to be discharged to SNF most likely tomorrow with BiPAP settings per pulmonology recommendations.  Nephrology holding Bumex today.    Medications:    Infusion Medications    sodium chloride Stopped (02/01/24 0145)    dextrose       Scheduled Medications    [Held by provider] bumetanide  1 mg Oral Daily    polyethylene glycol  17 g Oral Daily    sennosides-docusate sodium  1 tablet Oral Nightly    insulin lispro  0-4 Units SubCUTAneous TID WC    insulin lispro  0-4 Units SubCUTAneous Nightly    loteprednol  1 drop Right Eye Every Other Day    Polyethylene Glycol 400  1 drop Left Eye TID    atenolol  25 mg Oral 
  Physician Progress Note      PATIENT:               CRUZ RANDALL  [facility] #:                  [id number]  :                       1929  ADMIT DATE:       2024 11:48 PM  DISCH DATE:  RESPONDING  PROVIDER #:        Albert Cortez MD          QUERY TEXT:    Patient admitted with SOB. Noted to have Malnutrition Status:  Moderate   malnutrition (24 3398) by dietician. If possible, please document in   progress notes and discharge summary if you are evaluating and /or treating   any of the following:    The medical record reflects the following:  Risk Factors: Per dietitian, pt meets ASPEN criteria for  Moderate   malnutrition..  Clinical Indicators: Pt. moderately malnourished AEB criteria as listed above.    At risk for further nutrition compromise r/t admit with acute on chronic   respiratory failure, CHF/pneumonia, advanced age, and underlying medical   condition (hx: afib, HTN, arthritis, HLD, pneumonia, CHF, CKD, pacer, hepatic   lesion).  Treatment: ADULT ORAL NUTRITION SUPPLEMENT; Breakfast, Lunch, Dinner; Standard   4 oz Oral Supplement    ASPEN Criteria:    https://aspenjournals.onlinelibrary.flynn.com/doi/full/10.1177/655874697052822  5    Thank you. Malu Sims RN, Clinical Documentation Integrity, [place]anna   Cycle, [facility], [facility]  Options provided:  -- Moderate Protein Calorie Malnutrition confirmed  -- Moderate Protein Calorie Malnutrition ruled out  -- Other - I will add my own diagnosis  -- Disagree - Not applicable / Not valid  -- Disagree - Clinically unable to determine / Unknown  -- Refer to Clinical Documentation Reviewer    PROVIDER RESPONSE TEXT:    The diagnosis of Moderate Protein Calorie Malnutrition was confirmed.    Query created by: Malu Sims on 2024 7:23 AM      Electronically signed by:  Albert Cortez MD 2024 7:45 AM          
  Physician Progress Note      PATIENT:               CRUZ RANDALL  [facility] #:                  [id number]  :                       1929  ADMIT DATE:       2024 11:48 PM  DISCH DATE:  RESPONDING  PROVIDER #:        Eric Mccloud PA-C          QUERY TEXT:    Pt admitted with Acute on chronic respiratory failure with hypoxia and   hypercapnia and has Right upper lobe pneumonia. Pt noted to have WBC14.4, LA   1.1, elevated HR and resp /min, temp 96.4 and on Cefepime. If possible, please   document in the progress notes and discharge summary if you are evaluating   and /or treating any of the following:    The medical record reflects the following:  Risk Factors: Acute on chronic respiratory failure with hypoxia and   hypercapnia  Clinical Indicators: Right upper lobe pneumonia. Pt noted to have WBC14.4, LA   1.1, elevated HR and resp /min, temp 96.4  Treatment: Cefepime    Thank you. Malu Sims RN, Clinical Documentation Integrity, [place] AS   Cycle, [facility], [facility]  Options provided:  -- Sepsis confirmed after study and was present on admission  -- Sepsis treated and resolved  -- No Sepsis, localized infection only  -- Sepsis was ruled out  -- Other - I will add my own diagnosis  -- Disagree - Not applicable / Not valid  -- Disagree - Clinically unable to determine / Unknown  -- Refer to Clinical Documentation Reviewer    PROVIDER RESPONSE TEXT:    This patient has localized infection only, patient is not septic.    Query created by: Malu Sims on 2024 9:37 AM      QUERY TEXT:    Patient admitted with PNA, noted to have atrial fibrillation and is maintained   on XARELTO. If possible, please document in progress notes and discharge   summary if you are evaluating and/or treating any of the following:    The medical record reflects the following:  Risk Factors: Patient is an 89 -year-old female with hypertension and CHF,   history of Atrial fibrillation  Clinical Indicators: maintained 
  [facility]      Patient - Daniela Pacheco   MRN -  [id number]   [facility] # - [id number]   - 1929      Date of Admission -  2024 11:48 PM  Date of evaluation -  2024  Room - 3B-36/036-A   Hospital Day - 16  Consulting - Eric Mccloud, SARAH Primary Care Physician - Gaudencio Sepulveda     Problem List      Active Hospital Problems    Diagnosis Date Noted    Chronic renal disease, stage III (HCC) [635373] [N18.30] 2022     Priority: Medium    Hypotension [I95.9] 2024    Moderate malnutrition (HCC) [E44.0] 2024     Class: Acute    Palliative care patient [Z51.5] 2024    Acute on chronic respiratory failure with hypoxia and hypercapnia (HCC) [J96.21, J96.22] 2024    Chronic anticoagulation [Z79.01] 2024    Pleural effusion [J90] 2018    Pneumonia of right lung due to infectious organism [J18.9] 2018    Essential hypertension [I10] 2017    Chronic atrial fibrillation (HCC) [I48.20] 2017     Reason for Consult    Bilateral pleural pleural effusions, acute on chronic hypercapnic and hypoxic respiratory failure  HPI   History Obtained From: Patient and electronic medical record.    Daniela Pacheco is a 89 y.o. female who was admitted to [facility] for CO2 narcosis and volume overload.  She has had a hospital course since 2024 for which she has been diuresed.  Pulmonology was consulted following CTA chest which demonstrated bilateral pleural effusions.  Patient was evaluated at bedside this morning.  She reports overall feeling better and has a slight nonproductive cough.  She denies having any worsening shortness of breath, orthopnea, sputum production, fevers.  She has no acute respiratory concerns at this time.      Past 24 hrs   -On 2 LPM via NC  -Reports shortness of breath is improved since admission  -Tolerated PAP well overnight  All other systems reviewed    PMHx   Past Medical 
 [facility]  INPATIENT PHYSICAL THERAPY  DAILY NOTE  STRZ CVICU 4B - 4B-08/008-A  Time In: 1423  Time Out: 1435  Timed Code Treatment Minutes: 12 Minutes  Minutes: 12          Date: 2024  Patient Name: Daniela Pacheco,  Gender:  female        MRN: [id number]  : 1929  (89 y.o.)     Referring Practitioner: Eric Mccloud PA-C  Diagnosis: Acute on chronic respiratory failure with hypoxia and hypercapnia  Additional Pertinent Hx: HPI: Patient presents to the ED by EMS with family.  Family reports the patient has been sleeping off and on all day.  When they tried to wake her to take her night pills and eat a little they could not wake her up.  She uses 4L NC at baseline.  She was hypoxic for EMS requiring 10L NRB for transport.  The patient is placed on BiPAP upon arrival to the ED.  The patient is minimally responsive to deep painful stimuli.  At her last hospitalization the patient changed her code status to DNR CCA with wishes to not be intubated.  This was confirmed with the patient's son at the bedside.  Discussed the risks of BiPAP in the unresponsive patient with son at the bedside.  He understands the risk and agrees with the plan to admit the patient and hope that she becomes more responsive with improving her hypercapnia.  If she does not he agrees that we will consult the hospice service.     Prior Level of Function:  Lives With: Alone  Type of Home: House  Home Layout: One level  Home Access: Stairs to enter with rails  Entrance Stairs - Number of Steps: 2 steps with 1 rail  Home Equipment: Cane, Walker, rolling, Oxygen   Bathroom Shower/Tub: Walk-in shower  Bathroom Toilet: Handicap height  Bathroom Equipment: Grab bars in shower, Grab bars around toilet, Tub transfer bench    ADL Assistance: Independent  Homemaking Assistance: Independent  Ambulation Assistance: Independent  Transfer Assistance: Independent  Active : No  Additional Comments: PTA pt independent in ADL, 
 [facility]  INPATIENT PHYSICAL THERAPY  DAILY NOTE  STRZ CCU-STEPDOWN 3B - 3B-36/036-A      Time In: 1002  Time Out: 1025  Timed Code Treatment Minutes: 23 Minutes  Minutes: 23          Date: 2024  Patient Name: Daniela Pacheco,  Gender:  female        MRN: [id number]  : 1929  (89 y.o.)     Referring Practitioner: Eric Mccloud PA-C  Diagnosis: Acute on chronic respiratory failure with hypoxia and hypercapnia  Additional Pertinent Hx: HPI: Patient presents to the ED by EMS with family.  Family reports the patient has been sleeping off and on all day.  When they tried to wake her to take her night pills and eat a little they could not wake her up.  She uses 4L NC at baseline.  She was hypoxic for EMS requiring 10L NRB for transport.  The patient is placed on BiPAP upon arrival to the ED.  The patient is minimally responsive to deep painful stimuli.  At her last hospitalization the patient changed her code status to DNR CCA with wishes to not be intubated.  This was confirmed with the patient's son at the bedside.  Discussed the risks of BiPAP in the unresponsive patient with son at the bedside.  He understands the risk and agrees with the plan to admit the patient and hope that she becomes more responsive with improving her hypercapnia.  If she does not he agrees that we will consult the hospice service.     Prior Level of Function:  Lives With: Alone  Type of Home: House  Home Layout: One level  Home Access: Stairs to enter with rails  Entrance Stairs - Number of Steps: 2 steps with 1 rail  Home Equipment: Cane, Walker, rolling, Oxygen   Bathroom Shower/Tub: Walk-in shower  Bathroom Toilet: Handicap height  Bathroom Equipment: Grab bars in shower, Grab bars around toilet, Tub transfer bench    ADL Assistance: Independent  Homemaking Assistance: Independent  Ambulation Assistance: Independent  Transfer Assistance: Independent  Active : No  Additional Comments: PTA pt 
 [facility]  INPATIENT PHYSICAL THERAPY  DAILY NOTE  STRZ CVICU 4B - 4B-08/008-A      Time In: 1037  Time Out: 1115  Timed Code Treatment Minutes: 38 Minutes  Minutes: 38          Date: 2024  Patient Name: Daniela Pacheco,  Gender:  female        MRN: [id number]  : 1929  (89 y.o.)     Referring Practitioner: Eric Mccloud PA-C  Diagnosis: Acute on chronic respiratory failure with hypoxia and hypercapnia  Additional Pertinent Hx: HPI: Patient presents to the ED by EMS with family.  Family reports the patient has been sleeping off and on all day.  When they tried to wake her to take her night pills and eat a little they could not wake her up.  She uses 4L NC at baseline.  She was hypoxic for EMS requiring 10L NRB for transport.  The patient is placed on BiPAP upon arrival to the ED.  The patient is minimally responsive to deep painful stimuli.  At her last hospitalization the patient changed her code status to DNR CCA with wishes to not be intubated.  This was confirmed with the patient's son at the bedside.  Discussed the risks of BiPAP in the unresponsive patient with son at the bedside.  He understands the risk and agrees with the plan to admit the patient and hope that she becomes more responsive with improving her hypercapnia.  If she does not he agrees that we will consult the hospice service.     Prior Level of Function:  Lives With: Alone  Type of Home: House  Home Layout: One level  Home Access: Stairs to enter with rails  Entrance Stairs - Number of Steps: 2 steps with 1 rail  Home Equipment: Cane, Walker, rolling, Oxygen   Bathroom Shower/Tub: Walk-in shower  Bathroom Toilet: Handicap height  Bathroom Equipment: Grab bars in shower, Grab bars around toilet, Tub transfer bench    ADL Assistance: Independent  Homemaking Assistance: Independent  Ambulation Assistance: Independent  Transfer Assistance: Independent  Active : No  Additional Comments: PTA pt independent in 
 [facility]  INPATIENT PHYSICAL THERAPY  DAILY NOTE  STRZ CVICU 4B - 4B-08/008-A     Time In: 1027  Time Out: 1050  Timed Code Treatment Minutes: 23 Minutes  Minutes: 23          Date: 2024  Patient Name: Daniela Pacheco,  Gender:  female        MRN: [id number]  : 1929  (89 y.o.)     Referring Practitioner: Eric Mccloud PA-C  Diagnosis: Acute on chronic respiratory failure with hypoxia and hypercapnia  Additional Pertinent Hx: HPI: Patient presents to the ED by EMS with family.  Family reports the patient has been sleeping off and on all day.  When they tried to wake her to take her night pills and eat a little they could not wake her up.  She uses 4L NC at baseline.  She was hypoxic for EMS requiring 10L NRB for transport.  The patient is placed on BiPAP upon arrival to the ED.  The patient is minimally responsive to deep painful stimuli.  At her last hospitalization the patient changed her code status to DNR CCA with wishes to not be intubated.  This was confirmed with the patient's son at the bedside.  Discussed the risks of BiPAP in the unresponsive patient with son at the bedside.  He understands the risk and agrees with the plan to admit the patient and hope that she becomes more responsive with improving her hypercapnia.  If she does not he agrees that we will consult the hospice service.     Prior Level of Function:  Lives With: Alone  Type of Home: House  Home Layout: One level  Home Access: Stairs to enter with rails  Entrance Stairs - Number of Steps: 2 steps with 1 rail  Home Equipment: Cane, Walker, rolling, Oxygen   Bathroom Shower/Tub: Walk-in shower  Bathroom Toilet: Handicap height  Bathroom Equipment: Grab bars in shower, Grab bars around toilet, Tub transfer bench    ADL Assistance: Independent  Homemaking Assistance: Independent  Ambulation Assistance: Independent  Transfer Assistance: Independent  Active : No  Additional Comments: PTA pt independent in 
 [facility]  INPATIENT PHYSICAL THERAPY  DAILY NOTE  STRZ CCU-STEPDOWN 3B - 3B-36/036-A      Time In: 0740  Time Out: 0818  Timed Code Treatment Minutes: 38 Minutes  Minutes: 38          Date: 2024  Patient Name: Daniela Pacheco,  Gender:  female        MRN: [id number]  : 1929  (89 y.o.)     Referring Practitioner: Eric Mccloud PA-C  Diagnosis: Acute on chronic respiratory failure with hypoxia and hypercapnia  Additional Pertinent Hx: HPI: Patient presents to the ED by EMS with family.  Family reports the patient has been sleeping off and on all day.  When they tried to wake her to take her night pills and eat a little they could not wake her up.  She uses 4L NC at baseline.  She was hypoxic for EMS requiring 10L NRB for transport.  The patient is placed on BiPAP upon arrival to the ED.  The patient is minimally responsive to deep painful stimuli.  At her last hospitalization the patient changed her code status to DNR CCA with wishes to not be intubated.  This was confirmed with the patient's son at the bedside.  Discussed the risks of BiPAP in the unresponsive patient with son at the bedside.  He understands the risk and agrees with the plan to admit the patient and hope that she becomes more responsive with improving her hypercapnia.  If she does not he agrees that we will consult the hospice service.     Prior Level of Function:  Lives With: Alone  Type of Home: House  Home Layout: One level  Home Access: Stairs to enter with rails  Entrance Stairs - Number of Steps: 2 steps with 1 rail  Home Equipment: Cane, Walker, rolling, Oxygen   Bathroom Shower/Tub: Walk-in shower  Bathroom Toilet: Handicap height  Bathroom Equipment: Grab bars in shower, Grab bars around toilet, Tub transfer bench    ADL Assistance: Independent  Homemaking Assistance: Independent  Ambulation Assistance: Independent  Transfer Assistance: Independent  Active : No  Additional Comments: PTA pt 
 [facility]  INPATIENT PHYSICAL THERAPY  DAILY NOTE  STRZ CCU-STEPDOWN 3B - 3B-36/036-A      Time In: 07  Time Out: 0817  Timed Code Treatment Minutes: 41 Minutes  Minutes: 41          Date: 2024  Patient Name: Daniela Pacheco,  Gender:  female        MRN: [id number]  : 1929  (89 y.o.)     Referring Practitioner: Eric Mccloud PA-C  Diagnosis: Acute on chronic respiratory failure with hypoxia and hypercapnia  Additional Pertinent Hx: HPI: Patient presents to the ED by EMS with family.  Family reports the patient has been sleeping off and on all day.  When they tried to wake her to take her night pills and eat a little they could not wake her up.  She uses 4L NC at baseline.  She was hypoxic for EMS requiring 10L NRB for transport.  The patient is placed on BiPAP upon arrival to the ED.  The patient is minimally responsive to deep painful stimuli.  At her last hospitalization the patient changed her code status to DNR CCA with wishes to not be intubated.  This was confirmed with the patient's son at the bedside.  Discussed the risks of BiPAP in the unresponsive patient with son at the bedside.  He understands the risk and agrees with the plan to admit the patient and hope that she becomes more responsive with improving her hypercapnia.  If she does not he agrees that we will consult the hospice service.     Prior Level of Function:  Lives With: Alone  Type of Home: House  Home Layout: One level  Home Access: Stairs to enter with rails  Entrance Stairs - Number of Steps: 2 steps with 1 rail  Home Equipment: Cane, Walker, rolling, Oxygen   Bathroom Shower/Tub: Walk-in shower  Bathroom Toilet: Handicap height  Bathroom Equipment: Grab bars in shower, Grab bars around toilet, Tub transfer bench    ADL Assistance: Independent  Homemaking Assistance: Independent  Ambulation Assistance: Independent  Transfer Assistance: Independent  Active : No  Additional Comments: PTA pt 
[address] [place] [address]--HOSPITALIST GROUP    Hospitalist PROGRESS NOTE dictated by Albert Cortez MD on 2024    Patient ID: Daniela Pacheco is 89 y.o. and presently in room [facility]036-A  : 1929 MRN: [id number]    Admit date: 2024  Primary Care Physician: Gaudencio Sepulveda   Patient Care Team:  Gaudencio Sepulveda as PCP - General (Family Medicine)  Tel: [phone]  IP CONSULT TO SOCIAL WORK  IP CONSULT TO PALLIATIVE CARE  PALLIATIVE CARE EVAL  IP CONSULT TO NEPHROLOGY  IP CONSULT TO PULMONOLOGY  Admitting Physician: Albert Cortez MD   Code Status:  Limited    Daniela Pacheco is a 89 y.o.  female who presented with Shortness of Breath    Admit date: 2024  Room: [address] [place] [address]-A    HPI: Patient presents to the ED by EMS with family.  Family reports the patient has been sleeping off and on all day.  When they tried to wake her to take her night pills and eat a little they could not wake her up.  She uses 4L NC at baseline.  She was hypoxic for EMS requiring 10L NRB for transport.  The patient is placed on BiPAP upon arrival to the ED.  The patient is minimally responsive to deep painful stimuli.  At her last hospitalization the patient changed her code status to DNR CCA with wishes to not be intubated.  This was confirmed with the patient's son at the bedside.  Discussed the risks of BiPAP in the unresponsive patient with son at the bedside.  He understands the risk and agrees with the plan to admit the patient and hope that she becomes more responsive with improving her hypercapnia.  If she does not he agrees that we will consult the hospice service.     Patient will be admitted for acute on chronic hypoxic and hypercapnic respiratory failure on BiPAP.  -----    2024: Patient denies chest pain shortness of breath or cough.    Sodium 126 today.  Yesterday sodium 131.  Yesterday patient started on oral Bumex and previously on a Bumex drip.    Patient 
[address] [place] [address]--HOSPITALIST GROUP    Hospitalist PROGRESS NOTE dictated by Albert Cortez MD on 2024    Patient ID: Daniela Pacheco is 89 y.o. and presently in room [facility]036-A  : 1929 MRN: [id number]    Admit date: 2024  Primary Care Physician: Gaudencio Sepulveda   Patient Care Team:  Gaudencio Sepulveda as PCP - General (Family Medicine)  Tel: [phone]  IP CONSULT TO SOCIAL WORK  IP CONSULT TO PALLIATIVE CARE  PALLIATIVE CARE EVAL  IP CONSULT TO NEPHROLOGY  IP CONSULT TO PULMONOLOGY  Admitting Physician: Albert Cortez MD   Code Status:  Limited    Daniela Pacheco is a 89 y.o.  female who presented with Shortness of Breath    Admit date: 2024  Room: [address] [place] [address]-A    HPI: Patient presents to the ED by EMS with family.  Family reports the patient has been sleeping off and on all day.  When they tried to wake her to take her night pills and eat a little they could not wake her up.  She uses 4L NC at baseline.  She was hypoxic for EMS requiring 10L NRB for transport.  The patient is placed on BiPAP upon arrival to the ED.  The patient is minimally responsive to deep painful stimuli.  At her last hospitalization the patient changed her code status to DNR CCA with wishes to not be intubated.  This was confirmed with the patient's son at the bedside.  Discussed the risks of BiPAP in the unresponsive patient with son at the bedside.  He understands the risk and agrees with the plan to admit the patient and hope that she becomes more responsive with improving her hypercapnia.  If she does not he agrees that we will consult the hospice service.     Patient will be admitted for acute on chronic hypoxic and hypercapnic respiratory failure on BiPAP.  -----    2024: Patient denies chest pain shortness of breath or cough.    Sodium 126 today.  Yesterday sodium 131.  Yesterday patient started on oral Bumex and previously on a Bumex drip.    Patient completed 
[facility]--HOSPITALIST GROUP    Hospitalist PROGRESS NOTE dictated by Albert Cortez MD on 2024    Patient ID: Daniela Pacheco is 89 y.o. and presently in room [facility]008-A  : 1929 MRN: [id number]    Admit date: 2024  Primary Care Physician: Gaudencio Sepulveda   Patient Care Team:  Gaudencio Sepulveda as PCP - General (Family Medicine)  Tel: [phone]  IP CONSULT TO SOCIAL WORK  IP CONSULT TO PALLIATIVE CARE  PALLIATIVE CARE EVAL  IP CONSULT TO NEPHROLOGY  Admitting Physician: No admitting provider for patient encounter.   Code Status:  Limited    Daniela Pacheco is a 89 y.o.  female who presented with Shortness of Breath    Admit date: 2024  Room: [facility]-A    HPI: Patient presents to the ED by EMS with family.  Family reports the patient has been sleeping off and on all day.  When they tried to wake her to take her night pills and eat a little they could not wake her up.  She uses 4L NC at baseline.  She was hypoxic for EMS requiring 10L NRB for transport.  The patient is placed on BiPAP upon arrival to the ED.  The patient is minimally responsive to deep painful stimuli.  At her last hospitalization the patient changed her code status to DNR CCA with wishes to not be intubated.  This was confirmed with the patient's son at the bedside.  Discussed the risks of BiPAP in the unresponsive patient with son at the bedside.  He understands the risk and agrees with the plan to admit the patient and hope that she becomes more responsive with improving her hypercapnia.  If she does not he agrees that we will consult the hospice service.     Patient will be admitted for acute on chronic hypoxic and hypercapnic respiratory failure on BiPAP.  -----    2024: Patient denies chest pain shortness of breath or cough.    Sodium 126 today.  Yesterday sodium 131.  Yesterday patient started on oral Bumex and previously on a Bumex drip.    Patient completed 
6 [facility]--HOSPITALIST GROUP    Hospitalist PROGRESS NOTE dictated by Albert Cortez MD on 2024    Patient ID: Daniela Pacheco is 89 y.o. and presently in room [facility]008-A  : 1929 MRN: [id number]    Admit date: 2024  Primary Care Physician: Gaudencio Sepulveda   Patient Care Team:  Gaudencio Sepulveda as PCP - General (Family Medicine)  Tel: [phone]  IP CONSULT TO SOCIAL WORK  IP CONSULT TO PALLIATIVE CARE  PALLIATIVE CARE EVAL  Admitting Physician: No admitting provider for patient encounter.   Code Status:  Limited    Daniela Pacheco is a 89 y.o.  female who presented with Shortness of Breath    Admit date: 2024  [facility]        2024: Patient denies chest pain shortness of breath or cough.    Sodium 126 today.  Yesterday sodium 131.  Yesterday patient started on oral Bumex and previously on a Bumex drip.    Patient completed 7-day course of cefepime on 2024 for right upper lobe pneumonia.      Assessment:    Principal Problem:    Acute on chronic respiratory failure with hypoxia and hypercapnia (HCC)  Active Problems:    Chronic renal disease, stage III (HCC) [430175]    Essential hypertension    Chronic atrial fibrillation (HCC)    Pneumonia of right lung due to infectious organism    Chronic anticoagulation    Palliative care patient  Resolved Problems:    * No resolved hospital problems. *  Chronic atrial fibrillation on Xarelto.        Plan:    Fluid restrict 1200 cc daily  Check serum and urine osmolalities and urine sodium.  Monitor sodium level.  In and outs and daily weight.  Palliative care following.  Continuous Infusions:    sodium chloride Stopped (24 0145)    dextrose       bumetanide, 1 mg, Daily  polyethylene glycol, 17 g, Daily  sennosides-docusate sodium, 1 tablet, Nightly  insulin lispro, 0-4 Units, TID WC  insulin lispro, 0-4 Units, Nightly  loteprednol, 1 drop, Every Other Day  Polyethylene Glycol 
A home oxygen evaluation has been completed.     [x]Patient is an inpatient. It is expected that the patient will be discharged within the next 48 hours. Qualified provider to write order for home prescription if patient qualifies. Social service/care managers will arrange for home oxygen.  If patient is active, arrange for Home Medical supplier to assess for Oxygen Conserving Device per pulse oximetry.  []Patient is an outpatient. Results will be faxed to the ordering provider. Qualified provider to write order for home prescription if patient qualifies and arranges for home oxygen.      Patient was placed on room air for 15 minutes. SpO2 was 84% on room air at rest. Patients SpO2 was below 89% and qualified for home oxygen. Oxygen was applied at 2 lpm via nasal cannula to maintain a SpO2 between 90-92% while at rest. Actual SpO2 was 92 %. Patient can ambulate for exercise flow rate. Patients was ambulated, SpO2 was 92% on 6 lpm to maintain SpO2 between 90-92% while exercising.    If oxygen need is greater than 4 lpm the SpO2 on 4 lpm was 89.     
[facility]  INPATIENT PHYSICAL THERAPY  EVALUATION  [facility] CVICU 4B - 4B-08/008-A    Time In: 09  Time Out: 0950  Timed Code Treatment Minutes: 29 Minutes  Minutes: 37          Date: 2024  Patient Name: Daniela Pacheco,  Gender:  female        MRN: [id number]  : 1929  (89 y.o.)      Referring Practitioner: Eric Mccloud PA-C  Diagnosis: Acute on chronic respiratory failure with hypoxia and hypercapnia  Additional Pertinent Hx: HPI: Patient presents to the ED by EMS with family.  Family reports the patient has been sleeping off and on all day.  When they tried to wake her to take her night pills and eat a little they could not wake her up.  She uses 4L NC at baseline.  She was hypoxic for EMS requiring 10L NRB for transport.  The patient is placed on BiPAP upon arrival to the ED.  The patient is minimally responsive to deep painful stimuli.  At her last hospitalization the patient changed her code status to DNR CCA with wishes to not be intubated.  This was confirmed with the patient's son at the bedside.  Discussed the risks of BiPAP in the unresponsive patient with son at the bedside.  He understands the risk and agrees with the plan to admit the patient and hope that she becomes more responsive with improving her hypercapnia.  If she does not he agrees that we will consult the hospice service.     Restrictions/Precautions:  Restrictions/Precautions: Fall Risk, General Precautions  Position Activity Restriction  Other position/activity restrictions: O2 at 5 L currently. Was on 2 L at rest and 4 L with activity in PLOF.    Subjective:  Chart Reviewed: Yes  Patient assessed for rehabilitation services?: Yes  Subjective: Pt resting in bed and agrees to therapy. RN approved session.    General:     Vision: Impaired  Vision Exceptions: Wears glasses at all times  Hearing: Exceptions to WFL  Hearing Exceptions: Hard of hearing/hearing concerns, Bilateral hearing aid       Pain: denied  
CLINICAL PHARMACY: DISCHARGE MED RECONCILIATION/REVIEW    [facility] Select Patient?: No  Total # of Interventions Recommended: 0  Total # Interventions Accepted: 0  Intervention Severity:   - Level 1 Intervention Present?: No   - Level 2 #: 0   - Level 3 #: 0   Time Spent (min): 15    Additional Documentation:    Suha Sagastume, PepeD    
[facility]  OCCUPATIONAL THERAPY MISSED TREATMENT NOTE  STRZ CVICU 4B  4B-08/008-A      Date: 2024  Patient Name: Daniela Pacheco        CSN: [id number]   : 1929  (89 y.o.)  Gender: female   Referring Practitioner: Eric Mccloud PA-C  Diagnosis: acute on chronic respiratory failure with hypoxia and  hypercapnia         REASON FOR MISSED TREATMENT:  Attempt 1: patient recently finished with PT, Attempt 2: patient eating lunch.  Will attempt next available time.                
[facility]  STRZ CVICU 4B  Occupational Therapy  Daily Note  Time:   Time In: 1405  Time Out: 1438  Timed Code Treatment Minutes: 33 Minutes  Minutes: 33          Date: 2024  Patient Name: Daniela Pacheco,   Gender: female      Room: [facility]08/008-A  MRN: [id number]  : 1929  (89 y.o.)  Referring Practitioner: Eric Mccloud PA-C  Diagnosis: acute on chronic respiratory failure with hypoxia and  hypercapnia  Additional Pertinent Hx: HPI: Patient presents to the ED by EMS with family.  Family reports the patient has been sleeping off and on all day.  When they tried to wake her to take her night pills and eat a little they could not wake her up.  She uses 4L NC at baseline.  She was hypoxic for EMS requiring 10L NRB for transport.  The patient is placed on BiPAP upon arrival to the ED.  The patient is minimally responsive to deep painful stimuli.  At her last hospitalization the patient changed her code status to DNR CCA with wishes to not be intubated.  This was confirmed with the patient's son at the bedside.  Discussed the risks of BiPAP in the unresponsive patient with son at the bedside.  He understands the risk and agrees with the plan to admit the patient and hope that she becomes more responsive with improving her hypercapnia.  If she does not he agrees that we will consult the hospice service.    Restrictions/Precautions:  Restrictions/Precautions: Fall Risk, General Precautions  Position Activity Restriction  Other position/activity restrictions: O2 at 5 L currently. Was on 2 L at rest and 4 L with activity in PLOF.     SUBJECTIVE: Patient sleeping in bedside chair upon arrival; agreeable to therapy.  Patient pleasant and cooperative throughout    PAIN: 0/10:     Vitals: Oxygen: >95% 4L throughout    COGNITION: WFL    ADL:   Grooming: Stand By Assistance.  Wash face with set up seated  Bathing: Contact Guard Assistance.  With standing, patient completed remainder seated  Upper 
[facility]  STRZ CCU-STEPDOWN 3B  Occupational Therapy  Progress Note  Time:   Time In: 1100  Time Out: 1141  Timed Code Treatment Minutes: 41 Minutes  Minutes: 41          Date: 2024  Patient Name: Daniela Pacheco,   Gender: female      Room: -36/036-A  MRN: [id number]  : 1929  (89 y.o.)  Referring Practitioner: Eric Mccloud PA-C  Diagnosis: acute on chronic respiratory failure with hypoxia and  hypercapnia  Additional Pertinent Hx: HPI: Patient presents to the ED by EMS with family.  Family reports the patient has been sleeping off and on all day.  When they tried to wake her to take her night pills and eat a little they could not wake her up.  She uses 4L NC at baseline.  She was hypoxic for EMS requiring 10L NRB for transport.  The patient is placed on BiPAP upon arrival to the ED.  The patient is minimally responsive to deep painful stimuli.  At her last hospitalization the patient changed her code status to DNR CCA with wishes to not be intubated.  This was confirmed with the patient's son at the bedside.  Discussed the risks of BiPAP in the unresponsive patient with son at the bedside.  He understands the risk and agrees with the plan to admit the patient and hope that she becomes more responsive with improving her hypercapnia.  If she does not he agrees that we will consult the hospice service.    Restrictions/Precautions:  Restrictions/Precautions: Fall Risk, General Precautions  Position Activity Restriction  Other position/activity restrictions: monitor O2 sats     Social/Functional History:  Lives With: Alone  Type of Home: House  Home Layout: One level  Home Access: Stairs to enter with rails  Entrance Stairs - Number of Steps: 2 steps with 1 rail  Home Equipment: Cane, Walker, rolling, Oxygen   Bathroom Shower/Tub: Walk-in shower  Bathroom Toilet: Handicap height  Bathroom Equipment: Grab bars in shower, Grab bars around toilet, Tub transfer bench       ADL 
Discussed discharge summary with patient and patient's daughter, Tess. Instructed patient about medications & follow up appointments. Patient denies any additional questions. Patient was discharged with all belongings. No distress noted. Patient discharged to [place], on 2L nasal cannula. Attempted to call Lorena Marti twice to give report on patient but had no answer. All paperwork faxed to [place]. Taken down to the vehicle by transporter per wheelchair.    
Follow-up visit to pray and encouragement her. Prayer was appreciated   02/01/24 1359   Encounter Summary   Service Provided For: Patient and family together   Referral/Consult From: [facility]   Support System Children   Last Encounter  02/01/24   Complexity of Encounter Low   Begin Time 1020   End Time  1030   Total Time Calculated 10 min   Crisis   Type Follow up   Spiritual/Emotional needs   Type Spiritual Support   Assessment/Intervention/Outcome   Assessment Hopeful   Intervention Empowerment   Outcome Encouraged;Engaged in conversation     .   
[facility]  STRZ CCU-STEPDOWN 3B  Occupational Therapy  Daily Note  Time:   Time In: 1155  Time Out: 1233  Timed Code Treatment Minutes: 38 Minutes  Minutes: 38          Date: 2024  Patient Name: Daniela Pacheco,   Gender: female      Room: -36/036-A  MRN: [id number]  : 1929  (89 y.o.)  Referring Practitioner: Eric Mccloud PA-C  Diagnosis: acute on chronic respiratory failure with hypoxia and  hypercapnia  Additional Pertinent Hx: HPI: Patient presents to the ED by EMS with family.  Family reports the patient has been sleeping off and on all day.  When they tried to wake her to take her night pills and eat a little they could not wake her up.  She uses 4L NC at baseline.  She was hypoxic for EMS requiring 10L NRB for transport.  The patient is placed on BiPAP upon arrival to the ED.  The patient is minimally responsive to deep painful stimuli.  At her last hospitalization the patient changed her code status to DNR CCA with wishes to not be intubated.  This was confirmed with the patient's son at the bedside.  Discussed the risks of BiPAP in the unresponsive patient with son at the bedside.  He understands the risk and agrees with the plan to admit the patient and hope that she becomes more responsive with improving her hypercapnia.  If she does not he agrees that we will consult the hospice service.    Restrictions/Precautions:  Restrictions/Precautions: Fall Risk, General Precautions  Position Activity Restriction  Other position/activity restrictions: monitor O2 sats     SUBJECTIVE: Pleasant and cooperative.  RN approved session. Pt requested to go for a walk.  She made a stop in the bathroom prior to walking in the hallway.   PAIN: None reported    Vitals: Oxygen: Pt had mild SOB while standing and finishing in the bathroom. She remained on 2L of O2 throughout session.    COGNITION: Decreased Recall; min cues provided to orient to day of the week and which holiday is 
Hand off report given to Jo-Ann JORGE. Patient resting comfortably in chair while eating lunch. Call light in reach.  Yumiko Kendrick RSC/SN  
[facility]   Nephrology progress note  2/12/2024, 10:46 AM      Pt Name:    Daniela Pacheco  MRN:     [id number]     YOB: 1929  Admit Date:    1/28/2024 11:48 PM    Chief Complaint: Nephrology following for hyponatremia    Subjective:  Patient was seen and examined this morning  This is late entry  Seen earlier today during rounds  On nasal cannula  More awake and alert      Objective:  24HR INTAKE/OUTPUT:    Intake/Output Summary (Last 24 hours) at 2/12/2024 1046  Last data filed at 2/12/2024 1015  Gross per 24 hour   Intake 360 ml   Output 250 ml   Net 110 ml           I/O last 3 completed shifts:  In: 790 [P.O.:790]  Out: 1200 [Urine:1200]  I/O this shift:  In: 120 [P.O.:120]  Out: -    Admission weight: 72.6 kg (160 lb)  Wt Readings from Last 3 Encounters:   02/11/24 71 kg (156 lb 8.4 oz)   01/22/24 73 kg (160 lb 15 oz)   12/08/23 67.1 kg (148 lb)        Vitals :   Vitals:    02/11/24 2000 02/11/24 2301 02/12/24 0245 02/12/24 0844   BP: (!) 112/48 132/61 (!) 142/70 123/66   Pulse: 67 66 67 88   Resp: 20 22 20 20   Temp: 98.7 °F (37.1 °C) 98.8 °F (37.1 °C) 97.8 °F (36.6 °C) 98.3 °F (36.8 °C)   TempSrc: Oral Oral Oral Oral   SpO2: 93% 95% 95% 99%   Weight:       Height:           Physical examination  General Appearance: appears comfortable, no distress  Mouth/Throat: Oral mucosa moist  Neck: No JVD  Lungs:  no use of accessory muscles  GI: soft, non-tender, no guarding  Extremities: + Trace LE edema, stable    Medications:  Infusion:    sodium chloride Stopped (02/01/24 0145)    dextrose       Meds:    bumetanide  1 mg Oral Daily    polyethylene glycol  17 g Oral Daily    sennosides-docusate sodium  1 tablet Oral Nightly    insulin lispro  0-4 Units SubCUTAneous TID WC    insulin lispro  0-4 Units SubCUTAneous Nightly    loteprednol  1 drop Right Eye Every Other Day    Polyethylene Glycol 400  1 drop Left Eye TID    atenolol  25 mg Oral Daily    isosorbide mononitrate  30 mg 
[facility]   Nephrology progress note  2/14/2024, 1:01 PM      Pt Name:    Daniela Pacheco  MRN:     [id number]     YOB: 1929  Admit Date:    1/28/2024 11:48 PM    Chief Complaint: Nephrology following for hyponatremia    Subjective:  Patient was seen and examined this morning  This is late entry  Seen earlier today during rounds  Hard of hearing      Objective:  24HR INTAKE/OUTPUT:    Intake/Output Summary (Last 24 hours) at 2/14/2024 1301  Last data filed at 2/14/2024 1053  Gross per 24 hour   Intake 650 ml   Output 500 ml   Net 150 ml           I/O last 3 completed shifts:  In: 1460 [P.O.:1460]  Out: 300 [Urine:300]  I/O this shift:  In: 290 [P.O.:280; I.V.:10]  Out: 300 [Urine:300]   Admission weight: 72.6 kg (160 lb)  Wt Readings from Last 3 Encounters:   02/14/24 73.7 kg (162 lb 7.7 oz)   01/22/24 73 kg (160 lb 15 oz)   12/08/23 67.1 kg (148 lb)        Vitals :   Vitals:    02/14/24 0822 02/14/24 0940 02/14/24 1100 02/14/24 1246   BP: 134/65 134/65 (!) 117/57    Pulse: 69 69 61    Resp: 18  18    Temp: 98.1 °F (36.7 °C)  97.9 °F (36.6 °C)    TempSrc: Oral  Oral    SpO2: 96%  97% 96%   Weight:       Height:           Physical examination  General Appearance: appears comfortable, no distress  Mouth/Throat: Oral mucosa moist  Neck: No JVD  Lungs:  no use of accessory muscles  GI: soft, non-tender, no guarding  Extremities: + Trace LE edema, stable    Medications:  Infusion:    sodium chloride Stopped (02/01/24 0145)    dextrose       Meds:    [Held by provider] bumetanide  1 mg Oral Daily    polyethylene glycol  17 g Oral Daily    sennosides-docusate sodium  1 tablet Oral Nightly    insulin lispro  0-4 Units SubCUTAneous TID WC    insulin lispro  0-4 Units SubCUTAneous Nightly    loteprednol  1 drop Right Eye Every Other Day    Polyethylene Glycol 400  1 drop Left Eye TID    atenolol  25 mg Oral Daily    isosorbide mononitrate  30 mg Oral Daily    rivaroxaban  15 mg Oral Daily 
[facility]   Nephrology progress note  2/8/2024, 12:18 PM      Pt Name:    Daniela Pacheco  MRN:     [id number]     YOB: 1929  Admit Date:    1/28/2024 11:48 PM    Chief Complaint: Nephrology following for hyponatremia    Subjective:  Patient was seen and examined this morning  This is late entry  Discussed with nursing staff at bedside  Patient is on low-dose normal saline  No chest pain or shortness of breath      Objective:  24HR INTAKE/OUTPUT:    Intake/Output Summary (Last 24 hours) at 2/8/2024 1218  Last data filed at 2/8/2024 0612  Gross per 24 hour   Intake 692.44 ml   Output 1620 ml   Net -927.56 ml         I/O last 3 completed shifts:  In: 1087.4 [P.O.:595; I.V.:492.4]  Out: 2520 [Urine:2520]  No intake/output data recorded.   Admission weight: 72.6 kg (160 lb)  Wt Readings from Last 3 Encounters:   02/08/24 73.5 kg (162 lb 0.6 oz)   01/22/24 73 kg (160 lb 15 oz)   12/08/23 67.1 kg (148 lb)        Vitals :   Vitals:    02/08/24 0030 02/08/24 0437 02/08/24 0506 02/08/24 0830   BP: (!) 112/55 (!) 116/57  (!) 114/55   Pulse: 60 60  60   Resp: 16 18  16   Temp: 98.8 °F (37.1 °C) 98.2 °F (36.8 °C)  (!) 96.3 °F (35.7 °C)   TempSrc: Axillary Oral  Oral   SpO2: 93% 91%  90%   Weight:   73.5 kg (162 lb 0.6 oz)    Height:           Physical examination  General Appearance: alert and cooperative with exam, appears comfortable, no distress  Mouth/Throat: Oral mucosa moist  Neck: No JVD  Lungs:  no use of accessory muscles  GI: soft, non-tender, no guarding  Extremities: + Trace LE edema    Medications:  Infusion:    sodium chloride Stopped (02/01/24 0145)    dextrose       Meds:    bumetanide  1 mg Oral Daily    polyethylene glycol  17 g Oral Daily    sennosides-docusate sodium  1 tablet Oral Nightly    insulin lispro  0-4 Units SubCUTAneous TID WC    insulin lispro  0-4 Units SubCUTAneous Nightly    loteprednol  1 drop Right Eye Every Other Day    Polyethylene Glycol 400  1 drop 
[facility]  INPATIENT OCCUPATIONAL THERAPY  STRZ CVICU 4B  EVALUATION    Time:   Time In: 1344  Time Out: 1406  Timed Code Treatment Minutes: 14 Minutes  Minutes: 22          Date: 2024  Patient Name: Daniela Pacheco,   Gender: female      MRN: [id number]  : 1929  (89 y.o.)  Referring Practitioner: Eric Mccloud PA-C  Diagnosis: acute on chronic respiratory failure with hypoxia and  hypercapnia  Additional Pertinent Hx: HPI: Patient presents to the ED by EMS with family.  Family reports the patient has been sleeping off and on all day.  When they tried to wake her to take her night pills and eat a little they could not wake her up.  She uses 4L NC at baseline.  She was hypoxic for EMS requiring 10L NRB for transport.  The patient is placed on BiPAP upon arrival to the ED.  The patient is minimally responsive to deep painful stimuli.  At her last hospitalization the patient changed her code status to DNR CCA with wishes to not be intubated.  This was confirmed with the patient's son at the bedside.  Discussed the risks of BiPAP in the unresponsive patient with son at the bedside.  He understands the risk and agrees with the plan to admit the patient and hope that she becomes more responsive with improving her hypercapnia.  If she does not he agrees that we will consult the hospice service.    Restrictions/Precautions:  Restrictions/Precautions: Fall Risk, General Precautions  Position Activity Restriction  Other position/activity restrictions: O2 at 5 L currently. Was on 2 L at rest and 4 L with activity in PLOF.    Subjective  Chart Reviewed: Yes, History and Physical, Orders, Progress Notes  Patient assessed for rehabilitation services?: Yes  Family / Caregiver Present: No    Subjective: RN approved OT visit, patient agreeable to OT session. Asleep in recliner with lunch in front of her    Pain: denied/10:     Vitals: Vitals not assessed per clinical judgement, see nursing 
[facility]  OCCUPATIONAL THERAPY MISSED TREATMENT NOTE  STRZ CVICU 4B  4B-08/008-A      Date: 2024  Patient Name: Daniela Pacheco        CSN: [id number]   : 1929  (89 y.o.)  Gender: female   Referring Practitioner: Eric Mccloud PA-C  Diagnosis: acute on chronic respiratory failure with hypoxia and  hypercapnia         REASON FOR MISSED TREATMENT: Attempt 1: patient with PT, Attempt 2: patient eating lunch.  Will attempt next available time.               
[facility]  STRZ CVICU 4B  Occupational Therapy  Daily Note  Time:   Time In: 1118  Time Out: 1149  Timed Code Treatment Minutes: 31 Minutes  Minutes: 31          Date: 2024  Patient Name: Daniela Pacheco,   Gender: female      Room: [facility]08/008-A  MRN: [id number]  : 1929  (89 y.o.)  Referring Practitioner: Eric Mccloud PA-C  Diagnosis: acute on chronic respiratory failure with hypoxia and  hypercapnia  Additional Pertinent Hx: HPI: Patient presents to the ED by EMS with family.  Family reports the patient has been sleeping off and on all day.  When they tried to wake her to take her night pills and eat a little they could not wake her up.  She uses 4L NC at baseline.  She was hypoxic for EMS requiring 10L NRB for transport.  The patient is placed on BiPAP upon arrival to the ED.  The patient is minimally responsive to deep painful stimuli.  At her last hospitalization the patient changed her code status to DNR CCA with wishes to not be intubated.  This was confirmed with the patient's son at the bedside.  Discussed the risks of BiPAP in the unresponsive patient with son at the bedside.  He understands the risk and agrees with the plan to admit the patient and hope that she becomes more responsive with improving her hypercapnia.  If she does not he agrees that we will consult the hospice service.    Restrictions/Precautions:  Restrictions/Precautions: Fall Risk, General Precautions  Position Activity Restriction  Other position/activity restrictions: O2 at 5 L currently. Was on 2 L at rest and 4 L with activity in PLOF.     SUBJECTIVE: RN approved OT visit, patient agreeable.     PAIN: denied/10:     Vitals: Oxygen: 5L o2 fluctuating between 86-95% with cues for deep breathing with activity    COGNITION: WNL    ADL:   Grooming: Moderate Assistance, X 1, with set-up, with verbal cues , and with increased time for completion.  Washing hair with shower cap while upright in bed requiring 
[facility]  STRZ CCU-STEPDOWN 3B  Occupational Therapy  Daily Note  Time:   Time In: 821  Time Out: 847  Timed Code Treatment Minutes: 26 Minutes  Minutes: 26          Date: 2024  Patient Name: Daniela Pacheco,   Gender: female      Room: -36/036-A  MRN: [id number]  : 1929  (89 y.o.)  Referring Practitioner: Eric Mccloud PA-C  Diagnosis: acute on chronic respiratory failure with hypoxia and  hypercapnia  Additional Pertinent Hx: HPI: Patient presents to the ED by EMS with family.  Family reports the patient has been sleeping off and on all day.  When they tried to wake her to take her night pills and eat a little they could not wake her up.  She uses 4L NC at baseline.  She was hypoxic for EMS requiring 10L NRB for transport.  The patient is placed on BiPAP upon arrival to the ED.  The patient is minimally responsive to deep painful stimuli.  At her last hospitalization the patient changed her code status to DNR CCA with wishes to not be intubated.  This was confirmed with the patient's son at the bedside.  Discussed the risks of BiPAP in the unresponsive patient with son at the bedside.  He understands the risk and agrees with the plan to admit the patient and hope that she becomes more responsive with improving her hypercapnia.  If she does not he agrees that we will consult the hospice service.    Restrictions/Precautions:  Restrictions/Precautions: Fall Risk, General Precautions  Position Activity Restriction  Other position/activity restrictions: monitor O2 sats     SUBJECTIVE: Patient seated in bedside chair upon arrival, notes recently completing PT session however requesting to use bathroom, agreeable to OT session.  Patient notes \"I did not use oxygen at home\".  Collaboration with RNVinay, d/t patient O2 stats >94% on 1L, trial RA during session, agreeable.  Patient pleasant and cooperative throughout session.    PAIN: 0/10:      Vitals: Oxygen: >94% 1L, removed nasal 
Report received from Mary JORGE. Patient resting comfortably in bed with unlabored respirations. Call light in reach.  Yumiko Kendrick RSC/SN  
Spirometry completed bedside. Results under chart review/procedures tab.   
[facility]  STRZ CVICU 4B  Occupational Therapy  Daily Note  Time:   Time In: 746  Time Out: 830  Timed Code Treatment Minutes: 44 Minutes  Minutes: 44          Date: 2024  Patient Name: Daniela Pacheco,   Gender: female      Room: [facility]08/008-A  MRN: [id number]  : 1929  (89 y.o.)  Referring Practitioner: Eric Mccloud PA-C  Diagnosis: acute on chronic respiratory failure with hypoxia and  hypercapnia  Additional Pertinent Hx: HPI: Patient presents to the ED by EMS with family.  Family reports the patient has been sleeping off and on all day.  When they tried to wake her to take her night pills and eat a little they could not wake her up.  She uses 4L NC at baseline.  She was hypoxic for EMS requiring 10L NRB for transport.  The patient is placed on BiPAP upon arrival to the ED.  The patient is minimally responsive to deep painful stimuli.  At her last hospitalization the patient changed her code status to DNR CCA with wishes to not be intubated.  This was confirmed with the patient's son at the bedside.  Discussed the risks of BiPAP in the unresponsive patient with son at the bedside.  He understands the risk and agrees with the plan to admit the patient and hope that she becomes more responsive with improving her hypercapnia.  If she does not he agrees that we will consult the hospice service.    Restrictions/Precautions:  Restrictions/Precautions: Fall Risk, General Precautions  Position Activity Restriction  Other position/activity restrictions: O2 at 5 L currently. Was on 2 L at rest and 4 L with activity in PLOF.     SUBJECTIVE: Patient supine in bed upon arrival of OT. Nurse approved of session. Patient agreeable to OT. Patient pleasant and cooperative    PAIN:  No pain was noted during the session.    Vitals: Oxygen: >94% throughout session on 4L    COGNITION: WFL, Decreased Problem Solving, and Decreased Safety Awareness    ADL:   Grooming: Contact Guard Assistance, with 
Type and Reason for Visit:    Reassess     Nutrition Recommendations/Plan:   Pt. moderately malnourished AEB criteria as listed above.  At risk for further nutrition compromise r/t admit with acute on chronic respiratory failure, CHF/pneumonia, advanced age, and underlying medical condition (hx: afib, HTN, arthritis, HLD, pneumonia, CHF, CKD, pacer, hepatic lesion).    CM following, pre-cert was started 2/8.     Nutrition Assessment:      Pt. At low nutrition risk per RD evaluation.    Pt. Report/Treatments/Miscellaneous: Per patient and daughter, her appetite has greatly improved. Per daughter, she has done a 360 and is now eating much better. Per patient she is prioritizing eating the food on her tray and then is drinking the nutrition supplement. Patient likes the food here and isn't having any difficulty eating the food. Per patient she has had 3 bowel movements today.   GI Status: 2/11  Labs:   Recent Labs     02/12/24  0419   *   K 3.8   CL 85*   GLUCOSE 113*   BUN 26*   CREATININE 0.6     Medications:    bumetanide  1 mg Oral Daily    polyethylene glycol  17 g Oral Daily    sennosides-docusate sodium  1 tablet Oral Nightly    insulin lispro  0-4 Units SubCUTAneous TID WC    insulin lispro  0-4 Units SubCUTAneous Nightly    loteprednol  1 drop Right Eye Every Other Day    Polyethylene Glycol 400  1 drop Left Eye TID    atenolol  25 mg Oral Daily    isosorbide mononitrate  30 mg Oral Daily    rivaroxaban  15 mg Oral Daily    calcium elemental  1 tablet Oral Daily    sodium chloride flush  5-40 mL IntraVENous 2 times per day       Malnutrition Assessment:  No Malnutrition      Wounds:    None     Current Nutrition Therapies:    ADULT ORAL NUTRITION SUPPLEMENT; Breakfast, Lunch, Dinner; Standard 4 oz Oral Supplement  ADULT DIET; Regular; 5 carb choices (75 gm/meal); 1200 ml     Anthropometric Measures:  Height:    157.5 cm (5' 2\")  Current Body Weight:   71 kg (156 lb 8.4 oz) (2/11, +2 pitting 
[facility]   PROGRESS NOTE      Patient: Daniela Pacheco  Room #: 4B-08/008-A            YOB: 1929  Age: 89 y.o.  Gender: female            Admit Date & Time: 1/28/2024 11:48 PM    Assessment:    The patient shared that her  had blessed her. The patient also shared pedro pablo at knowing that surrounding churches were praying for her. The patient then requested prayer.    Interventions:  The patient was provided personalized prayer.    Outcomes:  The patient was thankful for the provided prayer.     Plan:  1.Spiritual care will continue to follow the patient according to [facility] spiritual care SOP.       Electronically signed by Chaplain García, on 2/8/2024 at 10:31 AM.  Spiritual Care Department  [facility]  [phone]     02/08/24 1027   Encounter Summary   Encounter Overview/Reason  Follow-up   Service Provided For: Patient   Referral/Consult From: [facility]   Support System Children;Jew/radha community   Last Encounter  02/08/24   Complexity of Encounter Low   Begin Time 1020   End Time  1025   Total Time Calculated 5 min   Spiritual/Emotional needs   Type Spiritual Support   Assessment/Intervention/Outcome   Assessment Hopeful   Intervention Prayer (assurance of)/[place]   Outcome Expressed Gratitude;Peace         
[facility]  STRZ CVICU 4B  Occupational Therapy  Daily Note  Time:   Time In: 1019  Time Out: 1053  Timed Code Treatment Minutes: 34 Minutes  Minutes: 34          Date: 2024  Patient Name: Daniela Pacheco,   Gender: female      Room: [facility]08/008-A  MRN: [id number]  : 1929  (89 y.o.)  Referring Practitioner: Eric Mccloud PA-C  Diagnosis: acute on chronic respiratory failure with hypoxia and  hypercapnia  Additional Pertinent Hx: HPI: Patient presents to the ED by EMS with family.  Family reports the patient has been sleeping off and on all day.  When they tried to wake her to take her night pills and eat a little they could not wake her up.  She uses 4L NC at baseline.  She was hypoxic for EMS requiring 10L NRB for transport.  The patient is placed on BiPAP upon arrival to the ED.  The patient is minimally responsive to deep painful stimuli.  At her last hospitalization the patient changed her code status to DNR CCA with wishes to not be intubated.  This was confirmed with the patient's son at the bedside.  Discussed the risks of BiPAP in the unresponsive patient with son at the bedside.  He understands the risk and agrees with the plan to admit the patient and hope that she becomes more responsive with improving her hypercapnia.  If she does not he agrees that we will consult the hospice service.    Restrictions/Precautions:  Restrictions/Precautions: Fall Risk, General Precautions  Position Activity Restriction  Other position/activity restrictions: O2 at 5 L currently. Was on 2 L at rest and 4 L with activity in PLOF.     SUBJECTIVE: RN approved OT visit, patient upright in bed with family present. Patient pleasant and cooperative throughout the session.     PAIN: denied/10:     Vitals: Oxygen: at beginning of session 89% on 5L o2 at rest, with RN approval patient increased to 6L and with education on pursed lip breathing patient increased to 93% sitting, with activity patient 
X 3 attempts at glucose check, unable to produce adequate blood sample. Patient refused further attempts. Eating dinner with assistance.   
IntraVENous 2 times per day     Meds prn: oxyCODONE, bisacodyl, albuterol sulfate HFA, [Held by provider] cloNIDine, sodium chloride flush, sodium chloride, potassium chloride **OR** potassium alternative oral replacement **OR** potassium chloride, magnesium sulfate, ondansetron **OR** ondansetron, polyethylene glycol, acetaminophen **OR** acetaminophen, glucose, dextrose bolus **OR** dextrose bolus, glucagon (rDNA), dextrose     Lab Data :  CBC: No results for input(s): \"WBC\", \"HGB\", \"HCT\", \"PLT\" in the last 72 hours.  CMP:  Recent Labs     02/08/24  0349 02/09/24  0410 02/10/24  0444   * 133* 133*   K 4.1 3.7 3.9   CL 81* 82* 84*   CO2 42* 46* 43*   BUN 42* 38* 32*   CREATININE 1.0 0.9 0.8   GLUCOSE 102 121* 112*   CALCIUM 9.7 9.5 9.7     Hepatic: No results for input(s): \"LABALBU\", \"AST\", \"ALT\", \"ALB\", \"BILITOT\", \"ALKPHOS\" in the last 72 hours.      Impression and Plan:  Hyponatremia.    urine sodium is less than 20, urine osmolality is 181.  This raises suspicion of possible polydipsia although it does not appear that she has been drinking excessively.    Continue with fluid restriction  Sodium improved. Continue diuretics for fluid overload    Severe acute respiratory acidosis.  Her pCO2 was 142 on arrival.  Improved  Acute hypoxic respiratory failure.  Improved.  Now on nasal cannula.  Continue with Bumex  Renal cyst.  Has been evaluated by urology as outpatient.  Was seen by urology November 2022 and family decided conservative measures  Pneumonia  Atrial fibrillation  Hypotension.  Improved. Bp meds on hold    Leena Bell,   [facility]    This report has been created using voice recognition software. It may contain minor errors which are inherent in voice recognition technology  
Processing    ADL:   Lower Extremity Dressing: Maximum Assistance.  X2 due to pt incontinent of urine  Footwear Management: Maximum Assistance.  For doffing/donning socks .    BALANCE:  Standing Balance: Contact Guard Assistance. With RW, and BUE support on the walker during ADL routine with pt standing for 30-40 seconds in which pt then began urinating on ground, with pt then standing for additional 1' to get cleaned up with vc's for PLB and deep breathing    BED MOBILITY:  Supine to Sit: Moderate Assistance with the HOB raised, and extended time to get to the EOB    TRANSFERS:  Sit to Stand:  Contact Guard Assistance. From the EOB  Stand to Sit: Contact Guard Assistance. To the recliner    FUNCTIONAL MOBILITY:  Assistive Device: Rolling Walker  Assist Level:  Contact Guard Assistance.   Distance:  from the EOB to the recliner with slow, steady steps, and vc's for bringing BLE to the edge of recliner       ADDITIONAL ACTIVITIES:    -PAC Inpatient Daily Activity Raw Score: 16  AM-PAC Inpatient ADL T-Scale Score : 35.96  ADL Inpatient CMS 0-100% Score: 53.32    Modified Willard Scale:  Not Applicable    ASSESSMENT:     Activity Tolerance:  Patient tolerance of  treatment: Good treatment tolerance      Discharge Recommendations: Continue to assess pending progress  Equipment Recommendations: Equipment Needed: No (Continue to assess as patient progreses)  Plan: Times Per Week: 5x  Current Treatment Recommendations: Strengthening, Balance training, Functional mobility training, Endurance training, Safety education & training, Equipment evaluation, education, & procurement, Self-Care / ADL    Education:  Learners: Patient  ADL's    Goals  Short Term Goals  Time Frame for Short Term Goals: until discharge  Short Term Goal 1: Patient will complete LB dressing with min assistance and use of AE prn to improve independence in daily living.  Short Term Goal 2: Patient will complete toileting and hygiene with MIN A to improve 
Standard 4 oz Oral Supplement    Anthropometric Measures:  Height: 157.5 cm (5' 2\")  Ideal Body Weight (IBW): 110 lbs (50 kg)    Admission Body Weight: 72.6 kg (160 lb) (1/28/24: no edema charted)  Current Body Weight: 70.7 kg (155 lb 13.8 oz) (2/5/24 +1 LE edema, bedscale),       Current BMI (kg/m2): 28.5  Usual Body Weight:  (~ 145# per patient.  Per EMR: 5/24/22: 147#4oz, 1/19/24: 156#8oz, 2/1/24: 152#12oz)                       BMI Categories: Overweight (BMI 25.0-29.9)    Estimated Daily Nutrient Needs:  Energy Requirements Based On: Kcal/kg  Weight Used for Energy Requirements:  (71kg)  Energy (kcal/day): ~ 3320-1307 kcals (20-25 kcals/kg/day)  Weight Used for Protein Requirements: Ideal (50 kg)  Protein (g/day): ~ 65 grams or more (1.3 grams/kg IBW/day)         Nutrition Diagnosis:   Moderate malnutrition, In context of acute illness or injury related to inadequate protein-energy intake, early satiety as evidenced by intake 51-75%, intake 26-50%, intake 0-25%    Nutrition Interventions:   Food and/or Nutrient Delivery: Continue Current Diet, Start Oral Nutrition Supplement  Nutrition Education/Counseling: Education initiated (Encouraged oral intake, good protein sources, and ONS use in between meals.)          Goals:     Goals: PO intake 75% or greater, by next RD assessment       Nutrition Monitoring and Evaluation:      Food/Nutrient Intake Outcomes: Food and Nutrient Intake, Supplement Intake  Physical Signs/Symptoms Outcomes: Biochemical Data, GI Status, Fluid Status or Edema, Nutrition Focused Physical Findings, Weight    Discharge Planning:    Too soon to determine     Allison C Schwab, SAFIA, LD  Contact: [phone]     
rivaroxaban  15 mg Oral Daily    calcium elemental  1 tablet Oral Daily    sodium chloride flush  5-40 mL IntraVENous 2 times per day     Meds prn: oxyCODONE, bisacodyl, albuterol sulfate HFA, [Held by provider] cloNIDine, sodium chloride flush, sodium chloride, potassium chloride **OR** potassium alternative oral replacement **OR** potassium chloride, magnesium sulfate, ondansetron **OR** ondansetron, polyethylene glycol, acetaminophen **OR** acetaminophen, glucose, dextrose bolus **OR** dextrose bolus, glucagon (rDNA), dextrose     Lab Data :  CBC: No results for input(s): \"WBC\", \"HGB\", \"HCT\", \"PLT\" in the last 72 hours.  CMP:  Recent Labs     02/07/24  0508 02/07/24  1806 02/08/24  0349 02/09/24  0410   * 125* 131* 133*   K 3.8  --  4.1 3.7   CL 77*  --  81* 82*   CO2 43*  --  42* 46*   BUN 39*  --  42* 38*   CREATININE 0.9  --  1.0 0.9   GLUCOSE 118*  --  102 121*   CALCIUM 9.7  --  9.7 9.5       Hepatic: No results for input(s): \"LABALBU\", \"AST\", \"ALT\", \"ALB\", \"BILITOT\", \"ALKPHOS\" in the last 72 hours.      Impression and Plan:  Hyponatremia.    urine sodium is less than 20, urine osmolality is 181.  This raises suspicion of possible polydipsia although it does not appear that she has been drinking excessively.    IV fluids stopped yesterday.  Sodium today is up to 133.  Overall appropriate correction  At this point, continue to monitor sodium levels closely  Monitor urine output  Continue with Bumex  Severe acute respiratory acidosis.  Her pCO2 was 142 on arrival.  Improved  Acute hypoxic respiratory failure.  Improved.  Now on nasal cannula.  Continue with Bumex  Renal cyst.  Has been evaluated by urology as outpatient.  Was seen by urology November 2022 and family decided conservative measures  Pneumonia  Atrial fibrillation  Hypotension.  Improved.  Lisinopril and Norvasc on hold    Mikey De La Paz MD  [facility]    This report has been created using voice recognition software. 
used walker in home    Restrictions/Precautions:  Restrictions/Precautions: Fall Risk, General Precautions  Position Activity Restriction  Other position/activity restrictions: monitor O2 sats     SUBJECTIVE: ANIA Velásquez approved therapy session. Patient supine in bed upon PTA arrival and agreeable to therapy session. A&Ox4/4. Patient requesting toileting task. Upon standing, patient incontinent of bladder. Patient positioned in BS chair at end of therapy session.    PAIN: 0/10: denies    Vitals: Vitals not assessed per clinical judgement, see nursing flowsheet  Vitals:    02/09/24 0936   BP: (!) 111/54   Pulse: 60   Resp: 20   Temp: 97.2 °F (36.2 °C)   SpO2: 94%   SpO2 dropping with functional mobility 79%. Cues provided for pursed lip breathing with O2 increasing >90% with quick recovery. Patient on 2L of O2 via nasal canula.    OBJECTIVE:  Bed Mobility:  Supine to Sit: Contact Guard Assistance, with head of bed flat, with rail, with increased time for completion  Scooting: Minimal Assistance, with head of bed flat, with rail, with increased time for completion    Transfers:  Sit to Stand: Contact Guard Assistance, with increased time for completion, cues for hand placement  Stand to Sit:Contact Guard Assistance, with increased time for completion, cues for hand placement  Toilet transfer: CGA, increased time required, cues for hand placement. Patient with difficulty reaching back with LUE d/t O2 sensor cord length unable to reach back.    Ambulation:  Contact Guard Assistance  Distance: 6'x1, 10'x1  Surface: Level Tile  Device:Rolling Walker  Gait Deviations:  Forward Flexed Posture, Slow Evelia, Decreased Step Length Bilaterally, Decreased Gait Speed, Decreased Heel Strike Bilaterally, Wide Base of Support, and cues required to assist RW navigation from restroom to BS chair.    Balance:  Static Sitting Balance:  Supervision  Dynamic Sitting Balance: Supervision  Static Standing Balance: Contact Guard Assistance, 
Associates    This report has been created using voice recognition software. It may contain minor errors which are inherent in voice recognition technology  
Good treatment tolerance      Discharge Recommendations: Subacute/skilled nursing facility  Equipment Recommendations: Equipment Needed: No (Continue to assess as patient progreses)  Plan: Times Per Week: 5x  Current Treatment Recommendations: Strengthening, Balance training, Functional mobility training, Endurance training, Safety education & training, Equipment evaluation, education, & procurement, Self-Care / ADL    Education:  Learners: Patient  Role of OT, Plan of Care, ADL's, IADL's, Reviewed Prior Education, Home Safety, and Importance of Increasing Activity    Goals  Short Term Goals  Time Frame for Short Term Goals: until discharge  Short Term Goal 1: Patient will complete LB dressing with min assistance and use of AE prn to improve independence in daily living.  Short Term Goal 2: Patient will complete toileting and hygiene with MIN A to improve independence in toileting.  Short Term Goal 3: Patient will complete various transfers with CGA and least restrictive device to improve safety in living environment.  Short Term Goal 4: Patient will demonstrate improved functional ambulation to/from bathroom with CGA and least restrictive device in prep for houshold distances.  Long Term Goals  Time Frame for Long Term Goals : None due to ELOS    Following session, patient left in safe position with all fall risk precautions in place.     Occupational therapy session supervised and note reviewed by HEBER Calloway/TONO   
T, APRN - CNP  [address] [address]  [phone]    Schedule an appointment as soon as possible for a visit in 2 month(s)  follow-up in 2 months with CXR PA/L and Full PFT prior to appointment with Dr. Wong         Discharge Medications:        Medication List        CHANGE how you take these medications      albuterol sulfate  (90 Base) MCG/ACT inhaler  Commonly known as: Ventolin HFA  Inhale 2 puffs into the lungs every 6 hours as needed for Wheezing or Shortness of Breath  What changed: when to take this     lisinopril 20 MG tablet  Commonly known as: PRINIVIL;ZESTRIL  Take 0.5 tablets by mouth daily  What changed: how much to take            CONTINUE taking these medications      atenolol 25 MG tablet  Commonly known as: TENORMIN  Take 1 tablet by mouth daily     calcium carbonate 600 MG Tabs tablet     cloNIDine 0.1 MG tablet  Commonly known as: CATAPRES  Take 1 tablet by mouth 2 times daily as needed for High Blood Pressure (for SBP >160) If over 160/100     Cranberry 1000 MG Caps     GLUCOSAMINE CHONDR COMPLEX PO     isosorbide mononitrate 30 MG extended release tablet  Commonly known as: IMDUR  Take 1 tablet by mouth daily     loteprednol 0.5 % ophthalmic suspension  Commonly known as: LOTEMAX     MULTI-VITAMIN DAILY PO     OCUVITE PO     potassium chloride 20 MEQ extended release tablet  Commonly known as: KLOR-CON M  Take 1 tablet by mouth daily     rivaroxaban 15 MG Tabs tablet  Commonly known as: XARELTO  Take 1 tablet by mouth daily     VITAMIN D-3 PO     vitamin E 100 units capsule            STOP taking these medications      amLODIPine 5 MG tablet  Commonly known as: NORVASC     bumetanide 1 MG tablet  Commonly known as: BUMEX     cefdinir 300 MG capsule  Commonly known as: OMNICEF            ASK your doctor about these medications      nitroGLYCERIN 0.4 MG SL tablet  Commonly known as: NITROSTAT  up to max of 3 total doses. If no relief after 1 dose, call 911.        
recognition technology  
   Vaping Use: Never used   Substance and Sexual Activity    Alcohol use: No    Drug use: No    Sexual activity: Not Currently   Other Topics Concern    Not on file   Social History Narrative    - Never     Social Determinants of Health     Financial Resource Strain: Low Risk  (5/23/2022)    Overall Financial Resource Strain (CARDIA)     Difficulty of Paying Living Expenses: Not hard at all   Food Insecurity: No Food Insecurity (1/29/2024)    Hunger Vital Sign     Worried About Running Out of Food in the Last Year: Never true     Ran Out of Food in the Last Year: Never true   Transportation Needs: No Transportation Needs (1/29/2024)    PRAPARE - Transportation     Lack of Transportation (Medical): No     Lack of Transportation (Non-Medical): No   Physical Activity: Not on file   Stress: Not on file   Social Connections: Not on file   Intimate Partner Violence: Not on file   Housing Stability: Low Risk  (1/29/2024)    Housing Stability Vital Sign     Unable to Pay for Housing in the Last Year: No     Number of Places Lived in the Last Year: 1     Unstable Housing in the Last Year: No     Family History          Problem Relation Age of Onset    Cancer Mother     High Blood Pressure Father     Arthritis Father     Heart Attack Father     Heart Disease Father     Cancer Sister     Diabetes Sister     Cancer Brother     Early Death Brother     Cancer Sister     Diabetes Sister        Occupational history   Occupation:  She is current working: No  Type of profession: retired.                       Subjective   Patient was seen and examined bedside today, she was again sitting in her chair however she was on oxygen today.  She had 1 episode of hypoxia yesterday O2 saturation 86 and was put on O2.  She stated she did not feel short of breath and continues to improve well overall.  They did do her home BiPAP evaluation overnight along with overnight pulse oximetry.  Continue BiPAP at night and with daytime naps.  
Potential Risks and Complications Due to Impairment/Illness/Injury, and Education Related to Health Promotion and Wellness  Evaluation of Education: Verbalizes understanding and Needs further instruction    PLAN:  No further speech therapy services indicated.      Breanna Bell M.A., CCC-SLP [id number]        
for Short Term Goals: at discharge  Short Term Goal 1: Pt to be Mod I for supine <> sit to get in/out of bed  Short Term Goal 2: Pt to be Mod I for sit <> stand to get up to ambulate  Short Term Goal 3: Pt to ambulate >15 ft with RW with CGA for household distances  Short Term Goal 4: Pt to negotiate 2 steps with 1 rail with CGA for home access  Long Term Goals  Time Frame for Long Term Goals : not set due to short ELOS    Following session, patient left in safe position with all fall risk precautions in place.    
left pleural effusion is also suspected to be increased. This document has been electronically signed by: Marciano Yung M.D. on 01/29/2024 12:31 AM      Electronically signed by Iker Abad MD on 2/3/2024 at 10:00 AM   
medical care. Face to face evaluation and examination was performed. Case discussed with Dr. Manny Zambrano. Agree with Certified nurse practitioner's findings and plan as documented in the Certified nurse practitioner's note. Italicized font, if present,  represents changes to the note made by me. More than 50% of the encounter time involved with patient care by the Pulmonary & Critical care service team spent by me.     Please see my modifications mentioned below:    - Since weaned to RA with good response from diuresis, net -10.5L since admission  - Continue diuresis per Nephrology team   - Plan to perform home BiPAP evaluation prior to discharge home  - Continue IS x10/hr while awake  - Remainder per resident note       Umberto Wong, DO   Pulmonary & Critical Care       
Comparison: CR/SR - XR CHEST PORTABLE - 01/19/2024 05:19 PM EST Findings: The pulmonary arteries are well opacified. Scattered plaque is seen in the thoracic aorta without aneurysm or dissection. The heart is enlarged. There is no pericardial effusion. There are no enlarged mediastinal or hilar lymph nodes. The thyroid is unremarkable. Small bilateral pleural effusions with lower lobe dependent interlobular septal thickening. Scattered patchy centrilobular and subpleural consolidations in the right upper lung with milder involvement in the right middle and lower lungs. No pneumothorax. Calcified granulomas in the liver and spleen. No acute fractures.     1. No pulmonary embolus. 2. Cardiomegaly with small bilateral pleural effusions and mild lower lobe predominant interlobular septal thickening in the setting of interstitial edema. Mild CHF is possible. 3. Nonspecific patchy centrilobular and subpleural consolidations greatest in the right upper lung with milder involvement in the right middle and lower lungs. Pulmonary edema is possible in the setting of CHF but can be followed to exclude infectious infiltrates. This document has been electronically signed by: Bay Luo MD on 01/19/2024 08:14 PM All CTs at this facility use dose modulation techniques and iterative reconstructions, and/or weight-based dosing when appropriate to reduce radiation to a low as reasonably achievable. 3D Post-processing was performed on this study.    XR CHEST PORTABLE    Result Date: 1/19/2024  Chest radiograph AP view Comparison: CR/SR - XR CHEST PORTABLE - 12/08/2023 02:30 AM EST Findings: Cardiomegaly, same as before. Left imbedded cardiac device in stable location. Mildly progressive interstitial thickening bilaterally. Developing rounded densities of the right upper lobe and left suprahilar region. Right lung base granuloma is unchanged. New small left pleural effusion. No pneumothorax. No acute fracture.     Impression: 1. 
in both lung bases. Overall, the infiltrates have worsened. There is a possible small left-sided pleural effusion. The pulmonary vascularity is normal.     Worsening bilateral infiltrates. **This report has been created using voice recognition software. It may contain minor errors which are inherent in voice recognition technology.** Final report electronically signed by Dr. Larissa Garcia on 1/22/2024 7:19 AM    CTA CHEST W WO CONTRAST    Result Date: 1/19/2024  CT angiography chest with contrast. 3D Postprocessing. Comparison: CR/SR - XR CHEST PORTABLE - 01/19/2024 05:19 PM EST Findings: The pulmonary arteries are well opacified. Scattered plaque is seen in the thoracic aorta without aneurysm or dissection. The heart is enlarged. There is no pericardial effusion. There are no enlarged mediastinal or hilar lymph nodes. The thyroid is unremarkable. Small bilateral pleural effusions with lower lobe dependent interlobular septal thickening. Scattered patchy centrilobular and subpleural consolidations in the right upper lung with milder involvement in the right middle and lower lungs. No pneumothorax. Calcified granulomas in the liver and spleen. No acute fractures.     1. No pulmonary embolus. 2. Cardiomegaly with small bilateral pleural effusions and mild lower lobe predominant interlobular septal thickening in the setting of interstitial edema. Mild CHF is possible. 3. Nonspecific patchy centrilobular and subpleural consolidations greatest in the right upper lung with milder involvement in the right middle and lower lungs. Pulmonary edema is possible in the setting of CHF but can be followed to exclude infectious infiltrates. This document has been electronically signed by: Bay Luo MD on 01/19/2024 08:14 PM All CTs at this facility use dose modulation techniques and iterative reconstructions, and/or weight-based dosing when appropriate to reduce radiation to a low as reasonably achievable. 3D Post-processing was 
granuloma is unchanged. New small left pleural effusion. No pneumothorax. No acute fracture.     Impression: 1. Developing rounded densities of the bilateral upper lobes, may reflect infection or pulmonary nodules, recommend CT chest with IV contrast for further evaluation. 2. Cardiomegaly, increased pulmonary interstitial edema and new small left pleural effusion, concerning for developing congestive heart failure. This document has been electronically signed by: Aditi Sebastian MD on 01/19/2024 05:54 PM      This note was dictated using M*Modal Fluency Direct so please excuse any grammatical or syntax errors as no guarantees can be provided that every mistake has been identified and corrected by editing.      Electronically signed by Albert Cortez MD on 2/7/2024 at 1:22 PM

## 2024-02-15 ENCOUNTER — CLINICAL DOCUMENTATION (OUTPATIENT)
Dept: SPIRITUAL SERVICES | Facility: CLINIC | Age: 89
End: 2024-02-15

## 2024-02-15 ENCOUNTER — NURSE ONLY (OUTPATIENT)
Dept: CARDIOLOGY CLINIC | Age: 89
End: 2024-02-15
Payer: MEDICARE

## 2024-02-15 DIAGNOSIS — Z95.0 PACEMAKER: Primary | ICD-10-CM

## 2024-02-15 PROCEDURE — 93279 PRGRMG DEV EVAL PM/LDLS PM: CPT | Performed by: INTERNAL MEDICINE

## 2024-02-15 NOTE — ACP (ADVANCE CARE PLANNING)
Advance Care Planning   Advance Care Planning Note  Ambulatory Spiritual Care Services    Date:  2/15/2024    Received request from patient.    Consultation conversation participants:   Patient who understands ACP conversation  Patient's child(tran)     Goals of ACP Conversation:  Discuss advance care planning documents    Health Care Decision Makers:      Primary Decision Maker: Fam Pacheco - Child - [phone]    Secondary Decision Maker: Sheela Hdez - Child - [phone]    Supplemental (Other) Decision Maker: Toñito Pacheco - Child - [phone]   Summary:  Completed New Documents  Updated Healthcare Decision Maker    Advance Care Planning Documents (Patient Wishes)  Currently on file:   Healthcare Power of /Advance Directive Appointment of Health Care Agent  Living Will/Advance Directive  DNR-CCA  Do Not Intubate    Assessment:    met with Daniela, as well as her daughter (Sheela) and son (Fam), to provide support for the completion of Advance Directives documents as part of an Advance Care Planning conversation.  She was alert and oriented with decision-making capacity to express her wishes at this time.    Interventions:  Provided education on documents for clarity and greater understanding  Assisted in the completion of documents according to patient's wishes at this time  Encouraged ongoing ACP conversation with future decision makers and loved ones    Outcomes:  ACP Discussion: Completed  New advance directive completed.  Returned original document(s) to patient, as well as copies for distribution to appointed agents  Copy of advance directive given to staff to scan into medical record.  Routed ACP note to attending provider or other IDT member.      Electronically signed by Chaplain Willem on 2/15/2024 at 4:20 PM.

## 2024-02-15 NOTE — PROGRESS NOTES
In Office [employer] single pacemaker   Patient of [employer]    Battery 10.5 years    Presenting rhythm   Underlying VS 50-70    RV impedance 399    RV sense 11.9    RV paced 96%    V Thresholds 0.5@0.40  V Amplitude 2.0 @ 0.40    Episodes   none

## 2024-04-17 NOTE — CONSULTS
[facility]    [address], [place] [address]  [phone]     Hospital Consult  2/7/2024 2:10 PM    Pt Name:    Daniela Pacheco  MRN:     [id number]   [id number]  YOB: 1929  Admit Date:    1/28/2024 11:48 PM  Primary Care Physician:  Gaudencio Sepulveda    [facility] Number:   [id number]    Reason for Consult:  Hyponatremia  Requesting provider:  Hospitalist    History:   The patient is a 89 y.o. elderly female with history of hypertension, pacemaker, diabetes, skin cancer, HFpEF, pulmonary hypertension, hypertension, diabetes who was admitted almost 10 days ago after she was brought to the emergency room for evaluation of less responsiveness, shortness of breath.  Apparently patient had been sleeping more.  She was progressively getting short of breath.  She was not eating or drinking much.  She was sent to the emergency room by family.  Apparently she was requiring 10 L nonrebreather on arrival to the ER.  She was then admitted by the hospitalist service.  Was started on BiPAP support.  She was recently treated for right lung pneumonia.  Since admission underwent CTA which was negative for PE.  She was also noted to have some pulm edema.  Initially she was on Bumex drip which was stopped and now placed on oral Bumex.  Currently on antibiotics.  Also has chronic atrial fibrillation.  Nephrology consulted today due to persistent and worsening hyponatremia.  Patient seen and examined this morning.  [facility] historian.  Discussed with nursing staff.  Patient is on nasal cannula    Past Medical History:  Past Medical History:   Diagnosis Date    Arthritis     Atrial fibrillation (HCC)     Cancer (HCC)     Skin Cancer    CHF (congestive heart failure) (HCC)     Chronic renal disease, stage III (HCC) [178667] 05/18/2022    Gross hematuria 2014    Dr Zayas    Hepatic lesion     HTN (hypertension)     Hyperlipidemia     [employer] single pacemaker  03/07/2023    Pneumonia 12/02/2018     Refill Requested:  Adderall XR 30mg     Recommended Follow Up: 6 months  No Show/Cancel: 0  Next visit: Visit date not found  Last office visit:2/19/24  Pt was notified to schedule an appointment.     Ordered date: 2/19/24  Last RX dispensed (per PDMP): 3/24/24    Controlled Med - Routed to Provider due to NO PROTOCOL. Orders have been prepped according to providers note.